# Patient Record
Sex: FEMALE | Race: WHITE | NOT HISPANIC OR LATINO | Employment: OTHER | URBAN - METROPOLITAN AREA
[De-identification: names, ages, dates, MRNs, and addresses within clinical notes are randomized per-mention and may not be internally consistent; named-entity substitution may affect disease eponyms.]

---

## 2017-01-17 DIAGNOSIS — F03.91 DEMENTIA WITH BEHAVIORAL DISTURBANCE, UNSPECIFIED DEMENTIA TYPE: ICD-10-CM

## 2017-01-17 RX ORDER — MEMANTINE HYDROCHLORIDE 28 MG/1
CAPSULE, EXTENDED RELEASE ORAL
OUTPATIENT
Start: 2017-01-17

## 2017-01-26 DIAGNOSIS — F03.91 DEMENTIA WITH BEHAVIORAL DISTURBANCE, UNSPECIFIED DEMENTIA TYPE: ICD-10-CM

## 2017-01-26 RX ORDER — MEMANTINE HYDROCHLORIDE 28 MG/1
CAPSULE, EXTENDED RELEASE ORAL
Qty: 30 EACH | Refills: 3 | Status: SHIPPED | OUTPATIENT
Start: 2017-01-26 | End: 2017-04-19 | Stop reason: SDUPTHER

## 2017-02-20 DIAGNOSIS — F02.818 DEMENTIA ASSOCIATED WITH OTHER UNDERLYING DISEASE WITH BEHAVIORAL DISTURBANCE: ICD-10-CM

## 2017-02-20 RX ORDER — DONEPEZIL HYDROCHLORIDE 5 MG/1
5 TABLET, FILM COATED ORAL EVERY MORNING
Qty: 30 TABLET | Refills: 11 | Status: SHIPPED | OUTPATIENT
Start: 2017-02-20 | End: 2017-04-19 | Stop reason: SDUPTHER

## 2017-03-24 RX ORDER — LOVASTATIN 10 MG/1
TABLET ORAL
Qty: 30 TABLET | Refills: 11 | Status: ON HOLD | OUTPATIENT
Start: 2017-03-24 | End: 2018-07-14 | Stop reason: HOSPADM

## 2017-04-19 DIAGNOSIS — F02.818 DEMENTIA ASSOCIATED WITH OTHER UNDERLYING DISEASE WITH BEHAVIORAL DISTURBANCE: ICD-10-CM

## 2017-04-19 DIAGNOSIS — F03.91 DEMENTIA WITH BEHAVIORAL DISTURBANCE, UNSPECIFIED DEMENTIA TYPE: ICD-10-CM

## 2017-04-19 RX ORDER — MEMANTINE HYDROCHLORIDE 28 MG/1
1 CAPSULE, EXTENDED RELEASE ORAL DAILY
Qty: 90 EACH | Refills: 3 | Status: SHIPPED | OUTPATIENT
Start: 2017-04-19 | End: 2018-04-12 | Stop reason: SDUPTHER

## 2017-04-19 RX ORDER — DONEPEZIL HYDROCHLORIDE 5 MG/1
5 TABLET, FILM COATED ORAL EVERY MORNING
Qty: 90 TABLET | Refills: 3 | Status: SHIPPED | OUTPATIENT
Start: 2017-04-19 | End: 2018-04-10 | Stop reason: SDUPTHER

## 2017-05-01 ENCOUNTER — OFFICE VISIT (OUTPATIENT)
Dept: INTERNAL MEDICINE | Facility: CLINIC | Age: 72
End: 2017-05-01
Payer: MEDICARE

## 2017-05-01 VITALS
OXYGEN SATURATION: 96 % | WEIGHT: 135.81 LBS | HEART RATE: 81 BPM | BODY MASS INDEX: 23.18 KG/M2 | HEIGHT: 64 IN | DIASTOLIC BLOOD PRESSURE: 60 MMHG | SYSTOLIC BLOOD PRESSURE: 130 MMHG

## 2017-05-01 DIAGNOSIS — H90.5 DEAFNESS CONGENITAL: ICD-10-CM

## 2017-05-01 DIAGNOSIS — E13.9 DIABETES MELLITUS DUE TO ABNORMAL INSULIN: Primary | ICD-10-CM

## 2017-05-01 DIAGNOSIS — E78.5 HYPERLIPIDEMIA, UNSPECIFIED HYPERLIPIDEMIA TYPE: ICD-10-CM

## 2017-05-01 DIAGNOSIS — F79 MENTAL RETARDATION: ICD-10-CM

## 2017-05-01 DIAGNOSIS — M54.89 OTHER BACK PAIN, UNSPECIFIED CHRONICITY: ICD-10-CM

## 2017-05-01 DIAGNOSIS — I10 ESSENTIAL HYPERTENSION: ICD-10-CM

## 2017-05-01 PROCEDURE — 99213 OFFICE O/P EST LOW 20 MIN: CPT | Mod: PBBFAC | Performed by: INTERNAL MEDICINE

## 2017-05-01 PROCEDURE — 99215 OFFICE O/P EST HI 40 MIN: CPT | Mod: S$PBB,,, | Performed by: INTERNAL MEDICINE

## 2017-05-01 PROCEDURE — 99999 PR PBB SHADOW E&M-EST. PATIENT-LVL III: CPT | Mod: PBBFAC,,, | Performed by: INTERNAL MEDICINE

## 2017-05-01 NOTE — PROGRESS NOTES
CHIEF COMPLAINT: Followup of diabetes, hypertension, hyperlipidemia, reflux.       HISTORY OF PRESENT ILLNESS: This is a 71-year-old woman who presents with her facilitator, Izabelfor followup of above. All history is obtained from Izabel due her mental handicapped and hearing loss. Memory has stabilized. She is now taking Aricept 5 mg daily and Namenda XR 28 mg daily. Memory is the same. She continues to see Dr Walters in neurology.  She continues to see Dr Cardenas in psychiatry who has given her haldol 5 mg once daily as needed when she gets agitated just prior her next Haldol injection (which is every 2 weeks). Have only had to have the prn Haldol once since our last visit. She continues to take Seroquel 200 mg at bedtime and Cogentin 1 mg twce daily and Cymbalta 30 mg daily.       Blood sugars have been doing well. She is checking blood sugars daily. She continues to take metformin 500 mg twice daily. No hypoglycemia. NO diarrhea. Weight is stable       Blood pressure has been well controlled on lisinopril 20 mg daily. No cough or shortness of breath. She continues to take lovastatin 10 mg at bedtime for hyperlipidemia. No joint pain, muscle pain, chest pain, shortness of breath, palpitaitons      No heartburn on Pepcid 40 mg daily. No nausea, vomiting, constipation, diarrhea.       She continues to complain of back pain intermittently. She does her exercises at home which helps. She continues to take tylenol 500 mg 2 tablets twice daily, Celebrex 200 mg daily, gabapentin 300 mg at bedtime and zanaflex 2 mg 2 tablets every 6 hours as needed. She sleeps well.                  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       PHYSICAL EXAMINATION:     /60 (BP Location: Left arm, Patient Position: Sitting, BP Method: Manual)  Pulse 81  Ht 5' 4" (1.626 m)  Wt 61.6 kg (135 lb 12.9 oz)  SpO2 96%  BMI 23.31 kg/m2    GENERAL: She is alert, oriented, no apparent distress. Affect within normal limits.    Conjunctiva anicteric. Tympanic membranes clear. Oropharynx clear.    NECK: Supple.    Respiratory: Effort normal. Lungs are clear to auscultation.    HEART: Regular rate and rhythm without murmurs, gallops or rubs.    No lower extremity edema.  ABDOMEN: soft, non distended, non tender, bowel sounds present, no hepatosplenomgaly  BREAST: no abn seen, no nodules palpated, no axillary lad  Protective Sensation (w/ 10 gram monofilament):  Right: Intact  Left: Intact    Visual Inspection:  Normal -  Bilateral    Pedal Pulses:   Right: Present  Left: Present    Posterior tibialis:   Right:Present  Left: Present              ASSESSMENT AND PLAN:    1. Memory loss - on Aricept and Namenda XR 28 mg daily  3. Diabetes mellitus - labs    4. Back pain -better  5. Hypertension - controlled.    6. Hyperlipidemia - stable    7. Reflux - controlled.    8. Schizoaffective disorder with memory issues - follow up with neurology and psychiatry    9. Screening - mammogram 11/16. Colonoscopy is due 2018. Normal bone density 09/2011.    I will see her back in 6 months, sooner if problems arise.    TO have labs done at [facility]           "

## 2017-05-11 ENCOUNTER — LAB VISIT (OUTPATIENT)
Dept: LAB | Facility: HOSPITAL | Age: 72
End: 2017-05-11
Attending: INTERNAL MEDICINE
Payer: MEDICARE

## 2017-05-11 DIAGNOSIS — E13.9 MATURITY ONSET DIABETES MELLITUS IN YOUNG: Primary | ICD-10-CM

## 2017-05-11 LAB
ALBUMIN SERPL BCP-MCNC: 4 G/DL
ALP SERPL-CCNC: 88 U/L
ALT SERPL W/O P-5'-P-CCNC: 9 U/L
ANION GAP SERPL CALC-SCNC: 10 MMOL/L
AST SERPL-CCNC: 16 U/L
BASOPHILS # BLD AUTO: 0.04 K/UL
BASOPHILS NFR BLD: 0.6 %
BILIRUB SERPL-MCNC: 0.3 MG/DL
BUN SERPL-MCNC: 15 MG/DL
CALCIUM SERPL-MCNC: 10.1 MG/DL
CHLORIDE SERPL-SCNC: 103 MMOL/L
CHOLEST/HDLC SERPL: 3.9 {RATIO}
CO2 SERPL-SCNC: 28 MMOL/L
CREAT SERPL-MCNC: 0.8 MG/DL
DIFFERENTIAL METHOD: NORMAL
EOSINOPHIL # BLD AUTO: 0.3 K/UL
EOSINOPHIL NFR BLD: 4 %
ERYTHROCYTE [DISTWIDTH] IN BLOOD BY AUTOMATED COUNT: 13.4 %
EST. GFR  (AFRICAN AMERICAN): >60 ML/MIN/1.73 M^2
EST. GFR  (NON AFRICAN AMERICAN): >60 ML/MIN/1.73 M^2
ESTIMATED AVG GLUCOSE: 114 MG/DL
GLUCOSE SERPL-MCNC: 99 MG/DL
HBA1C MFR BLD HPLC: 5.6 %
HCT VFR BLD AUTO: 39.4 %
HDL/CHOLESTEROL RATIO: 25.5 %
HDLC SERPL-MCNC: 149 MG/DL
HDLC SERPL-MCNC: 38 MG/DL
HGB BLD-MCNC: 12.9 G/DL
LDLC SERPL CALC-MCNC: 68.2 MG/DL
LYMPHOCYTES # BLD AUTO: 1.9 K/UL
LYMPHOCYTES NFR BLD: 29.6 %
MCH RBC QN AUTO: 28.5 PG
MCHC RBC AUTO-ENTMCNC: 32.7 %
MCV RBC AUTO: 87 FL
MONOCYTES # BLD AUTO: 0.7 K/UL
MONOCYTES NFR BLD: 11.3 %
NEUTROPHILS # BLD AUTO: 3.4 K/UL
NEUTROPHILS NFR BLD: 54.3 %
NONHDLC SERPL-MCNC: 111 MG/DL
PLATELET # BLD AUTO: 297 K/UL
PMV BLD AUTO: 9.6 FL
POTASSIUM SERPL-SCNC: 3.9 MMOL/L
PROT SERPL-MCNC: 8 G/DL
RBC # BLD AUTO: 4.52 M/UL
SODIUM SERPL-SCNC: 141 MMOL/L
TRIGL SERPL-MCNC: 214 MG/DL
TSH SERPL DL<=0.005 MIU/L-ACNC: 0.71 UIU/ML
WBC # BLD AUTO: 6.29 K/UL

## 2017-05-11 PROCEDURE — 80053 COMPREHEN METABOLIC PANEL: CPT

## 2017-05-11 PROCEDURE — 80061 LIPID PANEL: CPT

## 2017-05-11 PROCEDURE — 36415 COLL VENOUS BLD VENIPUNCTURE: CPT

## 2017-05-11 PROCEDURE — 84443 ASSAY THYROID STIM HORMONE: CPT

## 2017-05-11 PROCEDURE — 83036 HEMOGLOBIN GLYCOSYLATED A1C: CPT

## 2017-05-11 PROCEDURE — 85025 COMPLETE CBC W/AUTO DIFF WBC: CPT

## 2017-05-19 DIAGNOSIS — F25.9 CHRONIC SCHIZOAFFECTIVE DISORDER: ICD-10-CM

## 2017-05-22 RX ORDER — QUETIAPINE FUMARATE 200 MG/1
TABLET, FILM COATED ORAL
Qty: 30 TABLET | Refills: 1 | Status: SHIPPED | OUTPATIENT
Start: 2017-05-22 | End: 2017-07-19 | Stop reason: SDUPTHER

## 2017-05-22 RX ORDER — BENZTROPINE MESYLATE 1 MG/1
TABLET ORAL
Qty: 60 TABLET | Refills: 1 | Status: SHIPPED | OUTPATIENT
Start: 2017-05-22 | End: 2017-07-19 | Stop reason: SDUPTHER

## 2017-05-22 RX ORDER — DULOXETIN HYDROCHLORIDE 30 MG/1
CAPSULE, DELAYED RELEASE ORAL
Qty: 30 CAPSULE | Refills: 1 | Status: SHIPPED | OUTPATIENT
Start: 2017-05-22 | End: 2017-07-19 | Stop reason: SDUPTHER

## 2017-06-22 ENCOUNTER — HOSPITAL ENCOUNTER (EMERGENCY)
Facility: HOSPITAL | Age: 72
Discharge: HOME | End: 2017-06-23
Attending: EMERGENCY MEDICINE
Payer: MEDICARE
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
## 2017-06-22 DIAGNOSIS — S00.81XA ABRASION, FACE W/O INFECTION: ICD-10-CM

## 2017-06-22 PROCEDURE — 99284 EMERGENCY DEPT VISIT MOD MDM: CPT | Mod: ,,, | Performed by: EMERGENCY MEDICINE

## 2017-06-22 PROCEDURE — 99284 EMERGENCY DEPT VISIT MOD MDM: CPT | Mod: 25

## 2017-06-23 VITALS
DIASTOLIC BLOOD PRESSURE: 73 MMHG | WEIGHT: 140 LBS | RESPIRATION RATE: 16 BRPM | TEMPERATURE: 99 F | HEART RATE: 72 BPM | BODY MASS INDEX: 24.03 KG/M2 | OXYGEN SATURATION: 98 % | SYSTOLIC BLOOD PRESSURE: 167 MMHG

## 2017-06-23 PROCEDURE — 96374 THER/PROPH/DIAG INJ IV PUSH: CPT

## 2017-06-23 PROCEDURE — 25000003 PHARM REV CODE 250: Performed by: STUDENT IN AN ORGANIZED HEALTH CARE EDUCATION/TRAINING PROGRAM

## 2017-06-23 RX ORDER — LABETALOL HYDROCHLORIDE 5 MG/ML
10 INJECTION, SOLUTION INTRAVENOUS
Status: COMPLETED | OUTPATIENT
Start: 2017-06-23 | End: 2017-06-23

## 2017-06-23 RX ORDER — IBUPROFEN 600 MG/1
600 TABLET ORAL EVERY 6 HOURS PRN
Qty: 20 TABLET | Refills: 0 | Status: SHIPPED | OUTPATIENT
Start: 2017-06-23 | End: 2017-11-03

## 2017-06-23 RX ADMIN — LABETALOL HYDROCHLORIDE 10 MG: 5 INJECTION, SOLUTION INTRAVENOUS at 12:06

## 2017-06-23 NOTE — ED PROVIDER NOTES
Encounter Date: 6/22/2017    SCRIBE #1 NOTE: I, Ranjeet Maynard, am scribing for, and in the presence of,  Dr. Farfan . I have scribed the following portions of the note - the Resident attestation.       History     Chief Complaint   Patient presents with    Fall     Patient is a 70yo F with history of Dementia, HTN and DM who presents with face pain after a fall from standing today. Patient and the caregiver note she was walking in the park with her group when she tripped over her shoe and fell forward, striking her head on the ground. No LOC reported. No nausea/vomiting reported. Patient is acting appropriately per the caregiver at the bedside. Patient notes she fell on her L arm.           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 No pertinent surgical history.  Family History   Problem Relation Age of Onset    Depression Sister     Depression Brother     Suicide Brother     ADD / ADHD Neg Hx     Alcohol abuse Neg Hx     Anxiety disorder Neg Hx     Bipolar disorder Neg Hx     Dementia Neg Hx     Drug abuse Neg Hx     OCD Neg Hx     Paranoid behavior Neg Hx     Physical abuse Neg Hx     Schizophrenia Neg Hx     Seizures Neg Hx     Self injury Neg Hx     Sexual abuse Neg Hx      Social History   Substance Use Topics    Smoking status: Never Smoker    Smokeless tobacco: Never Used    Alcohol use No     Review of Systems   Constitutional: Negative for chills and fever.   HENT: Negative for congestion, rhinorrhea and sore throat.    Eyes: Negative for  visual disturbance.   Respiratory: Negative for cough and shortness of breath.    Cardiovascular: Negative for chest pain.   Gastrointestinal: Negative for abdominal pain, diarrhea, nausea and vomiting.   Genitourinary: Negative for dysuria.   Musculoskeletal: Negative for neck pain.   Skin: Positive for wound.   Neurological: Negative for dizziness, syncope and weakness.       Physical Exam     Initial Vitals [06/22/17 2056]   BP Pulse Resp Temp SpO2   (!) 170/80 73 18 98 °F (36.7 °C) 100 %      MAP       110         Physical Exam    Nursing note and vitals reviewed.  Constitutional: She appears well-developed and well-nourished. She is not diaphoretic. No distress.   Caregiver at bedside   HENT:   Head: Normocephalic and atraumatic.   Right Ear: External ear normal.   Left Ear: External ear normal.   Nose: Nose normal.   Mouth/Throat: Oropharynx is clear and moist. No oropharyngeal exudate.   No septal hematoma, no hemotympanum. Multiple superficial facial abrasions. No facial crepitus.    Eyes: EOM are normal. Pupils are equal, round, and reactive to light. Right eye exhibits no discharge. Left eye exhibits no discharge. No scleral icterus.   Neck: No tracheal deviation present. No JVD present.   Cardiovascular: Normal rate, regular rhythm, normal heart sounds and intact distal pulses. Exam reveals no gallop and no friction rub.    No murmur heard.  Pulmonary/Chest: Breath sounds normal. No respiratory distress. She has no wheezes. She has no rhonchi. She has no rales. She exhibits no tenderness.   Abdominal: Soft. Bowel sounds are normal. She exhibits no distension. There is no tenderness. There is no guarding.   Musculoskeletal: She exhibits no edema or tenderness.   Lymphadenopathy:     She has no cervical adenopathy.   Neurological: She is alert.   Moves all extremities and carries on conversation.   No C-spine tenderness. Full ROM.    Skin: Skin is warm and dry. Capillary refill takes less than 2 seconds.          ED Course   Procedures  Labs Reviewed - No data to display                APC / Resident Notes:   PGY-2 MDM:   -Patient is a 71 y.o. presenting with a chief complaint of mechanical fall from standing. Vital signs stable, patient afebrile, non-tachycardic and non-hypoxic.   -Will check CT head, Max Face for evidence of fracture. Will check CXR and Xr L shoulder for evidence of fracture.   -Patient will require blood pressure re-check in 1 week with primary care physician.     Selvin Oro DO  [employer] EM/IM PGY-2  6/23/2017 12:26 AM       Patient Care Update:  -CT imaging of the max face, head showed no acute process. CXR and Xr shoulder unremarkable.   -Will discharge.     Selvin Oro DO  [facility] EM/IM PGY-2  6/23/2017 2:14 AM          Scribe Attestation:   Scribe #1: I performed the above scribed service and the documentation accurately describes the services I performed. I attest to the accuracy of the note.    Attending Attestation:   Physician Attestation Statement for Resident:  As the supervising MD . -: 71 y.o. female presents to the ED after she tripped and fell. Pt has an abrasion to her face.      As the supervising MD I agree with the above PE.   -: Will do CT scan to evaluate for fracture or hemorrhage.            Physician Attestation for Scribe:  Physician Attestation Statement for Scribe #1: I, Dr. Farfan, reviewed documentation, as scribed by Ranjeet Maynard  in my presence, and it is both accurate and complete.                   ED Course     Clinical Impression:   Diagnoses of Fall, Fall, Fall, and Fall were pertinent to this visit.                           Selvin Oro MD  Resident  06/23/17 0417

## 2017-06-23 NOTE — ED TRIAGE NOTES
Pt presents to ED with c/o trip and fall. Pt hit her head and has abrasions to left side of face. Caretaker denies LOC. Pt also fel on left arm. No deformity noted. Pt denies pain. Pt has hx of dementia.

## 2017-06-23 NOTE — ED NOTES
Pt assisted to bed. Pt identifiers verified.    APPEARANCE: Pt is awake and alert. Pt is clean and well groomed with clothes appropriately fastened. Abrasions noted to left side of face.   RESPIRATORY: Respirations are even and unlabored. Airway is patent.   SKIN: Skin is warm, dry, intact and appropriately colored for ethnicity.   NEURO: Pt is moving all extremities without difficulty. Sensation is intact. Speech is clear and appropriate. Facial movement is symmetrical.   CARDIAC: No edema noted. Peripheral pulses are intact and palpable. Cap refill is <3 seconds.     Bed is in low, locked position with side rails upx2. Call light is in reach.

## 2017-07-12 DIAGNOSIS — F25.9 CHRONIC SCHIZOAFFECTIVE DISORDER: ICD-10-CM

## 2017-07-12 RX ORDER — QUETIAPINE FUMARATE 200 MG/1
TABLET, FILM COATED ORAL
Qty: 30 TABLET | OUTPATIENT
Start: 2017-07-12

## 2017-07-12 RX ORDER — BENZTROPINE MESYLATE 1 MG/1
TABLET ORAL
Qty: 60 TABLET | OUTPATIENT
Start: 2017-07-12

## 2017-07-12 RX ORDER — DULOXETIN HYDROCHLORIDE 30 MG/1
CAPSULE, DELAYED RELEASE ORAL
Qty: 30 CAPSULE | OUTPATIENT
Start: 2017-07-12

## 2017-07-19 ENCOUNTER — OFFICE VISIT (OUTPATIENT)
Dept: PSYCHIATRY | Facility: CLINIC | Age: 72
End: 2017-07-19
Payer: MEDICARE

## 2017-07-19 VITALS
HEART RATE: 84 BPM | HEIGHT: 64 IN | DIASTOLIC BLOOD PRESSURE: 78 MMHG | WEIGHT: 138 LBS | BODY MASS INDEX: 23.56 KG/M2 | SYSTOLIC BLOOD PRESSURE: 175 MMHG

## 2017-07-19 DIAGNOSIS — F25.9 CHRONIC SCHIZOAFFECTIVE DISORDER: Primary | ICD-10-CM

## 2017-07-19 DIAGNOSIS — E13.9 DIABETES MELLITUS DUE TO ABNORMAL INSULIN: ICD-10-CM

## 2017-07-19 DIAGNOSIS — I10 ESSENTIAL HYPERTENSION: ICD-10-CM

## 2017-07-19 DIAGNOSIS — F25.9 CHRONIC SCHIZOAFFECTIVE DISORDER: ICD-10-CM

## 2017-07-19 DIAGNOSIS — E78.5 HYPERLIPIDEMIA, UNSPECIFIED HYPERLIPIDEMIA TYPE: ICD-10-CM

## 2017-07-19 DIAGNOSIS — H90.5 DEAFNESS CONGENITAL: ICD-10-CM

## 2017-07-19 DIAGNOSIS — F03.91 DEMENTIA WITH BEHAVIORAL DISTURBANCE, UNSPECIFIED DEMENTIA TYPE: ICD-10-CM

## 2017-07-19 PROCEDURE — 99213 OFFICE O/P EST LOW 20 MIN: CPT | Mod: PBBFAC | Performed by: PSYCHIATRY & NEUROLOGY

## 2017-07-19 PROCEDURE — 1159F MED LIST DOCD IN RCRD: CPT | Mod: ,,, | Performed by: PSYCHIATRY & NEUROLOGY

## 2017-07-19 PROCEDURE — 99999 PR PBB SHADOW E&M-EST. PATIENT-LVL III: CPT | Mod: PBBFAC,,, | Performed by: PSYCHIATRY & NEUROLOGY

## 2017-07-19 PROCEDURE — 99214 OFFICE O/P EST MOD 30 MIN: CPT | Mod: S$PBB,,, | Performed by: PSYCHIATRY & NEUROLOGY

## 2017-07-19 PROCEDURE — 3044F HG A1C LEVEL LT 7.0%: CPT | Mod: ,,, | Performed by: PSYCHIATRY & NEUROLOGY

## 2017-07-19 RX ORDER — BENZTROPINE MESYLATE 1 MG/1
TABLET ORAL
Qty: 60 TABLET | Refills: 6 | Status: SHIPPED | OUTPATIENT
Start: 2017-07-19 | End: 2018-04-12 | Stop reason: SDUPTHER

## 2017-07-19 RX ORDER — DULOXETIN HYDROCHLORIDE 30 MG/1
CAPSULE, DELAYED RELEASE ORAL
Qty: 30 CAPSULE | Refills: 6 | Status: SHIPPED | OUTPATIENT
Start: 2017-07-19 | End: 2018-05-09 | Stop reason: SDUPTHER

## 2017-07-19 RX ORDER — BENZTROPINE MESYLATE 1 MG/1
TABLET ORAL
Qty: 60 TABLET | Refills: 1 | Status: SHIPPED | OUTPATIENT
Start: 2017-07-19 | End: 2017-07-19 | Stop reason: SDUPTHER

## 2017-07-19 RX ORDER — DULOXETIN HYDROCHLORIDE 30 MG/1
CAPSULE, DELAYED RELEASE ORAL
Qty: 30 CAPSULE | Refills: 1 | Status: SHIPPED | OUTPATIENT
Start: 2017-07-19 | End: 2017-07-19 | Stop reason: SDUPTHER

## 2017-07-19 RX ORDER — HALOPERIDOL 5 MG/1
5 TABLET ORAL DAILY PRN
Qty: 30 TABLET | Refills: 1 | Status: SHIPPED | OUTPATIENT
Start: 2017-07-19 | End: 2018-05-09 | Stop reason: SDUPTHER

## 2017-07-19 RX ORDER — QUETIAPINE FUMARATE 200 MG/1
TABLET, FILM COATED ORAL
Qty: 30 TABLET | Refills: 6 | Status: SHIPPED | OUTPATIENT
Start: 2017-07-19 | End: 2018-04-12 | Stop reason: SDUPTHER

## 2017-07-19 RX ORDER — QUETIAPINE FUMARATE 200 MG/1
TABLET, FILM COATED ORAL
Qty: 30 TABLET | Refills: 1 | Status: SHIPPED | OUTPATIENT
Start: 2017-07-19 | End: 2017-07-19 | Stop reason: SDUPTHER

## 2017-07-19 NOTE — PROGRESS NOTES
ESTABLISHED OUTPATIENT VISIT   E/M LEVEL 4: [id number]    ENCOUNTER DATE: 7/19/2017  SITE: [facility]      HISTORY    CHIEF COMPLAINT   Lay Peres is a 71 y.o. female who presents for followup of   Chief Complaint   Patient presents with    Mood Disorder    Memory Deficits       HPI   (Elements: Location, Quality, Severity, Duration, Timing, Content, Modifying Factors, Associated Signs & Symptoms)    Patient with cogenital deafness, possible MR, chronic schizoaffective disorder, and now dementia, followed by me for disease maintenance.  She comes with new  today.  Overall she is doing well save continued progression of memory loss - it may actually have stabilized to some degree but overall downward course.  She remains on aricept and namenda - both managed by neurology.  No significant agitation or behavioral problems.  She does have prn haldol for breakthrough agitation.  New  states she does have intermittent suspiciousness and some combativeness about showering.  The suspiciousness at times may be secondary to hallucinatory experience, at times may be secondary to memory loss.  She is not oversedated on medication - no falls, no grogginess.  Sleep intact.  No fatigue or drowsiness during day.  Weight up 9lbs intersession.  No movement side effects noted.  I discussed treatment plan and rationale with new .  No wandering off, leaving water/stove on, inappropriate clothing - she attends to some of her ADLs such as feeding and toileting - does wear a depends at night.  No depression or SI.  She did recently ask to live in a nursing home, which surprised her .   has been labeling things in her room to help assist with memory.      11/16/16 - no major changes other than worsening memory.  No med changes.  5/12/16 - no major changes other than worsening memory.  No med changes.  8/26/15 - progressive short term memory  loss but otherwise stable.  No med changes.  I discussed again with sister psychopharmacologic approach.  Of note, this is longstanding regimen started prior to seeing me.  We discussed both of the followin. Usually multiple neuroleptics avoided.  In this case she is on two neuroleptics - haldol decanoate and seroquel.  2. There is risk for cardiovascular events with neuroleptics in dementia.  However patient with comorbid schizoaffective disorder that long predates dementia and is indication for neuroleptic.  Sister understands risks and benefits of keeping med regimen as it is vs adjusting it.  Sister would like to continue regimen as is - states in past when adjustments made patient has decompensated requiring hospitalization.          ROS   Neurologic ROS: positive for - memory loss.  Negative for tremor, akathisia, or tardive dyskinesia  Musculoskeletal ROS: positive for - back pain   ROS: positive for urinary incontinence    PFSH  Past Medical History reviewed: Yes  Family History reviewed: Yes  Social History reviewed: No      PSYCHOTROPIC MEDICATIONS   Haldol dec 100mg IM q2 weeks - also Haldol 5mg prns  Seroquel 200mg bedtime  Cymbalta 30mg daily  Cogentin 1mg bid    Namenda  Aricept    EXAMINATION    VITALS   Most recent vital signs, dated less than 90 days prior to this appointment, were reviewed.   Vitals:    17 1651   BP: (!) 175/78   Pulse: 84       CONSTITUTIONAL  General Appearance: stated age, casually dressed    MUSCULOSKELETAL  Muscle Strength and Tone: no weakness or spasticity  Abnormal Involuntary Movements: no tremor noted.  No evidence of akathisia or tardive dyskinesia on my exam.  Gait and Station: ambulates without assistance    PSYCHIATRIC   Level of Consciousness: alert  Orientation: to person and place  Grooming: neat, intact  Psychomotor Behavior: no retardation or agitation.    Speech: rambles on at times, repetitive - often repeating self  Language: reads lips, some  dysarthria - difficult to comprehend  Mood: ok  Affect: animated, reactive, smiling, pleasant, euthymic.  No irritability noted.    Thought Process: perseverative, concrete, off topic at times  Associations: no loosening of associations  Thought Content: no SI.  +PI at times but none here with me  Memory: impaired  Attention: mild distractibility noted  Fund of Knowledge: limited  Insight: impaired  Judgment: intact    MEDICAL DECISION MAKING    DIAGNOSES AND MANAGEMENT PLANS    New problem(s) (3 points each):   - none    Established problem(s), worsening (2 points each):   - none    Established problem(s), stable/improved (1 point each):  - schizoaffective disorder.  cont current med regimen - this is longstanding regimen and it seems to be keeping her reasonably stable with no appreciable side effects.  - mental retardation.  Perhaps mild, IQ unknown.  Cont structured living program.  - congenital deafness.  Could be complicating proper diagnosis.  She reads lips.  Neurology notes it may be affecting memory diagnosis.  - dementia.  Neurology input appreciated, will cont to follow.  She is now on aricept and namenda.  As dementia progresses, we will likely need to taper down psychotropics - but she has not yet progressed to this point - this was again reviewed with new  today.    - metabolic syndrome.  Will monitor given she is on atypical neuroleptics.  Labs up to date - reviewed.  She is followed by Dr. Case.  Weight is up 9lbs.      PRESCRIPTION DRUG MANAGEMENT  Compliance: taking meds as prescribed  Side Effects: none reported  Regimen Adjustments: cont med regimen as prescribed.  [place] prescription monitoring site checked - no entries      DIAGNOSTIC TESTING  Follow cmp and fasting lipids on seroquel   5/11/17 - TSH, lipid panel, CMP, CBC, HgA1c reviewed  5/10/13 EKG QTc 428      Serg Cardenas

## 2017-09-07 ENCOUNTER — LAB VISIT (OUTPATIENT)
Dept: LAB | Facility: HOSPITAL | Age: 72
End: 2017-09-07
Attending: INTERNAL MEDICINE
Payer: MEDICARE

## 2017-09-07 DIAGNOSIS — E11.9 DIABETES MELLITUS WITHOUT COMPLICATION: Primary | ICD-10-CM

## 2017-09-07 LAB
ESTIMATED AVG GLUCOSE: 105 MG/DL
HBA1C MFR BLD HPLC: 5.3 %

## 2017-09-07 PROCEDURE — 83036 HEMOGLOBIN GLYCOSYLATED A1C: CPT

## 2017-10-05 ENCOUNTER — IMMUNIZATION (OUTPATIENT)
Dept: INTERNAL MEDICINE | Facility: CLINIC | Age: 72
End: 2017-10-05
Payer: MEDICARE

## 2017-10-05 PROCEDURE — 90662 IIV NO PRSV INCREASED AG IM: CPT | Mod: PBBFAC | Performed by: INTERNAL MEDICINE

## 2017-11-03 ENCOUNTER — OFFICE VISIT (OUTPATIENT)
Dept: INTERNAL MEDICINE | Facility: CLINIC | Age: 72
End: 2017-11-03
Payer: MEDICARE

## 2017-11-03 VITALS
SYSTOLIC BLOOD PRESSURE: 120 MMHG | DIASTOLIC BLOOD PRESSURE: 70 MMHG | BODY MASS INDEX: 23.93 KG/M2 | HEART RATE: 76 BPM | WEIGHT: 140.19 LBS | HEIGHT: 64 IN

## 2017-11-03 DIAGNOSIS — E13.9 DIABETES MELLITUS DUE TO ABNORMAL INSULIN: ICD-10-CM

## 2017-11-03 DIAGNOSIS — F79 MENTAL RETARDATION: ICD-10-CM

## 2017-11-03 DIAGNOSIS — Z12.31 SCREENING MAMMOGRAM, ENCOUNTER FOR: Primary | ICD-10-CM

## 2017-11-03 DIAGNOSIS — F03.91 DEMENTIA WITH BEHAVIORAL DISTURBANCE, UNSPECIFIED DEMENTIA TYPE: ICD-10-CM

## 2017-11-03 DIAGNOSIS — I10 ESSENTIAL HYPERTENSION: ICD-10-CM

## 2017-11-03 DIAGNOSIS — H90.5 DEAFNESS CONGENITAL: ICD-10-CM

## 2017-11-03 DIAGNOSIS — Z12.11 SCREENING FOR MALIGNANT NEOPLASM OF COLON: ICD-10-CM

## 2017-11-03 DIAGNOSIS — E78.5 HYPERLIPIDEMIA, UNSPECIFIED HYPERLIPIDEMIA TYPE: ICD-10-CM

## 2017-11-03 PROCEDURE — 99212 OFFICE O/P EST SF 10 MIN: CPT | Mod: PBBFAC | Performed by: INTERNAL MEDICINE

## 2017-11-03 PROCEDURE — 99999 PR PBB SHADOW E&M-EST. PATIENT-LVL II: CPT | Mod: PBBFAC,,, | Performed by: INTERNAL MEDICINE

## 2017-11-03 PROCEDURE — 99215 OFFICE O/P EST HI 40 MIN: CPT | Mod: S$PBB,,, | Performed by: INTERNAL MEDICINE

## 2017-11-03 NOTE — PROGRESS NOTES
CHIEF COMPLAINT: Followup of diabetes, hypertension, hyperlipidemia, reflux.       HISTORY OF PRESENT ILLNESS: This is a 72-year-old woman who presents with her sister followup of above. All history is obtained from sister due her mental handicapped and hearing loss. Memory has stabilized. She is now taking Aricept 5 mg daily and Namenda XR 28 mg daily. Memory is the same. She continues to see Dr Walters in neurology.  She continues to see Dr Cardenas in psychiatry who has given her haldol 5 mg once daily as needed when she gets agitated just prior her next Haldol injection (which is every 2 weeks). Have only had to have the prn Haldol once since our last visit. She continues to take Seroquel 200 mg at bedtime and Cogentin 1 mg twce daily and Cymbalta 30 mg daily.       Blood sugars have been doing well. She is checking blood sugars daily. She continues to take metformin 500 mg twice daily. No hypoglycemia. NO diarrhea. Weight is stable       Blood pressure has been well controlled on lisinopril 20 mg daily. No cough or shortness of breath. She continues to take lovastatin 10 mg at bedtime for hyperlipidemia. No joint pain, muscle pain, chest pain, shortness of breath, palpitaitons      No heartburn on Pepcid 40 mg daily. No nausea, vomiting, constipation, diarrhea.       She continues to complain of back pain intermittently. She does her exercises at home which helps. She continues to take tylenol 500 mg 2 tablets twice daily, Celebrex 200 mg daily, gabapentin 300 mg at bedtime and zanaflex 2 mg 2 tablets every 6 hours as needed. She sleeps well.                  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       PHYSICAL EXAMINATION:       /70   Pulse 76   Ht 5' 4" (1.626 m)   Wt 63.6 kg (140 lb 3.4 oz)   BMI 24.07 kg/m²     GENERAL: She is alert, oriented, no apparent distress. Affect within normal limits.    Conjunctiva anicteric. Tympanic membranes clear. Oropharynx clear.    NECK: Supple.    Respiratory: Effort normal. Lungs are clear to auscultation.    HEART: Regular rate and rhythm without murmurs, gallops or rubs.    No lower extremity edema.  ABDOMEN: soft, non distended, non tender, bowel sounds present, no hepatosplenomgaly  BREAST: no abn seen, no nodules palpated, no axillary lad                ASSESSMENT AND PLAN:    1. Memory loss - on Aricept and Namenda XR 28 mg daily  3. Diabetes mellitus - stable   4. Back pain -stable  5. Hypertension - controlled.    6. Hyperlipidemia - stable    7. Reflux - controlled.    8. Schizoaffective disorder with memory issues - follow up with neurology and psychiatry    9. Screening - mammogram 11/16 - ordered. Colonoscopy is due 2018. Normal bone density 09/2011.      [employer] was given to patient on 11/3/2017 10:21 AM         I will see her back in 6 months, sooner if problems arise.    TO have labs done at [facility]        "

## 2017-11-07 DIAGNOSIS — F02.818 DEMENTIA ASSOCIATED WITH OTHER UNDERLYING DISEASE WITH BEHAVIORAL DISTURBANCE: ICD-10-CM

## 2017-11-13 DIAGNOSIS — F25.9 CHRONIC SCHIZOAFFECTIVE DISORDER: ICD-10-CM

## 2017-11-13 RX ORDER — HALOPERIDOL DECANOATE 100 MG/ML
100 INJECTION INTRAMUSCULAR
Qty: 1 ML | Refills: 12 | Status: SHIPPED | OUTPATIENT
Start: 2017-11-13 | End: 2018-05-09 | Stop reason: SDUPTHER

## 2018-01-05 ENCOUNTER — HOSPITAL ENCOUNTER (OUTPATIENT)
Dept: RADIOLOGY | Facility: HOSPITAL | Age: 73
Discharge: HOME | End: 2018-01-05
Attending: INTERNAL MEDICINE
Payer: MEDICARE

## 2018-01-05 DIAGNOSIS — Z12.31 SCREENING MAMMOGRAM, ENCOUNTER FOR: ICD-10-CM

## 2018-01-05 PROCEDURE — 77067 SCR MAMMO BI INCL CAD: CPT | Mod: TC

## 2018-01-05 PROCEDURE — 77067 SCR MAMMO BI INCL CAD: CPT | Mod: 26,,, | Performed by: RADIOLOGY

## 2018-01-05 PROCEDURE — 77063 BREAST TOMOSYNTHESIS BI: CPT | Mod: 26,,, | Performed by: RADIOLOGY

## 2018-02-22 ENCOUNTER — HOSPITAL ENCOUNTER (EMERGENCY)
Facility: HOSPITAL | Age: 73
Discharge: HOME | End: 2018-02-22
Attending: EMERGENCY MEDICINE
Payer: MEDICARE

## 2018-02-22 VITALS
HEIGHT: 65 IN | HEART RATE: 77 BPM | BODY MASS INDEX: 24.16 KG/M2 | OXYGEN SATURATION: 99 % | RESPIRATION RATE: 20 BRPM | DIASTOLIC BLOOD PRESSURE: 77 MMHG | TEMPERATURE: 98 F | WEIGHT: 145 LBS | SYSTOLIC BLOOD PRESSURE: 171 MMHG
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
## 2018-02-22 DIAGNOSIS — S09.90XA INJURY OF HEAD, INITIAL ENCOUNTER: Primary | ICD-10-CM

## 2018-02-22 DIAGNOSIS — S00.03XA HEMATOMA OF SCALP, INITIAL ENCOUNTER: ICD-10-CM

## 2018-02-22 PROCEDURE — 25000003 PHARM REV CODE 250: Performed by: EMERGENCY MEDICINE

## 2018-02-22 PROCEDURE — 99283 EMERGENCY DEPT VISIT LOW MDM: CPT

## 2018-02-22 PROCEDURE — 99283 EMERGENCY DEPT VISIT LOW MDM: CPT | Mod: ,,, | Performed by: EMERGENCY MEDICINE

## 2018-02-22 RX ORDER — ACETAMINOPHEN 325 MG/1
650 TABLET ORAL
Status: COMPLETED | OUTPATIENT
Start: 2018-02-22 | End: 2018-02-22

## 2018-02-22 RX ADMIN — ACETAMINOPHEN 650 MG: 325 TABLET, FILM COATED ORAL at 03:02

## 2018-02-22 NOTE — ED NOTES
Pt reports being puched down from standing position today. Pt reports falling and hitting head on floor. Pt denies LOC , AMS , N/V.     LOC: The patient is awake, alert and aware of environment with an appropriate affect. Pt family member being historian, pt hx of deafness   APPEARANCE: Patient resting comfortably and in no acute distress, patient is clean and well groomed  SKIN: The skin is warm and dry, color consistent with ethnicity, patient has normal skin turgor and moist mucus membranes, skin intact, no breakdown or bruising noted.  MUSCULOSKELETAL: Patient moving all extremities well, no obvious swelling or deformities noted.   NEUROLOGIC: PERRL, 3 mm bilaterally, eyes open spontaneously, behavior appropriate to situation, follows commands, facial expression symmetrical.
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
## 2018-02-22 NOTE — DISCHARGE INSTRUCTIONS
Our goal in the emergency department is to always give you outstanding care and exceptional service. You may receive a survey by mail or e-mail in the next week regarding your experience in our ED. We would greatly appreciate your completing and returning the survey. Your feedback provides us with a way to recognize our staff who give very good care and it helps us learn how to improve when your experience was below our aspiration of excellence.     Tylenol for pain as needed.  Continue to use ice for swelling.

## 2018-03-15 DIAGNOSIS — F25.9 CHRONIC SCHIZOAFFECTIVE DISORDER: ICD-10-CM

## 2018-03-15 RX ORDER — BENZTROPINE MESYLATE 1 MG/1
TABLET ORAL
Qty: 60 TABLET | Refills: 0 | Status: SHIPPED | OUTPATIENT
Start: 2018-03-15 | End: 2018-04-10 | Stop reason: SDUPTHER

## 2018-04-10 DIAGNOSIS — F02.818 DEMENTIA ASSOCIATED WITH OTHER UNDERLYING DISEASE WITH BEHAVIORAL DISTURBANCE: ICD-10-CM

## 2018-04-10 DIAGNOSIS — F25.9 CHRONIC SCHIZOAFFECTIVE DISORDER: ICD-10-CM

## 2018-04-10 RX ORDER — DONEPEZIL HYDROCHLORIDE 5 MG/1
5 TABLET, FILM COATED ORAL EVERY MORNING
Qty: 90 TABLET | Refills: 3 | Status: SHIPPED | OUTPATIENT
Start: 2018-04-10 | End: 2019-04-04 | Stop reason: SDUPTHER

## 2018-04-11 RX ORDER — BENZTROPINE MESYLATE 1 MG/1
TABLET ORAL
Qty: 14 TABLET | Refills: 0 | Status: SHIPPED | OUTPATIENT
Start: 2018-04-11 | End: 2018-05-09

## 2018-04-11 RX ORDER — DULOXETIN HYDROCHLORIDE 30 MG/1
CAPSULE, DELAYED RELEASE ORAL
Qty: 7 CAPSULE | Refills: 0 | Status: SHIPPED | OUTPATIENT
Start: 2018-04-11 | End: 2018-05-09

## 2018-04-11 RX ORDER — QUETIAPINE FUMARATE 200 MG/1
TABLET, FILM COATED ORAL
Qty: 7 TABLET | Refills: 0 | Status: SHIPPED | OUTPATIENT
Start: 2018-04-11 | End: 2018-05-09

## 2018-04-12 ENCOUNTER — TELEPHONE (OUTPATIENT)
Dept: NEUROLOGY | Facility: CLINIC | Age: 73
End: 2018-04-12

## 2018-04-12 DIAGNOSIS — F03.91 DEMENTIA WITH BEHAVIORAL DISTURBANCE, UNSPECIFIED DEMENTIA TYPE: ICD-10-CM

## 2018-04-12 DIAGNOSIS — F25.9 CHRONIC SCHIZOAFFECTIVE DISORDER: ICD-10-CM

## 2018-04-12 RX ORDER — BENZTROPINE MESYLATE 1 MG/1
TABLET ORAL
Qty: 60 TABLET | Refills: 0 | Status: SHIPPED | OUTPATIENT
Start: 2018-04-12 | End: 2018-05-09 | Stop reason: SDUPTHER

## 2018-04-12 RX ORDER — QUETIAPINE FUMARATE 200 MG/1
TABLET, FILM COATED ORAL
Qty: 30 TABLET | Refills: 0 | Status: SHIPPED | OUTPATIENT
Start: 2018-04-12 | End: 2018-05-09 | Stop reason: SDUPTHER

## 2018-04-12 RX ORDER — MEMANTINE HYDROCHLORIDE 28 MG/1
1 CAPSULE, EXTENDED RELEASE ORAL DAILY
Qty: 90 EACH | Refills: 3 | Status: SHIPPED | OUTPATIENT
Start: 2018-04-12 | End: 2019-04-04 | Stop reason: SDUPTHER

## 2018-04-12 NOTE — TELEPHONE ENCOUNTER
Advice endy back does not get the prescription request and we will send her the prescription today.

## 2018-04-12 NOTE — TELEPHONE ENCOUNTER
----- Message from Wayne Bull sent at 4/12/2018  1:42 PM CDT -----  Contact: Dorie Gambino            Name of Who is Calling: Ted      What is the request in detail: Pharmacy is requesting a call back in reference to the denial on Rx memantine (NAMENDA XR) 28 mg CSpX       Can the clinic reply by [facility]: no      What Number to Call Back if not in [facility]: Dorie Gambino [facility] [place] [address] [place] [address] [phone]

## 2018-04-17 DIAGNOSIS — F25.9 CHRONIC SCHIZOAFFECTIVE DISORDER: ICD-10-CM

## 2018-04-17 RX ORDER — DULOXETIN HYDROCHLORIDE 30 MG/1
CAPSULE, DELAYED RELEASE ORAL
Qty: 30 CAPSULE | Refills: 0 | Status: SHIPPED | OUTPATIENT
Start: 2018-04-17 | End: 2018-05-09

## 2018-05-03 ENCOUNTER — TELEPHONE (OUTPATIENT)
Dept: ENDOSCOPY | Facility: HOSPITAL | Age: 73
End: 2018-05-03

## 2018-05-03 DIAGNOSIS — Z12.11 SPECIAL SCREENING FOR MALIGNANT NEOPLASMS, COLON: Primary | ICD-10-CM

## 2018-05-03 RX ORDER — POLYETHYLENE GLYCOL 3350, SODIUM SULFATE ANHYDROUS, SODIUM BICARBONATE, SODIUM CHLORIDE, POTASSIUM CHLORIDE 236; 22.74; 6.74; 5.86; 2.97 G/4L; G/4L; G/4L; G/4L; G/4L
4 POWDER, FOR SOLUTION ORAL ONCE
Qty: 4000 ML | Refills: 0 | Status: SHIPPED | OUTPATIENT
Start: 2018-05-03 | End: 2018-05-03

## 2018-05-09 ENCOUNTER — OFFICE VISIT (OUTPATIENT)
Dept: PSYCHIATRY | Facility: CLINIC | Age: 73
End: 2018-05-09
Payer: MEDICARE

## 2018-05-09 VITALS
WEIGHT: 146.5 LBS | HEART RATE: 72 BPM | SYSTOLIC BLOOD PRESSURE: 179 MMHG | BODY MASS INDEX: 24.38 KG/M2 | DIASTOLIC BLOOD PRESSURE: 82 MMHG

## 2018-05-09 DIAGNOSIS — E78.5 HYPERLIPIDEMIA, UNSPECIFIED HYPERLIPIDEMIA TYPE: ICD-10-CM

## 2018-05-09 DIAGNOSIS — H90.5 DEAFNESS CONGENITAL: ICD-10-CM

## 2018-05-09 DIAGNOSIS — E13.9 DIABETES MELLITUS DUE TO ABNORMAL INSULIN: ICD-10-CM

## 2018-05-09 DIAGNOSIS — F79 MENTAL RETARDATION: ICD-10-CM

## 2018-05-09 DIAGNOSIS — Z79.899 LONG-TERM USE OF HIGH-RISK MEDICATION: ICD-10-CM

## 2018-05-09 DIAGNOSIS — F03.91 DEMENTIA WITH BEHAVIORAL DISTURBANCE, UNSPECIFIED DEMENTIA TYPE: ICD-10-CM

## 2018-05-09 DIAGNOSIS — F25.9 CHRONIC SCHIZOAFFECTIVE DISORDER: Primary | ICD-10-CM

## 2018-05-09 DIAGNOSIS — I10 ESSENTIAL HYPERTENSION: ICD-10-CM

## 2018-05-09 PROCEDURE — 99999 PR PBB SHADOW E&M-EST. PATIENT-LVL III: CPT | Mod: PBBFAC,,, | Performed by: PSYCHIATRY & NEUROLOGY

## 2018-05-09 PROCEDURE — 99213 OFFICE O/P EST LOW 20 MIN: CPT | Mod: PBBFAC | Performed by: PSYCHIATRY & NEUROLOGY

## 2018-05-09 PROCEDURE — 99214 OFFICE O/P EST MOD 30 MIN: CPT | Mod: S$PBB,,, | Performed by: PSYCHIATRY & NEUROLOGY

## 2018-05-09 RX ORDER — HALOPERIDOL 5 MG/1
5 TABLET ORAL DAILY PRN
Qty: 30 TABLET | Refills: 1 | Status: SHIPPED | OUTPATIENT
Start: 2018-05-09 | End: 2018-08-15 | Stop reason: SDUPTHER

## 2018-05-09 RX ORDER — DULOXETIN HYDROCHLORIDE 30 MG/1
CAPSULE, DELAYED RELEASE ORAL
Qty: 30 CAPSULE | Refills: 6 | Status: SHIPPED | OUTPATIENT
Start: 2018-05-09 | End: 2018-12-05 | Stop reason: SDUPTHER

## 2018-05-09 RX ORDER — QUETIAPINE FUMARATE 200 MG/1
TABLET, FILM COATED ORAL
Qty: 30 TABLET | Refills: 0 | Status: SHIPPED | OUTPATIENT
Start: 2018-05-09 | End: 2018-06-15 | Stop reason: SDUPTHER

## 2018-05-09 RX ORDER — BENZTROPINE MESYLATE 1 MG/1
TABLET ORAL
Qty: 60 TABLET | Refills: 6 | Status: SHIPPED | OUTPATIENT
Start: 2018-05-09 | End: 2018-08-15 | Stop reason: SDUPTHER

## 2018-05-09 RX ORDER — HALOPERIDOL DECANOATE 100 MG/ML
100 INJECTION INTRAMUSCULAR
Qty: 1 ML | Refills: 12 | Status: SHIPPED | OUTPATIENT
Start: 2018-05-09 | End: 2018-11-14 | Stop reason: SDUPTHER
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
## 2018-06-12 DIAGNOSIS — F25.9 CHRONIC SCHIZOAFFECTIVE DISORDER: ICD-10-CM

## 2018-06-13 RX ORDER — QUETIAPINE FUMARATE 200 MG/1
TABLET, FILM COATED ORAL
Qty: 30 TABLET | OUTPATIENT
Start: 2018-06-13

## 2018-06-15 DIAGNOSIS — F25.9 CHRONIC SCHIZOAFFECTIVE DISORDER: ICD-10-CM

## 2018-06-15 RX ORDER — QUETIAPINE FUMARATE 200 MG/1
TABLET, FILM COATED ORAL
Qty: 30 TABLET | Refills: 0 | Status: SHIPPED | OUTPATIENT
Start: 2018-06-15 | End: 2018-07-03 | Stop reason: SDUPTHER

## 2018-07-03 DIAGNOSIS — F25.9 CHRONIC SCHIZOAFFECTIVE DISORDER: ICD-10-CM

## 2018-07-05 RX ORDER — QUETIAPINE FUMARATE 200 MG/1
TABLET, FILM COATED ORAL
Qty: 30 TABLET | Refills: 0 | Status: SHIPPED | OUTPATIENT
Start: 2018-07-05 | End: 2018-07-31 | Stop reason: SDUPTHER

## 2018-07-13 ENCOUNTER — HOSPITAL ENCOUNTER (INPATIENT)
Facility: HOSPITAL | Age: 73
LOS: 3 days | Discharge: HOME HEALTH | DRG: 064 | End: 2018-07-16
Attending: EMERGENCY MEDICINE | Admitting: PSYCHIATRY & NEUROLOGY
Payer: MEDICARE

## 2018-07-13 DIAGNOSIS — I63.9 CEREBROVASCULAR ACCIDENT (CVA), UNSPECIFIED MECHANISM: Primary | ICD-10-CM

## 2018-07-13 DIAGNOSIS — G93.6 CYTOTOXIC CEREBRAL EDEMA: ICD-10-CM

## 2018-07-13 DIAGNOSIS — I63.9 CVA (CEREBRAL VASCULAR ACCIDENT): ICD-10-CM

## 2018-07-13 DIAGNOSIS — I63.412 STROKE DUE TO EMBOLISM OF LEFT MIDDLE CEREBRAL ARTERY: ICD-10-CM

## 2018-07-13 DIAGNOSIS — I63.9 STROKE: ICD-10-CM

## 2018-07-13 DIAGNOSIS — F03.91 DEMENTIA WITH BEHAVIORAL DISTURBANCE, UNSPECIFIED DEMENTIA TYPE: ICD-10-CM

## 2018-07-13 DIAGNOSIS — R41.82 ALTERED MENTAL STATUS: ICD-10-CM

## 2018-07-13 LAB
ALBUMIN SERPL BCP-MCNC: 3 G/DL
ALP SERPL-CCNC: 106 U/L
ALT SERPL W/O P-5'-P-CCNC: 21 U/L
ANION GAP SERPL CALC-SCNC: 9 MMOL/L
AST SERPL-CCNC: 21 U/L
BACTERIA #/AREA URNS AUTO: ABNORMAL /HPF
BASOPHILS # BLD AUTO: 0.04 K/UL
BASOPHILS NFR BLD: 0.3 %
BILIRUB SERPL-MCNC: 0.8 MG/DL
BILIRUB UR QL STRIP: NEGATIVE
BUN SERPL-MCNC: 27 MG/DL
CALCIUM SERPL-MCNC: 10.2 MG/DL
CHLORIDE SERPL-SCNC: 107 MMOL/L
CHOLEST SERPL-MCNC: 106 MG/DL
CHOLEST/HDLC SERPL: 3.2 {RATIO}
CLARITY UR REFRACT.AUTO: ABNORMAL
CO2 SERPL-SCNC: 24 MMOL/L
COLOR UR AUTO: YELLOW
CREAT SERPL-MCNC: 1.3 MG/DL (ref 0.5–1.4)
CREAT SERPL-MCNC: 1.5 MG/DL
DIASTOLIC DYSFUNCTION: YES
DIFFERENTIAL METHOD: ABNORMAL
EOSINOPHIL # BLD AUTO: 0 K/UL
EOSINOPHIL NFR BLD: 0.3 %
ERYTHROCYTE [DISTWIDTH] IN BLOOD BY AUTOMATED COUNT: 14.6 %
EST. GFR  (AFRICAN AMERICAN): 39.3 ML/MIN/1.73 M^2
EST. GFR  (NON AFRICAN AMERICAN): 34.1 ML/MIN/1.73 M^2
ESTIMATED AVG GLUCOSE: 108 MG/DL
ESTIMATED PA SYSTOLIC PRESSURE: 50.06
GLUCOSE SERPL-MCNC: 171 MG/DL
GLUCOSE UR QL STRIP: NEGATIVE
HBA1C MFR BLD HPLC: 5.4 %
HCT VFR BLD AUTO: 31.3 %
HDLC SERPL-MCNC: 33 MG/DL
HDLC SERPL: 31.1 %
HGB BLD-MCNC: 9.6 G/DL
HGB UR QL STRIP: ABNORMAL
HYALINE CASTS UR QL AUTO: 0 /LPF
IMM GRANULOCYTES # BLD AUTO: 0.12 K/UL
IMM GRANULOCYTES NFR BLD AUTO: 0.8 %
INR PPP: 1
KETONES UR QL STRIP: NEGATIVE
LDLC SERPL CALC-MCNC: 54.4 MG/DL
LEUKOCYTE ESTERASE UR QL STRIP: ABNORMAL
LYMPHOCYTES # BLD AUTO: 0.5 K/UL
LYMPHOCYTES NFR BLD: 3 %
MCH RBC QN AUTO: 27.6 PG
MCHC RBC AUTO-ENTMCNC: 30.7 G/DL
MCV RBC AUTO: 90 FL
MICROSCOPIC COMMENT: ABNORMAL
MONOCYTES # BLD AUTO: 1.8 K/UL
MONOCYTES NFR BLD: 11.7 %
NEUTROPHILS # BLD AUTO: 13 K/UL
NEUTROPHILS NFR BLD: 83.9 %
NITRITE UR QL STRIP: POSITIVE
NONHDLC SERPL-MCNC: 73 MG/DL
NRBC BLD-RTO: 0 /100 WBC
PH UR STRIP: 5 [PH] (ref 5–8)
PLATELET # BLD AUTO: 236 K/UL
PMV BLD AUTO: 9.6 FL
POC PTINR: 1.4 (ref 0.9–1.2)
POC PTWBT: 16 SEC (ref 9.7–14.3)
POCT GLUCOSE: 145 MG/DL (ref 70–110)
POCT GLUCOSE: 198 MG/DL (ref 70–110)
POTASSIUM SERPL-SCNC: 4.1 MMOL/L
PROT SERPL-MCNC: 7.1 G/DL
PROT UR QL STRIP: ABNORMAL
PROTHROMBIN TIME: 10.6 SEC
RBC # BLD AUTO: 3.48 M/UL
RBC #/AREA URNS AUTO: 10 /HPF (ref 0–4)
RETIRED EF AND QEF - SEE NOTES: 60 (ref 55–65)
SAMPLE: ABNORMAL
SAMPLE: NORMAL
SODIUM SERPL-SCNC: 140 MMOL/L
SP GR UR STRIP: 1.01 (ref 1–1.03)
TRICUSPID VALVE REGURGITATION: ABNORMAL
TRIGL SERPL-MCNC: 93 MG/DL
TSH SERPL DL<=0.005 MIU/L-ACNC: 0.63 UIU/ML
URN SPEC COLLECT METH UR: ABNORMAL
UROBILINOGEN UR STRIP-ACNC: NEGATIVE EU/DL
WBC # BLD AUTO: 15.48 K/UL
WBC #/AREA URNS AUTO: 25 /HPF (ref 0–5)

## 2018-07-13 PROCEDURE — 99285 EMERGENCY DEPT VISIT HI MDM: CPT | Mod: 25

## 2018-07-13 PROCEDURE — 63600175 PHARM REV CODE 636 W HCPCS: Performed by: STUDENT IN AN ORGANIZED HEALTH CARE EDUCATION/TRAINING PROGRAM

## 2018-07-13 PROCEDURE — 25000003 PHARM REV CODE 250: Performed by: PHYSICIAN ASSISTANT

## 2018-07-13 PROCEDURE — 83036 HEMOGLOBIN GLYCOSYLATED A1C: CPT

## 2018-07-13 PROCEDURE — 63600175 PHARM REV CODE 636 W HCPCS: Performed by: PHYSICIAN ASSISTANT

## 2018-07-13 PROCEDURE — 20600001 HC STEP DOWN PRIVATE ROOM

## 2018-07-13 PROCEDURE — 82565 ASSAY OF CREATININE: CPT

## 2018-07-13 PROCEDURE — 87088 URINE BACTERIA CULTURE: CPT

## 2018-07-13 PROCEDURE — 93005 ELECTROCARDIOGRAM TRACING: CPT

## 2018-07-13 PROCEDURE — 96374 THER/PROPH/DIAG INJ IV PUSH: CPT

## 2018-07-13 PROCEDURE — 97535 SELF CARE MNGMENT TRAINING: CPT

## 2018-07-13 PROCEDURE — 96375 TX/PRO/DX INJ NEW DRUG ADDON: CPT

## 2018-07-13 PROCEDURE — G8996 SWALLOW CURRENT STATUS: HCPCS | Mod: CL

## 2018-07-13 PROCEDURE — 87086 URINE CULTURE/COLONY COUNT: CPT

## 2018-07-13 PROCEDURE — 99285 EMERGENCY DEPT VISIT HI MDM: CPT | Mod: ,,, | Performed by: PHYSICIAN ASSISTANT

## 2018-07-13 PROCEDURE — 85025 COMPLETE CBC W/AUTO DIFF WBC: CPT

## 2018-07-13 PROCEDURE — 82962 GLUCOSE BLOOD TEST: CPT

## 2018-07-13 PROCEDURE — 96361 HYDRATE IV INFUSION ADD-ON: CPT

## 2018-07-13 PROCEDURE — 25000003 PHARM REV CODE 250: Performed by: NURSE PRACTITIONER

## 2018-07-13 PROCEDURE — C8929 TTE W OR WO FOL WCON,DOPPLER: HCPCS

## 2018-07-13 PROCEDURE — 93306 TTE W/DOPPLER COMPLETE: CPT | Mod: 26,,, | Performed by: INTERNAL MEDICINE

## 2018-07-13 PROCEDURE — 25000003 PHARM REV CODE 250: Performed by: STUDENT IN AN ORGANIZED HEALTH CARE EDUCATION/TRAINING PROGRAM

## 2018-07-13 PROCEDURE — 87186 SC STD MICRODIL/AGAR DIL: CPT

## 2018-07-13 PROCEDURE — 87077 CULTURE AEROBIC IDENTIFY: CPT

## 2018-07-13 PROCEDURE — A4216 STERILE WATER/SALINE, 10 ML: HCPCS | Performed by: STUDENT IN AN ORGANIZED HEALTH CARE EDUCATION/TRAINING PROGRAM

## 2018-07-13 PROCEDURE — 80061 LIPID PANEL: CPT

## 2018-07-13 PROCEDURE — 96372 THER/PROPH/DIAG INJ SC/IM: CPT

## 2018-07-13 PROCEDURE — G8997 SWALLOW GOAL STATUS: HCPCS | Mod: CJ

## 2018-07-13 PROCEDURE — 92610 EVALUATE SWALLOWING FUNCTION: CPT

## 2018-07-13 PROCEDURE — 99223 1ST HOSP IP/OBS HIGH 75: CPT | Mod: AI,GC,, | Performed by: PSYCHIATRY & NEUROLOGY

## 2018-07-13 PROCEDURE — 81001 URINALYSIS AUTO W/SCOPE: CPT

## 2018-07-13 PROCEDURE — 25000003 PHARM REV CODE 250: Performed by: PSYCHIATRY & NEUROLOGY

## 2018-07-13 PROCEDURE — 93010 ELECTROCARDIOGRAM REPORT: CPT | Mod: ,,, | Performed by: INTERNAL MEDICINE

## 2018-07-13 PROCEDURE — 80053 COMPREHEN METABOLIC PANEL: CPT

## 2018-07-13 PROCEDURE — 84443 ASSAY THYROID STIM HORMONE: CPT

## 2018-07-13 PROCEDURE — 85610 PROTHROMBIN TIME: CPT

## 2018-07-13 RX ORDER — ROSUVASTATIN CALCIUM 20 MG/1
20 TABLET, COATED ORAL DAILY
Status: DISCONTINUED | OUTPATIENT
Start: 2018-07-13 | End: 2018-07-16 | Stop reason: HOSPADM

## 2018-07-13 RX ORDER — CEFTRIAXONE 1 G/1
1 INJECTION, POWDER, FOR SOLUTION INTRAMUSCULAR; INTRAVENOUS
Status: DISCONTINUED | OUTPATIENT
Start: 2018-07-14 | End: 2018-07-16 | Stop reason: HOSPADM

## 2018-07-13 RX ORDER — HEPARIN SODIUM 5000 [USP'U]/ML
5000 INJECTION, SOLUTION INTRAVENOUS; SUBCUTANEOUS EVERY 8 HOURS
Status: DISCONTINUED | OUTPATIENT
Start: 2018-07-13 | End: 2018-07-16 | Stop reason: HOSPADM

## 2018-07-13 RX ORDER — LISINOPRIL 20 MG/1
20 TABLET ORAL DAILY
Status: DISCONTINUED | OUTPATIENT
Start: 2018-07-13 | End: 2018-07-16

## 2018-07-13 RX ORDER — BENZTROPINE MESYLATE 0.5 MG/1
1 TABLET ORAL 2 TIMES DAILY
Status: DISCONTINUED | OUTPATIENT
Start: 2018-07-13 | End: 2018-07-16 | Stop reason: HOSPADM

## 2018-07-13 RX ORDER — MEMANTINE HYDROCHLORIDE 5 MG/1
5 TABLET ORAL DAILY
Status: DISCONTINUED | OUTPATIENT
Start: 2018-07-13 | End: 2018-07-16 | Stop reason: HOSPADM

## 2018-07-13 RX ORDER — HALOPERIDOL 5 MG/1
5 TABLET ORAL DAILY PRN
Status: DISCONTINUED | OUTPATIENT
Start: 2018-07-13 | End: 2018-07-13

## 2018-07-13 RX ORDER — ONDANSETRON 2 MG/ML
4 INJECTION INTRAMUSCULAR; INTRAVENOUS EVERY 12 HOURS PRN
Status: DISCONTINUED | OUTPATIENT
Start: 2018-07-13 | End: 2018-07-16 | Stop reason: HOSPADM

## 2018-07-13 RX ORDER — HALOPERIDOL 5 MG/ML
5 INJECTION INTRAMUSCULAR EVERY 6 HOURS PRN
Status: DISCONTINUED | OUTPATIENT
Start: 2018-07-13 | End: 2018-07-16 | Stop reason: HOSPADM

## 2018-07-13 RX ORDER — CEFTRIAXONE 1 G/1
1 INJECTION, POWDER, FOR SOLUTION INTRAMUSCULAR; INTRAVENOUS
Status: COMPLETED | OUTPATIENT
Start: 2018-07-13 | End: 2018-07-13

## 2018-07-13 RX ORDER — SODIUM CHLORIDE 0.9 % (FLUSH) 0.9 %
3 SYRINGE (ML) INJECTION EVERY 8 HOURS
Status: DISCONTINUED | OUTPATIENT
Start: 2018-07-13 | End: 2018-07-16 | Stop reason: HOSPADM

## 2018-07-13 RX ORDER — FAMOTIDINE 20 MG/1
40 TABLET, FILM COATED ORAL DAILY
Status: DISCONTINUED | OUTPATIENT
Start: 2018-07-13 | End: 2018-07-16 | Stop reason: HOSPADM

## 2018-07-13 RX ORDER — INSULIN ASPART 100 [IU]/ML
0-5 INJECTION, SOLUTION INTRAVENOUS; SUBCUTANEOUS EVERY 6 HOURS PRN
Status: DISCONTINUED | OUTPATIENT
Start: 2018-07-13 | End: 2018-07-16 | Stop reason: HOSPADM

## 2018-07-13 RX ORDER — QUETIAPINE FUMARATE 100 MG/1
200 TABLET, FILM COATED ORAL DAILY
Status: DISCONTINUED | OUTPATIENT
Start: 2018-07-13 | End: 2018-07-13

## 2018-07-13 RX ORDER — ACETAMINOPHEN 325 MG/1
650 TABLET ORAL EVERY 6 HOURS PRN
Status: DISCONTINUED | OUTPATIENT
Start: 2018-07-13 | End: 2018-07-16 | Stop reason: HOSPADM

## 2018-07-13 RX ORDER — DULOXETIN HYDROCHLORIDE 30 MG/1
30 CAPSULE, DELAYED RELEASE ORAL DAILY
Status: DISCONTINUED | OUTPATIENT
Start: 2018-07-13 | End: 2018-07-16 | Stop reason: HOSPADM

## 2018-07-13 RX ORDER — HALOPERIDOL 5 MG/ML
INJECTION INTRAMUSCULAR
Status: DISPENSED
Start: 2018-07-13 | End: 2018-07-14

## 2018-07-13 RX ORDER — ACETAMINOPHEN 650 MG/1
650 SUPPOSITORY RECTAL
Status: COMPLETED | OUTPATIENT
Start: 2018-07-13 | End: 2018-07-13

## 2018-07-13 RX ORDER — ASPIRIN 325 MG
325 TABLET, DELAYED RELEASE (ENTERIC COATED) ORAL DAILY
Status: DISCONTINUED | OUTPATIENT
Start: 2018-07-13 | End: 2018-07-16 | Stop reason: HOSPADM

## 2018-07-13 RX ORDER — QUETIAPINE FUMARATE 100 MG/1
200 TABLET, FILM COATED ORAL NIGHTLY
Status: DISCONTINUED | OUTPATIENT
Start: 2018-07-13 | End: 2018-07-16 | Stop reason: HOSPADM

## 2018-07-13 RX ORDER — DONEPEZIL HYDROCHLORIDE 5 MG/1
5 TABLET, FILM COATED ORAL EVERY MORNING
Status: DISCONTINUED | OUTPATIENT
Start: 2018-07-13 | End: 2018-07-16 | Stop reason: HOSPADM

## 2018-07-13 RX ORDER — GLUCAGON 1 MG
1 KIT INJECTION
Status: DISCONTINUED | OUTPATIENT
Start: 2018-07-13 | End: 2018-07-16 | Stop reason: HOSPADM

## 2018-07-13 RX ORDER — QUETIAPINE FUMARATE 100 MG/1
200 TABLET, FILM COATED ORAL NIGHTLY
Status: DISCONTINUED | OUTPATIENT
Start: 2018-07-14 | End: 2018-07-13

## 2018-07-13 RX ORDER — LABETALOL HYDROCHLORIDE 5 MG/ML
10 INJECTION, SOLUTION INTRAVENOUS EVERY 4 HOURS PRN
Status: DISCONTINUED | OUTPATIENT
Start: 2018-07-13 | End: 2018-07-16 | Stop reason: HOSPADM

## 2018-07-13 RX ADMIN — BENZTROPINE MESYLATE 1 MG: 0.5 TABLET ORAL at 10:07

## 2018-07-13 RX ADMIN — ACETAMINOPHEN 650 MG: 650 SUPPOSITORY RECTAL at 12:07

## 2018-07-13 RX ADMIN — HALOPERIDOL LACTATE 5 MG: 5 INJECTION, SOLUTION INTRAMUSCULAR at 01:07

## 2018-07-13 RX ADMIN — HEPARIN SODIUM 5000 UNITS: 5000 INJECTION, SOLUTION INTRAVENOUS; SUBCUTANEOUS at 03:07

## 2018-07-13 RX ADMIN — ACETAMINOPHEN 650 MG: 325 TABLET, FILM COATED ORAL at 11:07

## 2018-07-13 RX ADMIN — Medication 3 ML: at 03:07

## 2018-07-13 RX ADMIN — HEPARIN SODIUM 5000 UNITS: 5000 INJECTION, SOLUTION INTRAVENOUS; SUBCUTANEOUS at 10:07

## 2018-07-13 RX ADMIN — SODIUM CHLORIDE 1000 ML: 0.9 INJECTION, SOLUTION INTRAVENOUS at 11:07

## 2018-07-13 RX ADMIN — CEFTRIAXONE SODIUM 1 G: 1 INJECTION, POWDER, FOR SOLUTION INTRAMUSCULAR; INTRAVENOUS at 11:07

## 2018-07-13 RX ADMIN — QUETIAPINE FUMARATE 200 MG: 100 TABLET ORAL at 11:07

## 2018-07-13 RX ADMIN — Medication 3 ML: at 10:07

## 2018-07-13 NOTE — ASSESSMENT & PLAN NOTE
72-year-old female with a medical history significant for deafness, HTN, DM, HLD, schizoaffective disorder, dementia with behavior disturbance presents to the ED for evaluation of language impairment, body shakiness, imbalance resulting in a fall today.  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  Of note, patient was not treated with iv alteplase as she was out of the window for any intervention.  Admit to the stroke service to complete stroke work up as detailed below.    Antithrombotics for secondary stroke prevention: Antiplatelets: Aspirin: 81 mg daily    Statins for secondary stroke prevention and hyperlipidemia, if present:   Statins: Atorvastatin- 40 mg daily    Aggressive risk factor modification: HTN, DM, HLD     Rehab efforts: PT/OT/SLP to evaluate and treat    Diagnostics ordered/pending: HgbA1C to assess blood glucose levels, Lipid Profile to assess cholesterol levels, MRA head to assess vasculature, MRA neck/arch to assess vasculature, TTE to assess cardiac function/status , TSH to assess thyroid function    VTE prophylaxis: Heparin 5000 units SQ every 8 hours    BP parameters: Infarct: No intervention, SBP <220

## 2018-07-13 NOTE — ED NOTES
Pt now agitated, trying to get out of bed, redirected, remains agitated and restless. [facility] MD notified.

## 2018-07-13 NOTE — ED NOTES
Tele box [id number] applied to pt. VENTURA in war room states able to see pt on monitor, rhythm SINUS TACHY with .

## 2018-07-13 NOTE — CONSULTS
[facility]  Vascular Neurology  Comprehensive Stroke Center  Consult Note    Consults  Assessment/Plan:     Patient is a 72 y.o. year old female with:    * Stroke due to embolism of left middle cerebral artery    72-year-old female with a medical history significant for deafness, HTN, DM, HLD, schizoaffective disorder, dementia with behavior disturbance presents to the ED for evaluation of language impairment, body shakiness, imbalance resulting in a fall today.  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  Of note, patient was not treated with iv alteplase as she was out of the window for any intervention.  Admit to the stroke service to complete stroke work up as detailed below.    Antithrombotics for secondary stroke prevention: Antiplatelets: Aspirin: 81 mg daily    Statins for secondary stroke prevention and hyperlipidemia, if present:   Statins: Atorvastatin- 40 mg daily    Aggressive risk factor modification: HTN, DM, HLD     Rehab efforts: PT/OT/SLP to evaluate and treat    Diagnostics ordered/pending: HgbA1C to assess blood glucose levels, Lipid Profile to assess cholesterol levels, MRA head to assess vasculature, MRA neck/arch to assess vasculature, TTE to assess cardiac function/status , TSH to assess thyroid function    VTE prophylaxis: Heparin 5000 units SQ every 8 hours    BP parameters: Infarct: No intervention, SBP <220                STROKE DOCUMENTATION     Acute Stroke Times   Last Known Normal Date: 07/12/18  Last Known Normal Time:  (unable to determine)  Symptom Onset Date: 07/12/18  Symptom Onset Time:  (unable to determine)  Stroke Team Called Date: 07/13/18  Stroke Team Called Time: 0820  Stroke Team Arrival Date: 07/13/18  Stroke Team Arrival Time: 0825  CT Interpretation Time: 0832  Decision to Treat Time for Alteplase:  (No iv alteplse candidate)  Decision to Treat Time for IR:  (No IR candidate)    NIH Scale:  1a.  Level Of Consciousness: 0-->Alert: keenly responsive  1b. LOC Questions: 1-->Answers one question correctly (Please, note that patient is deaf)  1c. LOC Commands: 0-->Performs both tasks correctly  2. Best Gaze: 0-->Normal  3. Visual: 0-->No visual loss  4. Facial Palsy: 0-->Normal symmetrical movements  5a. Motor Arm, Left: 0-->No drift: limb holds 90 (or 45) degrees for full 10 secs  5b. Motor Arm, Right: 0-->No drift: limb holds 90 (or 45) degrees for full 10 secs  6a. Motor Leg, Left: 0-->No drift: leg holds 30 degree position for full 5 secs  6b. Motor Leg, Right: 0-->No drift: leg holds 30 degree position for full 5 secs  7. Limb Ataxia: 0-->Absent  8. Sensory: 0-->Normal: no sensory loss  9. Best Language: 2-->Severe aphasia: all communication is through fragmentary expression: great need for inference, questioning, and guessing by the listener. Range of information that can be exchanged is limited: listener carries burden of. . . (see row details)  10. Dysarthria: 2-->Severe dysarthria: patients speech is so slurred as to be unintelligible in the absence of or out of proportion to any dysphasia, or is mute/anarthric  11. Extinction and Inattention (formerly Neglect): 0-->No abnormality  Total (NIH Stroke Scale): 5    Modified Russell Score: 1  [place] Coma Scale:15   ABCD2 Score:    [id number] Score:   HAS -BLED Score:   ICH Score:   Hunt & Lui Classification:       Thrombolysis Candidate? No, Out of window       Interventional Revascularization Candidate?   Is the patient eligible for mechanical endovascular reperfusion (DON)?  No; No significant neurological deficit      Hemorrhagic change of an Ischemic Stroke: Does this patient have an ischemic stroke with hemorrhagic changes? No     Subjective:     History of Present Illness:  Ms Peres is a 72-year-old female with a medical history significant for deafness, HTN, DM, HLD, schizoaffective disorder, dementia with behavior disturbance presents to the ED  "for evaluation of language impairment, body shakiness, imbalance resulting in a fall today.   is at the bedside and provides all the history. She indicated that at baseline patient is able to walk and speak fluently, and has no significant functional limitations, but yesterday patient was noted to have shaking movement of the upper body and was very off balance. Then, she reports that Ms Peres had an unwitnessed fall this morning and was found around 6:20-6:30 and has been having " mumbled speech this morning" as well as grabbing for objects that are not there. Pt lives at home with a roommate and has 24-hr care. No recent reports of fever, cough, diarrhea, vomiting.   Upon our initial assessment patient displays expressive dysphasia with apparent preserved sensorium and no other lateralizing signs on exam.  STAT CTH was obtained and it showed no acute abnormality.  Strong suspicion for acute infarction distal small branch left MCA, thus MRI brain was requested which showed small acute posterior lateral frontal lobe.  Of note, patient was not offer treatment with iv alteplase as she was out of the window for any intervention.        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 No pertinent surgical history.  Family History   Problem Relation Age of Onset    Depression Sister     Depression Brother     Suicide Brother     ADD / ADHD Neg Hx     Alcohol abuse Neg Hx     Anxiety disorder Neg Hx     Bipolar disorder Neg Hx     Dementia Neg Hx     Drug abuse Neg " Hx     OCD Neg Hx     Paranoid behavior Neg Hx     Physical abuse Neg Hx     Schizophrenia Neg Hx     Seizures Neg Hx     Self injury Neg Hx     Sexual abuse Neg Hx      Social History   Substance Use Topics    Smoking status: Never Smoker    Smokeless tobacco: Never Used    Alcohol use No     Review of patient's allergies indicates:  No Known Allergies    Medications: I have reviewed the current medication administration record.      (Not in a hospital admission)    Review of Systems   Constitutional: Negative for chills, diaphoresis and fever.   HENT: Positive for hearing loss. Negative for facial swelling, tinnitus and trouble swallowing.    Eyes: Negative for photophobia and visual disturbance.   Respiratory: Negative for chest tightness and shortness of breath.    Cardiovascular: Negative for chest pain and palpitations.   Gastrointestinal: Negative for abdominal pain and vomiting.   Endocrine: Negative for cold intolerance and polyuria.   Genitourinary: Negative for hematuria.   Musculoskeletal: Positive for gait problem. Negative for neck pain and neck stiffness.   Skin: Negative for rash.   Neurological: Positive for speech difficulty. Negative for dizziness, weakness and headaches.   Hematological: Does not bruise/bleed easily.   Psychiatric/Behavioral: Negative for agitation, behavioral problems and confusion.     Objective:     Vital Signs (Most Recent):  Temp: (!) 102.2 °F (39 °C) (07/13/18 1250)  Pulse: 104 (07/13/18 1200)  Resp: 20 (07/13/18 1200)  BP: 129/88 (07/13/18 1200)  SpO2: 99 % (07/13/18 1200)    Vital Signs Range (Last 24H):  Temp:  [97.7 °F (36.5 °C)-102.2 °F (39 °C)]   Pulse:  []   Resp:  [18-20]   BP: (128-140)/(56-88)   SpO2:  [95 %-99 %]     Physical Exam   Constitutional: She appears well-developed and well-nourished.   HENT:   Head: Normocephalic and atraumatic.   Eyes: EOM are normal. Pupils are equal, round, and reactive to light.   Neck: Normal range of motion. Neck  supple.   Cardiovascular: Normal rate and regular rhythm.    Pulmonary/Chest: Effort normal. No respiratory distress.   Abdominal: Soft. She exhibits no mass.   Genitourinary:   Genitourinary Comments: No tested     Musculoskeletal: Normal range of motion. She exhibits no edema or deformity.   Neurological: She is alert. She displays normal reflexes. No cranial nerve deficit or sensory deficit. She exhibits normal muscle tone. Coordination normal.   Patient is deaf   Skin: No rash noted. No erythema.   Psychiatric: She has a normal mood and affect. Her behavior is normal.   Nursing note and vitals reviewed.      Neurological Exam:   LOC: alert  Attention Span: Good   Language: Expressive aphasia  Articulation: Dysarthria  Orientation: Untestable due to severe aphasia   Visual Fields: Full  EOM (CN III, IV, VI): Full/intact  Pupils (CN II, III): PERRL  Facial Sensation (CN V): Normal  Facial Movement (CN VII): Symmetric facial expression    Gag Reflex: present  Reflexes: 2+ throughout  Motor: Arm left  Normal 5/5  Leg left  Normal 5/5  Arm right  Normal 5/5  Leg right Normal 5/5  Cebellar: No evidence of appendicular or axial ataxia  Sensation: Intact to light touch, temperature and vibration  Tone: Normal tone throughout      Laboratory:  CMP:   Recent Labs  Lab 07/13/18  0819   CALCIUM 10.2   ALBUMIN 3.0*   PROT 7.1      K 4.1   CO2 24      BUN 27*   CREATININE 1.5*   ALKPHOS 106   ALT 21   AST 21   BILITOT 0.8     CBC:   Recent Labs  Lab 07/13/18  0819   WBC 15.48*   RBC 3.48*   HGB 9.6*   HCT 31.3*      MCV 90   MCH 27.6   MCHC 30.7*     Lipid Panel:   Recent Labs  Lab 07/13/18  0819   CHOL 106*   LDLCALC 54.4*   HDL 33*   TRIG 93     Coagulation:   Recent Labs  Lab 07/13/18  0819   INR 1.0     Hgb A1C: No results for input(s): HGBA1C in the last 168 hours.  TSH:   Recent Labs  Lab 07/13/18  0819   TSH 0.626       Diagnostic Results:      Brain imaging:  [facility] 7/13/18: no acute intracranial  abnormality  MRI brain 7/13/18: small focus of acute infarct left lateral frontal lobe    Vessel Imaging:  None    Cardiac Evaluation:   NSR on bedside monitor      Nima Cortez MD  Comprehensive Stroke Center  Department of Vascular Neurology   [facility]

## 2018-07-13 NOTE — PT/OT/SLP EVAL
Speech Language Pathology Evaluation  Bedside Swallow    Patient Name:  Lay Peres   MRN:  [id number]  Admitting Diagnosis: Stroke due to embolism of left middle cerebral artery    Recommendations:                 General Recommendations:  Speech language evaluation  Diet recommendations:  Puree, Nectar Thick ONLY WHEN FULLY AWAKE & ALERT & ATTENDING TO PO  Aspiration Precautions: 1 bite/sip at a time, Assistance with meals and Assistance with thickening liquids, Avoid talking while eating, Eliminate distractions, HOB to 90 degrees, Meds crushed in puree  Monitor closely for s/s of aspiration (coughing, throat clearing, wet vocal quality) & discontinue PO if s/s present  Small bites/sips and Strict aspiration precautions   General Precautions: Standard, aspiration, fall, nectar thick, pureed diet  Communication strategies:  pt is deaf, reads lips     History:     Past Medical History:   Diagnosis Date    Back pain 7/13/2012    Chronic constipation 7/13/2012    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 50-70) 7/13/2012    Neck pain 7/13/2012    Paranoid schizophrenia     Schizoaffective disorder, chronic condition 7/13/2012       History reviewed. No pertinent surgical history.    Social History: Patient lives at [facility]    Chest X-Rays: 7/13 No significant intrathoracic abnormality.  Allowing for a poorer inspiratory depth level on the current examination, there has been no significant detrimental interval change in the appearance of the chest since 06/23/2017.    Prior diet: reg/thin per sister    Subjective   Awake, pt very agitated & restless when SLP arrived. Several nurses at bedside assisting pt. Pt with jerking arm/body movements.     Pain/Comfort:  · Pain Rating 1: 0/10 (none  indicated)  · Pain Rating Post-Intervention 2: 0/10    Objective:     Oral Musculature Evaluation  · Oral Musculature: unable to assess due to poor participation/comprehension  · Mucosal Quality: adequate  · Volitional Cough: not elicited  · Volitional Swallow: not elicited  · Voice Prior to PO Intake: Clear via spontaneous verbalizations    Bedside Swallow Eval:   Consistencies Assessed:  · Thin liquids 1 tsp via spoon   · Nectar thick liquids via spoon x1, 3 oz via cup sips  · Puree 1 tsp bites x5   · Solid not attempted at this time 2/2 state of agitation/alertness    Oral Phase:   · WFL    Pharyngeal Phase:   · coughing/choking following sip of thin. Swallow appeared delayed & uncoordinated with jerking movement & agitation  · No overt s/s aspiration with nectar & puree, pt appears to elicit more controlled & coordinated swallows with these thicker consistencies    SLP educated sister on swallow function, recs, precautions, risks of aspiration, s/s aspiration, role of SLP, POC, instructions for thickening, etc. Sister    Assessment:     Lay Peres is a 72 y.o. female with an SLP diagnosis of dysphagia    Goals:    SLP Goals        Problem: SLP Goal    Goal Priority Disciplines Outcome   SLP Goal     SLP    Description:  Speech Language Pathology Goals  Goals expected to be met by 7/20  1. Pt will tolerate puree & nectar thick liquids without signs of aspiration  2. Ongoing swallow assessment to ensure safety of PO intake & determine least restrictive PO consistencies  3. SLP Speech/Language/Cognitive Evaluation                    Plan:     · Patient to be seen:  5 x/week   · Plan of Care expires:  08/11/18  · Plan of Care reviewed with:  patient, sibling   · SLP Follow-Up:  Yes       Discharge recommendations:   (tbd)     Time Tracking:     SLP Treatment Date:   07/13/18  Speech Start Time:  1335  Speech Stop Time:  1351     Speech Total Time (min):  16 min    Billable Minutes: Eval Swallow and  Oral Function 8 and Seld Care/Home Management Training 8    Mckenzie Boston, CCC-SLP  07/13/2018   [phone]

## 2018-07-13 NOTE — ED NOTES
Hourly rounding complete. Patient resting in stretcher and is in NAD at this time. Pt calm and cooperative at this time. Pt is awake and alert, respirations even and unlabored. Pt updated on POC. Family at bedside and updated on POC. Bed low and locked with side rails up x2, call bell in pt reach.

## 2018-07-13 NOTE — HPI
"Ms Peres is a 72-year-old female with a medical history significant for deafness, HTN, DM, HLD, schizoaffective disorder, dementia with behavior disturbance presents to the ED for evaluation of language impairment, body shakiness, imbalance resulting in a fall today.   is at the bedside and provides all the history. She indicated that at baseline patient is able to walk and speak fluently, and has no significant functional limitations, but yesterday patient was noted to have shaking movement of the upper body and was very off balance. Then, she reports that Ms Peres had an unwitnessed fall this morning and was found around 6:20-6:30 and has been having " mumbled speech this morning" as well as grabbing for objects that are not there. Pt lives at home with a roommate and has 24-hr care. No recent reports of fever, cough, diarrhea, vomiting.   Upon our initial assessment patient displays expressive dysphasia with apparent preserved sensorium and no other lateralizing signs on exam.  STAT CTH was obtained and it showed no acute abnormality.  Strong suspicion for acute infarction distal small branch left MCA, thus MRI brain was requested which showed small acute posterior lateral frontal lobe.  Of note, patient was not offer treatment with iv alteplase as she was out of the window for any intervention.  "

## 2018-07-13 NOTE — ED TRIAGE NOTES
PT ARRIVED TO ED WITH CAREGIVER WITH CC OF SLURRED SPEECH UPON WAKING UP APPROX 6AM TODAY - LSN UNKNOWN. NO OTHER COMPLAINTS AT THIS TIME.    Patient identifiers verified and correct for Lay Reeves Ja.    LOC: The patient is awake and alert, slurred speech noted. Pt deaf, able to read lips, follows commands appropriately, speech incomprehensible, answers yes/ no questions appropriately.  APPEARANCE: Patient resting and in no acute distress. Pt is clean and well groomed. No JVD visible. Pt reports pain level of 0.  SKIN: Skin is warm dry and intact, and color is pallor. No tenting observed and capillary refill <3 seconds. No clubbing noted to nail beds. No breakdown or brusing visible and mucus membranes moist and acyanotic.  MUSCULOSKELETAL: Full range of motion present in all extremities. Hand  equal and leg strength strong +5 bilaterally.  RESPIRATORY: Airway is open and patent. Respirations-unlabored, regular rate, equal bilaterally on inspiration and expiration. No accessory muscle use noted. Lungs clear to auscultation in all fields bilaterally anterior and posterior.   CARDIAC: Patient tachycardic. No peripheral edema noted.  ABDOMEN: Soft, tenderness noted to RUQ, with no distention noted.   NEUROLOGIC: SEE NEURO ASSESSMENT FLOW SHEET  : Pt reports pain/ burning with urination.

## 2018-07-13 NOTE — ED PROVIDER NOTES
Encounter Date: 7/13/2018    SCRIBE #1 NOTE: I, Hetal Cartwright, am scribing for, and in the presence of,  Dr. Watson. I have scribed the following portions of the note - the EKG reading.       History     Chief Complaint   Patient presents with    Altered Mental Status      , fell this morning, not able to walk, urinated on self     A 72-year-old female with medical history significant for deafness, HTN, DM, HLD, schizoaffective disorder, dementia with behavior disturbance presents to the ED with various symptoms.  is present and provides most of history.  She reports that the had an unwitnessed fall this morning and was found around 6:20-6:30.  Patient was unable to stand or walk at her baseline.  No significant pain was noted. Patient's speech was also mumbled this morning.  Patient is deaf, but her speech is usually understandable.  Case management also notes that the patient had small twitching movements of the hands that began yesterday. Pt has been grabbing for objects that are not there. Pt lives at home with a roommate and has 24-hr care. No recent reports of fever, cough, diarrhea, vomiting.           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 No pertinent surgical history.  Family History   Problem Relation Age of Onset    Depression Sister     Depression Brother     Suicide Brother     ADD / ADHD Neg Hx     Alcohol abuse Neg Hx     Anxiety disorder Neg Hx     Bipolar  disorder Neg Hx     Dementia Neg Hx     Drug abuse Neg Hx     OCD Neg Hx     Paranoid behavior Neg Hx     Physical abuse Neg Hx     Schizophrenia Neg Hx     Seizures Neg Hx     Self injury Neg Hx     Sexual abuse Neg Hx      Social History   Substance Use Topics    Smoking status: Never Smoker    Smokeless tobacco: Never Used    Alcohol use No     Review of Systems   Constitutional: Negative for fever.   HENT: Negative for sore throat.    Respiratory: Negative for shortness of breath.    Cardiovascular: Negative for chest pain.   Gastrointestinal: Negative for nausea.   Genitourinary: Negative for dysuria.   Musculoskeletal: Negative for back pain.   Skin: Negative for rash.   Neurological: Positive for speech difficulty. Negative for weakness.        Extremity twitching   Hematological: Does not bruise/bleed easily.       Physical Exam     Initial Vitals [07/13/18 0749]   BP Pulse Resp Temp SpO2   (!) 140/63 109 18 97.7 °F (36.5 °C) 97 %      MAP       --         Physical Exam    Nursing note and vitals reviewed.  Constitutional: She appears well-developed and well-nourished. She is not diaphoretic.  Non-toxic appearance. She does not appear ill. No distress.   HENT:   Head: Normocephalic and atraumatic.   Eyes: EOM are normal. Pupils are equal, round, and reactive to light.   Neck: Neck supple.   Cardiovascular: Normal rate and regular rhythm. Exam reveals no gallop and no friction rub.    No murmur heard.  Pulmonary/Chest: Effort normal and breath sounds normal. No accessory muscle usage. No tachypnea. No respiratory distress. She has no decreased breath sounds. She has no wheezes. She has no rhonchi. She has no rales.   Abdominal: Soft. She exhibits no distension. There is tenderness in the right upper quadrant.   Mild RUQ tenderness.   Musculoskeletal: Normal range of motion.   Neurological: She is alert.   Moderate dysarthria noted. No extremity drift.  Symmetric facial movements.  Unable to assess  sensation or coordination with patient's baseline mental status.   Skin: Skin is warm and dry. No rash noted. No pallor.   Psychiatric: She has a normal mood and affect. Her behavior is normal.         ED Course   Procedures  Labs Reviewed   CBC W/ AUTO DIFFERENTIAL - Abnormal; Notable for the following:        Result Value    WBC 15.48 (*)     RBC 3.48 (*)     Hemoglobin 9.6 (*)     Hematocrit 31.3 (*)     MCHC 30.7 (*)     RDW 14.6 (*)     Immature Granulocytes 0.8 (*)     Gran # (ANC) 13.0 (*)     Immature Grans (Abs) 0.12 (*)     Lymph # 0.5 (*)     Mono # 1.8 (*)     Gran% 83.9 (*)     Lymph% 3.0 (*)     All other components within normal limits   COMPREHENSIVE METABOLIC PANEL - Abnormal; Notable for the following:     Glucose 171 (*)     BUN, Bld 27 (*)     Creatinine 1.5 (*)     Albumin 3.0 (*)     eGFR if  39.3 (*)     eGFR if non  34.1 (*)     All other components within normal limits   LIPID PANEL - Abnormal; Notable for the following:     Cholesterol 106 (*)     HDL 33 (*)     LDL Cholesterol 54.4 (*)     All other components within normal limits   URINALYSIS, REFLEX TO URINE CULTURE - Abnormal; Notable for the following:     Appearance, UA Hazy (*)     Protein, UA 1+ (*)     Occult Blood UA 2+ (*)     Nitrite, UA Positive (*)     Leukocytes, UA 2+ (*)     All other components within normal limits    Narrative:     Preferred Collection Type->Urine, Clean Catch   URINALYSIS MICROSCOPIC - Abnormal; Notable for the following:     RBC, UA 10 (*)     WBC, UA 25 (*)     Bacteria, UA Many (*)     All other components within normal limits    Narrative:     Preferred Collection Type->Urine, Clean Catch   POCT GLUCOSE - Abnormal; Notable for the following:     POCT Glucose 198 (*)     All other components within normal limits   ISTAT PROCEDURE - Abnormal; Notable for the following:     POC PTWBT 16.0 (*)     POC PTINR 1.4 (*)     All other components within normal limits   POCT  GLUCOSE - Abnormal; Notable for the following:     POCT Glucose 145 (*)     All other components within normal limits   CULTURE, URINE   PROTIME-INR   TSH   HEMOGLOBIN A1C   HEMOGLOBIN A1C    Narrative:     ADD ON TEST HEMOGLOBIN A1C PER DR AUDI ALVAREZ ORDER #[id number]   07/13/2018  13:33    POCT GLUCOSE   ISTAT CREATININE   POCT GLUCOSE MONITORING CONTINUOUS     EKG Readings: (Independently Interpreted)   Sinus tachycardia, rate of 103, no STEMI, normal intervals.       Imaging Results          MRI Brain (Stroke Protocol) Without Contrast (Final result)  Result time 07/13/18 10:38:47    Final result by Arya Flores MD (07/13/18 10:38:47)                 Impression:      Punctate acute infarct in the left lateral frontal lobe.  No acute hemorrhage.    Moderate degree of chronic microvascular ischemic change which is slightly advanced for age.    Additional findings as above.    COMMUNICATION  This critical result was discovered/received at 1000.  The critical information above was relayed directly by me by telephone to Dr. Audi Alvarez on 07/13/2018 at 1010.    Electronically signed by resident: Demond Angelo  Date:    07/13/2018  Time:    10:12    Electronically signed by: Arya Flores MD  Date:    07/13/2018  Time:    10:38             Narrative:    EXAMINATION:  MRI BRAIN (STROKE PROTOCOL) WITHOUT CONTRAST    CLINICAL HISTORY:  Stroke;    TECHNIQUE:  Multiplanar multisequence MR imaging of the brain was performed without intravenous contrast.    COMPARISON:  CT head without contrast 07/13/2018    FINDINGS:  Intracranial Compartment:    Ventricles are stable in size and configuration when compared to the prior examination without evidence of hydrocephalus.    The brain parenchyma demonstrates a punctate focus of restricted diffusion in the left lateral frontal lobe measuring on the order of 5 mm and consistent with acute infarction.  Demonstrates scattered patchy and confluent T2/FLAIR signal  hyperintensity throughout the supratentorial white matter likely representing a moderate degree of chronic microvascular ischemic change.  Findings are slightly advanced for age.  There is mild generalized cerebral volume loss.  No mass, hemorrhage, or recent or remote major vascular distribution infarct.    No extra-axial blood or fluid collections.    Normal vascular flow voids are preserved.    Skull/Extracranial Contents (limited evaluation):    Bone marrow signal intensity is normal.                               X-Ray Abdomen AP 1 View (KUB) (Final result)  Result time 07/13/18 09:13:33    Final result by Katherine Borrero MD (07/13/18 09:13:33)                 Impression:      No intra-abdominal disease identified on this examination.      Electronically signed by: Katherine Borrero MD  Date:    07/13/2018  Time:    09:13             Narrative:    EXAMINATION:  XR ABDOMEN AP 1 VIEW    CLINICAL HISTORY:  Cerebral infarction, unspecified    TECHNIQUE:  Single AP View of the abdomen was performed.    COMPARISON:  None    FINDINGS:  Diaphragm in pubic symphysis are excluded from the examination.    Gas and abundant stool are present in the colon without bowel distension.  I detect no small bowel gas.  There is no intramural gas, intra portal gas or free peritoneal gas and no mass effect.  I do not identify an usual calcification.    Degenerative changes are present at multiple levels in the lumbar spine of this 72-year-old woman.                               X-Ray Chest AP Portable (Final result)  Result time 07/13/18 08:59:20    Final result by Dg Sanders MD (07/13/18 08:59:20)                 Impression:      No significant intrathoracic abnormality.  Allowing for a poorer inspiratory depth level on the current examination, there has been no significant detrimental interval change in the appearance of the chest since 06/23/2017.      Electronically signed by: Dg Sanders MD  Date:    07/13/2018  Time:    08:59              Narrative:    EXAMINATION:  XR CHEST AP PORTABLE    CLINICAL HISTORY:  Stroke;    COMPARISON:  Comparison is made to 06/23/2017.    FINDINGS:  Allowing for magnification of the cardiomediastinal silhouette related both to projection and to a poor inspiratory depth level, heart size is within normal limits.  Pulmonary vascularity is normal.  Lung zones appear adequately aerated, free of significant airspace consolidation or volume loss.  No pleural fluid of any substantial volume is seen on either side.  No abnormal mediastinal widening.  No pneumothorax.                               CT Head Without Contrast (Final result)  Result time 07/13/18 09:22:24    Final result by Bruce Barajas MD (07/13/18 09:22:24)                 Impression:      No evidence of acute hemorrhage or major vascular distribution infarct.    Mild chronic ischemic change, stable when compared to CT examination 06/23/2017.    Electronically signed by resident: Demond Angelo  Date:    07/13/2018  Time:    08:39    Electronically signed by: Bruce Barajas MD  Date:    07/13/2018  Time:    09:22             Narrative:    EXAMINATION:  CT HEAD WITHOUT CONTRAST    CLINICAL HISTORY:  dysarthria, ataxia;    TECHNIQUE:  Low dose axial CT images obtained throughout the head without intravenous contrast. Sagittal and coronal reconstructions were performed.    COMPARISON:  CT head without contrast 06/23/2017    FINDINGS:  The brain parenchyma demonstrates a mild degree of stable chronic microvascular ischemic change of the supratentorial periventricular white matter, noting that findings are most prominent in the right subinsular region.  No parenchymal mass, hemorrhage, edema, or major vascular distribution infarct.    Ventricles are stable in size and configuration when compared to the prior examination without evidence of hydrocephalus. No extra-axial blood or fluid collections.    No extracalvarial fracture or significant  extracalvarial soft tissue injury.  Incidental note is made of hyperostosis frontalis interna.  Mastoid air cells and paranasal sinuses are essentially clear.                                 Medical Decision Making:   History:   Old Medical Records: I decided to obtain old medical records.  Differential Diagnosis:   My differential diagnosis includes but is not limited to:  Ischemic stroke, ICH, encephalopathy, malignancy, UTI  Independently Interpreted Test(s):   I have ordered and independently interpreted EKG Reading(s) - see prior notes  Clinical Tests:   Lab Tests: Ordered and Reviewed  Radiological Study: Ordered and Reviewed  Medical Tests: Ordered and Reviewed  Other:   I have discussed this case with another health care provider.       APC / Resident Notes:   72-year-old female presents with ataxia and dysarthria since this morning upon waking around 6:00 a.m. Vitals within normal limits. Dysarthria noted on exam.  No extremity drift or facial droop.  A stroke code was called.  Vascular Neurology consulted emergently.      MRI revealed punctate acute infarct in the left lateral frontal lobe.  UA with infection.  Patient was treated with ceftriaxone in the ED. Patient was admitted to vascular Neurology service for further treatment and management.  I have reviewed the patient's records and discussed this case with my supervising physician.         Scribe Attestation:   Scribe #1: I performed the above scribed service and the documentation accurately describes the services I performed. I attest to the accuracy of the note.               Clinical Impression:   The primary encounter diagnosis was Cerebrovascular accident (CVA), unspecified mechanism. Diagnoses of Stroke and Altered mental status were also pertinent to this visit.      Disposition:   Disposition: Admitted  Condition: Serious                        Chelle Hernandez PA-C  07/13/18 2572

## 2018-07-13 NOTE — SUBJECTIVE & OBJECTIVE
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 No pertinent surgical history.  Family History   Problem Relation Age of Onset    Depression Sister     Depression Brother     Suicide Brother     ADD / ADHD Neg Hx     Alcohol abuse Neg Hx     Anxiety disorder Neg Hx     Bipolar disorder Neg Hx     Dementia Neg Hx     Drug abuse Neg Hx     OCD Neg Hx     Paranoid behavior Neg Hx     Physical abuse Neg Hx     Schizophrenia Neg Hx     Seizures Neg Hx     Self injury Neg Hx     Sexual abuse Neg Hx      Social History   Substance Use Topics    Smoking status: Never Smoker    Smokeless tobacco: Never Used    Alcohol use No     Review of patient's allergies indicates:  No Known Allergies    Medications: I have reviewed the current medication administration record.      (Not in a hospital admission)    Review of Systems   Constitutional: Negative for chills, diaphoresis and fever.   HENT: Positive for hearing loss. Negative for facial swelling, tinnitus and trouble swallowing.    Eyes: Negative for photophobia and visual disturbance.   Respiratory: Negative for chest tightness and shortness of breath.    Cardiovascular: Negative for chest pain and palpitations.   Gastrointestinal: Negative for abdominal pain and vomiting.   Endocrine: Negative for cold intolerance and polyuria.   Genitourinary: Negative for hematuria.   Musculoskeletal: Positive for gait problem. Negative for neck pain and neck stiffness.   Skin: Negative for rash.   Neurological: Positive for  speech difficulty. Negative for dizziness, weakness and headaches.   Hematological: Does not bruise/bleed easily.   Psychiatric/Behavioral: Negative for agitation, behavioral problems and confusion.     Objective:     Vital Signs (Most Recent):  Temp: (!) 102.2 °F (39 °C) (07/13/18 1250)  Pulse: 104 (07/13/18 1200)  Resp: 20 (07/13/18 1200)  BP: 129/88 (07/13/18 1200)  SpO2: 99 % (07/13/18 1200)    Vital Signs Range (Last 24H):  Temp:  [97.7 °F (36.5 °C)-102.2 °F (39 °C)]   Pulse:  []   Resp:  [18-20]   BP: (128-140)/(56-88)   SpO2:  [95 %-99 %]     Physical Exam   Constitutional: She appears well-developed and well-nourished.   HENT:   Head: Normocephalic and atraumatic.   Eyes: EOM are normal. Pupils are equal, round, and reactive to light.   Neck: Normal range of motion. Neck supple.   Cardiovascular: Normal rate and regular rhythm.    Pulmonary/Chest: Effort normal. No respiratory distress.   Abdominal: Soft. She exhibits no mass.   Genitourinary:   Genitourinary Comments: No tested     Musculoskeletal: Normal range of motion. She exhibits no edema or deformity.   Neurological: She is alert. She displays normal reflexes. No cranial nerve deficit or sensory deficit. She exhibits normal muscle tone. Coordination normal.   Patient is deaf   Skin: No rash noted. No erythema.   Psychiatric: She has a normal mood and affect. Her behavior is normal.   Nursing note and vitals reviewed.      Neurological Exam:   LOC: alert  Attention Span: Good   Language: Expressive aphasia  Articulation: Dysarthria  Orientation: Untestable due to severe aphasia   Visual Fields: Full  EOM (CN III, IV, VI): Full/intact  Pupils (CN II, III): PERRL  Facial Sensation (CN V): Normal  Facial Movement (CN VII): Symmetric facial expression    Gag Reflex: present  Reflexes: 2+ throughout  Motor: Arm left  Normal 5/5  Leg left  Normal 5/5  Arm right  Normal 5/5  Leg right Normal 5/5  Cebellar: No evidence of appendicular or axial  ataxia  Sensation: Intact to light touch, temperature and vibration  Tone: Normal tone throughout      Laboratory:  CMP:   Recent Labs  Lab 07/13/18  0819   CALCIUM 10.2   ALBUMIN 3.0*   PROT 7.1      K 4.1   CO2 24      BUN 27*   CREATININE 1.5*   ALKPHOS 106   ALT 21   AST 21   BILITOT 0.8     CBC:   Recent Labs  Lab 07/13/18 0819   WBC 15.48*   RBC 3.48*   HGB 9.6*   HCT 31.3*      MCV 90   MCH 27.6   MCHC 30.7*     Lipid Panel:   Recent Labs  Lab 07/13/18 0819   CHOL 106*   LDLCALC 54.4*   HDL 33*   TRIG 93     Coagulation:   Recent Labs  Lab 07/13/18 0819   INR 1.0     Hgb A1C: No results for input(s): HGBA1C in the last 168 hours.  TSH:   Recent Labs  Lab 07/13/18 0819   TSH 0.626       Diagnostic Results:      Brain imaging:  CTH 7/13/18: no acute intracranial abnormality  MRI brain 7/13/18: small focus of acute infarct left lateral frontal lobe    Vessel Imaging:  None    Cardiac Evaluation:   NSR on bedside monitor

## 2018-07-13 NOTE — CONSULTS
Inpatient consult to Physical Medicine Rehab  Consult performed by: MANJIT JARRELL  Consult ordered by: PACO BRYSON  Reason for consult: assess rehab needs      Reviewed patient history and current admission.  Rehab team following.  Full consult to follow.    NBA Kaminski, FNP-C  Physical Medicine & Rehabilitation   07/13/2018  Spectralink: [id number]

## 2018-07-13 NOTE — ED NOTES
PT DIFFICULT TO AROUSE, AROUSED WITH REPEATED TACTILE STIMULUS, DOES NOT MAINTAIN ALERT, VASC NEURO MD GARCIA ROMAN NOTIFIED - MD ROMAN ALSO NOTIFIED OF PT TEMP 102.2 - TELEPHONE ORDER RECEIVED AND PLACED.

## 2018-07-13 NOTE — ED NOTES
HOURLY ROUNDING COMPLETE. Patient resting in stretcher and is in NAD at this time. Pt is awake and alert, VSS, respirations even and unlabored. Pt denies pain at this time. Pt updated on POC. Care giver remains at bedside, pt's sister arrived at bedside and updated on POC. Pt changed into hospital gown for MRI. Bed low and locked with side rails up x2, call bell in pt reach.

## 2018-07-14 PROBLEM — G93.6 CYTOTOXIC CEREBRAL EDEMA: Status: ACTIVE | Noted: 2018-07-14

## 2018-07-14 PROBLEM — N39.0 URINARY TRACT INFECTION: Status: ACTIVE | Noted: 2018-07-14

## 2018-07-14 LAB
ALBUMIN SERPL BCP-MCNC: 2.7 G/DL
ALP SERPL-CCNC: 109 U/L
ALT SERPL W/O P-5'-P-CCNC: 24 U/L
ANION GAP SERPL CALC-SCNC: 11 MMOL/L
APTT BLDCRRT: 34.2 SEC
APTT BLDCRRT: 36 SEC
AST SERPL-CCNC: 26 U/L
BASOPHILS # BLD AUTO: 0.05 K/UL
BASOPHILS NFR BLD: 0.4 %
BILIRUB SERPL-MCNC: 0.5 MG/DL
BUN SERPL-MCNC: 23 MG/DL
CALCIUM SERPL-MCNC: 9.8 MG/DL
CHLORIDE SERPL-SCNC: 110 MMOL/L
CK MB SERPL-MCNC: 1.3 NG/ML
CK MB SERPL-RTO: 1.1 %
CK SERPL-CCNC: 119 U/L
CO2 SERPL-SCNC: 21 MMOL/L
CREAT SERPL-MCNC: 1.3 MG/DL
DIFFERENTIAL METHOD: ABNORMAL
EOSINOPHIL # BLD AUTO: 0 K/UL
EOSINOPHIL NFR BLD: 0.3 %
ERYTHROCYTE [DISTWIDTH] IN BLOOD BY AUTOMATED COUNT: 14.6 %
EST. GFR  (AFRICAN AMERICAN): 46.7 ML/MIN/1.73 M^2
EST. GFR  (NON AFRICAN AMERICAN): 40.5 ML/MIN/1.73 M^2
GLUCOSE SERPL-MCNC: 133 MG/DL
HCT VFR BLD AUTO: 30.7 %
HGB BLD-MCNC: 9.5 G/DL
IMM GRANULOCYTES # BLD AUTO: 0.07 K/UL
IMM GRANULOCYTES NFR BLD AUTO: 0.6 %
INR PPP: 1
LYMPHOCYTES # BLD AUTO: 1.1 K/UL
LYMPHOCYTES NFR BLD: 9.4 %
MAGNESIUM SERPL-MCNC: 1.6 MG/DL
MCH RBC QN AUTO: 27.9 PG
MCHC RBC AUTO-ENTMCNC: 30.9 G/DL
MCV RBC AUTO: 90 FL
MONOCYTES # BLD AUTO: 1.5 K/UL
MONOCYTES NFR BLD: 12.4 %
NEUTROPHILS # BLD AUTO: 9.1 K/UL
NEUTROPHILS NFR BLD: 76.9 %
NRBC BLD-RTO: 0 /100 WBC
PHOSPHATE SERPL-MCNC: 2.4 MG/DL
PLATELET # BLD AUTO: 218 K/UL
PMV BLD AUTO: 10 FL
POCT GLUCOSE: 129 MG/DL (ref 70–110)
POCT GLUCOSE: 159 MG/DL (ref 70–110)
POCT GLUCOSE: 168 MG/DL (ref 70–110)
POTASSIUM SERPL-SCNC: 3.8 MMOL/L
PROT SERPL-MCNC: 6.8 G/DL
PROTHROMBIN TIME: 10.8 SEC
RBC # BLD AUTO: 3.41 M/UL
SODIUM SERPL-SCNC: 142 MMOL/L
TROPONIN I SERPL DL<=0.01 NG/ML-MCNC: 0.01 NG/ML
WBC # BLD AUTO: 11.86 K/UL

## 2018-07-14 PROCEDURE — 85730 THROMBOPLASTIN TIME PARTIAL: CPT

## 2018-07-14 PROCEDURE — 84100 ASSAY OF PHOSPHORUS: CPT

## 2018-07-14 PROCEDURE — 97535 SELF CARE MNGMENT TRAINING: CPT

## 2018-07-14 PROCEDURE — 25000003 PHARM REV CODE 250: Performed by: PSYCHIATRY & NEUROLOGY

## 2018-07-14 PROCEDURE — G8979 MOBILITY GOAL STATUS: HCPCS | Mod: CJ

## 2018-07-14 PROCEDURE — 85025 COMPLETE CBC W/AUTO DIFF WBC: CPT

## 2018-07-14 PROCEDURE — 63600175 PHARM REV CODE 636 W HCPCS: Performed by: STUDENT IN AN ORGANIZED HEALTH CARE EDUCATION/TRAINING PROGRAM

## 2018-07-14 PROCEDURE — 85730 THROMBOPLASTIN TIME PARTIAL: CPT | Mod: 91

## 2018-07-14 PROCEDURE — G8978 MOBILITY CURRENT STATUS: HCPCS | Mod: CK

## 2018-07-14 PROCEDURE — 25000003 PHARM REV CODE 250: Performed by: NURSE PRACTITIONER

## 2018-07-14 PROCEDURE — 97165 OT EVAL LOW COMPLEX 30 MIN: CPT

## 2018-07-14 PROCEDURE — 82550 ASSAY OF CK (CPK): CPT

## 2018-07-14 PROCEDURE — 83735 ASSAY OF MAGNESIUM: CPT

## 2018-07-14 PROCEDURE — 36415 COLL VENOUS BLD VENIPUNCTURE: CPT

## 2018-07-14 PROCEDURE — 84484 ASSAY OF TROPONIN QUANT: CPT

## 2018-07-14 PROCEDURE — 80053 COMPREHEN METABOLIC PANEL: CPT

## 2018-07-14 PROCEDURE — 25000003 PHARM REV CODE 250: Performed by: STUDENT IN AN ORGANIZED HEALTH CARE EDUCATION/TRAINING PROGRAM

## 2018-07-14 PROCEDURE — 97530 THERAPEUTIC ACTIVITIES: CPT

## 2018-07-14 PROCEDURE — 20600001 HC STEP DOWN PRIVATE ROOM

## 2018-07-14 PROCEDURE — G8988 SELF CARE GOAL STATUS: HCPCS | Mod: CJ

## 2018-07-14 PROCEDURE — 85610 PROTHROMBIN TIME: CPT

## 2018-07-14 PROCEDURE — 97162 PT EVAL MOD COMPLEX 30 MIN: CPT

## 2018-07-14 PROCEDURE — G8989 SELF CARE D/C STATUS: HCPCS | Mod: CK

## 2018-07-14 PROCEDURE — G8987 SELF CARE CURRENT STATUS: HCPCS | Mod: CK

## 2018-07-14 PROCEDURE — A4216 STERILE WATER/SALINE, 10 ML: HCPCS | Performed by: STUDENT IN AN ORGANIZED HEALTH CARE EDUCATION/TRAINING PROGRAM

## 2018-07-14 PROCEDURE — 82553 CREATINE MB FRACTION: CPT

## 2018-07-14 PROCEDURE — 63600175 PHARM REV CODE 636 W HCPCS: Performed by: NURSE PRACTITIONER

## 2018-07-14 PROCEDURE — 99233 SBSQ HOSP IP/OBS HIGH 50: CPT | Mod: ,,, | Performed by: PSYCHIATRY & NEUROLOGY

## 2018-07-14 RX ORDER — ROSUVASTATIN CALCIUM 20 MG/1
20 TABLET, COATED ORAL DAILY
Qty: 30 TABLET | Refills: 0 | Status: SHIPPED | OUTPATIENT
Start: 2018-07-15 | End: 2018-07-23 | Stop reason: SDUPTHER

## 2018-07-14 RX ORDER — PHENAZOPYRIDINE HYDROCHLORIDE 100 MG/1
100 TABLET, FILM COATED ORAL
Status: DISCONTINUED | OUTPATIENT
Start: 2018-07-14 | End: 2018-07-14

## 2018-07-14 RX ORDER — PHENAZOPYRIDINE HYDROCHLORIDE 100 MG/1
100 TABLET, FILM COATED ORAL
Status: DISPENSED | OUTPATIENT
Start: 2018-07-14 | End: 2018-07-16

## 2018-07-14 RX ORDER — ASPIRIN 325 MG
325 TABLET, DELAYED RELEASE (ENTERIC COATED) ORAL DAILY
Refills: 0 | COMMUNITY
Start: 2018-07-15 | End: 2020-09-28

## 2018-07-14 RX ADMIN — ASPIRIN 325 MG: 325 TABLET, DELAYED RELEASE ORAL at 10:07

## 2018-07-14 RX ADMIN — HEPARIN SODIUM 5000 UNITS: 5000 INJECTION, SOLUTION INTRAVENOUS; SUBCUTANEOUS at 02:07

## 2018-07-14 RX ADMIN — HALOPERIDOL LACTATE 5 MG: 5 INJECTION, SOLUTION INTRAMUSCULAR at 12:07

## 2018-07-14 RX ADMIN — PHENAZOPYRIDINE HYDROCHLORIDE 100 MG: 100 TABLET ORAL at 05:07

## 2018-07-14 RX ADMIN — HEPARIN SODIUM 5000 UNITS: 5000 INJECTION, SOLUTION INTRAVENOUS; SUBCUTANEOUS at 10:07

## 2018-07-14 RX ADMIN — BENZTROPINE MESYLATE 1 MG: 0.5 TABLET ORAL at 10:07

## 2018-07-14 RX ADMIN — PHENAZOPYRIDINE HYDROCHLORIDE 100 MG: 100 TABLET ORAL at 12:07

## 2018-07-14 RX ADMIN — ACETAMINOPHEN 650 MG: 325 TABLET, FILM COATED ORAL at 05:07

## 2018-07-14 RX ADMIN — ROSUVASTATIN CALCIUM 20 MG: 20 TABLET, FILM COATED ORAL at 10:07

## 2018-07-14 RX ADMIN — PHENAZOPYRIDINE HYDROCHLORIDE 100 MG: 100 TABLET ORAL at 02:07

## 2018-07-14 RX ADMIN — QUETIAPINE FUMARATE 200 MG: 100 TABLET ORAL at 09:07

## 2018-07-14 RX ADMIN — DONEPEZIL HYDROCHLORIDE 5 MG: 5 TABLET, FILM COATED ORAL at 06:07

## 2018-07-14 RX ADMIN — MEMANTINE HYDROCHLORIDE 5 MG: 5 TABLET ORAL at 10:07

## 2018-07-14 RX ADMIN — CEFTRIAXONE SODIUM 1 G: 1 INJECTION, POWDER, FOR SOLUTION INTRAMUSCULAR; INTRAVENOUS at 10:07

## 2018-07-14 RX ADMIN — FAMOTIDINE 40 MG: 20 TABLET ORAL at 10:07

## 2018-07-14 RX ADMIN — ACETAMINOPHEN 650 MG: 325 TABLET, FILM COATED ORAL at 12:07

## 2018-07-14 RX ADMIN — DULOXETINE HYDROCHLORIDE 30 MG: 30 CAPSULE, DELAYED RELEASE ORAL at 10:07

## 2018-07-14 RX ADMIN — LISINOPRIL 20 MG: 20 TABLET ORAL at 10:07

## 2018-07-14 RX ADMIN — HEPARIN SODIUM 5000 UNITS: 5000 INJECTION, SOLUTION INTRAVENOUS; SUBCUTANEOUS at 05:07

## 2018-07-14 RX ADMIN — BENZTROPINE MESYLATE 1 MG: 0.5 TABLET ORAL at 09:07

## 2018-07-14 RX ADMIN — ACETAMINOPHEN 650 MG: 325 TABLET, FILM COATED ORAL at 09:07

## 2018-07-14 RX ADMIN — Medication 3 ML: at 06:07

## 2018-07-14 RX ADMIN — Medication 3 ML: at 10:07

## 2018-07-14 RX ADMIN — Medication 3 ML: at 02:07

## 2018-07-14 NOTE — ASSESSMENT & PLAN NOTE
72-year-old female with a medical history significant for deafness, HTN, DM, HLD, schizoaffective disorder, dementia with behavior disturbance presents to the ED for evaluation of language impairment, body shakiness, imbalance resulting in a fall today. Family reports speech difficulties are patient's baseline (she is deaf and has dementia). Instability possibly also related to acute UTI.   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 Appears to be a small embolic phenomenon. Pending vascular imaging with carotid US.    Of note, patient was not treated with iv alteplase as she was out of the window for any intervention.   Admit to the stroke service to complete stroke work up as detailed below.      Antithrombotics for secondary stroke prevention: Antiplatelets: Aspirin: 325 mg daily    Statins for secondary stroke prevention and hyperlipidemia, if present: Statins: Crestor 20mg daily (home med)     Aggressive risk factor modification: HTN, DM, HLD      Rehab efforts: PT/OT/SLP to evaluate and treat    Diagnostics ordered/pending: Carotid US     VTE prophylaxis: Heparin 5000 units SQ every 8 hours    BP parameters: Infarct: No intervention, SBP <220

## 2018-07-14 NOTE — PLAN OF CARE
Problem: Physical Therapy Goal  Goal: Physical Therapy Goal  Outcome: Ongoing (interventions implemented as appropriate)  Initial eval completed.  Results, POC, and therapy recommendations discussed with patient and sisters.   Complete evaluation documentation to follow.    Mobility recommendation: Pt is safe to walk to and from the bathroom with 1 person assistance (hold her hand).  Discharge Recommendation: Recommend d/c home with HH and 24 hr assistance when medically appropriate.     Ruth March, PT  7/14/2018  [phone] (pager)

## 2018-07-14 NOTE — HOSPITAL COURSE
7/14/18: Patient agitated yesterday. Treatment for UTI started with ceftriaxone; febrile overnight. Culture in process. WBC count normal. On puree/nectar thick diet. PT/OT recommending back home with HH services/constant care.   7/15/18: Fever 102 overnight, improved with tylenol. Discussed case with hospital medicine. Ordering renal US and procalcitonin. Will continue to monitor closely. Carotid US last night without significant carotid stenosis - no change in medical plan for secondary stroke prevention.   7/16/18: Afebrile overnight. Diet advanced to dental soft and nectar thick liquids. Renal US negative for infection concerns. Will try to set up home health with [facility] today. Continuing antibiotics PO x 7 days. Completed 4 days of rocephin.     Ms. Peres is a 71yo F with dementia/MR at baseline, hypertension, and is deaf who presented after a fall with worsened speech (has dysarthria and aphasia at baseline). Patient was found to have a small distal L temporal lobe infarct. Suspect that infarct was more an incidental infarct and not causing new deficits due to size and location of infarct. PT/OT/ST felt patient should return home with HH (has assistance due to aforementioned comorbidities) and also [facility] due to modified diet (soft, nectar thick). Also found to have a UTI + E Coli, pan sensitive. She was started on rocephin and still had fevers after 2 days of treatment so on discharge patient was prescribed 7 day course of augmentin (pt had been afebrile for 24 hours). Renal US was negative for abscess, no leukocytosis. Patient will start ASA 325mg daily for secondary stroke prevention as well as crestor 20mg daily. She will have close follow up with her PCP within 1 week and 4-6 weeks will see neurology.

## 2018-07-14 NOTE — ASSESSMENT & PLAN NOTE
Ceftriaxone 1g IV daily started 7/13/18   UA 7/13 + leukocytes and nitrite +   Pending cultures and sensitivities   Febrile overnight (102.2)   No leukocytosis

## 2018-07-14 NOTE — CONSULTS
Consult received re: stroke pathway. Pt started on pureed diet. Noted chol is low and A1C is 5.4%, shows good compliance of diet. No education needed. Please re-consult if needed.

## 2018-07-14 NOTE — ASSESSMENT & PLAN NOTE
Delirium precautions   Sees Dr. Walters as OP   Continue home medications (donepezil, memantine, seroquel)   Plan for home with HH as soon as possible

## 2018-07-14 NOTE — PT/OT/SLP EVAL
"Physical Therapy Evaluation    Patient Name:  Lay Peres   MRN:  [id number]    Recommendations:     Discharge Recommendations:  home health PT   Discharge Equipment Recommendations: none   Barriers to discharge: Decreased caregiver support: recommend 1:1 caregiver      Plan:     During this hospitalization, patient to be seen 4 x/week to address the above listed problems via gait training, therapeutic activities, therapeutic exercises, neuromuscular re-education  · Plan of Care Expires:  08/14/18   Plan of Care Reviewed with: patient, sibling    History:     Past Medical History:   Diagnosis Date    Back pain 7/13/2012    Chronic constipation 7/13/2012    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 50-70) 7/13/2012    Neck pain 7/13/2012    Paranoid schizophrenia     Schizoaffective disorder, chronic condition 7/13/2012       History reviewed. No pertinent surgical history.    Subjective     Communicated with RN prior to session.  Patient found supine in bed with sister at bedside upon PT entry to room, agreeable to evaluation.      Chief Complaint: "not feeling well"  Pain/Comfort:  · Pain Rating 1: 0/10  · Pain Rating Post-Intervention 1: 0/10    Living Environment:  Pt lives in assisted living apartment with threshold step to enter.  She shares an apartment with another resident who receives 24 care.  24 hr supervision present within the home.  She was ambulatory for household distances with no assistance and no adaptive equipment.  She was independent with mobility and ADls with the following exceptions, supervision/min assistance for showering.  If she requires more assistance, her sister is available to care for the patient temporarily within her own home.  Patient has the following " equipment:shower chair.    Objective:     Patient found with: telemetry, bed alarm     General Precautions: Standard, aspiration, fall, hearing impaired, nectar thick, pureed diet   Patient was found supine in bed with her sister at bedside. She agreed to therapy.  Her sister assisted with communication. Pt is deaf. Minimal lip reading. She can verbalize.     PHYSICAL EXAMINATION  Cognitive Function:  - Oriented to: person  - Level of Alertness: awake and alert  - Follows Commands/attention: Follows one-step commands with gestures  - Communication: dysarthria (may be baseline)  - Safety awareness/insight to disability: impaired  Musculoskeletal System  Upper Extremities:   ROM: WFL  Strength: WFL  Lower Extremities:  ROM: WFL  Strength: WFL   Cardiopulmonary System:   Recent vitals:   Vitals:    07/14/18 0800   BP: 163/71   Pulse: 93   Resp: 16   Temp: 98 °F (36.7 °C)   Neuromuscular System:  - Sensation: NT  - Coordination: NT  Posture and gross symmetry: rounded shoulders, forward flexed posture    BALANCE:  Sitting: SBA  Standing: min assistance (hand held) for balance    FUNCTIONAL MOBILITY ASSESSMENT:  Bed Mobility: performed with HOB flat  - Rolling/Turning R: SBA  - Rolling/Turning L: SBA  - Supine > sit: min assistance  - Sit > supine: NT  - Scooting EOB: min assistance     Transfers:  - Sit <> stand transfer: min assistance   - Bed <> chair transfer: min assistance  - toilet transfer: min assistance    Gait:   Gait x 15 feet to and from the bathroom, min assistance (hand held)  Gait 50 feet x 2, min assistance (hand held) with minor gait and balance deviations.  - Patient demonstrated the following gait deviations:  · Decreased balance  · Inconsistent stepping pattern (festinating at times and decreased LLE step length at times) but improves with HHA  · Decreased LLE step length  - Comments: gait deviations worsen when assistance is removed, but improve with HHA during gait    Therapeutic Activities,  Education, or Exercises:  Therapist educated patient and her sisters on the role of PT, POC, and therapy recommendations of HH therapy.  Therapist discussed the patients current mobility status, deficits, and level of assistance with her sisters and nursing.  They were educated on proper positioning in supine and in sitting in order to increase awareness of extremity and to decrease the effects of immobility, edema, neglect and pain. PT educated and instructed the patient's sisters in level of assistance with gait and expected assistance upon d/c home. Discussed recommendation for 1:1 care until gait and mobility improve. Time was provided for active listening, discussion of health disposition, and discussion of safe discharge recommendaitons. Therapist answered questions to patient/familys satisfaction within scope of practice.  Patient and family are aware of patient's deficits and therapy progression. White board updated to reflect current level of assistance.    FUNCTIONAL OUTCOME MEASURES:   AM-PAC 6 CLICK MOBILITY  Total Score:18     Patient left up in chair with all lines intact, call button in reach, rn notified and sisters present.    GOALS:    Physical Therapy Goals        Problem: Physical Therapy Goal    Goal Priority Disciplines Outcome Goal Variances Interventions   Physical Therapy Goal     PT/OT, PT Ongoing (interventions implemented as appropriate)     Description:    Goals to be met by 7/27/2018    1. Pt will perform rolling to the R and L with SBA.   2. Pt will perform supine to sit from both sides of the bed with SBA.  3. Pt will perform sit to supine with SBA.  4. Pt will perform sit to stand transfers with SBA.    5. Pt will perform bed <> chair transfers with SBA.  6. Pt will perform gait x 50 feet with SBA and no assistance device.                    Assessment:     Lay Peres is a 72 y.o. female admitted with a medical diagnosis of Stroke due to embolism of left middle  cerebral artery.  She lives in an assisted living apartment with 24 hr assistance, but was at a supervision level with all mobility and ADLs.  She now presents with the following impairments/functional limitations:  gait instability, impaired endurance, impaired balance, decreased safety awareness, impaired self care skills, impaired functional mobilty, impaired cognition, weakness.  She required min assistance with bed mobility, transfers, and gait which is not her baseline.  However, the patient is expected to progress with additional therapy.  Recommend HH therapy and 1:1 assistance for mobility.  Assistive device not introduced at this time d/t cognitive deficits (congenital and acquired) and expectation that patient will return to PLOF and not require long term use of RW.       Rehab Prognosis:  good; patient would benefit from acute skilled PT services to address these deficits and reach maximum level of function.      Recent Surgery: * No surgery found *    Clinical Decision Making:   COMPLEXITY OF PT EXAMINATION:  HISTORY  - Comorbidities that affect the PT plan of care or the patient's ability to participate in/progress with therapy:  1. Mental retardation   2. Congenital deafness  3. Dementia   4. Chronic schizoaffective disorder  - Personal Factors:   1. Patient's cognitive status and safety concerns.  2. Status of current condition   3. PLOF.  EXAMINATION  - Body Systems:  1. Communication ability, affect, cognition, language, and learning style: the assessment of the ability to make needs known, consciousness, orientation, expected emotional /behavioral responses, and learning preferences  2. Neuromuscular system: a general assessment of gross coordinated movement (eg, balance, gait, locomotion, transfers, and transitions) and motor function (motor control and motor learning)  3. Musculoskeletal system: the assessment of gross symmetry, gross range of motion, gross strength, height, and  weight  4. Cardiovascular/pulmonary system: the assessment of heart rate, respiratory rate, blood pressure, SpO2, and edema   - Activity or participation limitations:   Patient's cognitive status and safety concerns  CLINICAL PRESENTATION: Evolving/changing characteristics  varying levels of awareness or cognitive performance   LEVEL OF COMPLEXITY: Moderate Complexity - at least 1-2 personal factors or comorbidities that impact the plan of care; examination addressing at least 3 body structures and functions, activity limitations, and/or participation restrictions; and clinical presentation with evolving or changing characteristics.      Time Tracking:     PT Received On: 07/14/18  PT Start Time: 0955     PT Stop Time: 1025  PT Total Time (min): 30 min     Billable Minutes: Evaluation 20 and Therapeutic Activity 10      Ruth March, PT  07/14/2018

## 2018-07-14 NOTE — PROGRESS NOTES
[facility]  Vascular Neurology  Comprehensive Stroke Center  Progress Note    Assessment/Plan:     * Stroke due to embolism of left middle cerebral artery    72-year-old female with a medical history significant for deafness, HTN, DM, HLD, schizoaffective disorder, dementia with behavior disturbance presents to the ED for evaluation of language impairment, body shakiness, imbalance resulting in a fall today. Family reports speech difficulties are patient's baseline (she is deaf and has dementia). Instability possibly also related to acute UTI.   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 Appears to be a small embolic phenomenon. Pending vascular imaging with carotid US.    Of note, patient was not treated with iv alteplase as she was out of the window for any intervention.   Admit to the stroke service to complete stroke work up as detailed below.      Antithrombotics for secondary stroke prevention: Antiplatelets: Aspirin: 325 mg daily    Statins for secondary stroke prevention and hyperlipidemia, if present: Statins: Crestor 20mg daily (home med)     Aggressive risk factor modification: HTN, DM, HLD      Rehab efforts: PT/OT/SLP to evaluate and treat    Diagnostics ordered/pending: Carotid US     VTE prophylaxis: Heparin 5000 units SQ every 8 hours    BP parameters: Infarct: No intervention, SBP <220        Cytotoxic cerebral edema    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 There is not mass effect associated with it. We will continue to monitor the patients clinical exam for any worsening of symptoms which may indicate expansion of the stroke or the area of the edema resulting in the clinical change. 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         Urinary tract infection    Ceftriaxone 1g IV daily started 7/13/18   UA 7/13 + leukocytes and nitrite +    Pending cultures and sensitivities   Febrile overnight (102.2)   No leukocytosis         Dementia    Delirium precautions   Sees Dr. Walters as OP   Continue home medications (donepezil, memantine, seroquel)   Plan for home with HH as soon as possible           Hyperlipidemia    Stroke risk factor   LDL 54.4   Continue home crestor 20mg daily         Hypertension    Stroke risk factor   Goal BP <200 acute infarct, no intervention              7/14/18: Patient agitated yesterday. Treatment for UTI started with ceftriaxone; febrile overnight. Culture in process. WBC count normal. On puree/nectar thick diet. PT/OT recommending back home with HH services/constant care.     STROKE DOCUMENTATION   Acute Stroke Times   Last Known Normal Date: 07/12/18  Last Known Normal Time:  (unable to determine)  Symptom Onset Date: 07/12/18  Symptom Onset Time:  (unable to determine)  Stroke Team Called Date: 07/13/18  Stroke Team Called Time: 0820  Stroke Team Arrival Date: 07/13/18  Stroke Team Arrival Time: 0825  CT Interpretation Time: 0832  Decision to Treat Time for Alteplase:  (No iv alteplse candidate)  Decision to Treat Time for IR:  (No IR candidate)    NIH Scale:  1a. Level Of Consciousness: 0-->Alert: keenly responsive  1b. LOC Questions: 1-->Answers one question correctly  1c. LOC Commands: 0-->Performs both tasks correctly  2. Best Gaze: 0-->Normal  3. Visual: 0-->No visual loss  4. Facial Palsy: 0-->Normal symmetrical movements  5a. Motor Arm, Left: 0-->No drift: limb holds 90 (or 45) degrees for full 10 secs  5b. Motor Arm, Right: 0-->No drift: limb holds 90 (or 45) degrees for full 10 secs  6a. Motor Leg, Left: 0-->No drift: leg holds 30 degree position for full 5 secs  6b. Motor Leg, Right: 0-->No drift: leg holds 30 degree position for full 5 secs  7. Limb Ataxia: 0-->Absent  8. Sensory: 0-->Normal: no sensory loss  9. Best Language: 2-->Severe aphasia: all communication is through fragmentary expression: great  need for inference, questioning, and guessing by the listener. Range of information that can be exchanged is limited: listener carries burden of. . . (see row details)  10. Dysarthria: 2-->Severe dysarthria: patients speech is so slurred as to be unintelligible in the absence of or out of proportion to any dysphasia, or is mute/anarthric  11. Extinction and Inattention (formerly Neglect): 0-->No abnormality  Total (NIH Stroke Scale): 5       Modified [place] Score: 1  [place] Coma Scale:    ABCD2 Score:    [id number] Score:   HAS -BLED Score:   ICH Score:   Hunt & Lui Classification:      Hemorrhagic change of an Ischemic Stroke: Does this patient have an ischemic stroke with hemorrhagic changes? No     Neurologic Chief Complaint: difficulty walking, fall     Subjective:     Interval History: Patient is seen for follow-up neurological assessment and treatment recommendations:     Patient agitated yesterday. Treatment for UTI started with ceftriaxone; febrile overnight. Culture in process. WBC count normal. On puree/nectar thick diet. PT/OT recommending back home with  services/[facility] care.     HPI, Past Medical, Family, and Social History remains the same as documented in the initial encounter.     Review of Systems   Constitutional: Positive for fever (T max 102 ). Negative for diaphoresis.   HENT: Positive for hearing loss (baseline).    Eyes: Negative for visual disturbance.   Respiratory: Negative for shortness of breath.    Gastrointestinal: Negative for vomiting.   Skin: Positive for rash.   Neurological: Positive for speech difficulty (family reports baseline speech). Negative for facial asymmetry and weakness.   Psychiatric/Behavioral: Negative for agitation and behavioral problems.     Scheduled Meds:   aspirin  325 mg Oral Daily    benztropine  1 mg Oral BID    cefTRIAXone (ROCEPHIN) IVPB  1 g Intravenous Q24H    donepezil  5 mg Oral QAM    DULoxetine  30 mg Oral Daily    famotidine  40 mg Oral  Daily    heparin (porcine)  5,000 Units Subcutaneous Q8H    lisinopril  20 mg Oral Daily    memantine  5 mg Oral Daily    phenazopyridine  100 mg Oral TID WM    QUEtiapine  200 mg Oral QHS    rosuvastatin  20 mg Oral Daily    sodium chloride 0.9%  3 mL Intravenous Q8H     Continuous Infusions:   sodium chloride 0.9%       PRN Meds:acetaminophen, dextrose 50%, glucagon (human recombinant), haloperidol lactate, insulin aspart U-100, labetalol, ondansetron, sodium chloride 0.9%    Objective:     Vital Signs (Most Recent):  Temp: 98.7 °F (37.1 °C) (07/14/18 1228)  Pulse: 90 (07/14/18 1228)  Resp: 18 (07/14/18 1228)  BP: (!) 165/71 (07/14/18 1228)  SpO2: 96 % (07/14/18 1228)  BP Location: Left arm    Vital Signs Range (Last 24H):  Temp:  [98 °F (36.7 °C)-101.5 °F (38.6 °C)]   Pulse:  []   Resp:  [16-20]   BP: (106-165)/(56-71)   SpO2:  [93 %-99 %]   BP Location: Left arm    Physical Exam   Constitutional: She appears well-developed and well-nourished. No distress.   HENT:   Head: Normocephalic and atraumatic.   Eyes: EOM are normal.   Neck: Normal range of motion. Neck supple.   Pulmonary/Chest: Effort normal.   Musculoskeletal: Normal range of motion.   Neurological: She is alert.   Unable to assess orientation given aphasia/dysarthria    Psychiatric: She has a normal mood and affect.       Neurological Exam:   LOC: alert  Attention Span: Good   Language: Global aphasia  Articulation: Dysarthria  Orientation: Untestable due to severe aphasia   Visual Fields: Full  EOM (CN III, IV, VI): Full/intact  Facial Movement (CN VII): Symmetric facial expression    Motor: Arm left  Normal 5/5  Leg left  Paresis: 4/5  Arm right  Normal 5/5  Leg right Paresis: 4/5  Sensation: Intact to light touch, temperature and vibration  Tone: Normal tone throughout    Laboratory:  CMP:   Recent Labs  Lab 07/14/18  0427   CALCIUM 9.8   ALBUMIN 2.7*   PROT 6.8      K 3.8   CO2 21*      BUN 23   CREATININE 1.3   ALKPHOS  109   ALT 24   AST 26   BILITOT 0.5     BMP:   Recent Labs  Lab 07/14/18 0427      K 3.8      CO2 21*   BUN 23   CREATININE 1.3   CALCIUM 9.8     CBC:   Recent Labs  Lab 07/14/18 0427   WBC 11.86   RBC 3.41*   HGB 9.5*   HCT 30.7*      MCV 90   MCH 27.9   MCHC 30.9*     Lipid Panel:   Recent Labs  Lab 07/13/18 0819   CHOL 106*   LDLCALC 54.4*   HDL 33*   TRIG 93     Coagulation:   Recent Labs  Lab 07/14/18 0427   INR 1.0   APTT 34.2*     Platelet Aggregation Study: No results for input(s): PLTAGG, PLTAGINTERP, PLTAGREGLACO, ADPPLTAGGREG in the last 168 hours.  Hgb A1C:   Recent Labs  Lab 07/13/18 0819   HGBA1C 5.4     TSH:   Recent Labs  Lab 07/13/18 0819   TSH 0.626       Diagnostic Results     Brain Imaging   MRI brain 7/13/18:   Punctate acute infarct in the left lateral frontal lobe.  No acute hemorrhage.  Moderate degree of chronic microvascular ischemic change which is slightly advanced for age.  Additional findings as above.      Vessel Imaging   Pending     Cardiac Imaging   ECHO 7/13/18:   CONCLUSIONS     1 - Normal left ventricular systolic function (EF 60-65%).     2 - Normal right ventricular systolic function .     3 - Impaired LV relaxation, elevated LAP (grade 2 diastolic dysfunction).     4 - Moderate left atrial enlargement.     5 - Mild tricuspid regurgitation.     6 - Pulmonary hypertension. The estimated PA systolic pressure is greater than 47 mmHg.     7 - Low central venous pressure      Airam Adams PA-C  Comprehensive Stroke Center  Department of Vascular Neurology   [facility]

## 2018-07-14 NOTE — PLAN OF CARE
Problem: Occupational Therapy Goal  Goal: Occupational Therapy Goal  OT evaluation completed.  KATHERIN Rodriges  7/14/2018

## 2018-07-14 NOTE — ASSESSMENT & PLAN NOTE
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 There is not mass effect associated with it. We will continue to monitor the patients clinical exam for any worsening of symptoms which may indicate expansion of the stroke or the area of the edema resulting in the clinical change. 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

## 2018-07-14 NOTE — PLAN OF CARE
[facility]    HOME HEALTH ORDERS  FACE TO FACE ENCOUNTER    Patient Name: Lay Peres  YOB: 1945    PCP: Krista Case MD   PCP Address: [address] [place] [address]  PCP Phone Number: [phone]  PCP Fax: [phone]    Encounter Date: 07/14/2018    Admit to Home Health    Diagnoses:  Active Hospital Problems    Diagnosis  POA    *Stroke due to embolism of left middle cerebral artery [I63.412]  Yes     Priority: 1 - High    Cytotoxic cerebral edema [G93.6]  Yes     Priority: 2     Urinary tract infection [N39.0]  Yes    Cerebrovascular accident (CVA) [I63.9]  Yes    Dementia [F03.90]  Yes    Hyperlipidemia [E78.5]  Yes    Hypertension [I10]  Yes      Resolved Hospital Problems    Diagnosis Date Resolved POA   No resolved problems to display.       No future appointments.  Follow-up Information     Krista Case MD In 10 days.    Specialty:  Internal Medicine  Why:  For hospital discharge - stroke and UTI   Contact information:  [address] [facility] [id number] [phone]             Cornelius Walters MD. Schedule an appointment as soon as possible for a visit in 4 weeks.    Specialty:  Neurology  Why:  For hospital follow up (fall, stroke)   Contact information:  [address]  [address]  [place] [id number] [phone]                     I have seen and examined this patient face to face today. My clinical findings that support the need for the home health skilled services and home bound status are the following:  Weakness/numbness causing balance and gait disturbance due to Stroke making it taxing to leave home.  Patient with medication mismanagement issues requiring home bound status as evidenced by  Poor understanding of medication regimen/dosage.    Allergies:Review of patient's allergies indicates:  No Known Allergies    Diet: pureed diet, nectar thick liquids     Activities: activity as tolerated    Nursing:    SN to complete comprehensive assessment including routine vital signs. Instruct on disease process and s/s of complications to report to MD. Review/verify medication list sent home with the patient at time of discharge  and instruct patient/caregiver as needed. Frequency may be adjusted depending on start of care date.    Notify MD if SBP > 160 or < 90; DBP > 90 or < 50; HR > 120 or < 50; Temp > 101; Other:         CONSULTS:    Physical Therapy to evaluate and treat. Evaluate for home safety and equipment needs; Establish/upgrade home exercise program. Perform / instruct on therapeutic exercises, gait training, transfer training, and Range of Motion.  Occupational Therapy to evaluate and treat. Evaluate home environment for safety and equipment needs. Perform/Instruct on transfers, ADL training, ROM, and therapeutic exercises.  Speech Therapy  to evaluate and treat for  Language, Swallowing and Cognition.  Aide to provide assistance with personal care, ADLs, and vital signs.    MISCELLANEOUS CARE:  N/A    WOUND CARE ORDERS  n/a      Medications: Review discharge medications with patient and family and provide education.      Current Discharge Medication List      START taking these medications    Details   aspirin (ECOTRIN) 325 MG EC tablet Take 1 tablet (325 mg total) by mouth once daily.  Refills: 0      rosuvastatin (CRESTOR) 20 MG tablet Take 1 tablet (20 mg total) by mouth once daily.  Qty: 30 tablet, Refills: 0         CONTINUE these medications which have NOT CHANGED    Details   acetaminophen (TYLENOL) 500 mg Cap Take 2 capsules (1,000 mg total) by mouth 2 (two) times daily.  Qty: 120 capsule, Refills: 11      benztropine (COGENTIN) 1 MG tablet TAKE 1 TABLET BY MOUTH twice a day (8am/8pm)  Qty: 60 tablet, Refills: 6    Associated Diagnoses: Chronic schizoaffective disorder      blood sugar diagnostic Strp 1 strip by Misc.(Non-Drug; Combo Route) route once daily.  Qty: 100 strip, Refills: 4      CALCIUM  CARBONATE (SAWYER-GEST ANTACID ORAL) Take 1 tablet by mouth 4 (four) times daily before meals and nightly.       cholecalciferol, vitamin D3, (VITAMIN D3) 1,000 unit capsule Take 1 capsule by mouth Daily.      donepezil (ARICEPT) 5 MG tablet Take 1 tablet (5 mg total) by mouth every morning. To be taken with food  Qty: 90 tablet, Refills: 3    Associated Diagnoses: Dementia associated with other underlying disease with behavioral disturbance      DULoxetine (CYMBALTA) 30 MG capsule TAKE 1 CAPSULE BY MOUTH once daily at 8am  Qty: 30 capsule, Refills: 6    Associated Diagnoses: Chronic schizoaffective disorder      famotidine (PEPCID) 40 MG tablet Take 1 tablet by mouth Every PM.      gabapentin (NEURONTIN) 300 MG capsule TAKE 1 TABLET (300MG) BY MOUTH AT BEDTIME at 8pm  Qty: 30 capsule, Refills: 11      haloperidol (HALDOL) 5 MG tablet Take 1 tablet (5 mg total) by mouth daily as needed (agitation/paranoia).  Qty: 30 tablet, Refills: 1    Associated Diagnoses: Chronic schizoaffective disorder      haloperidol decanoate (HALDOL DECANOATE) 100 mg/mL injection Inject 1 mL (100 mg total) into the muscle every 14 (fourteen) days.  Qty: 1 mL, Refills: 12    Comments: This prescription was filled on 11/13/2017. Any refills authorized will be placed on file.  Associated Diagnoses: Chronic schizoaffective disorder      lisinopril (PRINIVIL,ZESTRIL) 20 MG tablet Take 1 tablet (20 mg total) by mouth once daily.  Qty: 30 tablet, Refills: 4      memantine (NAMENDA XR) 28 mg CSpX Take 1 capsule (28 mg total) by mouth once daily.  Qty: 90 each, Refills: 3    Associated Diagnoses: Dementia with behavioral disturbance, unspecified dementia type      metformin (GLUCOPHAGE) 500 MG tablet Take 1 tablet (500 mg total) by mouth 2 (two) times daily with meals.  Qty: 180 tablet, Refills: 4      multivitamin capsule Take 1 capsule by mouth once daily.      nystatin (MYCOSTATIN) ointment Apply topically 2 (two) times daily.      QUEtiapine  (SEROQUEL) 200 MG Tab TAKE 1 TABLET BY MOUTH EVERY EVENING at 8pm  Qty: 30 tablet, Refills: 0    Associated Diagnoses: Chronic schizoaffective disorder      senna (SENNA) 8.6 mg tablet Take 1 tablet by mouth Twice daily.      tizanidine 2 mg Cap Take 4 mg by mouth every 6 (six) hours as needed.         STOP taking these medications       celecoxib (CELEBREX) 200 MG capsule Comments:   Reason for Stopping:         lovastatin (MEVACOR) 10 MG tablet Comments:   Reason for Stopping:         lovastatin (MEVACOR) 10 MG tablet Comments:   Reason for Stopping:               I certify that this patient is confined to her home and needs intermittent skilled nursing care, physical therapy, speech therapy and occupational therapy.

## 2018-07-14 NOTE — PT/OT/SLP EVAL
"Occupational Therapy   Evaluation    Name: Lay Peres  MRN: [id number]  Admitting Diagnosis:  Stroke due to embolism of left middle cerebral artery      Recommendations:     Discharge Recommendations: home health OT  Discharge Equipment Recommendations:  bath bench  Barriers to discharge:  None    History:     Occupational Profile:  Per sister:  Patient resides in [place] with roommate in one story home with one step to enter.  Patient and roommate have 24 hour care provided.  During the day, patient attends [employer] from 8am-4pm.  PTA patient independent with ADLs, not driving.  Patient is right handed.  Currently owns no DME.  Hobbies:  Eating out at [employer].  Roles/Responsibilities: Sister.      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 50-70) 7/13/2012    Neck pain 7/13/2012    Paranoid schizophrenia     Schizoaffective disorder, chronic condition 7/13/2012       History reviewed. No pertinent surgical history.    Subjective     Patient:  "You are beautiful.  Thank you.  I love you."  Sister:  "My sister and I are the only two who can understand her and it's getting progressively worse due to the dementia."   Communicated with: Nurse prior to session.  Pain/Comfort:  · Pain Rating 1: 0/10  · Pain Rating Post-Intervention 1: 0/10    Patients cultural, spiritual, Spiritism conflicts given the current situation: Nondenominational    Objective:     Patient found with: peripheral IV, bed alarm  Sister present.  General Precautions: Standard, aspiration, fall, pureed diet, nectar thick ; deaf; dementia with behavior disturbance  Orthopedic Precautions:N/A   Braces: N/A     Occupational " Performance:    Bed Mobility:    · Patient completed Rolling/Turning to Left with  supervision  · Patient completed Rolling/Turning to Right with supervision  · Patient completed Scooting/Bridging with supervision  · Patient completed Supine to Sit with stand by assistance  · Patient completed Sit to Supine with stand by assistance    Functional Mobility/Transfers:  · Patient completed Sit <> Stand Transfer with stand by assistance  with  no assistive device   · Patient completed Bed <> Chair Transfer using Stand Pivot technique with stand by assistance with no assistive device    Activities of Daily Living:  · Grooming: stand by assistance while standing  · Upper Body Dressing: stand by assistance while seated EOB  · Lower Body Dressing: stand by assistance    · Toileting: stand by assistance with std commode and use of grab bar    Cognitive/Visual Perceptual:  Cognitive/Psychosocial Skills:     -       Oriented to: Daily orientation provided  -       Follows Commands/attention:attentive; able to engage in all functional tasks.  -       Communication: few simple sentences stated  -       Safety awareness/insight to disability: impaired  -       Mood/Affect/Coping skills/emotional control: cooperative, affectionate    Physical Exam:  Postural examination/scapula alignment:    -       Rounded shoulders  Skin integrity: Visible skin intact  Edema:  None noted  Upper Extremity Range of Motion:     -       Right Upper Extremity: WFL  -       Left Upper Extremity: WFL  Upper Extremity Strength:    -       Right Upper Extremity: WFL  -       Left Upper Extremity: WFL    Patient left supine with all lines intact, call button in reach and bed alarm on    AMPAC 6 Click:  AMPAC Total Score: 18    Treatment & Education:  Patient education provided on role of OT and need for HH OT upon discharge. Patient and family instructed on need to call for assistance for toileting needs and when getting up.  Continued education, patient/  "family training recommended.  Patient's functional status and disposition recommendation discussed with patient and family.  White board updated in patient's room.  OT asked if there were any other questions; patient/ family had no further questions.       Education:    Assessment:     Lay Peres is a 72 y.o. female with a medical diagnosis of Stroke due to embolism of left middle cerebral artery.  She presents with performance deficits affecting function are weakness, impaired endurance, impaired self care skills, impaired functional mobilty.      Rehab Prognosis:  Good; patient would benefit from acute skilled OT services to address these deficits and reach maximum level of function.         Clinical Decision Makin.  OT Low:  "Pt evaluation falls under low complexity for evaluation coding due to performance deficits noted in 1-3 areas as stated above and 0 co-morbities affecting current functional status. Data obtained from problem focused assessments. No modifications or assistance was required for completion of evaluation. Only brief occupational profile and history review completed."     Plan:     Patient to be seen 3 x/week to address the above listed problems via self-care/home management, therapeutic activities, therapeutic exercises, neuromuscular re-education, cognitive retraining  · Plan of Care Expires: 18  · Plan of Care Reviewed with: patient, sibling    This Plan of care has been discussed with the patient who was involved in its development and understands and is in agreement with the identified goals and treatment plan    GOALS:    Occupational Therapy Goals        Problem: Occupational Therapy Goal    Goal Priority Disciplines Outcome Interventions   Occupational Therapy Goal     OT, PT/OT     Description:  Goals set  to be addressed for 7 days with expiration date,   Patient will increase functional independence with ADLs by performing:    Patient will " demonstrate rolling to the right with modified independence.  Not met   Patient will demonstrate rolling to the left with modified independence.   Not met  Patient will demonstrate supine -sit with modified independence.   Not met  Patient will demonstrate stand pivot transfers with supervision.   Not met  Patient will demonstrate grooming while standing with supervision.   Not met  Patient will demonstrate upper body dressing with supervision.   Not met  Patient will demonstrate lower body dressing with supervision.   Not met  Patient will demonstrate toileting with supervision.   Not met  Patient's family / caregiver will demonstrate independence and safety with assisting patient with self-care skills and functional mobility.     Not met                            Time Tracking:     OT Date of Treatment: 07/14/18  OT Start Time: 0704  OT Stop Time: 0730  OT Total Time (min): 26 min    Billable Minutes:Evaluation 16  Self Care/Home Management 10    KATHERIN Rodriges  7/14/2018

## 2018-07-14 NOTE — SUBJECTIVE & OBJECTIVE
Neurologic Chief Complaint: difficulty walking, fall     Subjective:     Interval History: Patient is seen for follow-up neurological assessment and treatment recommendations:     Patient agitated yesterday. Treatment for UTI started with ceftriaxone; febrile overnight. Culture in process. WBC count normal. On puree/nectar thick diet. PT/OT recommending back home with  services/constant care.     HPI, Past Medical, Family, and Social History remains the same as documented in the initial encounter.     Review of Systems   Constitutional: Positive for fever (T max 102 ). Negative for diaphoresis.   HENT: Positive for hearing loss (baseline).    Eyes: Negative for visual disturbance.   Respiratory: Negative for shortness of breath.    Gastrointestinal: Negative for vomiting.   Skin: Positive for rash.   Neurological: Positive for speech difficulty (family reports baseline speech). Negative for facial asymmetry and weakness.   Psychiatric/Behavioral: Negative for agitation and behavioral problems.     Scheduled Meds:   aspirin  325 mg Oral Daily    benztropine  1 mg Oral BID    cefTRIAXone (ROCEPHIN) IVPB  1 g Intravenous Q24H    donepezil  5 mg Oral QAM    DULoxetine  30 mg Oral Daily    famotidine  40 mg Oral Daily    heparin (porcine)  5,000 Units Subcutaneous Q8H    lisinopril  20 mg Oral Daily    memantine  5 mg Oral Daily    phenazopyridine  100 mg Oral TID WM    QUEtiapine  200 mg Oral QHS    rosuvastatin  20 mg Oral Daily    sodium chloride 0.9%  3 mL Intravenous Q8H     Continuous Infusions:   sodium chloride 0.9%       PRN Meds:acetaminophen, dextrose 50%, glucagon (human recombinant), haloperidol lactate, insulin aspart U-100, labetalol, ondansetron, sodium chloride 0.9%    Objective:     Vital Signs (Most Recent):  Temp: 98.7 °F (37.1 °C) (07/14/18 1228)  Pulse: 90 (07/14/18 1228)  Resp: 18 (07/14/18 1228)  BP: (!) 165/71 (07/14/18 1228)  SpO2: 96 % (07/14/18 1228)  BP Location: Left  arm    Vital Signs Range (Last 24H):  Temp:  [98 °F (36.7 °C)-101.5 °F (38.6 °C)]   Pulse:  []   Resp:  [16-20]   BP: (106-165)/(56-71)   SpO2:  [93 %-99 %]   BP Location: Left arm    Physical Exam   Constitutional: She appears well-developed and well-nourished. No distress.   HENT:   Head: Normocephalic and atraumatic.   Eyes: EOM are normal.   Neck: Normal range of motion. Neck supple.   Pulmonary/Chest: Effort normal.   Musculoskeletal: Normal range of motion.   Neurological: She is alert.   Unable to assess orientation given aphasia/dysarthria    Psychiatric: She has a normal mood and affect.       Neurological Exam:   LOC: alert  Attention Span: Good   Language: Global aphasia  Articulation: Dysarthria  Orientation: Untestable due to severe aphasia   Visual Fields: Full  EOM (CN III, IV, VI): Full/intact  Facial Movement (CN VII): Symmetric facial expression    Motor: Arm left  Normal 5/5  Leg left  Paresis: 4/5  Arm right  Normal 5/5  Leg right Paresis: 4/5  Sensation: Intact to light touch, temperature and vibration  Tone: Normal tone throughout    Laboratory:  CMP:   Recent Labs  Lab 07/14/18 0427   CALCIUM 9.8   ALBUMIN 2.7*   PROT 6.8      K 3.8   CO2 21*      BUN 23   CREATININE 1.3   ALKPHOS 109   ALT 24   AST 26   BILITOT 0.5     BMP:   Recent Labs  Lab 07/14/18 0427      K 3.8      CO2 21*   BUN 23   CREATININE 1.3   CALCIUM 9.8     CBC:   Recent Labs  Lab 07/14/18 0427   WBC 11.86   RBC 3.41*   HGB 9.5*   HCT 30.7*      MCV 90   MCH 27.9   MCHC 30.9*     Lipid Panel:   Recent Labs  Lab 07/13/18 0819   CHOL 106*   LDLCALC 54.4*   HDL 33*   TRIG 93     Coagulation:   Recent Labs  Lab 07/14/18 0427   INR 1.0   APTT 34.2*     Platelet Aggregation Study: No results for input(s): PLTAGG, PLTAGINTERP, PLTAGREGLACO, ADPPLTAGGREG in the last 168 hours.  Hgb A1C:   Recent Labs  Lab 07/13/18 0819   HGBA1C 5.4     TSH:   Recent Labs  Lab 07/13/18 0819   TSH 0.626        Diagnostic Results     Brain Imaging   MRI brain 7/13/18:   Punctate acute infarct in the left lateral frontal lobe.  No acute hemorrhage.  Moderate degree of chronic microvascular ischemic change which is slightly advanced for age.  Additional findings as above.      Vessel Imaging   Pending     Cardiac Imaging   ECHO 7/13/18:   CONCLUSIONS     1 - Normal left ventricular systolic function (EF 60-65%).     2 - Normal right ventricular systolic function .     3 - Impaired LV relaxation, elevated LAP (grade 2 diastolic dysfunction).     4 - Moderate left atrial enlargement.     5 - Mild tricuspid regurgitation.     6 - Pulmonary hypertension. The estimated PA systolic pressure is greater than 47 mmHg.     7 - Low central venous pressure

## 2018-07-15 LAB
ALBUMIN SERPL BCP-MCNC: 2.6 G/DL
ALP SERPL-CCNC: 119 U/L
ALT SERPL W/O P-5'-P-CCNC: 34 U/L
ANION GAP SERPL CALC-SCNC: 10 MMOL/L
AST SERPL-CCNC: 29 U/L
BASOPHILS # BLD AUTO: 0.04 K/UL
BASOPHILS NFR BLD: 0.4 %
BILIRUB SERPL-MCNC: 0.4 MG/DL
BUN SERPL-MCNC: 19 MG/DL
CALCIUM SERPL-MCNC: 10.3 MG/DL
CHLORIDE SERPL-SCNC: 111 MMOL/L
CO2 SERPL-SCNC: 21 MMOL/L
CREAT SERPL-MCNC: 1 MG/DL
DIFFERENTIAL METHOD: ABNORMAL
EOSINOPHIL # BLD AUTO: 0.1 K/UL
EOSINOPHIL NFR BLD: 1.4 %
ERYTHROCYTE [DISTWIDTH] IN BLOOD BY AUTOMATED COUNT: 14.5 %
EST. GFR  (AFRICAN AMERICAN): >60 ML/MIN/1.73 M^2
EST. GFR  (NON AFRICAN AMERICAN): 55.6 ML/MIN/1.73 M^2
GLUCOSE SERPL-MCNC: 130 MG/DL
HCT VFR BLD AUTO: 32.7 %
HGB BLD-MCNC: 10.1 G/DL
IMM GRANULOCYTES # BLD AUTO: 0.04 K/UL
IMM GRANULOCYTES NFR BLD AUTO: 0.4 %
LYMPHOCYTES # BLD AUTO: 1.3 K/UL
LYMPHOCYTES NFR BLD: 13.1 %
MCH RBC QN AUTO: 27.6 PG
MCHC RBC AUTO-ENTMCNC: 30.9 G/DL
MCV RBC AUTO: 89 FL
MONOCYTES # BLD AUTO: 1.4 K/UL
MONOCYTES NFR BLD: 14.4 %
NEUTROPHILS # BLD AUTO: 6.7 K/UL
NEUTROPHILS NFR BLD: 70.3 %
NRBC BLD-RTO: 0 /100 WBC
PLATELET # BLD AUTO: 223 K/UL
PMV BLD AUTO: 10.2 FL
POCT GLUCOSE: 115 MG/DL (ref 70–110)
POCT GLUCOSE: 127 MG/DL (ref 70–110)
POCT GLUCOSE: 146 MG/DL (ref 70–110)
POCT GLUCOSE: 185 MG/DL (ref 70–110)
POCT GLUCOSE: 190 MG/DL (ref 70–110)
POTASSIUM SERPL-SCNC: 3.7 MMOL/L
PROCALCITONIN SERPL IA-MCNC: 1.22 NG/ML
PROT SERPL-MCNC: 6.8 G/DL
RBC # BLD AUTO: 3.66 M/UL
SODIUM SERPL-SCNC: 142 MMOL/L
WBC # BLD AUTO: 9.54 K/UL

## 2018-07-15 PROCEDURE — 25000003 PHARM REV CODE 250: Performed by: NURSE PRACTITIONER

## 2018-07-15 PROCEDURE — 36415 COLL VENOUS BLD VENIPUNCTURE: CPT

## 2018-07-15 PROCEDURE — 63600175 PHARM REV CODE 636 W HCPCS: Performed by: STUDENT IN AN ORGANIZED HEALTH CARE EDUCATION/TRAINING PROGRAM

## 2018-07-15 PROCEDURE — 25000003 PHARM REV CODE 250: Performed by: STUDENT IN AN ORGANIZED HEALTH CARE EDUCATION/TRAINING PROGRAM

## 2018-07-15 PROCEDURE — 85025 COMPLETE CBC W/AUTO DIFF WBC: CPT

## 2018-07-15 PROCEDURE — 80053 COMPREHEN METABOLIC PANEL: CPT

## 2018-07-15 PROCEDURE — 63600175 PHARM REV CODE 636 W HCPCS: Performed by: NURSE PRACTITIONER

## 2018-07-15 PROCEDURE — 25000003 PHARM REV CODE 250: Performed by: PSYCHIATRY & NEUROLOGY

## 2018-07-15 PROCEDURE — 99233 SBSQ HOSP IP/OBS HIGH 50: CPT | Mod: ,,, | Performed by: PSYCHIATRY & NEUROLOGY

## 2018-07-15 PROCEDURE — 20600001 HC STEP DOWN PRIVATE ROOM

## 2018-07-15 PROCEDURE — 84145 PROCALCITONIN (PCT): CPT

## 2018-07-15 PROCEDURE — A4216 STERILE WATER/SALINE, 10 ML: HCPCS | Performed by: STUDENT IN AN ORGANIZED HEALTH CARE EDUCATION/TRAINING PROGRAM

## 2018-07-15 RX ADMIN — QUETIAPINE FUMARATE 200 MG: 100 TABLET ORAL at 09:07

## 2018-07-15 RX ADMIN — LISINOPRIL 20 MG: 20 TABLET ORAL at 09:07

## 2018-07-15 RX ADMIN — FAMOTIDINE 40 MG: 20 TABLET ORAL at 10:07

## 2018-07-15 RX ADMIN — PHENAZOPYRIDINE HYDROCHLORIDE 100 MG: 100 TABLET ORAL at 05:07

## 2018-07-15 RX ADMIN — ASPIRIN 325 MG: 325 TABLET, DELAYED RELEASE ORAL at 10:07

## 2018-07-15 RX ADMIN — HEPARIN SODIUM 5000 UNITS: 5000 INJECTION, SOLUTION INTRAVENOUS; SUBCUTANEOUS at 02:07

## 2018-07-15 RX ADMIN — PHENAZOPYRIDINE HYDROCHLORIDE 100 MG: 100 TABLET ORAL at 08:07

## 2018-07-15 RX ADMIN — HEPARIN SODIUM 5000 UNITS: 5000 INJECTION, SOLUTION INTRAVENOUS; SUBCUTANEOUS at 09:07

## 2018-07-15 RX ADMIN — MEMANTINE HYDROCHLORIDE 5 MG: 5 TABLET ORAL at 10:07

## 2018-07-15 RX ADMIN — BENZTROPINE MESYLATE 1 MG: 0.5 TABLET ORAL at 10:07

## 2018-07-15 RX ADMIN — Medication 3 ML: at 10:07

## 2018-07-15 RX ADMIN — DONEPEZIL HYDROCHLORIDE 5 MG: 5 TABLET, FILM COATED ORAL at 06:07

## 2018-07-15 RX ADMIN — ROSUVASTATIN CALCIUM 20 MG: 20 TABLET, FILM COATED ORAL at 10:07

## 2018-07-15 RX ADMIN — BENZTROPINE MESYLATE 1 MG: 0.5 TABLET ORAL at 09:07

## 2018-07-15 RX ADMIN — Medication 3 ML: at 02:07

## 2018-07-15 RX ADMIN — Medication 3 ML: at 06:07

## 2018-07-15 RX ADMIN — CEFTRIAXONE SODIUM 1 G: 1 INJECTION, POWDER, FOR SOLUTION INTRAMUSCULAR; INTRAVENOUS at 10:07

## 2018-07-15 RX ADMIN — HEPARIN SODIUM 5000 UNITS: 5000 INJECTION, SOLUTION INTRAVENOUS; SUBCUTANEOUS at 06:07

## 2018-07-15 RX ADMIN — PHENAZOPYRIDINE HYDROCHLORIDE 100 MG: 100 TABLET ORAL at 11:07

## 2018-07-15 RX ADMIN — DULOXETINE HYDROCHLORIDE 30 MG: 30 CAPSULE, DELAYED RELEASE ORAL at 10:07

## 2018-07-15 NOTE — ASSESSMENT & PLAN NOTE
Ceftriaxone 1g IV daily started 7/13/18   UA 7/13 + leukocytes and nitrite +   + E Coli  Pending sensitivities   Febrile overnight (102)   No leukocytosis   Renal US and procal ordered 7/15.   If febrile again will get ID approval for meropenem and get blood cultures

## 2018-07-15 NOTE — ASSESSMENT & PLAN NOTE
72-year-old female with a medical history significant for deafness, HTN, DM, HLD, schizoaffective disorder, dementia with behavior disturbance presents to the ED for evaluation of language impairment, body shakiness, imbalance resulting in a fall today. Family reports speech difficulties are patient's baseline (she is deaf and has dementia). Instability possibly also related to acute UTI.   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 Appears to be a small embolic phenomenon. Vascular US of carotid arteries unremarkable, no significant vascular disease.   Of note, patient was not treated with iv alteplase as she was out of the window for any intervention.   Admit to the stroke service to complete stroke work up as detailed below.      Antithrombotics for secondary stroke prevention: Antiplatelets: Aspirin: 325 mg daily    Statins for secondary stroke prevention and hyperlipidemia, if present: Statins: Crestor 20mg daily (home med)     Aggressive risk factor modification: HTN, DM, HLD      Rehab efforts: PT/OT/SLP to evaluate and treat    Diagnostics ordered/pending: Carotid US     VTE prophylaxis: Heparin 5000 units SQ every 8 hours    BP parameters: Infarct: No intervention, SBP <180

## 2018-07-15 NOTE — SUBJECTIVE & OBJECTIVE
Neurologic Chief Complaint: difficulty walking, fall     Subjective:     Interval History: Patient is seen for follow-up neurological assessment and treatment recommendations:     Fever 102 overnight, improved with tylenol. Discussed case with hospital medicine. Ordering renal US and procalcitonin. Will continue to monitor closely. Carotid US last night without significant carotid stenosis - no change in medical plan for secondary stroke prevention.     HPI, Past Medical, Family, and Social History remains the same as documented in the initial encounter.     Review of Systems   Constitutional: Positive for fever (T max 102 ). Negative for diaphoresis.   HENT: Positive for hearing loss (baseline).    Eyes: Negative for visual disturbance.   Respiratory: Negative for shortness of breath.    Gastrointestinal: Negative for vomiting.   Skin: Positive for rash.   Neurological: Positive for speech difficulty (family reports baseline speech). Negative for facial asymmetry and weakness.   Psychiatric/Behavioral: Negative for agitation and behavioral problems.     Scheduled Meds:   aspirin  325 mg Oral Daily    benztropine  1 mg Oral BID    cefTRIAXone (ROCEPHIN) IVPB  1 g Intravenous Q24H    donepezil  5 mg Oral QAM    DULoxetine  30 mg Oral Daily    famotidine  40 mg Oral Daily    heparin (porcine)  5,000 Units Subcutaneous Q8H    lisinopril  20 mg Oral Daily    memantine  5 mg Oral Daily    phenazopyridine  100 mg Oral TID WM    QUEtiapine  200 mg Oral QHS    rosuvastatin  20 mg Oral Daily    sodium chloride 0.9%  3 mL Intravenous Q8H     Continuous Infusions:   sodium chloride 0.9%       PRN Meds:acetaminophen, dextrose 50%, glucagon (human recombinant), haloperidol lactate, insulin aspart U-100, labetalol, ondansetron, sodium chloride 0.9%    Objective:     Vital Signs (Most Recent):  Temp: 98.2 °F (36.8 °C) (07/15/18 0815)  Pulse: 83 (07/15/18 1117)  Resp: 16 (07/14/18 2154)  BP: (!) 145/78 (07/15/18  0815)  SpO2: (!) 94 % (07/15/18 0815)  BP Location: Left arm    Vital Signs Range (Last 24H):  Temp:  [96.8 °F (36 °C)-102.1 °F (38.9 °C)]   Pulse:  []   Resp:  [16-18]   BP: (141-189)/(63-78)   SpO2:  [85 %-94 %]   BP Location: Left arm    Physical Exam   Constitutional: She appears well-developed and well-nourished. No distress.   HENT:   Head: Normocephalic and atraumatic.   Eyes: EOM are normal.   Neck: Normal range of motion. Neck supple.   Pulmonary/Chest: Effort normal.   Musculoskeletal: Normal range of motion.   Neurological: She is alert.   Unable to assess orientation given aphasia/dysarthria    Psychiatric: She has a normal mood and affect.       Neurological Exam:   LOC: alert  Attention Span: Good   Language: Global aphasia  Articulation: Dysarthria  Orientation: Untestable due to severe aphasia   Visual Fields: Full  EOM (CN III, IV, VI): Full/intact  Facial Movement (CN VII): Symmetric facial expression    Motor: Arm left  Normal 5/5  Leg left  Paresis: 4/5  Arm right  Normal 5/5  Leg right Paresis: 4/5  Sensation: Intact to light touch, temperature and vibration  Tone: Normal tone throughout    Laboratory:  CMP:     Recent Labs  Lab 07/15/18  0508   CALCIUM 10.3   ALBUMIN 2.6*   PROT 6.8      K 3.7   CO2 21*   *   BUN 19   CREATININE 1.0   ALKPHOS 119   ALT 34   AST 29   BILITOT 0.4     BMP:     Recent Labs  Lab 07/15/18  0508      K 3.7   *   CO2 21*   BUN 19   CREATININE 1.0   CALCIUM 10.3     CBC:     Recent Labs  Lab 07/15/18  0508   WBC 9.54   RBC 3.66*   HGB 10.1*   HCT 32.7*      MCV 89   MCH 27.6   MCHC 30.9*     Lipid Panel:     Recent Labs  Lab 07/13/18  0819   CHOL 106*   LDLCALC 54.4*   HDL 33*   TRIG 93     Coagulation:     Recent Labs  Lab 07/14/18  0427 07/14/18  1911   INR 1.0  --    APTT 34.2* 36.0*     Platelet Aggregation Study: No results for input(s): PLTAGG, PLTAGINTERP, PLTAGREGLACO, ADPPLTAGGREG in the last 168 hours.  Hgb A1C:     Recent  Labs  Lab 07/13/18  0819   HGBA1C 5.4     TSH:     Recent Labs  Lab 07/13/18  0819   TSH 0.626       Diagnostic Results     Brain Imaging   MRI brain 7/13/18:   Punctate acute infarct in the left lateral frontal lobe.  No acute hemorrhage.  Moderate degree of chronic microvascular ischemic change which is slightly advanced for age.  Additional findings as above.      Vessel Imaging   Carotid US 7/14/18:   No evidence of a hemodynamically significant carotid bifurcation stenosis.    Cardiac Imaging   ECHO 7/13/18:   CONCLUSIONS     1 - Normal left ventricular systolic function (EF 60-65%).     2 - Normal right ventricular systolic function .     3 - Impaired LV relaxation, elevated LAP (grade 2 diastolic dysfunction).     4 - Moderate left atrial enlargement.     5 - Mild tricuspid regurgitation.     6 - Pulmonary hypertension. The estimated PA systolic pressure is greater than 47 mmHg.     7 - Low central venous pressure

## 2018-07-15 NOTE — PROGRESS NOTES
[facility]  Vascular Neurology  Comprehensive Stroke Center  Progress Note    Assessment/Plan:     * Stroke due to embolism of left middle cerebral artery    72-year-old female with a medical history significant for deafness, HTN, DM, HLD, schizoaffective disorder, dementia with behavior disturbance presents to the ED for evaluation of language impairment, body shakiness, imbalance resulting in a fall today. Family reports speech difficulties are patient's baseline (she is deaf and has dementia). Instability possibly also related to acute UTI.   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 Appears to be a small embolic phenomenon. Vascular US of carotid arteries unremarkable, no significant vascular disease.   Of note, patient was not treated with iv alteplase as she was out of the window for any intervention.   Admit to the stroke service to complete stroke work up as detailed below.      Antithrombotics for secondary stroke prevention: Antiplatelets: Aspirin: 325 mg daily    Statins for secondary stroke prevention and hyperlipidemia, if present: Statins: Crestor 20mg daily (home med)     Aggressive risk factor modification: HTN, DM, HLD      Rehab efforts: PT/OT/SLP to evaluate and treat    Diagnostics ordered/pending: Carotid US     VTE prophylaxis: Heparin 5000 units SQ every 8 hours    BP parameters: Infarct: No intervention, SBP <180        Cytotoxic cerebral edema    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 There is not mass effect associated with it. We will continue to monitor the patients clinical exam for any worsening of symptoms which may indicate expansion of the stroke or the area of the edema resulting in the clinical change. 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         Urinary tract infection    Ceftriaxone 1g IV daily started 7/13/18   UA  7/13 + leukocytes and nitrite +   + E Coli  Pending sensitivities   Febrile overnight (102)   No leukocytosis   Renal US and procal ordered 7/15.   If febrile again will get ID approval for meropenem and get blood cultures         Dementia    Delirium precautions   Sees Dr. Walters as OP   Continue home medications (donepezil, memantine, seroquel)   Plan for home with HH as soon as possible           Hyperlipidemia    Stroke risk factor   LDL 54.4   Continue home crestor 20mg daily         Hypertension    Stroke risk factor   Goal BP <180 acute infarct, no intervention              7/14/18: Patient agitated yesterday. Treatment for UTI started with ceftriaxone; febrile overnight. Culture in process. WBC count normal. On puree/nectar thick diet. PT/OT recommending back home with HH services/constant care.   7/15/18: Fever 102 overnight, improved with tylenol. Discussed case with hospital medicine. Ordering renal US and procalcitonin. Will continue to monitor closely. Carotid US last night without significant carotid stenosis - no change in medical plan for secondary stroke prevention.     STROKE DOCUMENTATION   Acute Stroke Times   Last Known Normal Date: 07/12/18  Last Known Normal Time:  (unable to determine)  Symptom Onset Date: 07/12/18  Symptom Onset Time:  (unable to determine)  Stroke Team Called Date: 07/13/18  Stroke Team Called Time: 0820  Stroke Team Arrival Date: 07/13/18  Stroke Team Arrival Time: 0825  CT Interpretation Time: 0832  Decision to Treat Time for Alteplase:  (No iv alteplse candidate)  Decision to Treat Time for IR:  (No IR candidate)    NIH Scale:  1a. Level Of Consciousness: 0-->Alert: keenly responsive  1b. LOC Questions: 2-->Answers neither question correctly  1c. LOC Commands: 0-->Performs both tasks correctly  2. Best Gaze: 0-->Normal  3. Visual: 0-->No visual loss  4. Facial Palsy: 0-->Normal symmetrical movements  5a. Motor Arm, Left: 0-->No drift: limb holds 90 (or 45) degrees for  full 10 secs  5b. Motor Arm, Right: 0-->No drift: limb holds 90 (or 45) degrees for full 10 secs  6a. Motor Leg, Left: 0-->No drift: leg holds 30 degree position for full 5 secs  6b. Motor Leg, Right: 0-->No drift: leg holds 30 degree position for full 5 secs  7. Limb Ataxia: 0-->Absent  8. Sensory: 0-->Normal: no sensory loss  9. Best Language: 2-->Severe aphasia: all communication is through fragmentary expression: great need for inference, questioning, and guessing by the listener. Range of information that can be exchanged is limited: listener carries burden of. . . (see row details)  10. Dysarthria: 2-->Severe dysarthria: patients speech is so slurred as to be unintelligible in the absence of or out of proportion to any dysphasia, or is mute/anarthric  11. Extinction and Inattention (formerly Neglect): 0-->No abnormality  Total (NIH Stroke Scale): 6       Modified D[place] Score: 1  [place] Coma Scale:    ABCD2 Score:    [id number] Score:   HAS -BLED Score:   ICH Score:   Hunt & Lui Classification:      Hemorrhagic change of an Ischemic Stroke: Does this patient have an ischemic stroke with hemorrhagic changes? No     Neurologic Chief Complaint: difficulty walking, fall     Subjective:     Interval History: Patient is seen for follow-up neurological assessment and treatment recommendations:     Fever 102 overnight, improved with tylenol. Discussed case with hospital medicine. Ordering renal US and procalcitonin. Will continue to monitor closely. Carotid US last night without significant carotid stenosis - no change in medical plan for secondary stroke prevention.     HPI, Past Medical, Family, and Social History remains the same as documented in the initial encounter.     Review of Systems   Constitutional: Positive for fever (T max 102 ). Negative for diaphoresis.   HENT: Positive for hearing loss (baseline).    Eyes: Negative for visual disturbance.   Respiratory: Negative for shortness of breath.     Gastrointestinal: Negative for vomiting.   Skin: Positive for rash.   Neurological: Positive for speech difficulty (family reports baseline speech). Negative for facial asymmetry and weakness.   Psychiatric/Behavioral: Negative for agitation and behavioral problems.     Scheduled Meds:   aspirin  325 mg Oral Daily    benztropine  1 mg Oral BID    cefTRIAXone (ROCEPHIN) IVPB  1 g Intravenous Q24H    donepezil  5 mg Oral QAM    DULoxetine  30 mg Oral Daily    famotidine  40 mg Oral Daily    heparin (porcine)  5,000 Units Subcutaneous Q8H    lisinopril  20 mg Oral Daily    memantine  5 mg Oral Daily    phenazopyridine  100 mg Oral TID WM    QUEtiapine  200 mg Oral QHS    rosuvastatin  20 mg Oral Daily    sodium chloride 0.9%  3 mL Intravenous Q8H     Continuous Infusions:   sodium chloride 0.9%       PRN Meds:acetaminophen, dextrose 50%, glucagon (human recombinant), haloperidol lactate, insulin aspart U-100, labetalol, ondansetron, sodium chloride 0.9%    Objective:     Vital Signs (Most Recent):  Temp: 98.2 °F (36.8 °C) (07/15/18 0815)  Pulse: 83 (07/15/18 1117)  Resp: 16 (07/14/18 2154)  BP: (!) 145/78 (07/15/18 0815)  SpO2: (!) 94 % (07/15/18 0815)  BP Location: Left arm    Vital Signs Range (Last 24H):  Temp:  [96.8 °F (36 °C)-102.1 °F (38.9 °C)]   Pulse:  []   Resp:  [16-18]   BP: (141-189)/(63-78)   SpO2:  [85 %-94 %]   BP Location: Left arm    Physical Exam   Constitutional: She appears well-developed and well-nourished. No distress.   HENT:   Head: Normocephalic and atraumatic.   Eyes: EOM are normal.   Neck: Normal range of motion. Neck supple.   Pulmonary/Chest: Effort normal.   Musculoskeletal: Normal range of motion.   Neurological: She is alert.   Unable to assess orientation given aphasia/dysarthria    Psychiatric: She has a normal mood and affect.       Neurological Exam:   LOC: alert  Attention Span: Good   Language: Global aphasia  Articulation: Dysarthria  Orientation:  Untestable due to severe aphasia   Visual Fields: Full  EOM (CN III, IV, VI): Full/intact  Facial Movement (CN VII): Symmetric facial expression    Motor: Arm left  Normal 5/5  Leg left  Paresis: 4/5  Arm right  Normal 5/5  Leg right Paresis: 4/5  Sensation: Intact to light touch, temperature and vibration  Tone: Normal tone throughout    Laboratory:  CMP:     Recent Labs  Lab 07/15/18  0508   CALCIUM 10.3   ALBUMIN 2.6*   PROT 6.8      K 3.7   CO2 21*   *   BUN 19   CREATININE 1.0   ALKPHOS 119   ALT 34   AST 29   BILITOT 0.4     BMP:     Recent Labs  Lab 07/15/18  0508      K 3.7   *   CO2 21*   BUN 19   CREATININE 1.0   CALCIUM 10.3     CBC:     Recent Labs  Lab 07/15/18  0508   WBC 9.54   RBC 3.66*   HGB 10.1*   HCT 32.7*      MCV 89   MCH 27.6   MCHC 30.9*     Lipid Panel:     Recent Labs  Lab 07/13/18  0819   CHOL 106*   LDLCALC 54.4*   HDL 33*   TRIG 93     Coagulation:     Recent Labs  Lab 07/14/18  0427 07/14/18  1911   INR 1.0  --    APTT 34.2* 36.0*     Platelet Aggregation Study: No results for input(s): PLTAGG, PLTAGINTERP, PLTAGREGLACO, ADPPLTAGGREG in the last 168 hours.  Hgb A1C:     Recent Labs  Lab 07/13/18  0819   HGBA1C 5.4     TSH:     Recent Labs  Lab 07/13/18  0819   TSH 0.626       Diagnostic Results     Brain Imaging   MRI brain 7/13/18:   Punctate acute infarct in the left lateral frontal lobe.  No acute hemorrhage.  Moderate degree of chronic microvascular ischemic change which is slightly advanced for age.  Additional findings as above.      Vessel Imaging   Carotid US 7/14/18:   No evidence of a hemodynamically significant carotid bifurcation stenosis.    Cardiac Imaging   ECHO 7/13/18:   CONCLUSIONS     1 - Normal left ventricular systolic function (EF 60-65%).     2 - Normal right ventricular systolic function .     3 - Impaired LV relaxation, elevated LAP (grade 2 diastolic dysfunction).     4 - Moderate left atrial enlargement.     5 - Mild tricuspid  regurgitation.     6 - Pulmonary hypertension. The estimated PA systolic pressure is greater than 47 mmHg.     7 - Low central venous pressure      Airam Adams PA-C  Comprehensive Stroke Center  Department of Vascular Neurology   [facility]

## 2018-07-16 ENCOUNTER — TELEPHONE (OUTPATIENT)
Dept: NEUROLOGY | Facility: CLINIC | Age: 73
End: 2018-07-16

## 2018-07-16 ENCOUNTER — TELEPHONE (OUTPATIENT)
Dept: INTERNAL MEDICINE | Facility: CLINIC | Age: 73
End: 2018-07-16

## 2018-07-16 VITALS
OXYGEN SATURATION: 94 % | BODY MASS INDEX: 25.27 KG/M2 | WEIGHT: 148 LBS | HEART RATE: 85 BPM | HEIGHT: 64 IN | TEMPERATURE: 99 F | DIASTOLIC BLOOD PRESSURE: 92 MMHG | RESPIRATION RATE: 18 BRPM | SYSTOLIC BLOOD PRESSURE: 171 MMHG

## 2018-07-16 LAB
BACTERIA UR CULT: NORMAL
POCT GLUCOSE: 107 MG/DL (ref 70–110)
POCT GLUCOSE: 129 MG/DL (ref 70–110)

## 2018-07-16 PROCEDURE — A4216 STERILE WATER/SALINE, 10 ML: HCPCS | Performed by: STUDENT IN AN ORGANIZED HEALTH CARE EDUCATION/TRAINING PROGRAM

## 2018-07-16 PROCEDURE — 63600175 PHARM REV CODE 636 W HCPCS: Performed by: STUDENT IN AN ORGANIZED HEALTH CARE EDUCATION/TRAINING PROGRAM

## 2018-07-16 PROCEDURE — 92523 SPEECH SOUND LANG COMPREHEN: CPT

## 2018-07-16 PROCEDURE — 25000003 PHARM REV CODE 250: Performed by: PHYSICIAN ASSISTANT

## 2018-07-16 PROCEDURE — 99222 1ST HOSP IP/OBS MODERATE 55: CPT | Mod: ,,, | Performed by: NURSE PRACTITIONER

## 2018-07-16 PROCEDURE — 25000003 PHARM REV CODE 250: Performed by: STUDENT IN AN ORGANIZED HEALTH CARE EDUCATION/TRAINING PROGRAM

## 2018-07-16 PROCEDURE — 99233 SBSQ HOSP IP/OBS HIGH 50: CPT | Mod: ,,, | Performed by: PHYSICIAN ASSISTANT

## 2018-07-16 PROCEDURE — 63600175 PHARM REV CODE 636 W HCPCS: Performed by: NURSE PRACTITIONER

## 2018-07-16 PROCEDURE — 97116 GAIT TRAINING THERAPY: CPT

## 2018-07-16 PROCEDURE — G8980 MOBILITY D/C STATUS: HCPCS | Mod: CK

## 2018-07-16 PROCEDURE — 92526 ORAL FUNCTION THERAPY: CPT

## 2018-07-16 PROCEDURE — 25000003 PHARM REV CODE 250: Performed by: PSYCHIATRY & NEUROLOGY

## 2018-07-16 RX ORDER — LISINOPRIL 20 MG/1
20 TABLET ORAL ONCE
Status: COMPLETED | OUTPATIENT
Start: 2018-07-16 | End: 2018-07-16

## 2018-07-16 RX ORDER — LISINOPRIL 40 MG/1
40 TABLET ORAL DAILY
Qty: 30 TABLET | Refills: 0 | Status: SHIPPED | OUTPATIENT
Start: 2018-07-17 | End: 2018-08-10 | Stop reason: SDUPTHER

## 2018-07-16 RX ORDER — AMOXICILLIN AND CLAVULANATE POTASSIUM 875; 125 MG/1; MG/1
1 TABLET, FILM COATED ORAL EVERY 12 HOURS
Qty: 14 TABLET | Refills: 0 | Status: SHIPPED | OUTPATIENT
Start: 2018-07-16 | End: 2018-07-27

## 2018-07-16 RX ORDER — CIPROFLOXACIN 500 MG/1
500 TABLET ORAL 2 TIMES DAILY
Qty: 14 TABLET | Refills: 0 | Status: SHIPPED | OUTPATIENT
Start: 2018-07-16 | End: 2018-07-16 | Stop reason: HOSPADM

## 2018-07-16 RX ORDER — LISINOPRIL 20 MG/1
40 TABLET ORAL DAILY
Status: DISCONTINUED | OUTPATIENT
Start: 2018-07-17 | End: 2018-07-16 | Stop reason: HOSPADM

## 2018-07-16 RX ADMIN — Medication 3 ML: at 05:07

## 2018-07-16 RX ADMIN — ASPIRIN 325 MG: 325 TABLET, DELAYED RELEASE ORAL at 09:07

## 2018-07-16 RX ADMIN — LISINOPRIL 20 MG: 20 TABLET ORAL at 02:07

## 2018-07-16 RX ADMIN — DONEPEZIL HYDROCHLORIDE 5 MG: 5 TABLET, FILM COATED ORAL at 06:07

## 2018-07-16 RX ADMIN — ROSUVASTATIN CALCIUM 20 MG: 20 TABLET, FILM COATED ORAL at 09:07

## 2018-07-16 RX ADMIN — LISINOPRIL 20 MG: 20 TABLET ORAL at 09:07

## 2018-07-16 RX ADMIN — HEPARIN SODIUM 5000 UNITS: 5000 INJECTION, SOLUTION INTRAVENOUS; SUBCUTANEOUS at 05:07

## 2018-07-16 RX ADMIN — HEPARIN SODIUM 5000 UNITS: 5000 INJECTION, SOLUTION INTRAVENOUS; SUBCUTANEOUS at 02:07

## 2018-07-16 RX ADMIN — Medication 3 ML: at 02:07

## 2018-07-16 RX ADMIN — DULOXETINE HYDROCHLORIDE 30 MG: 30 CAPSULE, DELAYED RELEASE ORAL at 09:07

## 2018-07-16 RX ADMIN — FAMOTIDINE 40 MG: 20 TABLET ORAL at 09:07

## 2018-07-16 RX ADMIN — PHENAZOPYRIDINE HYDROCHLORIDE 100 MG: 100 TABLET ORAL at 12:07

## 2018-07-16 RX ADMIN — CEFTRIAXONE SODIUM 1 G: 1 INJECTION, POWDER, FOR SOLUTION INTRAMUSCULAR; INTRAVENOUS at 12:07

## 2018-07-16 RX ADMIN — BENZTROPINE MESYLATE 1 MG: 0.5 TABLET ORAL at 09:07

## 2018-07-16 NOTE — PLAN OF CARE
Problem: Physical Therapy Goal  Goal: Physical Therapy Goal    Goals to be met by 7/27/2018    1. Pt will perform rolling to the R and L with SBA.   2. Pt will perform supine to sit from both sides of the bed with SBA.  3. Pt will perform sit to supine with SBA.  4. Pt will perform sit to stand transfers with SBA- met.    5. Pt will perform bed <> chair transfers with SBA.  6. Pt will perform gait x 50 feet with SBA and no assistance device.     Outcome: Ongoing (interventions implemented as appropriate)  Patient's strength, balance and endurance showed significant progress.

## 2018-07-16 NOTE — HPI
Lay Peres is a 72-year-old female with PMHx of mild mental retardation, congenital deafness, HTN, HLD, DMII, schizoaffective disorder, dementia with behavior disturbance, depression, and back pain.  Patient presented to [facility] on 7/13/18 with speech difficulty and impaired balance with subsequent unwitnessed fall.  On arrival, evaluated by Vascular Neurology.  CTH without acute pathology; however, not tPA candidate 2/2 outside of treatment window.  MRI brain revealed acute L frontal infarct.  Etiology likely embolic.  Hospital course complicated by UTI (started on Ceftriaxone)    Functional History: Patient lives in [place] with a roommate in a single story home with one step to enter.  Prior to admission, she is (I) with ADLs and mobility.  She and her roommate have 24 hour care.  Attends [employer] 8 am-4 pm during the week.  DME: none.

## 2018-07-16 NOTE — PLAN OF CARE
[facility]    HOME HEALTH ORDERS  FACE TO FACE ENCOUNTER    Patient Name: Lay Peres  YOB: 1945    PCP: Krista Case MD   PCP Address: [address] / [place] [address]  PCP Phone Number: [phone]  PCP Fax: [phone]    Encounter Date: 07/16/2018    Admit to Home Health    Diagnoses:  Active Hospital Problems    Diagnosis  POA    *Stroke due to embolism of left middle cerebral artery [I63.412]  Yes     Priority: 1 - High    Cytotoxic cerebral edema [G93.6]  Yes     Priority: 2     Urinary tract infection [N39.0]  Yes    Cerebrovascular accident (CVA) [I63.9]  Yes    Dementia [F03.90]  Yes    Hyperlipidemia [E78.5]  Yes    Hypertension [I10]  Yes      Resolved Hospital Problems    Diagnosis Date Resolved POA   No resolved problems to display.       No future appointments.  Follow-up Information     Krista Case MD Call to schedule an appointment within 10 days.    Specialty:  Internal Medicine  Why:  For hospital discharge - stroke and UTI   Contact information:  [facility] [facility] [id number] [phone]             Cornelius Walters MD. Schedule an appointment as soon as possible for a visit in 4 weeks.    Specialty:  Neurology  Why:  For hospital follow up (fall, stroke)   Contact information:  [address]  [address]  [facility] [id number] [phone]                     I have seen and examined this patient face to face today. My clinical findings that support the need for the home health skilled services and home bound status are the following:  Weakness/numbness causing balance and gait disturbance due to Stroke making it taxing to leave home.  Patient with medication mismanagement issues requiring home bound status as evidenced by  Poor understanding of medication regimen/dosage.    Allergies:Review of patient's allergies indicates:  No Known Allergies    Diet: Dental soft diet, nectar thick liquids   Speech  therapy recommends modified barium swallow study as outpatient to maybe resume regular diet     Activities: activity as tolerated    Nursing:   SN to complete comprehensive assessment including routine vital signs. Instruct on disease process and s/s of complications to report to MD. Review/verify medication list sent home with the patient at time of discharge  and instruct patient/caregiver as needed. Frequency may be adjusted depending on start of care date.    Notify MD if SBP > 160 or < 90; DBP > 90 or < 50; HR > 120 or < 50; Temp > 101;   Other:         CONSULTS:    Physical Therapy to evaluate and treat. Evaluate for home safety and equipment needs; Establish/upgrade home exercise program. Perform / instruct on therapeutic exercises, gait training, transfer training, and Range of Motion.  Occupational Therapy to evaluate and treat. Evaluate home environment for safety and equipment needs. Perform/Instruct on transfers, ADL training, ROM, and therapeutic exercises.  Speech Therapy  to evaluate and treat for  Language, Swallowing and Cognition.  Aide to provide assistance with personal care, ADLs, and vital signs.    MISCELLANEOUS CARE:  N/A    WOUND CARE ORDERS  n/a      Medications: Review discharge medications with patient and family and provide education.      Current Discharge Medication List      START taking these medications    Details   aspirin (ECOTRIN) 325 MG EC tablet Take 1 tablet (325 mg total) by mouth once daily.  Refills: 0      ciprofloxacin HCl (CIPRO) 500 MG tablet Take 1 tablet (500 mg total) by mouth 2 (two) times daily. for 7 days  Qty: 14 tablet, Refills: 0      rosuvastatin (CRESTOR) 20 MG tablet Take 1 tablet (20 mg total) by mouth once daily.  Qty: 30 tablet, Refills: 0         CONTINUE these medications which have CHANGED    Details   lisinopril (PRINIVIL,ZESTRIL) 40 MG tablet Take 1 tablet (40 mg total) by mouth once daily.  Qty: 30 tablet, Refills: 0         CONTINUE these  medications which have NOT CHANGED    Details   acetaminophen (TYLENOL) 500 mg Cap Take 2 capsules (1,000 mg total) by mouth 2 (two) times daily.  Qty: 120 capsule, Refills: 11      benztropine (COGENTIN) 1 MG tablet TAKE 1 TABLET BY MOUTH twice a day (8am/8pm)  Qty: 60 tablet, Refills: 6    Associated Diagnoses: Chronic schizoaffective disorder      blood sugar diagnostic Strp 1 strip by Misc.(Non-Drug; Combo Route) route once daily.  Qty: 100 strip, Refills: 4      CALCIUM CARBONATE (SAWYER-GEST ANTACID ORAL) Take 1 tablet by mouth 4 (four) times daily before meals and nightly.       cholecalciferol, vitamin D3, (VITAMIN D3) 1,000 unit capsule Take 1 capsule by mouth Daily.      donepezil (ARICEPT) 5 MG tablet Take 1 tablet (5 mg total) by mouth every morning. To be taken with food  Qty: 90 tablet, Refills: 3    Associated Diagnoses: Dementia associated with other underlying disease with behavioral disturbance      DULoxetine (CYMBALTA) 30 MG capsule TAKE 1 CAPSULE BY MOUTH once daily at 8am  Qty: 30 capsule, Refills: 6    Associated Diagnoses: Chronic schizoaffective disorder      famotidine (PEPCID) 40 MG tablet Take 1 tablet by mouth Every PM.      gabapentin (NEURONTIN) 300 MG capsule TAKE 1 TABLET (300MG) BY MOUTH AT BEDTIME at 8pm  Qty: 30 capsule, Refills: 11      haloperidol (HALDOL) 5 MG tablet Take 1 tablet (5 mg total) by mouth daily as needed (agitation/paranoia).  Qty: 30 tablet, Refills: 1    Associated Diagnoses: Chronic schizoaffective disorder      haloperidol decanoate (HALDOL DECANOATE) 100 mg/mL injection Inject 1 mL (100 mg total) into the muscle every 14 (fourteen) days.  Qty: 1 mL, Refills: 12    Comments: This prescription was filled on 11/13/2017. Any refills authorized will be placed on file.  Associated Diagnoses: Chronic schizoaffective disorder      memantine (NAMENDA XR) 28 mg CSpX Take 1 capsule (28 mg total) by mouth once daily.  Qty: 90 each, Refills: 3    Associated Diagnoses:  Dementia with behavioral disturbance, unspecified dementia type      metformin (GLUCOPHAGE) 500 MG tablet Take 1 tablet (500 mg total) by mouth 2 (two) times daily with meals.  Qty: 180 tablet, Refills: 4      multivitamin capsule Take 1 capsule by mouth once daily.      nystatin (MYCOSTATIN) ointment Apply topically 2 (two) times daily.      QUEtiapine (SEROQUEL) 200 MG Tab TAKE 1 TABLET BY MOUTH EVERY EVENING at 8pm  Qty: 30 tablet, Refills: 0    Associated Diagnoses: Chronic schizoaffective disorder      senna (SENNA) 8.6 mg tablet Take 1 tablet by mouth Twice daily.         STOP taking these medications       celecoxib (CELEBREX) 200 MG capsule Comments:   Reason for Stopping: interaction with aspirin, if resumed, do not take at same time as aspirin         lovastatin (MEVACOR) 10 MG tablet Comments:   Reason for Stopping: alternative therapy        lovastatin (MEVACOR) 10 MG tablet Comments:   Reason for Stopping: alternative therapy         tizanidine 2 mg Cap Comments:   Reason for Stopping: Interaction with cipro and donepezil               I certify that this patient is confined to her home and needs intermittent skilled nursing care, physical therapy, speech therapy and occupational therapy.

## 2018-07-16 NOTE — PLAN OF CARE
Problem: Patient Care Overview  Goal: Plan of Care Review  Outcome: Ongoing (interventions implemented as appropriate)  PT is AAOX1 to self and family, no acute changes noted during shift, pt is up with asst and repositions self well in bed q2, PROM done, neuro deficits done q4; no new deficits noted, no skin breakdown noted, VSS. No falls noted. Fall precautions remain Pain assessed. No pain noted. Pt resting comfortably in bed. Call light in reach. Will continue to monitor.

## 2018-07-16 NOTE — TELEPHONE ENCOUNTER
----- Message from Airam Koenig sent at 7/16/2018 11:39 AM CDT -----  Contact: [facility] [phone]  Pt sister would like a call back from the nurse to get the patient scheduled for a hospital follow up. Pt is being discharged today and they would like her to be seen within 10 days

## 2018-07-16 NOTE — PLAN OF CARE
[facility]    HOME HEALTH ORDERS  FACE TO FACE ENCOUNTER    Patient Name: Lay Peres  YOB: 1945    PCP: Krista Case MD   PCP Address: [address] / [place] [address]  PCP Phone Number: [phone]  PCP Fax: [phone]    Encounter Date: 07/16/2018    Admit to Home Health    Diagnoses:  Active Hospital Problems    Diagnosis  POA    *Stroke due to embolism of left middle cerebral artery [I63.412]  Yes     Priority: 1 - High    Cytotoxic cerebral edema [G93.6]  Yes     Priority: 2     Urinary tract infection [N39.0]  Yes    Cerebrovascular accident (CVA) [I63.9]  Yes    Dementia [F03.90]  Yes    Hyperlipidemia [E78.5]  Yes    Hypertension [I10]  Yes      Resolved Hospital Problems    Diagnosis Date Resolved POA   No resolved problems to display.       Future Appointments  Date Time Provider Department Center   7/27/2018 3:00 PM Krista Case MD [facility]     Follow-up Information     Krista Case MD Call to schedule an appointment within 10 days.    Specialty:  Internal Medicine  Why:  For hospital discharge - stroke and UTI   Contact information:  [address] [facility] [address]  [phone]             Cornelius Walters MD. Schedule an appointment as soon as possible for a visit in 4 weeks.    Specialty:  Neurology  Why:  For hospital follow up (fall, stroke)   Contact information:  [address]  [address]  [facility] [address]  [phone]                     I have seen and examined this patient face to face today. My clinical findings that support the need for the home health skilled services and home bound status are the following:  Weakness/numbness causing balance and gait disturbance due to Stroke making it taxing to leave home.  Patient with medication mismanagement issues requiring home bound status as evidenced by  Poor understanding of medication regimen/dosage.    Allergies:Review of  patient's allergies indicates:  No Known Allergies    Diet: Dental soft diet, nectar thick liquids   Speech therapy recommends modified barium swallow study as outpatient to maybe resume regular diet     Activities: activity as tolerated    Nursing:   SN to complete comprehensive assessment including routine vital signs. Instruct on disease process and s/s of complications to report to MD. Review/verify medication list sent home with the patient at time of discharge  and instruct patient/caregiver as needed. Frequency may be adjusted depending on start of care date.    Notify MD if SBP > 160 or < 90; DBP > 90 or < 50; HR > 120 or < 50; Temp > 101;   Other:         CONSULTS:    Physical Therapy to evaluate and treat. Evaluate for home safety and equipment needs; Establish/upgrade home exercise program. Perform / instruct on therapeutic exercises, gait training, transfer training, and Range of Motion.  Occupational Therapy to evaluate and treat. Evaluate home environment for safety and equipment needs. Perform/Instruct on transfers, ADL training, ROM, and therapeutic exercises.  Speech Therapy  to evaluate and treat for  Language, Swallowing and Cognition.  Aide to provide assistance with personal care, ADLs, and vital signs.    MISCELLANEOUS CARE:  N/A    WOUND CARE ORDERS  n/a      Medications: Review discharge medications with patient and family and provide education.        Current Discharge Medication List      START taking these medications    Details   amoxicillin-clavulanate 875-125mg (AUGMENTIN) 875-125 mg per tablet Take 1 tablet by mouth every 12 (twelve) hours.  Qty: 14 tablet, Refills: 0      aspirin (ECOTRIN) 325 MG EC tablet Take 1 tablet (325 mg total) by mouth once daily.  Refills: 0      rosuvastatin (CRESTOR) 20 MG tablet Take 1 tablet (20 mg total) by mouth once daily.  Qty: 30 tablet, Refills: 0         CONTINUE these medications which have CHANGED    Details   lisinopril (PRINIVIL,ZESTRIL) 40 MG  tablet Take 1 tablet (40 mg total) by mouth once daily.  Qty: 30 tablet, Refills: 0         CONTINUE these medications which have NOT CHANGED    Details   acetaminophen (TYLENOL) 500 mg Cap Take 2 capsules (1,000 mg total) by mouth 2 (two) times daily.  Qty: 120 capsule, Refills: 11      benztropine (COGENTIN) 1 MG tablet TAKE 1 TABLET BY MOUTH twice a day (8am/8pm)  Qty: 60 tablet, Refills: 6    Associated Diagnoses: Chronic schizoaffective disorder      blood sugar diagnostic Strp 1 strip by Misc.(Non-Drug; Combo Route) route once daily.  Qty: 100 strip, Refills: 4      CALCIUM CARBONATE (SAWYER-GEST ANTACID ORAL) Take 1 tablet by mouth 4 (four) times daily before meals and nightly.       cholecalciferol, vitamin D3, (VITAMIN D3) 1,000 unit capsule Take 1 capsule by mouth Daily.      donepezil (ARICEPT) 5 MG tablet Take 1 tablet (5 mg total) by mouth every morning. To be taken with food  Qty: 90 tablet, Refills: 3    Associated Diagnoses: Dementia associated with other underlying disease with behavioral disturbance      DULoxetine (CYMBALTA) 30 MG capsule TAKE 1 CAPSULE BY MOUTH once daily at 8am  Qty: 30 capsule, Refills: 6    Associated Diagnoses: Chronic schizoaffective disorder      famotidine (PEPCID) 40 MG tablet Take 1 tablet by mouth Every PM.      gabapentin (NEURONTIN) 300 MG capsule TAKE 1 TABLET (300MG) BY MOUTH AT BEDTIME at 8pm  Qty: 30 capsule, Refills: 11      haloperidol (HALDOL) 5 MG tablet Take 1 tablet (5 mg total) by mouth daily as needed (agitation/paranoia).  Qty: 30 tablet, Refills: 1    Associated Diagnoses: Chronic schizoaffective disorder      haloperidol decanoate (HALDOL DECANOATE) 100 mg/mL injection Inject 1 mL (100 mg total) into the muscle every 14 (fourteen) days.  Qty: 1 mL, Refills: 12    Comments: This prescription was filled on 11/13/2017. Any refills authorized will be placed on file.  Associated Diagnoses: Chronic schizoaffective disorder      memantine (NAMENDA XR) 28 mg CSpX  Take 1 capsule (28 mg total) by mouth once daily.  Qty: 90 each, Refills: 3    Associated Diagnoses: Dementia with behavioral disturbance, unspecified dementia type      metformin (GLUCOPHAGE) 500 MG tablet Take 1 tablet (500 mg total) by mouth 2 (two) times daily with meals.  Qty: 180 tablet, Refills: 4      multivitamin capsule Take 1 capsule by mouth once daily.      nystatin (MYCOSTATIN) ointment Apply topically 2 (two) times daily.      QUEtiapine (SEROQUEL) 200 MG Tab TAKE 1 TABLET BY MOUTH EVERY EVENING at 8pm  Qty: 30 tablet, Refills: 0    Associated Diagnoses: Chronic schizoaffective disorder      senna (SENNA) 8.6 mg tablet Take 1 tablet by mouth Twice daily.         STOP taking these medications       celecoxib (CELEBREX) 200 MG capsule Comments:   Reason for Stopping:         lovastatin (MEVACOR) 10 MG tablet Comments:   Reason for Stopping:         lovastatin (MEVACOR) 10 MG tablet Comments:   Reason for Stopping:         tizanidine 2 mg Cap Comments:   Reason for Stopping:               I certify that this patient is confined to her home and needs intermittent skilled nursing care, physical therapy, speech therapy and occupational therapy.

## 2018-07-16 NOTE — PLAN OF CARE
Problem: SLP Goal  Goal: SLP Goal  Speech Therapy Short Term Goals  Goal expected to be met by 7/23  1. Pt will tolerate a dental soft diet and nectar thick liquids with no overt s/s of aspiration.   2. Patient will participate in a MBSS as appropriate per MD to objectively r/o aspiration and determine the least restrictive and safest po diet.   3. Patient will tolerate thin liquid trials x10 with no overt s/s of aspiration.   4. Patient will complete trial dysphagia therapy exercises x10 to improve swallowing function as appropriate.     Speech Language Pathology Goals  Goals expected to be met by 7/20  1. Pt will tolerate puree & nectar thick liquids without signs of aspiration. -MET  2. Ongoing swallow assessment to ensure safety of PO intake & determine least restrictive PO consistencies. -ongoing  3. SLP Speech/Language/Cognitive Evaluation. -MET  Outcome: Ongoing (interventions implemented as appropriate)  Patient with cognitive impairments at baseline. Patient is deaf and communicates by reading lips (no longer knows ASL). Use simple words, phrases, and gestures to communicate with patient. Recommend: Upgrade to dental soft diet, nectar thick liquids, aspiration precautions. Patient would benefit from a Modified Barium Swallow Study as appropriate per MD to objectively r/o aspiration and determine the least restrictive and safest po diet. Should patient discharge today, okay to complete MBSS in the outpatient setting.   ELADIO Young., CCC-SLP  Pager: [phone]  07/16/2018

## 2018-07-16 NOTE — CONSULTS
[facility]  Physical Medicine & Rehab  Consult Note    Patient Name: Lay Peres  MRN: [id number]  Admission Date: 7/13/2018  Hospital Length of Stay: 3 days  Attending Physician: Connor Romero MD     Inpatient consult to Physical Medicine & Rehabilitation  Consult performed by: Lea Obrien NP  Consult requested by:  Connor Romero MD    Collaborating Physician: Marion Jenkins MD  Reason for Consult:  assess rehabilitation needs    Consults  Subjective:     Principal Problem: Stroke due to embolism of left middle cerebral artery    HPI: Lay Peres is a 72-year-old female with PMHx of mild mental retardation, congenital deafness, HTN, HLD, DMII, schizoaffective disorder, dementia with behavior disturbance, depression, and back pain.  Patient presented to [facility] on 7/13/18 with speech difficulty and impaired balance with subsequent unwitnessed fall.  On arrival, evaluated by Vascular Neurology.  CTH without acute pathology; however, not tPA candidate 2/2 outside of treatment window.  MRI brain revealed acute L frontal infarct.  Etiology likely embolic.  Hospital course complicated by UTI (started on Ceftriaxone)    Functional History: Patient lives in [place] with a roommate in a single story home with one step to enter.  Prior to admission, she is (I) with ADLs and mobility.  She and her roommate have 24 hour care.  Attends [employer] 8 am-4 pm during the week.  DME: none.    Hospital Course:   7/13/18:  Evaluated by SLP.  Found to have dysphagia.  SLP recommendation: puree diet and nectar thick liquids.   7/14/18:  Evaluated by PT and OT.  Bed mobility SV-Rosy. Sit to stand SBA-Rosy and transfers SBA-Rosy.  Ambulated 15 ft and 50 ft x 2 Rosy.  UBD SBA and LBD SBA.  Grooming and toileting SBA.    Past Medical History:   Diagnosis Date    Back pain 7/13/2012    Chronic constipation 7/13/2012    Congenital deafness 7/13/2012    Deaf     Diabetes mellitus due to  abnormal insulin 7/13/2012    Hyperlipidemia 7/13/2012    Hypertension 7/13/2012    Macular degeneration     Major depression 7/13/2012    Mild mental retardation (I.Q. 50-70) 7/13/2012    Neck pain 7/13/2012    Paranoid schizophrenia     Schizoaffective disorder, chronic condition 7/13/2012     History reviewed. No pertinent surgical history.  Review of patient's allergies indicates:  No Known Allergies    Scheduled Medications:    aspirin  325 mg Oral Daily    benztropine  1 mg Oral BID    cefTRIAXone (ROCEPHIN) IVPB  1 g Intravenous Q24H    donepezil  5 mg Oral QAM    DULoxetine  30 mg Oral Daily    famotidine  40 mg Oral Daily    heparin (porcine)  5,000 Units Subcutaneous Q8H    lisinopril  20 mg Oral Daily    memantine  5 mg Oral Daily    phenazopyridine  100 mg Oral TID WM    QUEtiapine  200 mg Oral QHS    rosuvastatin  20 mg Oral Daily    sodium chloride 0.9%  3 mL Intravenous Q8H       PRN Medications: acetaminophen, dextrose 50%, glucagon (human recombinant), haloperidol lactate, insulin aspart U-100, labetalol, ondansetron, sodium chloride 0.9%    Family History     Problem Relation (Age of Onset)    Depression Sister, Brother    Suicide Brother        Social History Main Topics    Smoking status: Never Smoker    Smokeless tobacco: Never Used    Alcohol use No    Drug use: No    Sexual activity: Not Currently     Review of Systems   Unable to perform ROS: Dementia     Objective:     Vital Signs (Most Recent):  Temp: 99.3 °F (37.4 °C) (07/16/18 0914)  Pulse: 95 (07/16/18 0914)  Resp: 18 (07/16/18 0914)  BP: (!) 149/63 (07/16/18 0914)  SpO2: 98 % (07/16/18 0914)    Vital Signs (24h Range):  Temp:  [97 °F (36.1 °C)-99.9 °F (37.7 °C)] 99.3 °F (37.4 °C)  Pulse:  [83-95] 95  Resp:  [16-18] 18  SpO2:  [90 %-99 %] 98 %  BP: (136-203)/(60-86) 149/63     Body mass index is 25.4 kg/m².    Physical Exam   Constitutional: She appears well-developed and well-nourished. No distress.   HENT:    Head: Normocephalic and atraumatic.   Right Ear: External ear normal.   Left Ear: External ear normal.   Nose: Nose normal.   Eyes: Right eye exhibits no discharge. Left eye exhibits no discharge. No scleral icterus.   Neck: Normal range of motion.   Cardiovascular: Normal rate, regular rhythm and intact distal pulses.    Pulmonary/Chest: Effort normal. No respiratory distress. She has no wheezes.   Abdominal: Soft. She exhibits no distension. There is no tenderness.   Musculoskeletal: Normal range of motion. She exhibits no edema or tenderness.   Neurological: She is alert. She exhibits normal muscle tone.   -  Mental Status:  Awake and alert.  Follows simple commands.   -  Facial movement (CN VII): symmetrical.   -  Motor:  RUE: 4/5.  LUE: 5/5.  RLE: 4/5.  LLE: 5/5.  -  Tone:  Normal.   Skin: Skin is warm and dry. No rash noted.   Psychiatric: She has a normal mood and affect. Cognition and memory are impaired.   Vitals reviewed.    Diagnostic Results:   Labs: Reviewed  X-Ray: Reviewed  CT: Reviewed  MRI: Reviewed    Assessment/Plan:     * Stroke due to embolism of left middle cerebral artery    -  Presented with speech difficulty and impaired balance with subsequent unwitnessed fall  -  CTH without acute patholog--> not tPA candidate 2/2 outside of treatment window  -  MRI brain revealed acute L frontal infarct  -  Etiology likely embolic  See hospital course for functional, cognitive/speech/language, and nutrition/swallow status.      Recommendations  -  Encourage mobility, OOB in chair, and early ambulation as appropriate   -  PT/OT evaluate and treat  -  SLP speech and cognitive evaluate and treat  -  Monitor mood and sleep disturbances  -  Establish consistent sleep-wake cycle  -  Monitor for bowel and bladder dysfunction  -  Monitor for shoulder pain and subluxation  -  Monitor for spasticity  -  Monitor for and prevent skin breakdown and pressure ulcers  · Early mobility, repositioning/weight shifting  every 20-30 minutes when sitting, turn patient every 2 hours, proper mattress/overlay and chair cushioning, pressure relief/heel protector boots  -  DVT prophylaxis:  On SQH        Urinary tract infection    -  On Ceftriaxone         Cytotoxic cerebral edema    -  2/2 stroke        Dementia    -  At baseline with behavior disturbance   -  On home meds        Evaluated by therapy.  Near premorbid level of function and with limited rehab goals.  Agree with therapist for home health therapy.  Will sign off.  Please call with questions/concerns or re-consult if situation changes.    Thank you for your consult.     GLO Kaminski  Department of Physical Medicine & Rehab  [facility]

## 2018-07-16 NOTE — PT/OT/SLP PROGRESS
"Physical Therapy Treatment    Patient Name:  Lay Peres   MRN:  [id number]    Recommendations:     Discharge Recommendations:  home health PT   Discharge Equipment Recommendations: none   Barriers to discharge: None    Assessment:     Lay Peres is a 72 y.o. female admitted with a medical diagnosis of Stroke due to embolism of left middle cerebral artery.  She presents with the following impairments/functional limitations:  weakness, gait instability, decreased safety awareness, impaired cognition, impaired functional mobilty, impaired self care skills. Patient showed good compliance and initiative. No loss of balance or shortness of breath noted.    Rehab Prognosis:  good; patient would benefit from acute skilled PT services to address these deficits and reach maximum level of function.      Recent Surgery: * No surgery found *      Plan:     During this hospitalization, patient to be seen 4 x/week to address the above listed problems via gait training, therapeutic activities, therapeutic exercises, neuromuscular re-education  · Plan of Care Expires:  08/14/18   Plan of Care Reviewed with: patient, sibling    Subjective     Communicated with NSG prior to session.  Patient found on bedside chair upon PT entry to room, agreeable to treatment.   Patient's sister states " She's been moving around today, we walked her to the bathroom".     Chief Complaint: none  Patient comments/goals: to go home  Pain/Comfort:  · Pain Rating 1: 0/10  · Pain Rating Post-Intervention 1: 0/10    Patients cultural, spiritual, Jehovah's witness conflicts given the current situation: Episcopal    Objective:     Patient found with: telemetry     General Precautions: Standard, aspiration, fall, hearing impaired, nectar thick   Orthopedic Precautions:N/A   Braces: N/A     Functional Mobility:  · Transfers:     · Sit to Stand:  stand by assistance with no AD  · Bed to Chair: contact guard assistance with  no AD  using  Stand " Pivot  · Gait: 100 ft and 140 ft with HHA with CGA with a rest break in sitting between trials.      AM-PAC 6 CLICK MOBILITY  Turning over in bed (including adjusting bedclothes, sheets and blankets)?: 3  Sitting down on and standing up from a chair with arms (e.g., wheelchair, bedside commode, etc.): 3  Moving from lying on back to sitting on the side of the bed?: 3  Moving to and from a bed to a chair (including a wheelchair)?: 3  Need to walk in hospital room?: 3  Climbing 3-5 steps with a railing?: 3  Basic Mobility Total Score: 18       Therapeutic Activities and Exercises:   educated on proper transfer technique. Donned a second gown.    Patient left up in chair with all lines intact, call button in reach and siblings present..    GOALS:    Physical Therapy Goals        Problem: Physical Therapy Goal    Goal Priority Disciplines Outcome Goal Variances Interventions   Physical Therapy Goal     PT/OT, PT Ongoing (interventions implemented as appropriate)     Description:    Goals to be met by 7/27/2018    1. Pt will perform rolling to the R and L with SBA.   2. Pt will perform supine to sit from both sides of the bed with SBA.  3. Pt will perform sit to supine with SBA.  4. Pt will perform sit to stand transfers with SBA- met.    5. Pt will perform bed <> chair transfers with SBA.  6. Pt will perform gait x 50 feet with SBA and no assistance device.                       Time Tracking:     PT Received On: 07/16/18  PT Start Time: 1121     PT Stop Time: 1130  PT Total Time (min): 9 min     Billable [id number] Training 9    Treatment Type: Treatment  PT/PTA: PTA     PTA Visit Number: Gavi Felix PTA  07/16/2018

## 2018-07-16 NOTE — PT/OT/SLP DISCHARGE
Occupational Therapy Discharge Summary    Lay Peres  MRN: [id number]   Principal Problem: Stroke due to embolism of left middle cerebral artery      Patient Discharged from acute Occupational Therapy on 7/16.  Please refer to prior OT note dated 7/14 for functional status.    Assessment:      Patient appropriate for care in another setting.    Objective:     GOALS:    Occupational Therapy Goals        Problem: Occupational Therapy Goal    Goal Priority Disciplines Outcome Interventions   Occupational Therapy Goal     OT, PT/OT     Description:  Goals set 7/14 to be addressed for 7 days with expiration date, 7/21  Patient will increase functional independence with ADLs by performing:    Patient will demonstrate rolling to the right with modified independence.  Not met   Patient will demonstrate rolling to the left with modified independence.   Not met  Patient will demonstrate supine -sit with modified independence.   Not met  Patient will demonstrate stand pivot transfers with supervision.   Not met  Patient will demonstrate grooming while standing with supervision.   Not met  Patient will demonstrate upper body dressing with supervision.   Not met  Patient will demonstrate lower body dressing with supervision.   Not met  Patient will demonstrate toileting with supervision.   Not met  Patient's family / caregiver will demonstrate independence and safety with assisting patient with self-care skills and functional mobility.     Not met                            Reasons for Discontinuation of Therapy Services  Transfer to alternate level of care.      Plan:     Patient Discharged to: Home with Home Health Service    KATHERIN Rodriges  7/16/2018

## 2018-07-16 NOTE — PLAN OF CARE
Patient will have [facility] for home health needs. Patient will not attend the adult day care center until she has progressed with home health. TYLER spoke with pt's sister Jose Francisco to inform of this information. Jose Francisco voices understanding. Melony Tracyer, will arrange for pt to have additional support in the home while receiving home health.     TYLER faxed discharge orders to Caroline Ty for review ( ph: [phone]; fax : [phone]).    TYLER informed Caroline Ty that patient will be ready for pickup at 4:00 PM. Melony will provide transportation for pt to return home.     RN can call report to Caroline at [phone].    CARLOTTA castañeda.     Veronica Robertson LMSW  [facility]  Ext. [phone]

## 2018-07-16 NOTE — PROGRESS NOTES
[facility]  Vascular Neurology  Comprehensive Stroke Center  Progress Note    Assessment/Plan:     * Stroke due to embolism of left middle cerebral artery    72-year-old female with a medical history significant for deafness, HTN, DM, HLD, schizoaffective disorder, dementia with behavior disturbance presents to the ED for evaluation of language impairment, body shakiness, imbalance resulting in a fall today. Family reports speech difficulties are patient's baseline (she is deaf and has dementia). Instability possibly also related to acute UTI.   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 Appears to be a small embolic phenomenon. Vascular US of carotid arteries unremarkable, no significant vascular disease.   Of note, patient was not treated with iv alteplase as she was out of the window for any intervention.   Admit to the stroke service to complete stroke work up as detailed below.      Antithrombotics for secondary stroke prevention: Antiplatelets: Aspirin: 325 mg daily    Statins for secondary stroke prevention and hyperlipidemia, if present: Statins: Crestor 20mg daily (home med)     Aggressive risk factor modification: HTN, DM, HLD      Rehab efforts: PT/OT/SLP to evaluate and treat    Diagnostics ordered/pending: none    VTE prophylaxis: Heparin 5000 units SQ every 8 hours    BP parameters: Infarct: No intervention, SBP <180        Cytotoxic cerebral edema    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 There is not mass effect associated with it. We will continue to monitor the patients clinical exam for any worsening of symptoms which may indicate expansion of the stroke or the area of the edema resulting in the clinical change. 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         Urinary tract infection    Ceftriaxone 1g IV daily started 7/13/18   UA 7/13 +  leukocytes and nitrite +   + E Coli  Pan sensitive culture   Afebrile overnight  No leukocytosis   Renal US negative for abscess and procal 1.22  Will change antibiotics to PO and continue home treatment of UTI          Dementia    Delirium precautions   Sees Dr. Walters as OP   Continue home medications (donepezil, memantine, seroquel)   Plan for home with HH as soon as possible           Hyperlipidemia    Stroke risk factor   LDL 54.4   Continue home crestor 20mg daily         Hypertension    Stroke risk factor   Goal BP <180 acute infarct, no intervention              7/14/18: Patient agitated yesterday. Treatment for UTI started with ceftriaxone; febrile overnight. Culture in process. WBC count normal. On puree/nectar thick diet. PT/OT recommending back home with HH services/constant care.   7/15/18: Fever 102 overnight, improved with tylenol. Discussed case with hospital medicine. Ordering renal US and procalcitonin. Will continue to monitor closely. Carotid US last night without significant carotid stenosis - no change in medical plan for secondary stroke prevention.   7/16/18: Afebrile overnight. Diet advanced to dental soft and nectar thick liquids. Renal US negative for infection concerns. Will try to set up home health with [facility] today. Continuing antibiotics PO x 7 days. Completed 4 days of rocephin.     STROKE DOCUMENTATION   Acute Stroke Times   Last Known Normal Date: 07/12/18  Last Known Normal Time:  (unable to determine)  Symptom Onset Date: 07/12/18  Symptom Onset Time:  (unable to determine)  Stroke Team Called Date: 07/13/18  Stroke Team Called Time: 0820  Stroke Team Arrival Date: 07/13/18  Stroke Team Arrival Time: 0825  CT Interpretation Time: 0832  Decision to Treat Time for Alteplase:  (No iv alteplse candidate)  Decision to Treat Time for IR:  (No IR candidate)    NIH Scale:  1a. Level Of Consciousness: 0-->Alert: keenly responsive  1b. LOC Questions: 2-->Answers neither question  correctly  1c. LOC Commands: 0-->Performs both tasks correctly  2. Best Gaze: 0-->Normal  3. Visual: 0-->No visual loss  4. Facial Palsy: 0-->Normal symmetrical movements  5a. Motor Arm, Left: 0-->No drift: limb holds 90 (or 45) degrees for full 10 secs  5b. Motor Arm, Right: 0-->No drift: limb holds 90 (or 45) degrees for full 10 secs  6a. Motor Leg, Left: 0-->No drift: leg holds 30 degree position for full 5 secs  6b. Motor Leg, Right: 0-->No drift: leg holds 30 degree position for full 5 secs  7. Limb Ataxia: 0-->Absent  8. Sensory: 0-->Normal: no sensory loss  9. Best Language: 2-->Severe aphasia: all communication is through fragmentary expression: great need for inference, questioning, and guessing by the listener. Range of information that can be exchanged is limited: listener carries burden of. . . (see row details)  10. Dysarthria: 2-->Severe dysarthria: patients speech is so slurred as to be unintelligible in the absence of or out of proportion to any dysphasia, or is mute/anarthric  11. Extinction and Inattention (formerly Neglect): 0-->No abnormality  Total (NIH Stroke Scale): 6       Modified Russell Score: 1  Lazbuddie Coma Scale:    ABCD2 Score:    [id number] Score:   HAS -BLED Score:   ICH Score:   Hunt & Lui Classification:      Hemorrhagic change of an Ischemic Stroke: Does this patient have an ischemic stroke with hemorrhagic changes? No     Neurologic Chief Complaint: difficulty walking, fall     Subjective:     Interval History: Patient is seen for follow-up neurological assessment and treatment recommendations:     Afebrile overnight. Diet advanced to dental soft and nectar thick liquids. Renal US negative for infection concerns. Will try to set up home health with [facility] today. Continuing antibiotics PO x 7 days. Completed 4 days of rocephin.     HPI, Past Medical, Family, and Social History remains the same as documented in the initial encounter.     Review of Systems   Constitutional:  Negative for diaphoresis and fever (T max 99.9).   HENT: Positive for hearing loss (baseline).    Eyes: Negative for visual disturbance.   Respiratory: Negative for shortness of breath.    Gastrointestinal: Negative for vomiting.   Musculoskeletal:        Neg CVA tenderness   Skin: Positive for rash (anterior neck).   Neurological: Positive for speech difficulty (family reports baseline speech). Negative for facial asymmetry and weakness.   Psychiatric/Behavioral: Negative for agitation and behavioral problems.     Scheduled Meds:   aspirin  325 mg Oral Daily    benztropine  1 mg Oral BID    cefTRIAXone (ROCEPHIN) IVPB  1 g Intravenous Q24H    donepezil  5 mg Oral QAM    DULoxetine  30 mg Oral Daily    famotidine  40 mg Oral Daily    heparin (porcine)  5,000 Units Subcutaneous Q8H    lisinopril  20 mg Oral Daily    memantine  5 mg Oral Daily    phenazopyridine  100 mg Oral TID WM    QUEtiapine  200 mg Oral QHS    rosuvastatin  20 mg Oral Daily    sodium chloride 0.9%  3 mL Intravenous Q8H     Continuous Infusions:   sodium chloride 0.9%       PRN Meds:acetaminophen, dextrose 50%, glucagon (human recombinant), haloperidol lactate, insulin aspart U-100, labetalol, ondansetron, sodium chloride 0.9%    Objective:     Vital Signs (Most Recent):  Temp: 99.3 °F (37.4 °C) (07/16/18 0914)  Pulse: 95 (07/16/18 0914)  Resp: 18 (07/16/18 0914)  BP: (!) 149/63 (07/16/18 0914)  SpO2: 98 % (07/16/18 0914)  BP Location: Right arm    Vital Signs Range (Last 24H):  Temp:  [97 °F (36.1 °C)-99.9 °F (37.7 °C)]   Pulse:  [85-95]   Resp:  [16-18]   BP: (136-203)/(60-86)   SpO2:  [90 %-99 %]   BP Location: Right arm    Physical Exam   Constitutional: She appears well-developed and well-nourished. No distress.   HENT:   Head: Normocephalic and atraumatic.   Eyes: EOM are normal.   Neck: Normal range of motion. Neck supple.   Pulmonary/Chest: Effort normal.   Musculoskeletal: Normal range of motion.   Neurological: She is  alert.   Unable to assess orientation given aphasia/dysarthria    Psychiatric: She has a normal mood and affect.       Neurological Exam:   LOC: alert  Attention Span: Good   Language: Global aphasia  Articulation: Dysarthria  Orientation: Untestable due to severe aphasia   Visual Fields: Full  EOM (CN III, IV, VI): Full/intact  Facial Movement (CN VII): Symmetric facial expression    Motor: Arm left  Normal 5/5  Leg left  Paresis: 4/5  Arm right  Normal 5/5  Leg right Paresis: 4/5  Sensation: Intact to light touch, temperature and vibration  Tone: Normal tone throughout    Laboratory:  CMP:   No results for input(s): GLUCOSE, CALCIUM, ALBUMIN, PROT, NA, K, CO2, CL, BUN, CREATININE, ALKPHOS, ALT, AST, BILITOT in the last 24 hours.  BMP:     Recent Labs  Lab 07/15/18  0508      K 3.7   *   CO2 21*   BUN 19   CREATININE 1.0   CALCIUM 10.3     CBC:     Recent Labs  Lab 07/15/18  0508   WBC 9.54   RBC 3.66*   HGB 10.1*   HCT 32.7*      MCV 89   MCH 27.6   MCHC 30.9*     Lipid Panel:     Recent Labs  Lab 07/13/18  0819   CHOL 106*   LDLCALC 54.4*   HDL 33*   TRIG 93     Coagulation:     Recent Labs  Lab 07/14/18  0427 07/14/18  1911   INR 1.0  --    APTT 34.2* 36.0*     Platelet Aggregation Study: No results for input(s): PLTAGG, PLTAGINTERP, PLTAGREGLACO, ADPPLTAGGREG in the last 168 hours.  Hgb A1C:     Recent Labs  Lab 07/13/18  0819   HGBA1C 5.4     TSH:     Recent Labs  Lab 07/13/18  0819   TSH 0.626       Diagnostic Results     Brain Imaging   MRI brain 7/13/18:   Punctate acute infarct in the left lateral frontal lobe.  No acute hemorrhage.  Moderate degree of chronic microvascular ischemic change which is slightly advanced for age.  Additional findings as above.      Vessel Imaging   Carotid US 7/14/18:   No evidence of a hemodynamically significant carotid bifurcation stenosis.    Cardiac Imaging   ECHO 7/13/18:   CONCLUSIONS     1 - Normal left ventricular systolic function (EF 60-65%).     2  - Normal right ventricular systolic function .     3 - Impaired LV relaxation, elevated LAP (grade 2 diastolic dysfunction).     4 - Moderate left atrial enlargement.     5 - Mild tricuspid regurgitation.     6 - Pulmonary hypertension. The estimated PA systolic pressure is greater than 47 mmHg.     7 - Low central venous pressure      Airam Adams PA-C  [facility]  Department of Vascular Neurology   [facility]

## 2018-07-16 NOTE — PT/OT/SLP EVAL
Speech Language Pathology Evaluation  Cognitive Communication    Patient Name:  Lay Peres   MRN:  [id number]  Admitting Diagnosis: Stroke due to embolism of left middle cerebral artery    Recommendations:     Recommendations:                General Recommendations:  Modified barium swallow study; Should patient discharge prior to completion, okay to complete MBSS in the outpatient setting  Diet recommendations:  Dental Soft, [place] Thick   Aspiration Precautions: 1 bite/sip at a time, Assistance with thickening liquids, Check for pocketing/oral residue, Eliminate distractions, Frequent oral care, HOB to 90 degrees, Meds whole 1 at a time, Monitor for s/s of aspiration, Remain upright 30 minutes post meal, Small bites/sips and Standard aspiration precautions   General Precautions: Standard, aspiration, fall, hearing impaired, nectar thick  Communication strategies:  Patient is deaf- reads lips to communicate    History:     Past Medical History:   Diagnosis Date    Back pain 7/13/2012    Chronic constipation 7/13/2012    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 50-70) 7/13/2012    Neck pain 7/13/2012    Paranoid schizophrenia     Schizoaffective disorder, chronic condition 7/13/2012       History reviewed. No pertinent surgical history.    MRI: Punctate acute infarct in the left lateral frontal lobe.  No acute hemorrhage.  Moderate degree of chronic microvascular ischemic change which is slightly advanced for age.  Additional findings as above.    Social History: Patient lives in an apartment with a roommate with Down Syndrome. Per sister, patient's roommate requires 90% of nursing attention. Patient moving to on-campus housing at [employer].    Chest X-Rays:Allowing for magnification of the  "cardiomediastinal silhouette related both to projection and to a poor inspiratory depth level, heart size is within normal limits.  Pulmonary vascularity is normal.  Lung zones appear adequately aerated, free of significant airspace consolidation or volume loss.  No pleural fluid of any substantial volume is seen on either side.  No abnormal mediastinal widening.  No pneumothorax.    Prior diet: Patient's sister describes soft diet preferences; thin liquids      Subjective     Patient awake, seated in chair, and cooperative.   Patient goals: to go home     Pain/Comfort:  · Pain Rating 1: 0/10    Objective:     COGNITIVE STATUS:  Behavioral Observations: alert, appropriate and cooperative-  Memory:  · Immediate: Impaired   · Short Term: impaired  · Long Term: impaired  Orientation: impaired; oriented to name, month of birth; not oriented to place, situation, day or year of birthday  Attention: wfl  Problem Solving: at baseline per sister  Pragmatics: at baseline per sister  Simple Money/Time Management: at baseline per sister    LANGUAGE:   Receptive Language:  Simple y/n Questions: wfl  Complex y/n Questions: wfl  Identification: wfl  1 Step Directions: wfl  2 Step Directions: minimal assessment 2/2 hearing impairment; patient's sister reports patient at baseline    Expressive Language:   Naming:   · Confrontational: wfl  Automatic Speech: wfl  Responsive Speech: wfl    Treatment: Per patient's sister, she is at baseline for communication with some increased confusion with orientation and memory. She reports word finding difficulties at baseline. Patient's sister states, "My other sister and I are the only ones she really understands most of the time."    Patient seen for an ongoing swallowing assessment. Patient with no overt s/s of aspiration or difficulties with keny cracker trials. Sister describes a dental soft diet at baseline. Patient assessed with sips of water via cup and straw x8 ounces with noted facial " grimacing and throat clearing/vocalization following thin liquids. No facial grimacing or throat clear noted with nectar thick liquids. Unable to determine if vocalization/possible throat clear following thin liquids is behavioral or possible new difficulty swallowing. SLP provided family (sisters) education on SLP role, s/s and risks of aspiraiton, safe swallow precautions, and POC. Handouts provided listing thickener instructions and wallowing precautions. They v/u of all discussed.     Discussed possible Modified Barium Swallow Study with family (sisters) to objectively r/o aspiration. Family in agreement, however, do not want to delay discharge from hospital. Paged MD team and discussed recommendation for MBSS 7/17. Should patient discharge prior to completion, okay to complete MBSS in the outpatient setting.     Assessment:   Lay Peres is a 72 y.o. female with an SLP diagnosis of Dysphagia and Cognitive-Linguistic Impairment.  She presents with cognitive impairnents at baseline likely complicated by UTI. Patient would benefit from a MBSS to objectively r/o aspiration and determine the least restrictive and safest po diet.     Goals:    SLP Goals        Problem: SLP Goal    Goal Priority Disciplines Outcome   SLP Goal     SLP Ongoing (interventions implemented as appropriate)   Description:  Speech Therapy Short Term Goals  Goal expected to be met by 7/23  1. Pt will tolerate a dental soft diet and nectar thick liquids with no overt s/s of aspiration.   2. Patient will participate in a MBSS as appropriate per MD to objectively r/o aspiration and determine the least restrictive and safest po diet.   3. Patient will tolerate thin liquid trials x10 with no overt s/s of aspiration.   4. Patient will complete trial dysphagia therapy exercises x10 to improve swallowing function as appropriate.     Speech Language Pathology Goals  Goals expected to be met by 7/20  1. Pt will tolerate puree & nectar  thick liquids without signs of aspiration. -MET  2. Ongoing swallow assessment to ensure safety of PO intake & determine least restrictive PO consistencies. -ongoing  3. SLP Speech/Language/Cognitive Evaluation. -MET                    Plan:   · Patient to be seen:  4 x/week   · Plan of Care expires:  08/11/18  · Plan of Care reviewed with:  patient   · SLP Follow-Up:  Yes       Discharge recommendations:  Discharge Facility/Level Of Care Needs: home health speech therapy   Barriers to Discharge:  None per ST discipline    Time Tracking:   SLP Treatment Date:   07/16/18  Speech Start Time:  0828  Speech Stop Time:  0906     Speech Total Time (min):  38 min    Billable Minutes: Eval 15 , Treatment Swallowing Dysfunction 8 and Seld Care/Home Management Training 15    ELIAS Dumont, CCC-SLP   Pager: [phone]  07/16/2018

## 2018-07-16 NOTE — SUBJECTIVE & OBJECTIVE
Neurologic Chief Complaint: difficulty walking, fall     Subjective:     Interval History: Patient is seen for follow-up neurological assessment and treatment recommendations:     Afebrile overnight. Diet advanced to dental soft and nectar thick liquids. Renal US negative for infection concerns. Will try to set up home health with [facility] today. Continuing antibiotics PO x 7 days. Completed 4 days of rocephin.     HPI, Past Medical, Family, and Social History remains the same as documented in the initial encounter.     Review of Systems   Constitutional: Negative for diaphoresis and fever (T max 99.9).   HENT: Positive for hearing loss (baseline).    Eyes: Negative for visual disturbance.   Respiratory: Negative for shortness of breath.    Gastrointestinal: Negative for vomiting.   Musculoskeletal:        Neg CVA tenderness   Skin: Positive for rash (anterior neck).   Neurological: Positive for speech difficulty (family reports baseline speech). Negative for facial asymmetry and weakness.   Psychiatric/Behavioral: Negative for agitation and behavioral problems.     Scheduled Meds:   aspirin  325 mg Oral Daily    benztropine  1 mg Oral BID    cefTRIAXone (ROCEPHIN) IVPB  1 g Intravenous Q24H    donepezil  5 mg Oral QAM    DULoxetine  30 mg Oral Daily    famotidine  40 mg Oral Daily    heparin (porcine)  5,000 Units Subcutaneous Q8H    lisinopril  20 mg Oral Daily    memantine  5 mg Oral Daily    phenazopyridine  100 mg Oral TID WM    QUEtiapine  200 mg Oral QHS    rosuvastatin  20 mg Oral Daily    sodium chloride 0.9%  3 mL Intravenous Q8H     Continuous Infusions:   sodium chloride 0.9%       PRN Meds:acetaminophen, dextrose 50%, glucagon (human recombinant), haloperidol lactate, insulin aspart U-100, labetalol, ondansetron, sodium chloride 0.9%    Objective:     Vital Signs (Most Recent):  Temp: 99.3 °F (37.4 °C) (07/16/18 0914)  Pulse: 95 (07/16/18 0914)  Resp: 18 (07/16/18 0914)  BP: (!)  149/63 (07/16/18 0914)  SpO2: 98 % (07/16/18 0914)  BP Location: Right arm    Vital Signs Range (Last 24H):  Temp:  [97 °F (36.1 °C)-99.9 °F (37.7 °C)]   Pulse:  [85-95]   Resp:  [16-18]   BP: (136-203)/(60-86)   SpO2:  [90 %-99 %]   BP Location: Right arm    Physical Exam   Constitutional: She appears well-developed and well-nourished. No distress.   HENT:   Head: Normocephalic and atraumatic.   Eyes: EOM are normal.   Neck: Normal range of motion. Neck supple.   Pulmonary/Chest: Effort normal.   Musculoskeletal: Normal range of motion.   Neurological: She is alert.   Unable to assess orientation given aphasia/dysarthria    Psychiatric: She has a normal mood and affect.       Neurological Exam:   LOC: alert  Attention Span: Good   Language: Global aphasia  Articulation: Dysarthria  Orientation: Untestable due to severe aphasia   Visual Fields: Full  EOM (CN III, IV, VI): Full/intact  Facial Movement (CN VII): Symmetric facial expression    Motor: Arm left  Normal 5/5  Leg left  Paresis: 4/5  Arm right  Normal 5/5  Leg right Paresis: 4/5  Sensation: Intact to light touch, temperature and vibration  Tone: Normal tone throughout    Laboratory:  CMP:   No results for input(s): GLUCOSE, CALCIUM, ALBUMIN, PROT, NA, K, CO2, CL, BUN, CREATININE, ALKPHOS, ALT, AST, BILITOT in the last 24 hours.  BMP:     Recent Labs  Lab 07/15/18  0508      K 3.7   *   CO2 21*   BUN 19   CREATININE 1.0   CALCIUM 10.3     CBC:     Recent Labs  Lab 07/15/18  0508   WBC 9.54   RBC 3.66*   HGB 10.1*   HCT 32.7*      MCV 89   MCH 27.6   MCHC 30.9*     Lipid Panel:     Recent Labs  Lab 07/13/18  0819   CHOL 106*   LDLCALC 54.4*   HDL 33*   TRIG 93     Coagulation:     Recent Labs  Lab 07/14/18  0427 07/14/18  1911   INR 1.0  --    APTT 34.2* 36.0*     Platelet Aggregation Study: No results for input(s): PLTAGG, PLTAGINTERP, PLTAGREGLACO, ADPPLTAGGREG in the last 168 hours.  Hgb A1C:     Recent Labs  Lab 07/13/18  0819   HGBA1C  5.4     TSH:     Recent Labs  Lab 07/13/18  0819   TSH 0.626       Diagnostic Results     Brain Imaging   MRI brain 7/13/18:   Punctate acute infarct in the left lateral frontal lobe.  No acute hemorrhage.  Moderate degree of chronic microvascular ischemic change which is slightly advanced for age.  Additional findings as above.      Vessel Imaging   Carotid US 7/14/18:   No evidence of a hemodynamically significant carotid bifurcation stenosis.    Cardiac Imaging   ECHO 7/13/18:   CONCLUSIONS     1 - Normal left ventricular systolic function (EF 60-65%).     2 - Normal right ventricular systolic function .     3 - Impaired LV relaxation, elevated LAP (grade 2 diastolic dysfunction).     4 - Moderate left atrial enlargement.     5 - Mild tricuspid regurgitation.     6 - Pulmonary hypertension. The estimated PA systolic pressure is greater than 47 mmHg.     7 - Low central venous pressure

## 2018-07-16 NOTE — ASSESSMENT & PLAN NOTE
Ceftriaxone 1g IV daily started 7/13/18   UA 7/13 + leukocytes and nitrite +   + E Coli  Pan sensitive culture   Afebrile overnight  No leukocytosis   Renal US negative for abscess and procal 1.22  Will change antibiotics to PO and continue home treatment of UTI

## 2018-07-16 NOTE — HOSPITAL COURSE
7/13/18:  Evaluated by SLP.  Found to have dysphagia.  SLP recommendation: puree diet and nectar thick liquids.   7/14/18:  Evaluated by PT and OT.  Bed mobility SV-Rosy. Sit to stand SBA-Rosy and transfers SBA-Rosy.  Ambulated 15 ft and 50 ft x 2 Rosy.  UBD SBA and LBD SBA.  Grooming and toileting SBA.

## 2018-07-16 NOTE — ASSESSMENT & PLAN NOTE
-  Presented with speech difficulty and impaired balance with subsequent unwitnessed fall  -  CTH without acute patholog--> not tPA candidate 2/2 outside of treatment window  -  MRI brain revealed acute L frontal infarct  -  Etiology likely embolic  See hospital course for functional, cognitive/speech/language, and nutrition/swallow status.      Recommendations  -  Encourage mobility, OOB in chair, and early ambulation as appropriate   -  PT/OT evaluate and treat  -  SLP speech and cognitive evaluate and treat  -  Monitor mood and sleep disturbances  -  Establish consistent sleep-wake cycle  -  Monitor for bowel and bladder dysfunction  -  Monitor for shoulder pain and subluxation  -  Monitor for spasticity  -  Monitor for and prevent skin breakdown and pressure ulcers  · Early mobility, repositioning/weight shifting every 20-30 minutes when sitting, turn patient every 2 hours, proper mattress/overlay and chair cushioning, pressure relief/heel protector boots  -  DVT prophylaxis:  On [facility]

## 2018-07-16 NOTE — PLAN OF CARE
Assumed pt care from  Sue Townsend, no complaints at this time, Will continue with current plan of care.

## 2018-07-16 NOTE — PLAN OF CARE
07/16/18 1551   Final Note   Assessment Type Final Discharge Note   Discharge Disposition Home-Health     Patient is discharged to [facility] living today. Patient and her sisters are in agreement with discharge today. Cm sent an in basket message to [facility] for hospital follow up in 4-6 weeks. Cm called the patient's PCP Dr. Case to schedule an appt. Cm was unable to schedule an appt within two weeks. Cm left a message for the Rn to call the patient's sister with date and time of an appt within two weeks. Sw to arrange transportation home and .

## 2018-07-16 NOTE — PLAN OF CARE
PCP:Krista Case MD    Extended Emergency Contact Information  Primary Emergency Contact: Jose Francisco Segura   [place]  Mobile Phone: [phone]  Relation: Sister  Secondary Emergency Contact: Piper Herrera  Address: [address] [address]           [place] [address] [facility]  Home Phone: [phone]  Mobile Phone: [phone]  Relation: Relative      [facility] - [place] - [address]  [address]  [place] [address]  Phone: [phone] Fax: [phone]    Payor: MEDICARE / Plan: MEDICARE PART A & B / Product Type: Government /     Patient was independent with ADLS living at [facility] and going to [facility]  on Tuesdays and Thursdays. Pt/OT/SLP are recommending home with HH. Cm will continue to follow.     07/16/18 1025   Discharge Assessment   Assessment Type Discharge Planning Assessment   Confirmed/corrected address and phone number on facesheet? Yes   Assessment information obtained from? Caregiver   Expected Length of Stay (days) 1   Communicated expected length of stay with patient/caregiver yes   Prior to hospitilization cognitive status: Alert/Oriented   Prior to hospitalization functional status: Independent   Current cognitive status: Alert/Oriented   Current Functional Status: Needs Assistance   Facility Arrived From: Home   Lives With other (see comments)  ([facility])   Able to Return to Prior Arrangements yes   Is patient able to care for self after discharge? Yes   Who are your caregiver(s) and their phone number(s)? Jose Francisco Saldaña (sister) [phone]   Patient's perception of discharge disposition home or selfcare;home health   Readmission Within The Last 30 Days no previous admission in last 30 days   Patient currently being followed by outpatient case management? No   Patient currently receives any other outside agency services? No   Equipment Currently Used at Home none   Do you have any  problems affording any of your prescribed medications? No   Is the patient taking medications as prescribed? yes   Does the patient have transportation home? Yes   Transportation Available car;family or friend will provide   Does the patient receive services at the [facility]? No   Discharge Plan A Home;Home Health   Discharge Plan B Home   Patient/Family In Agreement With Plan yes

## 2018-07-16 NOTE — SUBJECTIVE & OBJECTIVE
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 No pertinent surgical history.  Review of patient's allergies indicates:  No Known Allergies    Scheduled Medications:    aspirin  325 mg Oral Daily    benztropine  1 mg Oral BID    cefTRIAXone (ROCEPHIN) IVPB  1 g Intravenous Q24H    donepezil  5 mg Oral QAM    DULoxetine  30 mg Oral Daily    famotidine  40 mg Oral Daily    heparin (porcine)  5,000 Units Subcutaneous Q8H    lisinopril  20 mg Oral Daily    memantine  5 mg Oral Daily    phenazopyridine  100 mg Oral TID WM    QUEtiapine  200 mg Oral QHS    rosuvastatin  20 mg Oral Daily    sodium chloride 0.9%  3 mL Intravenous Q8H       PRN Medications: acetaminophen, dextrose 50%, glucagon (human recombinant), haloperidol lactate, insulin aspart U-100, labetalol, ondansetron, sodium chloride 0.9%    Family History     Problem Relation (Age of Onset)    Depression Sister, Brother    Suicide Brother        Social History Main Topics    Smoking status: Never Smoker    Smokeless tobacco: Never Used    Alcohol use No    Drug use: No    Sexual activity: Not Currently     Review of Systems   Unable to perform ROS: Dementia     Objective:     Vital Signs (Most Recent):  Temp: 99.3 °F (37.4 °C) (07/16/18 0914)  Pulse: 95 (07/16/18 0914)  Resp: 18 (07/16/18 0914)  BP: (!) 149/63 (07/16/18 0914)  SpO2: 98 % (07/16/18 0914)    Vital Signs (24h Range):  Temp:  [97 °F (36.1 °C)-99.9 °F (37.7 °C)] 99.3 °F (37.4 °C)  Pulse:  [83-95] 95  Resp:  [16-18] 18  SpO2:  [90 %-99 %] 98  %  BP: (136-203)/(60-86) 149/63     Body mass index is 25.4 kg/m².    Physical Exam   Constitutional: She appears well-developed and well-nourished. No distress.   HENT:   Head: Normocephalic and atraumatic.   Right Ear: External ear normal.   Left Ear: External ear normal.   Nose: Nose normal.   Eyes: Right eye exhibits no discharge. Left eye exhibits no discharge. No scleral icterus.   Neck: Normal range of motion.   Cardiovascular: Normal rate, regular rhythm and intact distal pulses.    Pulmonary/Chest: Effort normal. No respiratory distress. She has no wheezes.   Abdominal: Soft. She exhibits no distension. There is no tenderness.   Musculoskeletal: Normal range of motion. She exhibits no edema or tenderness.   Neurological: She is alert. She exhibits normal muscle tone.   -  Mental Status:  Awake and alert.  Follows simple commands.   -  Facial movement (CN VII): symmetrical.   -  Motor:  RUE: 4/5.  LUE: 5/5.  RLE: 4/5.  LLE: 5/5.  -  Tone:  Normal.   Skin: Skin is warm and dry. No rash noted.   Psychiatric: She has a normal mood and affect. Cognition and memory are impaired.   Vitals reviewed.         Diagnostic Results:   Labs: Reviewed  X-Ray: Reviewed  CT: Reviewed  MRI: Reviewed

## 2018-07-16 NOTE — ASSESSMENT & PLAN NOTE
72-year-old female with a medical history significant for deafness, HTN, DM, HLD, schizoaffective disorder, dementia with behavior disturbance presents to the ED for evaluation of language impairment, body shakiness, imbalance resulting in a fall today. Family reports speech difficulties are patient's baseline (she is deaf and has dementia). Instability possibly also related to acute UTI.   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 Appears to be a small embolic phenomenon. Vascular US of carotid arteries unremarkable, no significant vascular disease.   Of note, patient was not treated with iv alteplase as she was out of the window for any intervention.   Admit to the stroke service to complete stroke work up as detailed below.      Antithrombotics for secondary stroke prevention: Antiplatelets: Aspirin: 325 mg daily    Statins for secondary stroke prevention and hyperlipidemia, if present: Statins: Crestor 20mg daily (home med)     Aggressive risk factor modification: HTN, DM, HLD      Rehab efforts: PT/OT/SLP to evaluate and treat    Diagnostics ordered/pending: none    VTE prophylaxis: Heparin 5000 units SQ every 8 hours    BP parameters: Infarct: No intervention, SBP <180

## 2018-07-16 NOTE — TELEPHONE ENCOUNTER
----- Message from Mariel Barbosa RN sent at 7/16/2018 11:37 AM CDT -----  Contact: Mariel Samuels  Please schedule a hospital follow up appt in 4-6 weeks. The patient was seen by Dr. Romero while in patient. But her already established neurologist is Dr. Walters. Please call the patient with date and time of appt.

## 2018-07-18 ENCOUNTER — PATIENT OUTREACH (OUTPATIENT)
Dept: ADMINISTRATIVE | Facility: CLINIC | Age: 73
End: 2018-07-18

## 2018-07-18 NOTE — PT/OT/SLP DISCHARGE
Physical Therapy Discharge Summary    Name: Lay Peres  MRN: [id number]   Principal Problem: Stroke due to embolism of left middle cerebral artery     Patient Discharged from acute Physical Therapy on 7/16/2018.  Please refer to prior PT noted date on 7/16/2018 for functional status.     Assessment:     Patient appropriate for care in another setting.    Objective:     GOALS:    Physical Therapy Goals        Problem: Physical Therapy Goal    Goal Priority Disciplines Outcome Goal Variances Interventions   Physical Therapy Goal     PT/OT, PT Ongoing (interventions implemented as appropriate)     Description:    Goals to be met by 7/27/2018    1. Pt will perform rolling to the R and L with SBA.   2. Pt will perform supine to sit from both sides of the bed with SBA.  3. Pt will perform sit to supine with SBA.  4. Pt will perform sit to stand transfers with SBA- met.    5. Pt will perform bed <> chair transfers with SBA.  6. Pt will perform gait x 50 feet with SBA and no assistance device.                       Reasons for Discontinuation of Therapy Services  Transfer to alternate level of care.      Plan:     Patient Discharged to: Home with Home Health Service.    Ruth March, PT  7/18/2018

## 2018-07-18 NOTE — PATIENT INSTRUCTIONS
Discharge Instructions for Stroke  You have been diagnosed with a stroke, or with a TIA (transient ischemic attack). Or you have been identified as having a high risk for stroke. During a stroke, blood stops flowing to part of your brain. This can damage areas in the brain that control other parts of the body. Symptoms after a stroke depend on which part of the brain has been affected.  Stroke risk factors  Once youve had a stroke, youre at greater risk for another one. Listed below are some other factors that can increase your risk for a stroke:  · High blood pressure  · High cholesterol  · Cigarette or cigar smoking  · Diabetes  · Carotid or other artery disease  · Atrial fibrillation, atrial flutter, or other heart disease  · Not being physically active  · Obesity  · Certain blood disorders (such as sickle cell anemia)  · Excessive alcohol use  · Abuse of street drugs  · Race  · Gender  · Family history of stroke  · Diet high in salty, fried, or greasy foods  Changes in daily living  Doing your regular tasks may be difficult after youve had a stroke, but you can learn new ways to manage your daily activities. In fact, doing daily activities may help you to regain muscle strength and bring back function to affected limbs. Be patient, give yourself time to adjust, and appreciate the progress you make.  Daily activities  You may be at risk of falling. Make changes to your home to help you walk more easily. A therapist will decide if you need an assistive device to walk safely.  You may need to see an occupational therapist or physical therapist to learn new ways of doing things. For example, you may need to make adjustments when bathing or dressing:  Tips for showering or bathing  · Test the water temperature with a hand or foot that was not affected by the stroke.  · Use grab bars, a shower seat, a hand-held showerhead, and a long-handled brush.  Tips for getting dressed  · Dress while sitting, starting with  the affected side or limb.  · Wear shirts that pull easily over your head. Wear pants or skirts with elastic waistbands.  · Use zippers with loops attached to the pull tabs.  Lifestyle changes  · Take your medicines exactly as directed. Dont skip doses.  · Begin an exercise program. Ask your provider how to get started. Also ask how much activity you should try to get on a daily or weekly basis. You can benefit from simple activities such as walking or gardening.  · Limit alcohol intake. Men should have no more than 2 alcoholic drinks a day. Women should limit themselves to 1 alcoholic drink per day.  · Know your cholesterol level. Follow your providers recommendations about how to keep cholesterol under control.  · If you are a smoker, quit now. Join a stop-smoking program to improve your chances of success. Ask your provider about medicines or other methods to help you quit.  · Learn stress management techniques to help you deal with stress in your home and work life.  Diet  Your healthcare provider will give you information on dietary changes that you may need to make, based on your situation. Your provider may recommend that you see a registered dietitian for help with diet changes. Changes may include:  · Reducing fat and cholesterol intake  · Reducing salt (sodium) intake, especially if you have high blood pressure  · Eating more fresh vegetables and fruits  · Eating more lean proteins, such as fish, poultry, and beans and peas (legumes)  · Eating less red meat and processed meats  · Using low-fat dairy products  · Limiting vegetable oils and nut oils  · Limiting sweets and processed foods such as chips, cookies, and baked goods  Follow-up care  · Keep your medical appointments. Close follow-up is important to stroke rehabilitation and recovery.  · Some medicines require blood tests to check for progress or problems. Keep follow-up appointments for any blood tests ordered by your providers.  When to call  911  Call 911 right away if you have any of the following symptoms of stroke:  · Weakness, tingling, or loss of feeling on one side of your face or body  · Sudden double vision or trouble seeing in one or both eyes  · Sudden trouble talking or slurred speech  · Trouble understanding others  · Sudden, severe headache  · Dizziness, loss of balance, or a sense of falling  · Blackouts or seizures      F.A.S.T. is an easy way to remember the signs of stroke. When you see these signs, you know that you need to call 911 fast.  F.A.S.T. stands for:  · F is for face drooping. One side of the face is drooping or numb. When the person smiles, the smile is uneven.  · A is for arm weakness. One arm is weak or numb. When the person lifts both arms at the same time, one arm may drift downward.  · S is for speech difficulty. You may notice slurred speech or trouble speaking. The person can't repeat a simple sentence correctly when asked.  · T is for time to call 911. If someone shows any of these symptoms, even if they go away, call 911 immediately. Make note of the time the symptoms first appeared.  Date Last Reviewed: 8/26/2015 © 2000-2018 [employer]. [address] [place] [address], [place] [address]. All rights reserved. This information is not intended as a substitute for professional medical care. Always follow your healthcare professional's instructions.

## 2018-07-19 NOTE — DISCHARGE SUMMARY
"[facility]  Vascular Neurology  Comprehensive Stroke Center  Discharge Summary     Summary:     Admit Date: 7/13/2018  7:50 AM    Discharge Date and Time: 7/16/2018  6:16 PM    Attending Physician: No att. providers found     Discharge Provider: Airam Adams PA-C    History of Present Illness: Ms Peres is a 72-year-old female with a medical history significant for deafness, HTN, DM, HLD, schizoaffective disorder, dementia with behavior disturbance presents to the ED for evaluation of language impairment, body shakiness, imbalance resulting in a fall today.   is at the bedside and provides all the history. She indicated that at baseline patient is able to walk and speak fluently, and has no significant functional limitations, but yesterday patient was noted to have shaking movement of the upper body and was very off balance. Then, she reports that Ms Peres had an unwitnessed fall this morning and was found around 6:20-6:30 and has been having " mumbled speech this morning" as well as grabbing for objects that are not there. Pt lives at home with a roommate and has 24-hr care. No recent reports of fever, cough, diarrhea, vomiting.   Upon our initial assessment patient displays expressive dysphasia with apparent preserved sensorium and no other lateralizing signs on exam.  STAT CTH was obtained and it showed no acute abnormality.  Strong suspicion for acute infarction distal small branch left MCA, thus MRI brain was requested which showed small acute posterior lateral frontal lobe.  Of note, patient was not offer treatment with iv alteplase as she was out of the window for any intervention.    Hospital Course (synopsis of major diagnoses, care, treatment, and services provided during the course of the hospital stay): 7/14/18: Patient agitated yesterday. Treatment for UTI started with ceftriaxone; febrile overnight. Culture in process. WBC count normal. On puree/nectar thick diet. PT/OT " recommending back home with  services/constant care.   7/15/18: Fever 102 overnight, improved with tylenol. Discussed case with hospital medicine. Ordering renal US and procalcitonin. Will continue to monitor closely. Carotid US last night without significant carotid stenosis - no change in medical plan for secondary stroke prevention.   7/16/18: Afebrile overnight. Diet advanced to dental soft and nectar thick liquids. Renal US negative for infection concerns. Will try to set up home health with [facility] today. Continuing antibiotics PO x 7 days. Completed 4 days of rocephin.     Ms. Peres is a 73yo F with dementia/MR at baseline, hypertension, and is deaf who presented after a fall with worsened speech (has dysarthria and aphasia at baseline). Patient was found to have a small distal L temporal lobe infarct. Suspect that infarct was more an incidental infarct and not causing new deficits due to size and location of infarct. PT/OT/ST felt patient should return home with HH (has assistance due to aforementioned comorbidities) and also follow up with [facility] due to modified diet (soft, nectar thick). Also found to have a UTI + E Coli, pan sensitive. She was started on rocephin and still had fevers after 2 days of treatment so on discharge patient was prescribed 7 day course of augmentin (pt had been afebrile for 24 hours). Renal US was negative for abscess, no leukocytosis. Patient will start ASA 325mg daily for secondary stroke prevention as well as crestor 20mg daily. She will have close follow up with her PCP within 1 week and 4-6 weeks will see her neurologist, Dr. Walters.     STROKE DOCUMENTATION   Acute Stroke Times   Last Known Normal Date: 07/12/18  Last Known Normal Time:  (unable to determine)  Symptom Onset Date: 07/12/18  Symptom Onset Time:  (unable to determine)  Stroke Team Called Date: 07/13/18  Stroke Team Called Time: 0820  Stroke Team Arrival Date: 07/13/18  Stroke Team Arrival Time: 0825  CT  Interpretation Time: 0832  Decision to Treat Time for Alteplase:  (No iv alteplse candidate)  Decision to Treat Time for IR:  (No IR candidate)     NIH Scale:  1a. Level Of Consciousness: 0-->Alert: keenly responsive  1b. LOC Questions: 2-->Answers neither question correctly  1c. LOC Commands: 0-->Performs both tasks correctly  2. Best Gaze: 0-->Normal  3. Visual: 0-->No visual loss  4. Facial Palsy: 0-->Normal symmetrical movements  5a. Motor Arm, Left: 0-->No drift: limb holds 90 (or 45) degrees for full 10 secs  5b. Motor Arm, Right: 0-->No drift: limb holds 90 (or 45) degrees for full 10 secs  6a. Motor Leg, Left: 0-->No drift: leg holds 30 degree position for full 5 secs  6b. Motor Leg, Right: 0-->No drift: leg holds 30 degree position for full 5 secs  7. Limb Ataxia: 0-->Absent  8. Sensory: 0-->Normal: no sensory loss  9. Best Language: 2-->Severe aphasia: all communication is through fragmentary expression: great need for inference, questioning, and guessing by the listener. Range of information that can be exchanged is limited: listener carries burden of. . . (see row details)  10. Dysarthria: 2-->Severe dysarthria: patients speech is so slurred as to be unintelligible in the absence of or out of proportion to any dysphasia, or is mute/anarthric  11. Extinction and Inattention (formerly Neglect): 0-->No abnormality  Total (NIH Stroke Scale): 6        Modified [place] Score: 3  Marcia Coma Scale:12   ABCD2 Score:    [id number] Score:   HAS -BLED Score:   ICH Score:   Hunt & Lui Classification:       Assessment/Plan:     Diagnostic Results:      Brain Imaging:   MRI brain 7/13/18:   Punctate acute infarct in the left lateral frontal lobe.  No acute hemorrhage.  Moderate degree of chronic microvascular ischemic change which is slightly advanced for age.  Additional findings as above.       Vessel Imaging   Carotid US 7/14/18:   No evidence of a hemodynamically significant carotid bifurcation  stenosis.     Cardiac Imaging   ECHO 7/13/18:   CONCLUSIONS     1 - Normal left ventricular systolic function (EF 60-65%).     2 - Normal right ventricular systolic function .     3 - Impaired LV relaxation, elevated LAP (grade 2 diastolic dysfunction).     4 - Moderate left atrial enlargement.     5 - Mild tricuspid regurgitation.     6 - Pulmonary hypertension. The estimated PA systolic pressure is greater than 47 mmHg.     7 - Low central venous pressure    Interventions: none     Complications: n/a     Disposition: Home or Self Care    Final Active Diagnoses:    Diagnosis Date Noted POA    PRINCIPAL PROBLEM:  Stroke due to embolism of left middle cerebral artery [I63.412] 07/13/2018 Yes    Cytotoxic cerebral edema [G93.6] 07/14/2018 Yes    Urinary tract infection [N39.0] 07/14/2018 Yes    Cerebrovascular accident (CVA) [I63.9] 07/13/2018 Yes    Dementia [F03.90] 01/20/2015 Yes    Hyperlipidemia [E78.5] 07/13/2012 Yes    Hypertension [I10] 07/13/2012 Yes      Problems Resolved During this Admission:    Diagnosis Date Noted Date Resolved POA     * Stroke due to embolism of left middle cerebral artery    72-year-old female with a medical history significant for deafness, HTN, DM, HLD, schizoaffective disorder, dementia with behavior disturbance presents to the ED for evaluation of language impairment, body shakiness, imbalance resulting in a fall today. Family reports speech difficulties are patient's baseline (she is deaf and has dementia). Instability possibly also related to acute UTI.   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 Appears to be a small embolic phenomenon. Vascular US of carotid arteries unremarkable, no significant vascular disease. ESUS.  Of note, patient was not treated with iv alteplase as she was out of the window for any intervention.       Antithrombotics for secondary stroke prevention: Antiplatelets: Aspirin: 325 mg  daily    Statins for secondary stroke prevention and hyperlipidemia, if present: Statins: Crestor 20mg daily (home med)     Aggressive risk factor modification: HTN, DM, HLD      Rehab efforts: PT/OT/SLP to evaluate and treat    Diagnostics ordered/pending: none    VTE prophylaxis: Heparin 5000 units SQ every 8 hours    BP parameters: Infarct: No intervention, SBP <180        Cytotoxic cerebral edema    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 There is not mass effect associated with it. We will continue to monitor the patients clinical exam for any worsening of symptoms which may indicate expansion of the stroke or the area of the edema resulting in the clinical change. 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         Urinary tract infection    Ceftriaxone 1g IV daily started 7/13/18   UA 7/13 + leukocytes and nitrite +   + E Coli  Pan sensitive culture   Afebrile overnight  No leukocytosis   Renal US negative for abscess and procal 1.22  Will change antibiotics to PO and continue home treatment of UTI          Dementia    Delirium precautions   Sees Dr. Walters as OP   Continue home medications (donepezil, memantine, seroquel)   Plan for home with HH as soon as possible           Hyperlipidemia    Stroke risk factor   LDL 54.4   Continue home crestor 20mg daily         Hypertension    Stroke risk factor   Goal BP <180 acute infarct, no intervention   Follow up with PCP as OP for better BP control             Recommendations:     Post-discharge complication risks: Falls, Pneumonia, Seizure, Skin breakdown, Urinary tract infections    Stroke Education given to: family and caregiver    Follow-up in Stroke Clinic in 4-6 weeks.     Discharge Plan:  Antithrombotics: Aspirin 325mg  Statin: Rosuvastatin 20mg  Aggresive risk factor modification:  Hypertension  High Cholesterol  Diet  Exercise    Follow Up:  Follow-up Information      Krista Case MD In 10 days.    Specialty:  Internal Medicine  Why:  For hospital discharge - stroke and UTI. The office will call you to schedule an appt with in 2 weeks. If the office does not call you by 7/18/18 please call to schedule an appt.  Contact information:  [address]  [facility] [address]  [phone]             Cornelius Walters MD. Schedule an appointment as soon as possible for a visit in 4 weeks.    Specialty:  Neurology  Why:  For hospital follow up (fall, stroke) The   Contact information:  [address]  [address]  [facility] [address]  [phone]                   Patient Instructions:   No discharge procedures on file.    Medications:  Reconciled Home Medications:      Medication List      START taking these medications    amoxicillin-clavulanate 875-125mg 875-125 mg per tablet  Commonly known as:  AUGMENTIN  Take 1 tablet by mouth every 12 (twelve) hours.     aspirin 325 MG EC tablet  Commonly known as:  ECOTRIN  Take 1 tablet (325 mg total) by mouth once daily.     rosuvastatin 20 MG tablet  Commonly known as:  CRESTOR  Take 1 tablet (20 mg total) by mouth once daily.        CHANGE how you take these medications    lisinopril 40 MG tablet  Commonly known as:  PRINIVIL,ZESTRIL  Take 1 tablet (40 mg total) by mouth once daily.  What changed:  · medication strength  · how much to take        CONTINUE taking these medications    acetaminophen 500 mg Cap  Commonly known as:  TYLENOL  Take 2 capsules (1,000 mg total) by mouth 2 (two) times daily.     benztropine 1 MG tablet  Commonly known as:  COGENTIN  TAKE 1 TABLET BY MOUTH twice a day (8am/8pm)     blood sugar diagnostic Strp  1 strip by Misc.(Non-Drug; Combo Route) route once daily.     SAWYER-GEST ANTACID ORAL  Take 1 tablet by mouth 4 (four) times daily before meals and nightly.     donepezil 5 MG tablet  Commonly known as:  ARICEPT  Take 1 tablet (5 mg total) by mouth every morning. To be taken with food     DULoxetine 30  MG capsule  Commonly known as:  CYMBALTA  TAKE 1 CAPSULE BY MOUTH once daily at 8am     gabapentin 300 MG capsule  Commonly known as:  NEURONTIN  TAKE 1 TABLET (300MG) BY MOUTH AT BEDTIME at 8pm     haloperidol 5 MG tablet  Commonly known as:  HALDOL  Take 1 tablet (5 mg total) by mouth daily as needed (agitation/paranoia).     haloperidol decanoate 100 mg/mL injection  Commonly known as:  HALDOL DECANOATE  Inject 1 mL (100 mg total) into the muscle every 14 (fourteen) days.     memantine 28 mg Cspx  Commonly known as:  NAMENDA XR  Take 1 capsule (28 mg total) by mouth once daily.     metFORMIN 500 MG tablet  Commonly known as:  GLUCOPHAGE  Take 1 tablet (500 mg total) by mouth 2 (two) times daily with meals.     multivitamin capsule  Take 1 capsule by mouth once daily.     nystatin ointment  Commonly known as:  MYCOSTATIN  Apply topically 2 (two) times daily.     PEPCID 40 MG tablet  Generic drug:  famotidine  Take 1 tablet by mouth Every PM.     QUEtiapine 200 MG Tab  Commonly known as:  SEROQUEL  TAKE 1 TABLET BY MOUTH EVERY EVENING at 8pm     SENNA 8.6 mg tablet  Generic drug:  senna  Take 1 tablet by mouth Twice daily.     VITAMIN D3 1,000 unit capsule  Generic drug:  cholecalciferol (vitamin D3)  Take 1 capsule by mouth Daily.        STOP taking these medications    celecoxib 200 MG capsule  Commonly known as:  CeleBREX     lovastatin 10 MG tablet  Commonly known as:  MEVACOR     tiZANidine 2 mg Cap            Airam Adams PA-C  [facility]  Department of Vascular Neurology   [facility]

## 2018-07-19 NOTE — ASSESSMENT & PLAN NOTE
Stroke risk factor   Goal BP <180 acute infarct, no intervention   Follow up with PCP as OP for better BP control

## 2018-07-19 NOTE — ASSESSMENT & PLAN NOTE
72-year-old female with a medical history significant for deafness, HTN, DM, HLD, schizoaffective disorder, dementia with behavior disturbance presents to the ED for evaluation of language impairment, body shakiness, imbalance resulting in a fall today. Family reports speech difficulties are patient's baseline (she is deaf and has dementia). Instability possibly also related to acute UTI.   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 Appears to be a small embolic phenomenon. Vascular US of carotid arteries unremarkable, no significant vascular disease. ESUS.  Of note, patient was not treated with iv alteplase as she was out of the window for any intervention.       Antithrombotics for secondary stroke prevention: Antiplatelets: Aspirin: 325 mg daily    Statins for secondary stroke prevention and hyperlipidemia, if present: Statins: Crestor 20mg daily (home med)     Aggressive risk factor modification: HTN, DM, HLD      Rehab efforts: PT/OT/SLP to evaluate and treat    Diagnostics ordered/pending: none    VTE prophylaxis: Heparin 5000 units SQ every 8 hours    BP parameters: Infarct: No intervention, SBP <180

## 2018-07-23 RX ORDER — ROSUVASTATIN CALCIUM 20 MG/1
TABLET, COATED ORAL
Qty: 30 TABLET | Refills: 6 | Status: SHIPPED | OUTPATIENT
Start: 2018-07-23 | End: 2021-11-02 | Stop reason: SDUPTHER

## 2018-07-27 ENCOUNTER — OFFICE VISIT (OUTPATIENT)
Dept: INTERNAL MEDICINE | Facility: CLINIC | Age: 73
End: 2018-07-27
Payer: MEDICARE

## 2018-07-27 DIAGNOSIS — F79 MENTAL RETARDATION: ICD-10-CM

## 2018-07-27 DIAGNOSIS — I10 ESSENTIAL HYPERTENSION: ICD-10-CM

## 2018-07-27 DIAGNOSIS — Z12.11 SCREENING FOR MALIGNANT NEOPLASM OF COLON: ICD-10-CM

## 2018-07-27 DIAGNOSIS — E13.9 DIABETES MELLITUS DUE TO ABNORMAL INSULIN: ICD-10-CM

## 2018-07-27 DIAGNOSIS — E78.5 HYPERLIPIDEMIA, UNSPECIFIED HYPERLIPIDEMIA TYPE: ICD-10-CM

## 2018-07-27 DIAGNOSIS — H90.5 DEAFNESS CONGENITAL: ICD-10-CM

## 2018-07-27 DIAGNOSIS — I63.9 ACUTE CARDIOEMBOLIC STROKE: Primary | ICD-10-CM

## 2018-07-27 DIAGNOSIS — N39.0 URINARY TRACT INFECTION WITHOUT HEMATURIA, SITE UNSPECIFIED: ICD-10-CM

## 2018-07-27 PROCEDURE — 99496 TRANSJ CARE MGMT HIGH F2F 7D: CPT | Mod: S$PBB,,, | Performed by: INTERNAL MEDICINE

## 2018-07-27 PROCEDURE — 99496 TRANSJ CARE MGMT HIGH F2F 7D: CPT | Mod: PBBFAC | Performed by: INTERNAL MEDICINE

## 2018-07-27 PROCEDURE — 99212 OFFICE O/P EST SF 10 MIN: CPT | Mod: PBBFAC | Performed by: INTERNAL MEDICINE

## 2018-07-27 PROCEDURE — 99999 PR PBB SHADOW E&M-EST. PATIENT-LVL II: CPT | Mod: PBBFAC,,, | Performed by: INTERNAL MEDICINE

## 2018-07-27 NOTE — PROGRESS NOTES
CHIEF COMPLAINT: Hospital follow up       HISTORY OF PRESENT ILLNESS: This is a 72-year-old woman who presents with her sister for hospital follow up. She presented to the ED on 7/13/18 due to dizziness and inability to walk.  She  Was hospitalized from 7/13/18 to 7/16/18 due to a UTI and a Punctate acute infarct in the left lateral frontal lobe on MRI brain.  US carotids unremarkable. ECH revealed diastolic dysfunction and pulmonary HTN with PA pressure greater than 47 mm hg. Since discharge, she has been feeling well. No dysuria, hematuria, fever, chills. Home health physical therapy has been coming out.  NO swallowing difficulty.  Mood has been good.  She will be moving from supported independent living to a group home at [facility]. She is now taking aspirin 325 mg daily and crestor 20 mg daily for stroke prevention.     Mood has been good. She continues to take Aricept 5 mg daily and Namenda XR 28 mg daily. Memory is the same. She continues to see Dr Walters in neurology.  She continues to see Dr Cardenas in psychiatry who has given her haldol 5 mg once daily as needed when she gets agitated just prior her next Haldol injection (which is every 2 weeks). She continues to take Seroquel 200 mg at bedtime and Cogentin 1 mg twce daily and Cymbalta 30 mg daily.       Blood sugars have been doing well. She is checking blood sugars daily. She continues to take metformin 500 mg twice daily. No hypoglycemia. NO diarrhea. Weight is stable       Blood pressure has been well controlled on lisinopril 20 mg daily.  No cough, joint pain, muscle pain, chest pain, shortness of breath, palpitaitons      No heartburn on Pepcid 40 mg daily. No nausea, vomiting, constipation, diarrhea.       She continues to complain of back pain intermittently. She does her exercises at home which helps. She continues to take tylenol 500 mg 2 tablets twice daily, gabapentin 300 mg at bedtime and zanaflex 2 mg 2 tablets every 6  "hours as needed. She sleeps well.                  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       PHYSICAL EXAMINATION:     /82 (BP Location: Left arm, Patient Position: Sitting, BP Method: Medium (Manual))   Pulse 87   Ht 5' 3" (1.6 m)   Wt 65.7 kg (144 lb 14.4 oz)   BMI 25.67 kg/m²     GENERAL: She is alert, oriented, no apparent distress. Affect within normal limits.    Conjunctiva anicteric. Tympanic membranes clear. Oropharynx clear.    NECK: Supple.    Respiratory: Effort normal. Lungs are clear to auscultation.    HEART: Regular rate and rhythm without murmurs, gallops or rubs.    No lower extremity edema.  ABDOMEN: soft, non distended, non tender, bowel sounds present, no hepatosplenomgaly             ASSESSMENT AND PLAN:    1. UTI -treated -discussed with her to let us know if she has any urinary difficulty  2. Acute stroke - modifying risk factors  3. Memory loss - on Aricept and Namenda XR 28 mg daily  4. Diabetes mellitus - stable   5. Back pain -stable  6. Hypertension - controlled.    7. Hyperlipidemia - stable    8. Reflux - controlled.    9. Schizoaffective disorder with memory issues - follow up with neurology and psychiatry    10. Screening - mammogram 1/18 Colonoscopy is due 2018. Normal bone density 09/2011.  [employer]Kit was given to patient on 7/29/2018 2:24 PM          I will see her back in 2 weeks at [place], sooner if problems arise.      Sister does not want any extra ordinary measeures to " prolong life. Sister does not want her to have CPR. [employer] packet given for sister to fill out and return after notarized to put on file.     Spent greater than 40 minutes with the patient, greater than 50% in face to face counseling.

## 2018-07-29 VITALS
HEART RATE: 87 BPM | HEIGHT: 63 IN | WEIGHT: 144.88 LBS | SYSTOLIC BLOOD PRESSURE: 126 MMHG | BODY MASS INDEX: 25.67 KG/M2 | DIASTOLIC BLOOD PRESSURE: 82 MMHG

## 2018-07-29 PROBLEM — I63.9 ACUTE CARDIOEMBOLIC STROKE: Status: ACTIVE | Noted: 2018-07-13

## 2018-07-29 NOTE — PROGRESS NOTES
Transitional Care Note  Subjective:       Patient ID: Lay Peres is a 72 y.o. female.  Chief Complaint: No chief complaint on file.    Family and/or Caretaker present at visit?  Yes.  Diagnostic tests reviewed/disposition: No diagnosic tests pending after this hospitalization.  Disease/illness education:   Home health/community services discussion/referrals: Patient has home health established at [facility].   Establishment or re-establishment of referral orders for community resources: No other necessary community resources.   Discussion with other health care providers: No discussion with other health care providers necessary.   HPI  Review of Systems    Objective:      Physical Exam    Assessment:           Plan:

## 2018-07-31 DIAGNOSIS — F25.9 CHRONIC SCHIZOAFFECTIVE DISORDER: ICD-10-CM

## 2018-07-31 RX ORDER — GABAPENTIN 300 MG/1
CAPSULE ORAL
Qty: 30 CAPSULE | Refills: 6 | Status: ON HOLD | OUTPATIENT
Start: 2018-07-31 | End: 2024-01-19

## 2018-07-31 RX ORDER — FAMOTIDINE 40 MG/1
TABLET, FILM COATED ORAL
Qty: 30 TABLET | Refills: 6 | Status: ON HOLD | OUTPATIENT
Start: 2018-07-31 | End: 2024-01-18 | Stop reason: HOSPADM

## 2018-07-31 RX ORDER — LISINOPRIL 40 MG/1
TABLET ORAL
Qty: 30 TABLET | OUTPATIENT
Start: 2018-07-31

## 2018-08-01 RX ORDER — QUETIAPINE FUMARATE 200 MG/1
TABLET, FILM COATED ORAL
Qty: 30 TABLET | Refills: 0 | Status: SHIPPED | OUTPATIENT
Start: 2018-08-01 | End: 2018-08-15 | Stop reason: SDUPTHER

## 2018-08-07 ENCOUNTER — HOSPITAL ENCOUNTER (OUTPATIENT)
Facility: HOSPITAL | Age: 73
Discharge: HOME | End: 2018-08-08
Attending: EMERGENCY MEDICINE | Admitting: HOSPITALIST
Payer: MEDICARE

## 2018-08-07 DIAGNOSIS — I50.32 CHRONIC DIASTOLIC HEART FAILURE: ICD-10-CM

## 2018-08-07 DIAGNOSIS — R07.9 CHEST PAIN: Primary | ICD-10-CM

## 2018-08-07 LAB
ALBUMIN SERPL BCP-MCNC: 3.5 G/DL
ALP SERPL-CCNC: 76 U/L
ALT SERPL W/O P-5'-P-CCNC: 10 U/L
ANION GAP SERPL CALC-SCNC: 9 MMOL/L
AST SERPL-CCNC: 24 U/L
BACTERIA #/AREA URNS AUTO: ABNORMAL /HPF
BASOPHILS # BLD AUTO: 0.05 K/UL
BASOPHILS NFR BLD: 0.5 %
BILIRUB SERPL-MCNC: 0.3 MG/DL
BILIRUB UR QL STRIP: NEGATIVE
BNP SERPL-MCNC: 23 PG/ML
BUN SERPL-MCNC: 20 MG/DL
CALCIUM SERPL-MCNC: 10.2 MG/DL
CHLORIDE SERPL-SCNC: 109 MMOL/L
CLARITY UR REFRACT.AUTO: ABNORMAL
CO2 SERPL-SCNC: 24 MMOL/L
COLOR UR AUTO: YELLOW
CREAT SERPL-MCNC: 0.8 MG/DL
DIFFERENTIAL METHOD: ABNORMAL
EOSINOPHIL # BLD AUTO: 0.3 K/UL
EOSINOPHIL NFR BLD: 2.9 %
ERYTHROCYTE [DISTWIDTH] IN BLOOD BY AUTOMATED COUNT: 15.2 %
EST. GFR  (AFRICAN AMERICAN): >60 ML/MIN/1.73 M^2
EST. GFR  (NON AFRICAN AMERICAN): >60 ML/MIN/1.73 M^2
GLUCOSE SERPL-MCNC: 111 MG/DL
GLUCOSE UR QL STRIP: NEGATIVE
HCT VFR BLD AUTO: 33.2 %
HGB BLD-MCNC: 9.9 G/DL
HGB UR QL STRIP: NEGATIVE
IMM GRANULOCYTES # BLD AUTO: 0.03 K/UL
IMM GRANULOCYTES NFR BLD AUTO: 0.3 %
KETONES UR QL STRIP: NEGATIVE
LEUKOCYTE ESTERASE UR QL STRIP: ABNORMAL
LYMPHOCYTES # BLD AUTO: 2.1 K/UL
LYMPHOCYTES NFR BLD: 20.4 %
MAGNESIUM SERPL-MCNC: 1.8 MG/DL
MCH RBC QN AUTO: 27.3 PG
MCHC RBC AUTO-ENTMCNC: 29.8 G/DL
MCV RBC AUTO: 92 FL
MICROSCOPIC COMMENT: ABNORMAL
MONOCYTES # BLD AUTO: 1 K/UL
MONOCYTES NFR BLD: 10 %
NEUTROPHILS # BLD AUTO: 6.7 K/UL
NEUTROPHILS NFR BLD: 65.9 %
NITRITE UR QL STRIP: POSITIVE
NRBC BLD-RTO: 0 /100 WBC
PH UR STRIP: 5 [PH] (ref 5–8)
PHOSPHATE SERPL-MCNC: 3.4 MG/DL
PLATELET # BLD AUTO: 286 K/UL
PMV BLD AUTO: 9.9 FL
POCT GLUCOSE: 104 MG/DL (ref 70–110)
POCT GLUCOSE: 84 MG/DL (ref 70–110)
POCT GLUCOSE: 84 MG/DL (ref 70–110)
POTASSIUM SERPL-SCNC: 4.8 MMOL/L
PROT SERPL-MCNC: 7.9 G/DL
PROT UR QL STRIP: NEGATIVE
RBC # BLD AUTO: 3.62 M/UL
RBC #/AREA URNS AUTO: 0 /HPF (ref 0–4)
SODIUM SERPL-SCNC: 142 MMOL/L
SP GR UR STRIP: 1.01 (ref 1–1.03)
SQUAMOUS #/AREA URNS AUTO: 0 /HPF
TROPONIN I SERPL DL<=0.01 NG/ML-MCNC: 0.01 NG/ML
TROPONIN I SERPL DL<=0.01 NG/ML-MCNC: 0.01 NG/ML
TROPONIN I SERPL DL<=0.01 NG/ML-MCNC: <0.006 NG/ML
URN SPEC COLLECT METH UR: ABNORMAL
UROBILINOGEN UR STRIP-ACNC: NEGATIVE EU/DL
WBC # BLD AUTO: 10.17 K/UL
WBC #/AREA URNS AUTO: >100 /HPF (ref 0–5)
WBC CLUMPS UR QL AUTO: ABNORMAL

## 2018-08-07 PROCEDURE — 84100 ASSAY OF PHOSPHORUS: CPT

## 2018-08-07 PROCEDURE — 87086 URINE CULTURE/COLONY COUNT: CPT

## 2018-08-07 PROCEDURE — 93005 ELECTROCARDIOGRAM TRACING: CPT

## 2018-08-07 PROCEDURE — 84484 ASSAY OF TROPONIN QUANT: CPT | Mod: 91

## 2018-08-07 PROCEDURE — 82962 GLUCOSE BLOOD TEST: CPT

## 2018-08-07 PROCEDURE — 87077 CULTURE AEROBIC IDENTIFY: CPT

## 2018-08-07 PROCEDURE — 99283 EMERGENCY DEPT VISIT LOW MDM: CPT | Mod: ,,, | Performed by: EMERGENCY MEDICINE

## 2018-08-07 PROCEDURE — 25000003 PHARM REV CODE 250: Performed by: EMERGENCY MEDICINE

## 2018-08-07 PROCEDURE — 83735 ASSAY OF MAGNESIUM: CPT

## 2018-08-07 PROCEDURE — 87088 URINE BACTERIA CULTURE: CPT

## 2018-08-07 PROCEDURE — 87186 SC STD MICRODIL/AGAR DIL: CPT

## 2018-08-07 PROCEDURE — 83880 ASSAY OF NATRIURETIC PEPTIDE: CPT

## 2018-08-07 PROCEDURE — 84484 ASSAY OF TROPONIN QUANT: CPT

## 2018-08-07 PROCEDURE — 36415 COLL VENOUS BLD VENIPUNCTURE: CPT

## 2018-08-07 PROCEDURE — 81001 URINALYSIS AUTO W/SCOPE: CPT

## 2018-08-07 PROCEDURE — 25000003 PHARM REV CODE 250: Performed by: PHYSICIAN ASSISTANT

## 2018-08-07 PROCEDURE — 63600175 PHARM REV CODE 636 W HCPCS: Performed by: PHYSICIAN ASSISTANT

## 2018-08-07 PROCEDURE — 99284 EMERGENCY DEPT VISIT MOD MDM: CPT | Mod: 25

## 2018-08-07 PROCEDURE — G0378 HOSPITAL OBSERVATION PER HR: HCPCS

## 2018-08-07 PROCEDURE — 85025 COMPLETE CBC W/AUTO DIFF WBC: CPT

## 2018-08-07 PROCEDURE — 99220 PR INITIAL OBSERVATION CARE,LEVL III: CPT | Mod: ,,, | Performed by: PHYSICIAN ASSISTANT

## 2018-08-07 PROCEDURE — 93010 ELECTROCARDIOGRAM REPORT: CPT | Mod: ,,, | Performed by: INTERNAL MEDICINE

## 2018-08-07 PROCEDURE — 80053 COMPREHEN METABOLIC PANEL: CPT

## 2018-08-07 RX ORDER — DULOXETIN HYDROCHLORIDE 30 MG/1
30 CAPSULE, DELAYED RELEASE ORAL DAILY
Status: DISCONTINUED | OUTPATIENT
Start: 2018-08-08 | End: 2018-08-08 | Stop reason: HOSPADM

## 2018-08-07 RX ORDER — ONDANSETRON 8 MG/1
8 TABLET, ORALLY DISINTEGRATING ORAL EVERY 8 HOURS PRN
Status: DISCONTINUED | OUTPATIENT
Start: 2018-08-07 | End: 2018-08-08 | Stop reason: HOSPADM

## 2018-08-07 RX ORDER — INSULIN ASPART 100 [IU]/ML
0-5 INJECTION, SOLUTION INTRAVENOUS; SUBCUTANEOUS
Status: DISCONTINUED | OUTPATIENT
Start: 2018-08-07 | End: 2018-08-08 | Stop reason: HOSPADM

## 2018-08-07 RX ORDER — SODIUM CHLORIDE 0.9 % (FLUSH) 0.9 %
3 SYRINGE (ML) INJECTION
Status: DISCONTINUED | OUTPATIENT
Start: 2018-08-07 | End: 2018-08-08 | Stop reason: HOSPADM

## 2018-08-07 RX ORDER — AMOXICILLIN 250 MG
1 CAPSULE ORAL 2 TIMES DAILY
Status: DISCONTINUED | OUTPATIENT
Start: 2018-08-07 | End: 2018-08-08 | Stop reason: HOSPADM

## 2018-08-07 RX ORDER — DONEPEZIL HYDROCHLORIDE 5 MG/1
5 TABLET, FILM COATED ORAL EVERY MORNING
Status: DISCONTINUED | OUTPATIENT
Start: 2018-08-08 | End: 2018-08-08 | Stop reason: HOSPADM

## 2018-08-07 RX ORDER — ONDANSETRON 2 MG/ML
4 INJECTION INTRAMUSCULAR; INTRAVENOUS EVERY 8 HOURS PRN
Status: DISCONTINUED | OUTPATIENT
Start: 2018-08-07 | End: 2018-08-08 | Stop reason: HOSPADM

## 2018-08-07 RX ORDER — IBUPROFEN 200 MG
16 TABLET ORAL
Status: DISCONTINUED | OUTPATIENT
Start: 2018-08-07 | End: 2018-08-08 | Stop reason: HOSPADM

## 2018-08-07 RX ORDER — FAMOTIDINE 20 MG/1
40 TABLET, FILM COATED ORAL NIGHTLY
Status: DISCONTINUED | OUTPATIENT
Start: 2018-08-07 | End: 2018-08-08 | Stop reason: HOSPADM

## 2018-08-07 RX ORDER — POLYETHYLENE GLYCOL 3350 17 G/17G
17 POWDER, FOR SOLUTION ORAL DAILY
Status: DISCONTINUED | OUTPATIENT
Start: 2018-08-07 | End: 2018-08-08 | Stop reason: HOSPADM

## 2018-08-07 RX ORDER — ENOXAPARIN SODIUM 100 MG/ML
40 INJECTION SUBCUTANEOUS EVERY 24 HOURS
Status: DISCONTINUED | OUTPATIENT
Start: 2018-08-07 | End: 2018-08-08 | Stop reason: HOSPADM

## 2018-08-07 RX ORDER — ROSUVASTATIN CALCIUM 20 MG/1
20 TABLET, COATED ORAL DAILY
Status: DISCONTINUED | OUTPATIENT
Start: 2018-08-08 | End: 2018-08-08 | Stop reason: HOSPADM

## 2018-08-07 RX ORDER — IBUPROFEN 200 MG
24 TABLET ORAL
Status: DISCONTINUED | OUTPATIENT
Start: 2018-08-07 | End: 2018-08-08 | Stop reason: HOSPADM

## 2018-08-07 RX ORDER — CEFTRIAXONE 1 G/1
1 INJECTION, POWDER, FOR SOLUTION INTRAMUSCULAR; INTRAVENOUS
Status: DISCONTINUED | OUTPATIENT
Start: 2018-08-07 | End: 2018-08-08

## 2018-08-07 RX ORDER — LISINOPRIL 20 MG/1
40 TABLET ORAL DAILY
Status: DISCONTINUED | OUTPATIENT
Start: 2018-08-08 | End: 2018-08-08 | Stop reason: HOSPADM

## 2018-08-07 RX ORDER — ACETAMINOPHEN 325 MG/1
650 TABLET ORAL EVERY 4 HOURS PRN
Status: DISCONTINUED | OUTPATIENT
Start: 2018-08-07 | End: 2018-08-08 | Stop reason: HOSPADM

## 2018-08-07 RX ORDER — NAPROXEN SODIUM 220 MG/1
81 TABLET, FILM COATED ORAL
Status: COMPLETED | OUTPATIENT
Start: 2018-08-07 | End: 2018-08-07

## 2018-08-07 RX ORDER — BENZTROPINE MESYLATE 1 MG/1
1 TABLET ORAL 2 TIMES DAILY
Status: DISCONTINUED | OUTPATIENT
Start: 2018-08-07 | End: 2018-08-08 | Stop reason: HOSPADM

## 2018-08-07 RX ORDER — HALOPERIDOL 5 MG/1
5 TABLET ORAL DAILY PRN
Status: DISCONTINUED | OUTPATIENT
Start: 2018-08-07 | End: 2018-08-08 | Stop reason: HOSPADM

## 2018-08-07 RX ORDER — POLYETHYLENE GLYCOL 3350 17 G/17G
17 POWDER, FOR SOLUTION ORAL 2 TIMES DAILY PRN
Status: DISCONTINUED | OUTPATIENT
Start: 2018-08-07 | End: 2018-08-08 | Stop reason: HOSPADM

## 2018-08-07 RX ORDER — HYDROCODONE BITARTRATE AND ACETAMINOPHEN 5; 325 MG/1; MG/1
1 TABLET ORAL EVERY 4 HOURS PRN
Status: DISCONTINUED | OUTPATIENT
Start: 2018-08-07 | End: 2018-08-08 | Stop reason: HOSPADM

## 2018-08-07 RX ORDER — GLUCAGON 1 MG
1 KIT INJECTION
Status: DISCONTINUED | OUTPATIENT
Start: 2018-08-07 | End: 2018-08-08 | Stop reason: HOSPADM

## 2018-08-07 RX ORDER — NITROGLYCERIN 0.4 MG/1
0.4 TABLET SUBLINGUAL EVERY 5 MIN PRN
Status: DISCONTINUED | OUTPATIENT
Start: 2018-08-07 | End: 2018-08-08 | Stop reason: HOSPADM

## 2018-08-07 RX ORDER — QUETIAPINE FUMARATE 100 MG/1
200 TABLET, FILM COATED ORAL NIGHTLY
Status: DISCONTINUED | OUTPATIENT
Start: 2018-08-07 | End: 2018-08-08 | Stop reason: HOSPADM

## 2018-08-07 RX ORDER — GABAPENTIN 300 MG/1
300 CAPSULE ORAL NIGHTLY
Status: DISCONTINUED | OUTPATIENT
Start: 2018-08-07 | End: 2018-08-08 | Stop reason: HOSPADM

## 2018-08-07 RX ORDER — ASPIRIN 325 MG
325 TABLET, DELAYED RELEASE (ENTERIC COATED) ORAL DAILY
Status: DISCONTINUED | OUTPATIENT
Start: 2018-08-08 | End: 2018-08-08 | Stop reason: HOSPADM

## 2018-08-07 RX ADMIN — STANDARDIZED SENNA CONCENTRATE AND DOCUSATE SODIUM 1 TABLET: 8.6; 5 TABLET, FILM COATED ORAL at 02:08

## 2018-08-07 RX ADMIN — ASPIRIN 81 MG CHEWABLE TABLET 81 MG: 81 TABLET CHEWABLE at 09:08

## 2018-08-07 RX ADMIN — POLYETHYLENE GLYCOL 3350 17 G: 17 POWDER, FOR SOLUTION ORAL at 02:08

## 2018-08-07 RX ADMIN — CEFTRIAXONE SODIUM 1 G: 1 INJECTION, POWDER, FOR SOLUTION INTRAMUSCULAR; INTRAVENOUS at 02:08

## 2018-08-07 RX ADMIN — FAMOTIDINE 40 MG: 20 TABLET ORAL at 09:08

## 2018-08-07 RX ADMIN — STANDARDIZED SENNA CONCENTRATE AND DOCUSATE SODIUM 1 TABLET: 8.6; 5 TABLET, FILM COATED ORAL at 09:08

## 2018-08-07 RX ADMIN — ENOXAPARIN SODIUM 40 MG: 100 INJECTION SUBCUTANEOUS at 05:08

## 2018-08-07 RX ADMIN — GABAPENTIN 300 MG: 300 CAPSULE ORAL at 09:08

## 2018-08-07 RX ADMIN — BENZTROPINE MESYLATE 1 MG: 1 TABLET ORAL at 09:08

## 2018-08-07 RX ADMIN — QUETIAPINE FUMARATE 200 MG: 100 TABLET ORAL at 09:08

## 2018-08-07 NOTE — H&P
[facility] Hospital Medicine  History & Physical    Patient Name: Lay Peres  MRN: [id number]  Admission Date: 8/7/2018  Attending Physician: Sakina Beckham MD   Primary Care Provider: Krista Case MD    [facility] Medicine Team: [facility]OSP MED E Conor Watkins PA-C     Patient information was obtained from patient, past medical records and ER records.     Subjective:     Principal Problem:Chest pain    Chief Complaint:   Chief Complaint   Patient presents with    Chest Pain     began this morning; middle of chest         HPI: Lay Peres a 73yo WF with a PMH of HTN, HLD, congenital deafness, schizoaffective disorder, dementia and CVA on 7/13/18 who presents to the ED with substernal CP. Patient's sister at bedside provides majority of HPI with sign language assistance to patient. Patient reported mild CP 5/10 to the center of her chest that began this morning. She reported the pain to her aid at [facility] and they consulted her PCP who told her to come to the ED. Patient denies any associated diaphoresis, N/V, or F/C. She reports associated L sided HA and dizziness that began this morning. She also complains of burning with urination however unsure of duration. She was recently treated for a UTI 7/13/18 with ceftriaxone 1g as an inpatient and discharged on augmentin with resolution. She complains of an episode of diarrhea and abdominal pain yesterday.     ED: Obtained CXR which showed no significant changes from prior 7/13/18. EKG showed NSR with new PACs, troponin 0.010 BNP 23. UA with > 100WBCs.    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 50-70) 7/13/2012    Neck pain  7/13/2012    Paranoid schizophrenia     Schizoaffective disorder, chronic condition 7/13/2012       History reviewed. No pertinent surgical history.    Review of patient's allergies indicates:  No Known Allergies    No current facility-administered medications on file prior to encounter.      Current Outpatient Prescriptions on File Prior to Encounter   Medication Sig    acetaminophen (TYLENOL) 500 mg Cap Take 2 capsules (1,000 mg total) by mouth 2 (two) times daily.    aspirin (ECOTRIN) 325 MG EC tablet Take 1 tablet (325 mg total) by mouth once daily.    benztropine (COGENTIN) 1 MG tablet TAKE 1 TABLET BY MOUTH twice a day (8am/8pm)    blood sugar diagnostic Strp 1 strip by Misc.(Non-Drug; Combo Route) route once daily.    CALCIUM CARBONATE (SAWYER-GEST ANTACID ORAL) Take 1 tablet by mouth 4 (four) times daily before meals and nightly.     cholecalciferol, vitamin D3, (VITAMIN D3) 1,000 unit capsule Take 1 capsule by mouth Daily.    donepezil (ARICEPT) 5 MG tablet Take 1 tablet (5 mg total) by mouth every morning. To be taken with food    DULoxetine (CYMBALTA) 30 MG capsule TAKE 1 CAPSULE BY MOUTH once daily at 8am    famotidine (PEPCID) 40 MG tablet TAKE 1 TABLET BY MOUTH EVERY EVENING AT 8PM    gabapentin (NEURONTIN) 300 MG capsule TAKE 1 TABLET (300MG) BY MOUTH AT BEDTIME at 8pm    haloperidol (HALDOL) 5 MG tablet Take 1 tablet (5 mg total) by mouth daily as needed (agitation/paranoia).    haloperidol decanoate (HALDOL DECANOATE) 100 mg/mL injection Inject 1 mL (100 mg total) into the muscle every 14 (fourteen) days.    lisinopril (PRINIVIL,ZESTRIL) 40 MG tablet Take 1 tablet (40 mg total) by mouth once daily.    memantine (NAMENDA XR) 28 mg CSpX Take 1 capsule (28 mg total) by mouth once daily.    metformin (GLUCOPHAGE) 500 MG tablet Take 1 tablet (500 mg total) by mouth 2 (two) times daily with meals.    multivitamin capsule Take 1 capsule by mouth once daily.    nystatin (MYCOSTATIN) ointment  Apply topically 2 (two) times daily.    QUEtiapine (SEROQUEL) 200 MG Tab TAKE 1 TABLET BY MOUTH EVERY EVENING at 8pm    rosuvastatin (CRESTOR) 20 MG tablet TAKE 1 TABLET BY MOUTH once daily    senna (SENNA) 8.6 mg tablet Take 1 tablet by mouth Twice daily.     Family History     Problem Relation (Age of Onset)    Depression Sister, Brother    Suicide Brother        Social History Main Topics    Smoking status: Never Smoker    Smokeless tobacco: Never Used    Alcohol use No    Drug use: No    Sexual activity: Not Currently     Review of Systems   Constitutional: Negative for chills, diaphoresis and fever.   HENT: Negative for trouble swallowing.    Eyes: Negative for photophobia and visual disturbance.   Cardiovascular: Positive for chest pain. Negative for palpitations and leg swelling.   Gastrointestinal: Positive for abdominal pain, constipation and diarrhea. Negative for abdominal distention, nausea and vomiting.   Genitourinary: Positive for dysuria. Negative for flank pain and urgency.   Skin: Negative for rash and wound.   Neurological: Positive for dizziness, light-headedness and headaches. Negative for speech difficulty and weakness.   Psychiatric/Behavioral: Negative for agitation and confusion. The patient is not nervous/anxious.      Objective:     Vital Signs (Most Recent):  Temp: 96.5 °F (35.8 °C) (08/07/18 1254)  Pulse: 72 (08/07/18 1254)  Resp: 18 (08/07/18 1254)  BP: 138/70 (08/07/18 1254)  SpO2: 97 % (08/07/18 1254) Vital Signs (24h Range):  Temp:  [96.5 °F (35.8 °C)-98.7 °F (37.1 °C)] 96.5 °F (35.8 °C)  Pulse:  [66-76] 72  Resp:  [16-18] 18  SpO2:  [96 %-99 %] 97 %  BP: (115-142)/(55-70) 138/70     Weight: 65.6 kg (144 lb 10 oz)  Body mass index is 25.7 kg/m².    Physical Exam   Constitutional: She appears well-developed and well-nourished. No distress.   HENT:   Head: Normocephalic and atraumatic.   Eyes: EOM are normal.   Neck: Normal range of motion. Neck supple.   Cardiovascular: Normal  rate, regular rhythm, normal heart sounds and intact distal pulses.    Pulmonary/Chest: Effort normal and breath sounds normal. No respiratory distress. She has no wheezes. She has no rales. She exhibits tenderness.   Abdominal: Soft. Bowel sounds are normal. She exhibits no distension and no mass. There is tenderness. There is no guarding.   Neurological: She is alert.   Skin: Skin is warm and dry. She is not diaphoretic.   Psychiatric: She has a normal mood and affect. Her behavior is normal.   Nursing note and vitals reviewed.        CRANIAL NERVES     CN III, IV, VI   Extraocular motions are normal.        Significant Labs:   CBC:   Recent Labs  Lab 08/07/18  0937   WBC 10.17   HGB 9.9*   HCT 33.2*        CMP:   Recent Labs  Lab 08/07/18  0937      K 4.8      CO2 24   *   BUN 20   CREATININE 0.8   CALCIUM 10.2   PROT 7.9   ALBUMIN 3.5   BILITOT 0.3   ALKPHOS 76   AST 24   ALT 10   ANIONGAP 9   EGFRNONAA >60.0     Troponin:   Recent Labs  Lab 08/07/18  0937   TROPONINI 0.010     Urine Studies:   Recent Labs  Lab 08/07/18  1101   COLORU Yellow   APPEARANCEUA Hazy*   PHUR 5.0   SPECGRAV 1.010   PROTEINUA Negative   GLUCUA Negative   KETONESU Negative   BILIRUBINUA Negative   OCCULTUA Negative   NITRITE Positive*   UROBILINOGEN Negative   LEUKOCYTESUR 3+*   RBCUA 0   WBCUA >100*   BACTERIA Many*   SQUAMEPITHEL 0       Significant Imaging: I have reviewed all pertinent imaging results/findings within the past 24 hours.    Assessment/Plan:     * Chest pain    Patient admitted with chest/epigastric pain that is reproducible on examination and has since resolved. The pain does not radiate and has no anginal equivalents.   - EKG without ischemic changes  - troponin 0.010, will trend x 3  - BNP 23  - will get pharm stress echo  - NPO at [facility]  - NTG prn        Urinary tract infection    Patient recently treated in July for UTI with Ecoli, discharged on Cipro and transitioned to Augmentin by PCP for  concern for QT prolongation. UA today with > 100 WBCs  - follow cultures  - continue Ceftriaxone daily  - no acute encephalopathy        Chronic diastolic heart failure    Patient appears compensated, last ECHO 7/18 with HFpEF and pulmonary hypertension  - continue ACE  - maintain on tele        Cerebrovascular accident (CVA)    Small left frontal punctuate focus seen on MRI 7/18  - no neurologic deficits  - continue secondary prevention with ASA and statin        Deafness congenital    Patient pleasant, sister at bedside can sign with her        Chronic schizoaffective disorder    Dementia    - continue cogentin, seroquel, duloxetine, and donepezil  - stable  - haldol PRN        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    Hold home metformin  - low dose SSI, ACHS  - cardiac diabetic diet        Hypertension    Blood pressure controlled on admission  - continue ACE          VTE Risk Mitigation         Ordered     enoxaparin injection 40 mg  Daily      08/07/18 1152     IP VTE HIGH RISK PATIENT  Once      08/07/18 1152             JOAQUIN CovingtonC  Department of Hospital Medicine   [facility]

## 2018-08-07 NOTE — ED NOTES
Pt resting on stretcher in NAD, respirations even and unlabored. Stretcher locked and in lowest position, side rails x 2, call bell within reach. Cardiac monitor, pulse ox and BP cuff applied cycling Q 30 minute vitals. Pt offered restroom assistance, pt states no needs at this time, caregiver at the bedside. Pt informed urine specimen needed. Will continue to monitor and update pt on plan of care.

## 2018-08-07 NOTE — ASSESSMENT & PLAN NOTE
Patient admitted with chest/epigastric pain that is reproducible on examination and has since resolved. The pain does not radiate and has no anginal equivalents.   - EKG without ischemic changes  - troponin 0.010, will trend x 3  - BNP 23  - will get pharm stress echo  - NPO at [facility]  - NTG prn

## 2018-08-07 NOTE — SUBJECTIVE & OBJECTIVE
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 50-70) 7/13/2012    Neck pain 7/13/2012    Paranoid schizophrenia     Schizoaffective disorder, chronic condition 7/13/2012       History reviewed. No pertinent surgical history.    Review of patient's allergies indicates:  No Known Allergies    No current facility-administered medications on file prior to encounter.      Current Outpatient Prescriptions on File Prior to Encounter   Medication Sig    acetaminophen (TYLENOL) 500 mg Cap Take 2 capsules (1,000 mg total) by mouth 2 (two) times daily.    aspirin (ECOTRIN) 325 MG EC tablet Take 1 tablet (325 mg total) by mouth once daily.    benztropine (COGENTIN) 1 MG tablet TAKE 1 TABLET BY MOUTH twice a day (8am/8pm)    blood sugar diagnostic Strp 1 strip by Misc.(Non-Drug; Combo Route) route once daily.    CALCIUM CARBONATE (SAWYER-GEST ANTACID ORAL) Take 1 tablet by mouth 4 (four) times daily before meals and nightly.     cholecalciferol, vitamin D3, (VITAMIN D3) 1,000 unit capsule Take 1 capsule by mouth Daily.    donepezil (ARICEPT) 5 MG tablet Take 1 tablet (5 mg total) by mouth every morning. To be taken with food    DULoxetine (CYMBALTA) 30 MG capsule TAKE 1 CAPSULE BY MOUTH once daily at 8am    famotidine (PEPCID) 40 MG tablet TAKE 1 TABLET BY MOUTH EVERY EVENING AT 8PM    gabapentin (NEURONTIN) 300 MG capsule TAKE 1 TABLET (300MG) BY MOUTH AT BEDTIME at 8pm    haloperidol (HALDOL) 5 MG tablet Take 1 tablet (5 mg total) by mouth daily as needed (agitation/paranoia).    haloperidol decanoate (HALDOL DECANOATE) 100 mg/mL injection Inject 1 mL (100 mg total) into the muscle every 14 (fourteen) days.    lisinopril  (PRINIVIL,ZESTRIL) 40 MG tablet Take 1 tablet (40 mg total) by mouth once daily.    memantine (NAMENDA XR) 28 mg CSpX Take 1 capsule (28 mg total) by mouth once daily.    metformin (GLUCOPHAGE) 500 MG tablet Take 1 tablet (500 mg total) by mouth 2 (two) times daily with meals.    multivitamin capsule Take 1 capsule by mouth once daily.    nystatin (MYCOSTATIN) ointment Apply topically 2 (two) times daily.    QUEtiapine (SEROQUEL) 200 MG Tab TAKE 1 TABLET BY MOUTH EVERY EVENING at 8pm    rosuvastatin (CRESTOR) 20 MG tablet TAKE 1 TABLET BY MOUTH once daily    senna (SENNA) 8.6 mg tablet Take 1 tablet by mouth Twice daily.     Family History     Problem Relation (Age of Onset)    Depression Sister, Brother    Suicide Brother        Social History Main Topics    Smoking status: Never Smoker    Smokeless tobacco: Never Used    Alcohol use No    Drug use: No    Sexual activity: Not Currently     Review of Systems   Constitutional: Negative for chills, diaphoresis and fever.   HENT: Negative for trouble swallowing.    Eyes: Negative for photophobia and visual disturbance.   Cardiovascular: Positive for chest pain. Negative for palpitations and leg swelling.   Gastrointestinal: Positive for abdominal pain, constipation and diarrhea. Negative for abdominal distention, nausea and vomiting.   Genitourinary: Positive for dysuria. Negative for flank pain and urgency.   Skin: Negative for rash and wound.   Neurological: Positive for dizziness, light-headedness and headaches. Negative for speech difficulty and weakness.   Psychiatric/Behavioral: Negative for agitation and confusion. The patient is not nervous/anxious.      Objective:     Vital Signs (Most Recent):  Temp: 96.5 °F (35.8 °C) (08/07/18 1254)  Pulse: 72 (08/07/18 1254)  Resp: 18 (08/07/18 1254)  BP: 138/70 (08/07/18 1254)  SpO2: 97 % (08/07/18 1254) Vital Signs (24h Range):  Temp:  [96.5 °F (35.8 °C)-98.7 °F (37.1 °C)] 96.5 °F (35.8 °C)  Pulse:  [66-76]  72  Resp:  [16-18] 18  SpO2:  [96 %-99 %] 97 %  BP: (115-142)/(55-70) 138/70     Weight: 65.6 kg (144 lb 10 oz)  Body mass index is 25.7 kg/m².    Physical Exam   Constitutional: She appears well-developed and well-nourished. No distress.   HENT:   Head: Normocephalic and atraumatic.   Eyes: EOM are normal.   Neck: Normal range of motion. Neck supple.   Cardiovascular: Normal rate, regular rhythm, normal heart sounds and intact distal pulses.    Pulmonary/Chest: Effort normal and breath sounds normal. No respiratory distress. She has no wheezes. She has no rales. She exhibits tenderness.   Abdominal: Soft. Bowel sounds are normal. She exhibits no distension and no mass. There is tenderness. There is no guarding.   Neurological: She is alert.   Skin: Skin is warm and dry. She is not diaphoretic.   Psychiatric: She has a normal mood and affect. Her behavior is normal.   Nursing note and vitals reviewed.        CRANIAL NERVES     CN III, IV, VI   Extraocular motions are normal.        Significant Labs:   CBC:   Recent Labs  Lab 08/07/18  0937   WBC 10.17   HGB 9.9*   HCT 33.2*        CMP:   Recent Labs  Lab 08/07/18  0937      K 4.8      CO2 24   *   BUN 20   CREATININE 0.8   CALCIUM 10.2   PROT 7.9   ALBUMIN 3.5   BILITOT 0.3   ALKPHOS 76   AST 24   ALT 10   ANIONGAP 9   EGFRNONAA >60.0     Troponin:   Recent Labs  Lab 08/07/18  0937   TROPONINI 0.010     Urine Studies:   Recent Labs  Lab 08/07/18  1101   COLORU Yellow   APPEARANCEUA Hazy*   PHUR 5.0   SPECGRAV 1.010   PROTEINUA Negative   GLUCUA Negative   KETONESU Negative   BILIRUBINUA Negative   OCCULTUA Negative   NITRITE Positive*   UROBILINOGEN Negative   LEUKOCYTESUR 3+*   RBCUA 0   WBCUA >100*   BACTERIA Many*   SQUAMEPITHEL 0       Significant Imaging: I have reviewed all pertinent imaging results/findings within the past 24 hours.

## 2018-08-07 NOTE — NURSING
Pt arrived to unit via stretcher from ED.  Pt aaox3, no distress noted.  Pt is deaf.  Pt c/o slight pain to left side of abdomen and a headache.  Bed locked and in lowest position.  Non skid socks and fall risk band applied.  Call light within reach.  Sister at bedside.

## 2018-08-07 NOTE — ED NOTES
Pt returned to bed from ambulating to restroom, placed on cardiac monitor, pulse ox and BP cuff cycling Q 30 minutes. Stretcher locked and in lowest position, side rails x 2, call bell within reach. Pt updated on wait for lab results. Sister at the bedside, pt states no needs at this time.

## 2018-08-07 NOTE — ASSESSMENT & PLAN NOTE
Small left frontal punctuate focus seen on MRI 7/18  - no neurologic deficits  - continue secondary prevention with ASA and statin

## 2018-08-07 NOTE — ED PROVIDER NOTES
Encounter Date: 8/7/2018    SCRIBE #1 NOTE: I, Hetal Cartwright, am scribing for, and in the presence of,  Dr. Miller. I have scribed the following portions of the note - Other sections scribed: HPI, ROS, Physical Exam.       History     Chief Complaint   Patient presents with    Chest Pain     began this morning; middle of chest      Time patient was seen by the provider: 9:27 AM      The patient is a 72 y.o. female with co-morbidities including: HTN, DM, HLD, congenital deafness, schizoaffective disorder, chronic constipation, MR, and macular degeneration who presents to the ED with a complaint of CP localized to the center of the chest.  Pt with hx of developmental delay and dementia.  Caretaker reports pt started complaining of pain just PTA.  Denies diaphoresis at the time of onset.  Caretaker also notes pt was complaining of HA. Denies any surgeries.  NKDA per caretaker.      The history is provided by the patient, a caregiver and medical records. The history is limited by a developmental delay.     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 No pertinent surgical history.  Family History   Problem Relation Age of Onset    Depression Sister     Depression Brother     Suicide Brother     ADD / ADHD Neg Hx     Alcohol abuse Neg Hx     Anxiety disorder Neg Hx     Bipolar disorder Neg Hx     Dementia Neg Hx     Drug abuse Neg Hx     OCD Neg Hx     Paranoid behavior Neg Hx     Physical abuse Neg Hx      Schizophrenia Neg Hx     Seizures Neg Hx     Self injury Neg Hx     Sexual abuse Neg Hx      Social History   Substance Use Topics    Smoking status: Never Smoker    Smokeless tobacco: Never Used    Alcohol use No     Review of Systems   Unable to perform ROS: Patient nonverbal   Constitutional: Negative for diaphoresis.   Cardiovascular: Positive for chest pain.   Neurological: Positive for headaches.       Physical Exam     Initial Vitals [08/07/18 0913]   BP Pulse Resp Temp SpO2   (!) 142/65 76 16 97.8 °F (36.6 °C) 97 %      MAP       --         Physical Exam    Nursing note and vitals reviewed.  Constitutional:   Pt is not diaphoretic.   HENT:   Head: Normocephalic and atraumatic.   Cardiovascular: Normal rate, regular rhythm and normal heart sounds.   Pulmonary/Chest: Breath sounds normal. No respiratory distress.   Musculoskeletal:   No peripheral edema.  Moving all four extremities.   Neurological: She is alert.   Skin: Skin is warm and dry.         ED Course   Procedures  Labs Reviewed   CBC W/ AUTO DIFFERENTIAL - Abnormal; Notable for the following:        Result Value    RBC 3.62 (*)     Hemoglobin 9.9 (*)     Hematocrit 33.2 (*)     MCHC 29.8 (*)     RDW 15.2 (*)     All other components within normal limits   COMPREHENSIVE METABOLIC PANEL - Abnormal; Notable for the following:     Glucose 111 (*)     All other components within normal limits   URINALYSIS - Abnormal; Notable for the following:     Appearance, UA Hazy (*)     Nitrite, UA Positive (*)     Leukocytes, UA 3+ (*)     All other components within normal limits   URINALYSIS MICROSCOPIC - Abnormal; Notable for the following:     WBC, UA >100 (*)     Bacteria, UA Many (*)     All other components within normal limits   TROPONIN I   B-TYPE NATRIURETIC PEPTIDE     EKG Readings: (Independently Interpreted)   Initial Reading: No STEMI. Rhythm: Normal Sinus Rhythm. Heart Rate: 74. Ectopy: No Ectopy. Conduction: Normal. ST Segments: Normal ST  Segments. T Waves: Normal. Axis: Normal. Clinical Impression: Normal Sinus Rhythm       Imaging Results          X-Ray Chest AP Portable (Final result)  Result time 08/07/18 09:57:21    Final result by Dg Sanders MD (08/07/18 09:57:21)                 Impression:      No significant intrathoracic abnormality, allowing for projection and a poor inspiratory depth level.  No significant detrimental interval change in the appearance of the chest since 07/13/2018 is appreciated.      Electronically signed by: Dg Sanders MD  Date:    08/07/2018  Time:    09:57             Narrative:    EXAMINATION:  XR CHEST AP PORTABLE    COMPARISON:  Comparison is made to 07/13/2018.    FINDINGS:  Heart size is within normal limits, as is the appearance of the pulmonary vascularity.  Lung zones are clear, and free of significant airspace consolidation or volume loss.  No pleural fluid of any substantial volume is seen on either side.  No abnormal mediastinal widening.  No pneumothorax.                                 Medical Decision Making:   History:   Old Medical Records: I decided to obtain old medical records.  Independently Interpreted Test(s):   I have ordered and independently interpreted EKG Reading(s) - see prior notes  Clinical Tests:   Lab Tests: Ordered and Reviewed  Radiological Study: Ordered and Reviewed  Medical Tests: Ordered and Reviewed  ED Management:  Multiple risk factors and limitations on history warrant obs.             Scribe Attestation:   Scribe #1: I performed the above scribed service and the documentation accurately describes the services I performed. I attest to the accuracy of the note.               Clinical Impression:   Diagnoses of Chest pain and Chest pain were pertinent to this visit.                             Vladimir Miller MD  08/07/18 1135

## 2018-08-07 NOTE — PLAN OF CARE
08/07/18 1548   Discharge Assessment   Assessment Type Discharge Planning Assessment   Confirmed/corrected address and phone number on facesheet? Yes   Assessment information obtained from? Caregiver  (sisters, Jose Francisco Segura ([phone]) & Renee Garcia ([phone]))   Expected Length of Stay (days) 1   Communicated expected length of stay with patient/caregiver yes   Prior to hospitilization cognitive status: Unable to Assess  (Dx: MR, dem, deaf, schizo; sisters stated that patient's dementia is getting worse)   Prior to hospitalization functional status: Independent   Current cognitive status: Unable to Assess   Current Functional Status: Independent   Lives With other (see comments)  (pt lives with another [facility] patient in a double house that is staffed by [facility])   Able to Return to Prior Arrangements yes   Is patient able to care for self after discharge? No   Patient's perception of discharge disposition home health  (Patient is currently receiving home health care (NSG,PT,OT) from [facility])   Readmission Within The Last 30 Days current reason for admission unrelated to previous admission   If yes, most recent facility name: [facility]   Patient currently being followed by outpatient case management? No   Patient currently receives any other outside agency services? ([facility] staff)   Equipment Currently Used at Home glucometer  (pt takes metformin but has accuchecks done by the [facility] staff)   Do you have any problems affording any of your prescribed medications? No   Is the patient taking medications as prescribed? yes   Does the patient have transportation home? Yes   Transportation Available family or friend will provide;agency transportation  (pt's sisters or [facility] staff)   Does the patient receive services at the [facility]? No   Discharge Plan A Home Health  ([facility])   Discharge Plan B Assisted Living   Patient/Family In Agreement With Plan yes   Readmission Questionnaire   At the time of  your discharge, did someone talk to you about what your health problems were? Yes   At the time of discharge, did someone talk to you about what to watch out for regarding worsening of your health problem? Yes   At the time of discharge, did someone talk to you about what to do if you experienced worsening of your health problem? Yes   At the time of discharge, did someone talk to you about which medication to take when you left the hospital and which ones to stop taking? Yes   At the time of discharge, did someone talk to you about when and where to follow up with a doctor after you left the hospital? Yes   What do you believe caused you to be sick enough to be re-admitted? chest pain   How often do you need to have someone help you when you read instructions, pamphlets, or other written material from your doctor or pharmacy? Always   Do you have problems taking your medications as prescribed? No   Do you have any problems affording any of  your prescribed medications? No   Do you have problems obtaining/receiving your medications? No   Does the patient have transportation to healthcare appointments? Yes   Living Arrangements other (see comments)  (pt lives with another [facility] patient in a double house that is staffed by [email or web address])   Does the patient have family/friends to help with healtcare needs after discharge? yes   Does your caregiver provide all the help you need? Yes     Dx: chest pain  Pharm: [facility]  Hosp f/u appt: Dr. Krista Case (PCP) on 8/15/18 at 1200 & Dr. Darío Marti ([facility] neuro) on 8/16/18 at 1340    CM was informed by the patient's sister, Jose Francisco Segura, that the patient lives with another [facility] patient, Karen (23 year old with down's syndrome), on one side of a double house. House is not owned by [email or web address]. Jose Francisco stated that one [email or web address] staff member is there when Lay & Karen are at home but is not there 24/7. Lay participates in [email or web address]'s day program Monday, Tuesday, Thursday, &  Friday from 8AM-4pm. [place] provides transportation to & from the day program.     Patient was hospitalized 7/13/18-7/16/18 with CVA & UTI. Patient was discharged with po abx & [facility].  informed Anu ([id number]) w/[facility] of patient's hospitalization & OBS status. Per Kala they will not need new orders if the patient remains in OBS status.    Jose Francisco stated that the patient will return home w/[facility] following discharge but that the patient is in the process (hopefully by the end of August) of moving into a group home on [facility] due to worsening dementia. Patient is followed by Dr. Walters for her dementia.    Discharge planning will need to be coordinated with [place] JULI Cordero (w [phone], c [phone]) or [place]  Rossana Larson (w [phone], c [phone]) at time of discharge.

## 2018-08-07 NOTE — HPI
Lay Peres a 71yo WF with a PMH of HTN, HLD, congenital deafness, schizoaffective disorder, dementia and CVA on 7/13/18 who presents to the ED with substernal CP. Patient's sister at bedside provides majority of HPI with sign language assistance to patient. Patient reported mild CP 5/10 to the center of her chest that began this morning. She reported the pain to her aid at [employer] and they consulted her PCP who told her to come to the ED. Patient denies any associated diaphoresis, N/V, or F/C. She reports associated L sided HA and dizziness that began this morning. She also complains of burning with urination however unsure of duration. She was recently treated for a UTI 7/13/18 with ceftriaxone 1g as an inpatient and discharged on augmentin with resolution. She complains of an episode of diarrhea and abdominal pain yesterday.     ED: Obtained CXR which showed no significant changes from prior 7/13/18. EKG showed NSR with new PACs, troponin 0.010 BNP 23. UA with > 100WBCs.

## 2018-08-07 NOTE — ASSESSMENT & PLAN NOTE
Patient appears compensated, last ECHO 7/18 with HFpEF and pulmonary hypertension  - continue ACE  - maintain on tele

## 2018-08-07 NOTE — ED TRIAGE NOTES
Patient presents with chest pain since this morning. Patient recently had a stroke a few weeks ago/

## 2018-08-07 NOTE — ED NOTES
LOC: The patient is awake, alert and aware of environment with an appropriate affect, the patient is oriented x 3 and speaking appropriately.  APPEARANCE: Patient resting comfortably and in no acute distress, patient is clean and well groomed  SKIN: The skin is warm and dry, color consistent with ethnicity, no breakdown or bruising noted.  MUSCULOSKELETAL: Patient moving all extremities well, no obvious swelling or deformities noted. Pt ambulates unassisted with steady gait.   RESPIRATORY: Airway is open and patent, breath sounds clear throughout all lung fields; respirations are spontaneous, patient has a normal effort and rate, no accessory muscle use noted. Pt denies trouble breathing and SOB  CARDIAC: Patient has no peripheral edema noted. Pt reports 5/10 mid-sternal chest pain beginning this AM.   ABDOMEN: Soft and non tender to palpation, no distention noted. Bowel sounds present x 4  NEUROLOGIC: PERRL, 3 mm bilaterally, eyes open spontaneously, behavior appropriate to situation, follows commands. Pt death, able to read lips and speak for communication.

## 2018-08-07 NOTE — PLAN OF CARE
Problem: Patient Care Overview  Goal: Plan of Care Review  Outcome: Ongoing (interventions implemented as appropriate)  Pt on fall precautions. Yellow fall risk band and socks on pt. Instructed pt to call for assistance as needed and pt voiced understanding. Sister at bedside. Denies pain or discomfort. SR on tele.  Accu checks ac/hs with insulin sliding scale.

## 2018-08-07 NOTE — ASSESSMENT & PLAN NOTE
Patient recently treated in July for UTI with Ecoli, discharged on Cipro and transitioned to Augmentin by PCP for concern for QT prolongation. UA today with > 100 WBCs  - follow cultures  - continue Ceftriaxone daily  - no acute encephalopathy

## 2018-08-08 ENCOUNTER — TELEPHONE (OUTPATIENT)
Dept: ADMINISTRATIVE | Facility: CLINIC | Age: 73
End: 2018-08-08

## 2018-08-08 VITALS
BODY MASS INDEX: 25.62 KG/M2 | TEMPERATURE: 99 F | HEART RATE: 76 BPM | SYSTOLIC BLOOD PRESSURE: 140 MMHG | HEIGHT: 63 IN | DIASTOLIC BLOOD PRESSURE: 63 MMHG | WEIGHT: 144.63 LBS | OXYGEN SATURATION: 97 % | RESPIRATION RATE: 17 BRPM

## 2018-08-08 PROBLEM — I50.32 CHRONIC DIASTOLIC HEART FAILURE: Status: ACTIVE | Noted: 2018-08-08

## 2018-08-08 LAB
ANION GAP SERPL CALC-SCNC: 9 MMOL/L
BASOPHILS # BLD AUTO: 0.07 K/UL
BASOPHILS NFR BLD: 0.8 %
BUN SERPL-MCNC: 19 MG/DL
CALCIUM SERPL-MCNC: 9.6 MG/DL
CHLORIDE SERPL-SCNC: 107 MMOL/L
CO2 SERPL-SCNC: 26 MMOL/L
CREAT SERPL-MCNC: 0.8 MG/DL
DIASTOLIC DYSFUNCTION: YES
DIFFERENTIAL METHOD: ABNORMAL
EOSINOPHIL # BLD AUTO: 0.5 K/UL
EOSINOPHIL NFR BLD: 5.4 %
ERYTHROCYTE [DISTWIDTH] IN BLOOD BY AUTOMATED COUNT: 15.3 %
EST. GFR  (AFRICAN AMERICAN): >60 ML/MIN/1.73 M^2
EST. GFR  (NON AFRICAN AMERICAN): >60 ML/MIN/1.73 M^2
GLUCOSE SERPL-MCNC: 99 MG/DL
HCT VFR BLD AUTO: 30.4 %
HGB BLD-MCNC: 9.5 G/DL
IMM GRANULOCYTES # BLD AUTO: 0.03 K/UL
IMM GRANULOCYTES NFR BLD AUTO: 0.4 %
LYMPHOCYTES # BLD AUTO: 2.6 K/UL
LYMPHOCYTES NFR BLD: 31 %
MCH RBC QN AUTO: 28.2 PG
MCHC RBC AUTO-ENTMCNC: 31.3 G/DL
MCV RBC AUTO: 90 FL
MONOCYTES # BLD AUTO: 1 K/UL
MONOCYTES NFR BLD: 11.6 %
NEUTROPHILS # BLD AUTO: 4.3 K/UL
NEUTROPHILS NFR BLD: 50.8 %
NRBC BLD-RTO: 0 /100 WBC
PLATELET # BLD AUTO: 260 K/UL
PMV BLD AUTO: 10 FL
POCT GLUCOSE: 153 MG/DL (ref 70–110)
POCT GLUCOSE: 99 MG/DL (ref 70–110)
POTASSIUM SERPL-SCNC: 4.3 MMOL/L
RBC # BLD AUTO: 3.37 M/UL
RETIRED EF AND QEF - SEE NOTES: 55 (ref 55–65)
SODIUM SERPL-SCNC: 142 MMOL/L
WBC # BLD AUTO: 8.52 K/UL

## 2018-08-08 PROCEDURE — 93351 STRESS TTE COMPLETE: CPT | Mod: 26,,, | Performed by: INTERNAL MEDICINE

## 2018-08-08 PROCEDURE — 36415 COLL VENOUS BLD VENIPUNCTURE: CPT

## 2018-08-08 PROCEDURE — 80048 BASIC METABOLIC PNL TOTAL CA: CPT

## 2018-08-08 PROCEDURE — 63600175 PHARM REV CODE 636 W HCPCS: Performed by: PHYSICIAN ASSISTANT

## 2018-08-08 PROCEDURE — C8930 TTE W OR W/O CONTR, CONT ECG: HCPCS

## 2018-08-08 PROCEDURE — G0378 HOSPITAL OBSERVATION PER HR: HCPCS

## 2018-08-08 PROCEDURE — 25000003 PHARM REV CODE 250: Performed by: PHYSICIAN ASSISTANT

## 2018-08-08 PROCEDURE — 93321 DOPPLER ECHO F-UP/LMTD STD: CPT | Mod: 26,,, | Performed by: INTERNAL MEDICINE

## 2018-08-08 PROCEDURE — 85025 COMPLETE CBC W/AUTO DIFF WBC: CPT

## 2018-08-08 PROCEDURE — 99217 PR OBSERVATION CARE DISCHARGE: CPT | Mod: ,,, | Performed by: PHYSICIAN ASSISTANT

## 2018-08-08 PROCEDURE — 82962 GLUCOSE BLOOD TEST: CPT

## 2018-08-08 RX ADMIN — POLYETHYLENE GLYCOL 3350 17 G: 17 POWDER, FOR SOLUTION ORAL at 08:08

## 2018-08-08 RX ADMIN — BENZTROPINE MESYLATE 1 MG: 1 TABLET ORAL at 08:08

## 2018-08-08 RX ADMIN — LISINOPRIL 40 MG: 20 TABLET ORAL at 08:08

## 2018-08-08 RX ADMIN — ASPIRIN 325 MG: 325 TABLET, DELAYED RELEASE ORAL at 08:08

## 2018-08-08 RX ADMIN — STANDARDIZED SENNA CONCENTRATE AND DOCUSATE SODIUM 1 TABLET: 8.6; 5 TABLET, FILM COATED ORAL at 08:08

## 2018-08-08 RX ADMIN — ROSUVASTATIN CALCIUM 20 MG: 20 TABLET, FILM COATED ORAL at 08:08

## 2018-08-08 RX ADMIN — GENTAMICIN SULFATE 288 MG: 40 INJECTION, SOLUTION INTRAMUSCULAR; INTRAVENOUS at 11:08

## 2018-08-08 RX ADMIN — DULOXETINE HYDROCHLORIDE 30 MG: 30 CAPSULE, DELAYED RELEASE ORAL at 08:08

## 2018-08-08 RX ADMIN — DONEPEZIL HYDROCHLORIDE 5 MG: 5 TABLET, FILM COATED ORAL at 06:08

## 2018-08-08 NOTE — PROGRESS NOTES
Pt discharged from unit.  Discharge instructions given to pt's sisters and both verbalized understanding.  Home meds and f/u appts reviewed.  IV removed from rac w/ catheter intact, no redness or swelling noted to area.  Pt left unit via w/c and was accompanied sisters.

## 2018-08-08 NOTE — PLAN OF CARE
Problem: Patient Care Overview  Goal: Plan of Care Review  Outcome: Ongoing (interventions implemented as appropriate)  Pt awake and alert, oriented to self and place only.  Accuchecks ac/hs.  Safety precautions maintained, pt remains free of falls.  No events noted on telemetry.  Pt denies pain.  IV abx ordered.  Call light within reach.  Sister at bedside.  Stress Echo scheduled for 1500 today.

## 2018-08-08 NOTE — PROGRESS NOTES
Home Health SOC 07/17/2018 - 09/14/2018 with [facility] ([facility] - Dr. Connor Romero. Patient received SN, PT, OT and ST services.

## 2018-08-08 NOTE — PLAN OF CARE
JULI informed Anu ([phone]) w/[facility] of patient's hospitalization & that she will need to resume  care at the [facility] following discharge. Voicemail message left for Melony Cordero ([phone]) informing of above conversation. Patient scheduled to have a stress test done today & may discharge this afternoon. Patient resting quietly in bed with sister, Renee Herrera ([phone]), at the bedside when JULI rounded. JULI informed Renee of above. Renee verbalized understanding. Will continue to follow.

## 2018-08-08 NOTE — PLAN OF CARE
Problem: Patient Care Overview  Goal: Plan of Care Review  Outcome: Ongoing (interventions implemented as appropriate)  Pt progressing towards goals.denies chest pain.remains on telemetry,NSR.all troponin levels negative.NPO after MN for possible stress test in am.continue to monitor glucose levels.safety precautions maintained.pt free of falls or injuries.pt is mentally challenged and [place].sister at bedside.continue plan of care.

## 2018-08-09 LAB — BACTERIA UR CULT: NORMAL

## 2018-08-09 RX ORDER — NITROFURANTOIN 25; 75 MG/1; MG/1
100 CAPSULE ORAL 2 TIMES DAILY
Qty: 20 CAPSULE | Refills: 0 | Status: SHIPPED | OUTPATIENT
Start: 2018-08-09 | End: 2018-08-19

## 2018-08-09 NOTE — PLAN OF CARE
08/09/18 0759   Final Note   Assessment Type Final Discharge Note     Patient discharged home with [facility] on 8/8/18. Discharge summary faxed to Melony Vazquez f) [phone].

## 2018-08-10 RX ORDER — LISINOPRIL 40 MG/1
TABLET ORAL
Qty: 30 TABLET | Refills: 11 | Status: SHIPPED | OUTPATIENT
Start: 2018-08-10 | End: 2021-11-02 | Stop reason: SDUPTHER

## 2018-08-10 NOTE — ASSESSMENT & PLAN NOTE
Patient admitted with chest/epigastric pain that is reproducible on examination and has since resolved. The pain does not radiate and has no anginal equivalents.   - EKG without ischemic changes  - troponin 0.010->0.014->0.06  - BNP 23  - pharm stress negative for ischemia  - NTG prn

## 2018-08-10 NOTE — ASSESSMENT & PLAN NOTE
Patient recently treated in July for UTI with Ecoli, discharged on Cipro and transitioned to Augmentin by PCP for concern for QT prolongation. UA today with > 100 WBCs  - culture grew pan sensitive Ecoli  - given 1 dose Ceftriaxone  - 1 dose Gentamycin 5mg/kg IV given to complete treatment  - no acute encephalopathy

## 2018-08-10 NOTE — DISCHARGE SUMMARY
[facility] Hospital Medicine  Discharge Summary      Patient Name: Lay Peres  MRN: [id number]  Admission Date: 8/7/2018  Hospital Length of Stay: 0 days  Discharge Date and Time: 8/8/2018  5:10 PM  Attending Physician: No att. providers found   Discharging Provider: Conor Watkins PA-C  Primary Care Provider: Krista Case MD  [facility] Medicine Team: [facility] HOSP MED E Conor Watkins PA-C    HPI:   Lay Peres a 71yo WF with a PMH of HTN, HLD, congenital deafness, schizoaffective disorder, dementia and CVA on 7/13/18 who presents to the ED with substernal CP. Patient's sister at bedside provides majority of HPI with sign language assistance to patient. Patient reported mild CP 5/10 to the center of her chest that began this morning. She reported the pain to her aid at [employer] [employer] and they consulted her PCP who told her to come to the ED. Patient denies any associated diaphoresis, N/V, or F/C. She reports associated L sided HA and dizziness that began this morning. She also complains of burning with urination however unsure of duration. She was recently treated for a UTI 7/13/18 with ceftriaxone 1g as an inpatient and discharged on augmentin with resolution. She complains of an episode of diarrhea and abdominal pain yesterday.     ED: Obtained CXR which showed no significant changes from prior 7/13/18. EKG showed NSR with new PACs, troponin 0.010 BNP 23. UA with > 100WBCs.    * No surgery found *      Hospital Course:   Patient admitted to observation for chest pain. Urinalysis positive for infection, culture grew Ecoli that was pansensitive. Treated with Gentamycin 5mg/kg for 1 dose. Stress echo negative for ischemia. Patient stable for discharge.     Consults:     * Chest pain    Patient admitted with chest/epigastric pain that is reproducible on examination and has since resolved. The pain does not radiate and has no anginal equivalents.   - EKG without ischemic  changes  - troponin 0.010->0.014->0.06  - BNP 23  - pharm stress negative for ischemia  - NTG prn        Urinary tract infection    Patient recently treated in July for UTI with Ecoli, discharged on Cipro and transitioned to Augmentin by PCP for concern for QT prolongation. UA today with > 100 WBCs  - culture grew pan sensitive Ecoli  - given 1 dose Ceftriaxone  - 1 dose Gentamycin 5mg/kg IV given to complete treatment  - no acute encephalopathy        Chronic diastolic heart failure    Patient appears compensated, last ECHO 7/18 with HFpEF and pulmonary hypertension  - continue ACE  - maintain on tele        Cerebrovascular accident (CVA)    Small left frontal punctuate focus seen on MRI 7/18  - no neurologic deficits  - continue secondary prevention with ASA and statin        Deafness congenital    Patient pleasant, sister at bedside can sign with her        Chronic schizoaffective disorder    Dementia    - continue cogentin, seroquel, duloxetine, and donepezil  - stable  - haldol PRN        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    Hold home metformin  - low dose SSI, ACHS  - cardiac diabetic diet        Hypertension    Blood pressure controlled on admission  - continue ACE          Final Active Diagnoses:    Diagnosis Date Noted POA    PRINCIPAL PROBLEM:  Chest pain [R07.9] 08/07/2018 Yes    Urinary tract infection [N39.0] 07/14/2018 Yes    Chronic diastolic heart failure [I50.32] 08/08/2018 Yes    Cerebrovascular accident (CVA) [I63.9] 07/13/2018 Yes    Dementia [F03.90] 01/20/2015 Yes    Chronic schizoaffective disorder [F25.8] 09/14/2012 Yes    Deafness congenital [H90.5] 09/14/2012 Yes    Hypertension [I10] 07/13/2012 Yes    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 [E13.9] 07/13/2012 Yes      Problems Resolved During this Admission:    Diagnosis Date Noted Date Resolved POA       Discharged Condition: good    Disposition: Home or Self Care    Follow Up:  Follow-up Information     Darío Marti  MD On 8/16/2018.    Specialty:  Neurology  Why:  at 1:40pm  Contact information:  [address]  [facility] [address]  [phone]             Krista Case MD On 8/15/2018.    Specialty:  Internal Medicine  Why:  at 12:00 AM  Contact information:  [address]  [place] [address]  [phone]                 Patient Instructions:     Diet Cardiac     Activity as tolerated         Significant Diagnostic Studies: Labs:   BMP: No results for input(s): GLU, NA, K, CL, CO2, BUN, CREATININE, CALCIUM, MG in the last 48 hours., CMP No results for input(s): NA, K, CL, CO2, GLU, BUN, CREATININE, CALCIUM, PROT, ALBUMIN, BILITOT, ALKPHOS, AST, ALT, ANIONGAP, ESTGFRAFRICA, EGFRNONAA in the last 48 hours., CBC No results for input(s): WBC, HGB, HCT, PLT in the last 48 hours., Troponin   Recent Labs  Lab 08/07/18  1938   TROPONINI <0.006    and All labs within the past 24 hours have been reviewed  Microbiology:   Urine Culture    Lab Results   Component Value Date    LABURIN ESCHERICHIA COLI  >100,000 cfu/ml   08/07/2018     Pharmacological stress echo   Order: [id number]   Status:  Final result   Visible to patient:  Yes (Patient Portal) Next appt:  08/15/2018 at 12:00 PM in Internal Medicine (Krista Case MD) Dx:  Chest pain   Details     Narrative     Date of Procedure: 08/08/2018    PRE-TEST DATA   EKG: Resting electrocardiogram reveals normal sinus rhythm at a rate of 78 bpm.     TEST DESCRIPTION   The patient received a graduated infusion of Dobutamine beginning at 10.00 mcg/Kg/min to a peak dose of 20 mcg/Kg/min, achieving a peak heart rate of 127 bpm, which is 90% of the age predicted maximum heart rate. . Infusion was terminated secondary to   completed protocol.     EKG Conclusions:    1. The EKG portion of this study is negative for ischemia at a peak heart rate of 127 bpm (90% of predicted).   2. Blood pressure remained stable throughout the protocol  (Presenting BP: 127/64 Peak BP: 173/48).    3. The following arrhythmias were present: PVCs.   4. There were no symptoms of chest discomfort or significant dyspnea throughout the protocol.     Echocardiographic Description:  Technical Quality: This is a technically adequate study. This study was performed in conjunction with a 3ml intravenous injection of Optison contrast agent.     Aorta: The aortic root is normal in size, measuring 2.4 cm at sinotubular junction and 3.0 cm at Sinuses of Valsalva. The proximal ascending aorta is normal in size, measuring 2.9 cm across.     Left Atrium: The left atrial volume index is normal, measuring 28.55 cc/m2.     Left Ventricle: The left ventricle is normal in size, with an end-diastolic diameter of 4.7 cm, and an end-systolic diameter of 3.4 cm. LV wall thickness is normal, with the septum and the posterior wall each measuring 0.8 cm across. Relative wall   thickness was normal at 0.34, and the LV mass index was 83.4 g/m2 consistent with normal left ventricular mass. There are no regional wall motion abnormalities. Left ventricular systolic function appears normal. Visually estimated ejection fraction is   55-60%. The LV Doppler derived stroke volume equals 66.0 ccs.     Diastolic indices: E wave velocity 0.6 m/s, E/A ratio 0.6,  msec., E/e' ratio(avg) 8. There is diastolic dysfunction secondary to relaxation abnormality.     Right Atrium: The right atrium is normal in size, measuring 3.9 cm in length and 3.2 cm in width in the apical view.     Right Ventricle: The right ventricle is normal in size measuring 2.9 cm at the base in the apical right ventricle-focused view. Global right ventricular systolic function appears normal. Tricuspid annular plane systolic excursion (TAPSE) is 1.5 cm.     Aortic Valve:  The aortic valve is normal in structure.     Mitral Valve:  The mitral valve is normal in structure.     Tricuspid Valve:  The tricuspid valve is normal in structure.     Pulmonary Valve:  The pulmonic valve  is normal in structure.     IVC: The IVC is not visualized.     Intracavitary: There is no evidence of pericardial effusion, intracavity mass, thrombi, or vegetation.     Other: If clinically indicated, a full Doppler study can be performed.     Peak Imaging:    Images taken at peak infusion showed left ventricular function augmenting. Left ventricular end systolic volume decreases.     No dobutamine induced wall motion abnormalities were identified.       CONCLUSIONS     1 - Normal left ventricular systolic function (EF 55-60%).     2 - Impaired LV relaxation, normal LAP (grade 1 diastolic dysfunction).     3 - Normal right ventricular systolic function .     4 - If clinically indicated, a full Doppler study can be performed.     No evidence of stress induced myocardial ischemia.         This document has been electronically    SIGNED BY: Radha Magaña MD On: 08/08/2018 15:29        Ref Range & Units 2d ago 4wk ago    EF 55 - 65 55  60     Diastolic Dysfunction  Yes   Yes     Resulting Agency  [facility]      Specimen Collected: 08/08/18 09:21 Last Resulted: 08/08/18 15:34 Lab Flowsheet Order Details View Encounter                  Pending Diagnostic Studies:     None         Medications:  Reconciled Home Medications:      Medication List      CONTINUE taking these medications    acetaminophen 500 mg Cap  Commonly known as:  TYLENOL  Take 2 capsules (1,000 mg total) by mouth 2 (two) times daily.     aspirin 325 MG EC tablet  Commonly known as:  ECOTRIN  Take 1 tablet (325 mg total) by mouth once daily.     benztropine 1 MG tablet  Commonly known as:  COGENTIN  TAKE 1 TABLET BY MOUTH twice a day (8am/8pm)     blood sugar diagnostic Strp  1 strip by Misc.(Non-Drug; Combo Route) route once daily.     SAWYER-GEST ANTACID ORAL  Take 1 tablet by mouth 4 (four) times daily before meals and nightly.     donepezil 5 MG tablet  Commonly known as:  ARICEPT  Take 1 tablet (5 mg total) by mouth every morning. To be taken with  food     DULoxetine 30 MG capsule  Commonly known as:  CYMBALTA  TAKE 1 CAPSULE BY MOUTH once daily at 8am     famotidine 40 MG tablet  Commonly known as:  PEPCID  TAKE 1 TABLET BY MOUTH EVERY EVENING AT 8PM     gabapentin 300 MG capsule  Commonly known as:  NEURONTIN  TAKE 1 TABLET (300MG) BY MOUTH AT BEDTIME at 8pm     haloperidol 5 MG tablet  Commonly known as:  HALDOL  Take 1 tablet (5 mg total) by mouth daily as needed (agitation/paranoia).     haloperidol decanoate 100 mg/mL injection  Commonly known as:  HALDOL DECANOATE  Inject 1 mL (100 mg total) into the muscle every 14 (fourteen) days.     lisinopril 40 MG tablet  Commonly known as:  PRINIVIL,ZESTRIL  Take 1 tablet (40 mg total) by mouth once daily.     memantine 28 mg Cspx  Commonly known as:  NAMENDA XR  Take 1 capsule (28 mg total) by mouth once daily.     metFORMIN 500 MG tablet  Commonly known as:  GLUCOPHAGE  Take 1 tablet (500 mg total) by mouth 2 (two) times daily with meals.     multivitamin capsule  Take 1 capsule by mouth once daily.     nystatin ointment  Commonly known as:  MYCOSTATIN  Apply topically 2 (two) times daily.     QUEtiapine 200 MG Tab  Commonly known as:  SEROQUEL  TAKE 1 TABLET BY MOUTH EVERY EVENING at 8pm     rosuvastatin 20 MG tablet  Commonly known as:  CRESTOR  TAKE 1 TABLET BY MOUTH once daily     SENNA 8.6 mg tablet  Generic drug:  senna  Take 1 tablet by mouth Twice daily.     VITAMIN D3 1,000 unit capsule  Generic drug:  cholecalciferol (vitamin D3)  Take 1 capsule by mouth Daily.            Indwelling Lines/Drains at time of discharge:   Lines/Drains/Airways          No matching active lines, drains, or airways          Time spent on the discharge of patient: 37 minutes  Patient was seen and examined on the date of discharge and determined to be suitable for discharge.         Conor Watkins PA-C  Department of Hospital Medicine  [facility]

## 2018-08-10 NOTE — HOSPITAL COURSE
Patient admitted to observation for chest pain. Urinalysis positive for infection, culture grew Ecoli that was pansensitive. Treated with Gentamycin 5mg/kg for 1 dose. Stress echo negative for ischemia. Patient stable for discharge.

## 2018-08-14 ENCOUNTER — TELEPHONE (OUTPATIENT)
Dept: INTERNAL MEDICINE | Facility: CLINIC | Age: 73
End: 2018-08-14

## 2018-08-14 NOTE — TELEPHONE ENCOUNTER
----- Message from Valorie Maciel sent at 8/14/2018 10:06 AM CDT -----  Contact: aDnte from [facility] -[phone]  Would like orders to continue home physical therapy. Please advise.

## 2018-08-15 ENCOUNTER — OFFICE VISIT (OUTPATIENT)
Dept: PSYCHIATRY | Facility: CLINIC | Age: 73
End: 2018-08-15
Payer: MEDICARE

## 2018-08-15 VITALS
HEIGHT: 62 IN | SYSTOLIC BLOOD PRESSURE: 148 MMHG | DIASTOLIC BLOOD PRESSURE: 68 MMHG | WEIGHT: 141.19 LBS | BODY MASS INDEX: 25.98 KG/M2 | HEART RATE: 83 BPM

## 2018-08-15 DIAGNOSIS — I63.9 CEREBROVASCULAR ACCIDENT (CVA), UNSPECIFIED MECHANISM: ICD-10-CM

## 2018-08-15 DIAGNOSIS — E78.5 HYPERLIPIDEMIA, UNSPECIFIED HYPERLIPIDEMIA TYPE: ICD-10-CM

## 2018-08-15 DIAGNOSIS — I10 ESSENTIAL HYPERTENSION: ICD-10-CM

## 2018-08-15 DIAGNOSIS — E13.9 DIABETES MELLITUS DUE TO ABNORMAL INSULIN: ICD-10-CM

## 2018-08-15 DIAGNOSIS — H90.5 DEAFNESS CONGENITAL: ICD-10-CM

## 2018-08-15 DIAGNOSIS — F03.91 DEMENTIA WITH BEHAVIORAL DISTURBANCE, UNSPECIFIED DEMENTIA TYPE: ICD-10-CM

## 2018-08-15 DIAGNOSIS — F79 MENTAL RETARDATION: ICD-10-CM

## 2018-08-15 DIAGNOSIS — F25.9 CHRONIC SCHIZOAFFECTIVE DISORDER: Primary | ICD-10-CM

## 2018-08-15 PROCEDURE — 99213 OFFICE O/P EST LOW 20 MIN: CPT | Mod: PBBFAC | Performed by: PSYCHIATRY & NEUROLOGY

## 2018-08-15 PROCEDURE — 99999 PR PBB SHADOW E&M-EST. PATIENT-LVL III: CPT | Mod: PBBFAC,,, | Performed by: PSYCHIATRY & NEUROLOGY

## 2018-08-15 PROCEDURE — 99214 OFFICE O/P EST MOD 30 MIN: CPT | Mod: S$PBB,,, | Performed by: PSYCHIATRY & NEUROLOGY

## 2018-08-15 RX ORDER — QUETIAPINE FUMARATE 200 MG/1
200 TABLET, FILM COATED ORAL NIGHTLY
Qty: 30 TABLET | Refills: 3 | Status: SHIPPED | OUTPATIENT
Start: 2018-08-15 | End: 2018-12-10 | Stop reason: SDUPTHER

## 2018-08-15 RX ORDER — BENZTROPINE MESYLATE 1 MG/1
TABLET ORAL
Qty: 60 TABLET | Refills: 3 | Status: SHIPPED | OUTPATIENT
Start: 2018-08-15 | End: 2019-01-08 | Stop reason: SDUPTHER

## 2018-08-15 RX ORDER — HALOPERIDOL 5 MG/1
5 TABLET ORAL DAILY PRN
Qty: 30 TABLET | Refills: 0 | Status: SHIPPED | OUTPATIENT
Start: 2018-08-15 | End: 2019-11-13 | Stop reason: SDUPTHER

## 2018-08-15 NOTE — PROGRESS NOTES
"ESTABLISHED OUTPATIENT VISIT   E/M LEVEL 4: [id number]    ENCOUNTER DATE: 8/15/2018  SITE: [facility], [facility]      HISTORY    CHIEF COMPLAINT   Lay Peres is a 72 y.o. female who presents for followup of   Chief Complaint   Patient presents with    Mood Disorder    Memory Deficits       HPI   (Elements: Location, Quality, Severity, Duration, Timing, Content, Modifying Factors, Associated Signs & Symptoms)    Patient with cogenital deafness, possible MR, chronic schizoaffective disorder, and now dementia, followed by me for disease maintenance.  She comes with representative from [facility] today because she needs a "psych eval" in order to transition from independent living back to a group home setting.  This will serve as that psychiatric evaluation.    Patient can give me little useful information today - responds to my questions with "I'm fine".  Per representative from [facility], she is overall doing OK save some declining memory functioning.  No increase in aggression or behavioral outbursts but is paranoid and often talks about the neighbors fighting, locking her door.  No sundowning - she is sleeping through the night.  No grogginess or side effects on medication.  She did have a CVA in interim - per representative she has regained function with no obvious lingering deficits.  She remains on aggressive psychotropic regimen to control behavior and still is having breakthrough paranoia on this regimen.  I have discussed with sister that as dementia evolves we will likely need to adjust psychotropic regimen - but as of now tolerating well.  She continues to have difficulties with ADLs such as bladder incontinence.  She is moving into group home for increased monitoring as her dementia progresses.        5/9/18 - no major changes save worsening memory.  No med changes.  Investigating moving into higher level of living - group home.  7/19/17 - no major changes save worsening memory.  No " med changes.  16 - no major changes other than worsening memory.  No med changes.  16 - no major changes other than worsening memory.  No med changes.  8/26/15 - progressive short term memory loss but otherwise stable.  No med changes.  I discussed again with sister psychopharmacologic approach.  Of note, this is longstanding regimen started prior to seeing me.  We discussed both of the followin. Usually multiple neuroleptics avoided.  In this case she is on two neuroleptics - haldol decanoate and seroquel.  2. There is risk for cardiovascular events with neuroleptics in dementia.  However patient with comorbid schizoaffective disorder that long predates dementia and is indication for neuroleptic.  Sister understands risks and benefits of keeping med regimen as it is vs adjusting it.  Sister would like to continue regimen as is - states in past when adjustments made patient has decompensated requiring hospitalization.          ROS   Neurologic ROS: positive for - memory loss.  Negative for tremor, akathisia, or tardive dyskinesia  Musculoskeletal ROS: positive for - back pain   ROS: positive for urinary incontinence      PFSH  Past Medical History reviewed: Yes  Family History reviewed: Yes  Social History reviewed: No      PSYCHOTROPIC MEDICATIONS   Haldol dec 100mg IM q2 weeks - also Haldol 5mg prns  Seroquel 200mg bedtime  Cymbalta 30mg daily  Cogentin 1mg bid    Namenda  Aricept    EXAMINATION    VITALS   Most recent vital signs, dated less than 90 days prior to this appointment, were reviewed.   Vitals:    08/15/18 1412   BP: (!) 148/68   Pulse: 83       CONSTITUTIONAL  General Appearance: stated age, casually dressed    MUSCULOSKELETAL  Muscle Strength and Tone: no weakness or spasticity  Abnormal Involuntary Movements: no tremor noted.  No evidence of akathisia or tardive dyskinesia on my exam.  Gait and Station: ambulates without assistance though aide guides her    PSYCHIATRIC   Level of  "Consciousness: alert  Orientation: to person and place, cannot give date again today  Grooming: neat, intact  Psychomotor Behavior: no retardation or agitation.    Speech: rambles on at times, repetitive - often repeating self.  She cannot answer simple questions from me today  Language: reads lips, some dysarthria - difficult to comprehend  Mood: "fine"  Affect: reactive, smiling, pleasant, euthymic.  No irritability noted.   Thought Process: perseverative, concrete  Associations: no loosening of associations  Thought Content: no SI.  +PI at times but none here with me  Memory: impaired  Attention: mild distractibility noted  Fund of Knowledge: limited  Insight: impaired  Judgment: intact    MEDICAL DECISION MAKING    DIAGNOSES AND MANAGEMENT PLANS    New problem(s) (3 points each):   - none    Established problem(s), worsening (2 points each):   - none    Established problem(s), stable/improved (1 point each):  - schizoaffective disorder.  cont current med regimen - this is longstanding regimen and it seems to be keeping her reasonably stable with no appreciable side effects.  - mental retardation.  Perhaps mild, IQ unknown.  Cont structured living program - she is moving up in acuity soon to group home - I'm in agreement with this.  - congenital deafness.  Could be complicating proper diagnosis.  She reads lips.  Neurology notes it may be affecting memory diagnosis.  - dementia.  Neurology input appreciated, will cont to follow.  She remains on aricept and namenda.  As dementia progresses, we will likely need to taper down psychotropics - but she has not yet progressed to this point.  She is s/p CVA in interim - MRI of head reviewed.    - metabolic syndrome.  Will monitor given she is on atypical neuroleptics.  Weight down 5lbs intersession.  She is followed by Dr. Case.  Blood work reviewed.      PRESCRIPTION DRUG MANAGEMENT  Compliance: taking meds as prescribed  Side Effects: none reported  Regimen " Adjustments: cont med regimen as prescribed.  LA prescription monitoring site checked - no entries      DIAGNOSTIC TESTING  Follow cmp and fasting lipids on seroquel   8/8/18 - BMP, CBC reviewed  8/7/18 - CMP reviewed  7/13/18 - HgA1c 5.4, lipid panel, TSH reviewed    7/13/18 - MRI of head reviewed  8/8/18 - EKG QTc 428      Serg Cardenas

## 2018-08-16 ENCOUNTER — OFFICE VISIT (OUTPATIENT)
Dept: NEUROLOGY | Facility: CLINIC | Age: 73
End: 2018-08-16
Payer: MEDICARE

## 2018-08-16 VITALS
SYSTOLIC BLOOD PRESSURE: 142 MMHG | DIASTOLIC BLOOD PRESSURE: 75 MMHG | BODY MASS INDEX: 25.97 KG/M2 | HEIGHT: 62 IN | WEIGHT: 141.13 LBS | HEART RATE: 89 BPM

## 2018-08-16 DIAGNOSIS — E78.5 HYPERLIPIDEMIA, UNSPECIFIED HYPERLIPIDEMIA TYPE: ICD-10-CM

## 2018-08-16 DIAGNOSIS — Z86.73 HISTORY OF STROKE: Primary | ICD-10-CM

## 2018-08-16 DIAGNOSIS — Z09 ENCOUNTER FOR FOLLOW-UP EXAMINATION AFTER COMPLETED TREATMENT FOR CONDITIONS OTHER THAN MALIGNANT NEOPLASM: ICD-10-CM

## 2018-08-16 DIAGNOSIS — I10 ESSENTIAL HYPERTENSION: ICD-10-CM

## 2018-08-16 PROBLEM — G93.6 CYTOTOXIC CEREBRAL EDEMA: Status: RESOLVED | Noted: 2018-07-14 | Resolved: 2018-08-16

## 2018-08-16 PROCEDURE — 99214 OFFICE O/P EST MOD 30 MIN: CPT | Mod: S$PBB,,, | Performed by: PSYCHIATRY & NEUROLOGY

## 2018-08-16 PROCEDURE — 99999 PR PBB SHADOW E&M-EST. PATIENT-LVL III: CPT | Mod: PBBFAC,,, | Performed by: PSYCHIATRY & NEUROLOGY

## 2018-08-16 PROCEDURE — 99213 OFFICE O/P EST LOW 20 MIN: CPT | Mod: PBBFAC | Performed by: PSYCHIATRY & NEUROLOGY

## 2018-08-16 NOTE — ASSESSMENT & PLAN NOTE
Etiology: Undetermined - ESUS (embolic stroke of undetermined source)  DMITRI Absent -- CE Absent --  Absent -- Other Absent   · Diagnostic Orders: 30 day event monitor  · Secondary stroke prevention: Continue asa 325  · Continue current statin therapy  rosuvastatin 20  · Blood pressure goal < 130/80 mmHg   · Stroke Risk Factors Addressed: HTn, HLD  · Stroke education administered

## 2018-08-16 NOTE — PROGRESS NOTES
Vascular Neurology  Clinic Note    Reason For Visit (Chief Complaint): fall w/ admit in 7/13-7/16    HPI: 72 y.o. right handed female with fall, found to have small left MCA territory embolic appearing infarct. She concurrently was diagnosed with UTI.  The aforementioned symptoms have never happened prior to the event. There are no identified triggers or modifying factors. There have been no recurrent events. There are no other associated symptoms.  Baseline significant deficit, she is partially deaf, HTN, DM, HLD, schizoaffective disorder, dementia with behavior disturbance  Now she is currently back to baseline. Patient's  is present and informed me of the history provided.      Brain Imaging:  MRI - embolic infarct in left temporal lobe    Vessel Imaging:  Carotid Ultrasound - wnl  Cardiac Evaluation:  TTE   CONCLUSIONS     1 - Normal left ventricular systolic function (EF 60-65%).     2 - Normal right ventricular systolic function .     3 - Impaired LV relaxation, elevated LAP (grade 2 diastolic dysfunction).     4 - Moderate left atrial enlargement.     5 - Mild tricuspid regurgitation.     6 - Pulmonary hypertension. The estimated PA systolic pressure is greater than 47 mmHg.     7 - Low central venous pressure.   Other:   None  Relevant Labwork:  Recent Labs   Lab  07/13/18   0819   HEMOGLOBIN A1C  5.4   LDL CHOLESTEROL  54.4 L   HDL  33 L   TRIGLYCERIDES  93   CHOLESTEROL  106 L       I independently viewed the above imaging studies in addition to reviewing the report.  I reviewed the above labwork.    Review of Systems  Msk: negative for muscle pain  Skin: negative for pruritis  Neuro: negative for headache  All others negative    Past Medical History  Past Medical History:   Diagnosis Date    Back pain 7/13/2012    Chronic constipation 7/13/2012    Congenital deafness 7/13/2012    Deaf     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 7/13/2012    Hyperlipidemia 7/13/2012    Hypertension  7/13/2012    Macular degeneration     Major depression 7/13/2012    Mild mental retardation (I.Q. 50-70) 7/13/2012    Neck pain 7/13/2012    Paranoid schizophrenia     Schizoaffective disorder, chronic condition 7/13/2012     Family History  No relevant history   Social History  Never Smoker     Medication List with Changes/Refills   Current Medications    ACETAMINOPHEN (TYLENOL) 500 MG CAP    Take 2 capsules (1,000 mg total) by mouth 2 (two) times daily.    ASPIRIN (ECOTRIN) 325 MG EC TABLET    Take 1 tablet (325 mg total) by mouth once daily.    BENZTROPINE (COGENTIN) 1 MG TABLET    TAKE 1 TABLET BY MOUTH twice a day (8am/8pm)    BLOOD SUGAR DIAGNOSTIC STRP    1 strip by Misc.(Non-Drug; Combo Route) route once daily.    CALCIUM CARBONATE (SAWYER-GEST ANTACID ORAL)    Take 1 tablet by mouth 4 (four) times daily before meals and nightly.     CHOLECALCIFEROL, VITAMIN D3, (VITAMIN D3) 1,000 UNIT CAPSULE    Take 1 capsule by mouth Daily.    DONEPEZIL (ARICEPT) 5 MG TABLET    Take 1 tablet (5 mg total) by mouth every morning. To be taken with food    DULOXETINE (CYMBALTA) 30 MG CAPSULE    TAKE 1 CAPSULE BY MOUTH once daily at 8am    FAMOTIDINE (PEPCID) 40 MG TABLET    TAKE 1 TABLET BY MOUTH EVERY EVENING AT 8PM    GABAPENTIN (NEURONTIN) 300 MG CAPSULE    TAKE 1 TABLET (300MG) BY MOUTH AT BEDTIME at 8pm    HALOPERIDOL (HALDOL) 5 MG TABLET    Take 1 tablet (5 mg total) by mouth daily as needed (agitation/paranoia).    HALOPERIDOL DECANOATE (HALDOL DECANOATE) 100 MG/ML INJECTION    Inject 1 mL (100 mg total) into the muscle every 14 (fourteen) days.    LISINOPRIL (PRINIVIL,ZESTRIL) 40 MG TABLET    TAKE 1 TABLET BY MOUTH once daily    MEMANTINE (NAMENDA XR) 28 MG CSPX    Take 1 capsule (28 mg total) by mouth once daily.    METFORMIN (GLUCOPHAGE) 500 MG TABLET    Take 1 tablet (500 mg total) by mouth 2 (two) times daily with meals.    MULTIVITAMIN CAPSULE    Take 1 capsule by mouth once daily.    NITROFURANTOIN,  "MACROCRYSTAL-MONOHYDRATE, (MACROBID) 100 MG CAPSULE    Take 1 capsule (100 mg total) by mouth 2 (two) times daily. for 10 days    NYSTATIN (MYCOSTATIN) OINTMENT    Apply topically 2 (two) times daily.    QUETIAPINE (SEROQUEL) 200 MG TAB    Take 1 tablet (200 mg total) by mouth every evening.    ROSUVASTATIN (CRESTOR) 20 MG TABLET    TAKE 1 TABLET BY MOUTH once daily    SENNA (SENNA) 8.6 MG TABLET    Take 1 tablet by mouth Twice daily.       EXAM  Vital Signs:Blood pressure (!) 142/75, pulse 89, height 5' 2" (1.575 m), weight 64 kg (141 lb 1.5 oz).  General: well appearing without discomfort   Mental Status:alert, profound dysarthria and baseline deafness make conversation quite limited; she can mimic commands   Language: even w/ screaming, unable to identify objects b pointing; unable to read  Cranial Nerves: EOMI, PERRL, no facial asymmetry, tongue to midline, palate midline  Motor: 5/5 power in all extremities, normal tone  Sensory: intact light touch bilaterally, intact proprioception bilaterally  Coordination: no ataxia on finger-to-nose or heel-to-shin testing; no truncal ataxia  Gait & Stance: normal    NIH Stroke Scale:    Level of Consciousness: 0 - alert  LOC Questions: 2 - answers none correctly  LOC Commands: 0 - performs both correctly  Best Gaze: 0 - normal  Visual: 0 - no visual loss  Facial Palsy: 0 - normal  Motor Left Arm: 0 - no drift  Motor Right Arm: 0 - no drift  Motor Left Le - no drift  Motor Right Le - no drift  Limb Ataxia: 0 - absent  Sensory: 0 - normal  Best Language: 2 - severe aphasia  Dysarthria: 2 - near to unintelligible  Extinction and Inattention: 0 - no neglect  NIH Stroke Scale Total: 6        ___________________  ASSESSMENT & PLAN    Problem List Items Addressed This Visit        Unprioritized    History of stroke - Primary    Current Assessment & Plan     Etiology: Undetermined - ESUS (embolic stroke of undetermined source)  DMITRI Absent -- CE Absent --  Absent -- " Other Absent   · Diagnostic Orders: 30 day event monitor  · Secondary stroke prevention: Continue asa 325  · Continue current statin therapy  rosuvastatin 20  · Blood pressure goal < 130/80 mmHg   · Stroke Risk Factors Addressed: HTn, HLD  · Stroke education administered           Relevant Orders    Cardiac event monitor      Other Visit Diagnoses     Encounter for follow-up examination after completed treatment for conditions other than malignant neoplasm         Relevant Orders    Cardiac event monitor          MD Breanna  Vascular Neurology  Office [phone]  Fax [phone]

## 2018-10-23 ENCOUNTER — IMMUNIZATION (OUTPATIENT)
Dept: INTERNAL MEDICINE | Facility: CLINIC | Age: 73
End: 2018-10-23
Payer: MEDICARE

## 2018-10-23 PROCEDURE — G0008 ADMIN INFLUENZA VIRUS VAC: HCPCS | Mod: ,,, | Performed by: INTERNAL MEDICINE

## 2018-10-23 PROCEDURE — G0008 ADMIN INFLUENZA VIRUS VAC: HCPCS | Mod: PBBFAC

## 2018-10-23 PROCEDURE — 90662 IIV NO PRSV INCREASED AG IM: CPT | Mod: PBBFAC | Performed by: INTERNAL MEDICINE

## 2018-11-14 DIAGNOSIS — F25.9 CHRONIC SCHIZOAFFECTIVE DISORDER: ICD-10-CM

## 2018-11-14 RX ORDER — HALOPERIDOL DECANOATE 100 MG/ML
100 INJECTION INTRAMUSCULAR
Qty: 1 ML | Refills: 12 | Status: SHIPPED | OUTPATIENT
Start: 2018-11-14 | End: 2019-03-20 | Stop reason: SDUPTHER

## 2018-11-16 ENCOUNTER — HOSPITAL ENCOUNTER (EMERGENCY)
Facility: HOSPITAL | Age: 73
Discharge: HOME | End: 2018-11-16
Attending: EMERGENCY MEDICINE
Payer: MEDICARE

## 2018-11-16 VITALS
HEIGHT: 62 IN | OXYGEN SATURATION: 96 % | BODY MASS INDEX: 25.95 KG/M2 | RESPIRATION RATE: 16 BRPM | DIASTOLIC BLOOD PRESSURE: 65 MMHG | SYSTOLIC BLOOD PRESSURE: 145 MMHG | HEART RATE: 75 BPM | TEMPERATURE: 98 F | WEIGHT: 141 LBS

## 2018-11-16 DIAGNOSIS — S80.211A ABRASION OF RIGHT KNEE, INITIAL ENCOUNTER: ICD-10-CM

## 2018-11-16 DIAGNOSIS — S69.91XA HAND INJURY, RIGHT, INITIAL ENCOUNTER: ICD-10-CM
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
## 2018-11-16 DIAGNOSIS — S09.90XA TRAUMATIC INJURY OF HEAD, INITIAL ENCOUNTER: ICD-10-CM

## 2018-11-16 PROCEDURE — 99284 EMERGENCY DEPT VISIT MOD MDM: CPT | Mod: 25

## 2018-11-16 PROCEDURE — 25000003 PHARM REV CODE 250: Performed by: PHYSICIAN ASSISTANT

## 2018-11-16 PROCEDURE — 99283 EMERGENCY DEPT VISIT LOW MDM: CPT | Mod: ,,, | Performed by: PHYSICIAN ASSISTANT

## 2018-11-16 RX ORDER — ACETAMINOPHEN 500 MG
1000 TABLET ORAL
Status: COMPLETED | OUTPATIENT
Start: 2018-11-16 | End: 2018-11-16

## 2018-11-16 RX ADMIN — ACETAMINOPHEN 1000 MG: 500 TABLET, FILM COATED ORAL at 03:11

## 2018-11-16 NOTE — ED PROVIDER NOTES
"Encounter Date: 11/16/2018    SCRIBE #1 NOTE: I, Sebastian Bautista , am scribing for, and in the presence of,  Iliana Cantrell MD. I have scribed the following portions of the note - the APC attestation.       History     Chief Complaint   Patient presents with    Fall     slip and fall, + head injury, LOC unknown, caregiver with pt reports nursing staff at [facility] reported episode of "shaking" following fall, not noted on arrival    Hand Injury     right hand     73 year old female with medical history of dementia, congential deafness, intellectual disability, diastolic heart failure, HTN, HLD, Hx of CVA presenting to the ED with the chief complaint of a fall. History and ROS provided by patient, caregiver, sister, and assisted living staff 2/2 mental status. Patient experienced a fall 1-2 hours PTA at the [facility] [employer]. Patient was witnessed to have a fall after the moving was over while she was walking down the aisle. Patient states the theatre was dark and believe she mistepped. Patient reports falling forward and reports striking her head against the ground. She reports having pain to her right hand and right knee. She denies LOC and blood thinner use. She was able to stand up and ambulate without difficulty after the falls. She reports feeling at her baseline health prior to the fall. She denies fever, lightheadedness, dizziness, chest pain, SOB, cough, abdominal pain, nausea, vomiting, dysuria, hematuria, diarrhea, constipation, melena.           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 50-70) 7/13/2012    Neck pain 7/13/2012    Paranoid " schizophrenia     Schizoaffective disorder, chronic condition 7/13/2012     No past surgical history on file.  Family History   Problem Relation Age of Onset    Depression Sister     Depression Brother     Suicide Brother     ADD / ADHD Neg Hx     Alcohol abuse Neg Hx     Anxiety disorder Neg Hx     Bipolar disorder Neg Hx     Dementia Neg Hx     Drug abuse Neg Hx     OCD Neg Hx     Paranoid behavior Neg Hx     Physical abuse Neg Hx     Schizophrenia Neg Hx     Seizures Neg Hx     Self injury Neg Hx     Sexual abuse Neg Hx      Social History     Tobacco Use    Smoking status: Never Smoker    Smokeless tobacco: Never Used   Substance Use Topics    Alcohol use: No    Drug use: No     Review of Systems   Constitutional: Negative for chills, diaphoresis, fatigue and fever.   HENT: Negative for congestion, sore throat and trouble swallowing.    Eyes: Negative for pain, redness and visual disturbance.   Respiratory: Negative for cough and shortness of breath.    Gastrointestinal: Negative for abdominal pain, diarrhea, nausea and vomiting.   Genitourinary: Negative for dysuria, flank pain and hematuria.   Musculoskeletal: Positive for arthralgias and back pain (upper back). Negative for neck pain and neck stiffness.   Skin: Positive for wound (Ecchymosis to R forehead, R hand, R patella).   Neurological: Positive for headaches. Negative for dizziness, weakness, light-headedness and numbness.       Physical Exam     Initial Vitals [11/16/18 1426]   BP Pulse Resp Temp SpO2   (!) 145/65 75 16 97.9 °F (36.6 °C) 96 %      MAP       --         Physical Exam    Constitutional: She appears well-developed and well-nourished. She is not diaphoretic. No distress.   Patient is deaf. Able to communicate by reading lips.    HENT:   Head: Normocephalic.   Mouth/Throat: Oropharynx is clear and moist. No oropharyngeal exudate.   Ecchymosis to R forehead with superficial abrasions.   Eyes: EOM are normal. Pupils are  equal, round, and reactive to light.   Neck: Normal range of motion. Neck supple.   Cardiovascular: Normal rate, regular rhythm and intact distal pulses.   Pulmonary/Chest: Breath sounds normal. No respiratory distress. She has no wheezes.   Abdominal: Soft. There is no tenderness.   Musculoskeletal: Normal range of motion. She exhibits no edema or tenderness.        Right hand: Comments: Ecchymosis and edema overlying dorsal 2nd metacarpal. Patient able to make a fist.    R paraspinal thoracic tenderness. No midline spinal tenderness. No overlying skin changes to back. Ambulates without difficulty.  Ecchymosis and superficial abrasions overlying R patella.   Neurological: She is alert and oriented to person, place, and time. She has normal strength. No cranial nerve deficit or sensory deficit.   Skin: Skin is warm and dry.       ED Course   Procedures  Labs Reviewed - No data to display       Imaging Results          X-Ray Hand 3 view Right (Final result)  Result time 11/16/18 16:21:05    Final result by Ted Simpson Jr., MD (11/16/18 16:21:05)                 Impression:      See above      Electronically signed by: Ted Simpson MD  Date:    11/16/2018  Time:    16:21             Narrative:    EXAMINATION:  XR HAND COMPLETE 3 VIEW RIGHT    CLINICAL HISTORY:  R hand injury 2/2 fall, ecchymosis and edema overlying 2nd metacarpal;    TECHNIQUE:  PA, lateral, and oblique views of the right hand were performed.    COMPARISON:  None    FINDINGS:  Possible ununited fracture of the ulnar styloid appears chronic.  Metallic ring obscures visualization proximal phalanx of the ring finger.  There is narrowing at the IP joints.  No acute fracture seen.                               X-Ray Knee 1 or 2 View Right (Final result)  Result time 11/16/18 16:21:53    Final result by Gurpreet Scott III, MD (11/16/18 16:21:53)                 Impression:      See above      Electronically signed by: Gurpreet Scott  MD  Date:    11/16/2018  Time:    16:21             Narrative:    EXAMINATION:  XR KNEE 1 OR 2 VIEW RIGHT    CLINICAL HISTORY:  Unspecified fall, initial encounter    FINDINGS:  No fracture dislocation bone destruction or OCD seen.                               CT Head Without Contrast (Final result)  Result time 11/16/18 16:27:50    Final result by Arya Flores MD (11/16/18 16:27:50)                 Impression:      No hemorrhage, infarct, or extra-axial fluid collection.    Additional findings reflective of moderate chronic microvascular ischemic disease as above.    Electronically signed by resident: Vicente Bernal MD  Date:    11/16/2018  Time:    16:05    Electronically signed by: Arya Flores MD  Date:    11/16/2018  Time:    16:27             Narrative:    EXAMINATION:  CT HEAD WITHOUT CONTRAST    CLINICAL HISTORY:  Head trauma, minor, GCS>=13, NOC/NEXUS/CCR neg, first study;contusion to R upper forehead;    TECHNIQUE:  Low dose axial CT images obtained throughout the head without intravenous contrast. Sagittal and coronal reconstructions were performed.    COMPARISON:  MR brain 07/13/2018.    FINDINGS:  Intracranial compartment:    Ventricles and sulci are unchanged from prior exam without evidence of hydrocephalus. Scattered patchy and confluent periventricular and supratentorial white matter hypoattenuation reflects sequelae of moderate chronic microvascular ischemic disease.  Previously described punctate infarct in the left lateral frontal lobe not definitively visualized on today's study likely due to proximity to the calvarium and adjacent sulci.  No parenchymal mass, hemorrhage, edema or major vascular distribution infarct.    No extra-axial blood or fluid collections.    Skull/extracranial contents (limited evaluation):    No fracture. Mastoid air cells and paranasal sinuses are essentially clear.                                       APC / Resident Notes:   73 year old female with medical  history of dementia, congential deafness, intellectual disability, diastolic heart failure, HTN, HLD, Hx of CVA, assisted living resident presenting to the ED c/o injuries to her head, right hand, right knee after mechanical fall today.     X-rays negative for acute fracture. Remote ununited fracture of ulnar styloid noted. CTH negative for hemorrhage, infarct, or extra-axial fluid collection. Chronic microvascular ischemia seen.    Patient continues to be neurovascularly intact on reassessment. Patient able to ambulate without assistance. Patient's family member at bedside states she is at her baseline mental status. Do not suspect acute processes at this time. Ace wrap applied to right hand. Instructed patient to use Tylenol and cold compresses for ongoing management of her pain. Patient stable for discharge. I have advised the patient to follow-up with their PCP. Strict return to ED precautions given for new, worsening, or concerning symptoms. I have discussed the care of this patient with my supervising physician.        Scribe Attestation:   Scribe #1: I performed the above scribed service and the documentation accurately describes the services I performed. I attest to the accuracy of the note.    Attending Attestation:     Physician Attestation Statement for NP/PA:   I discussed this assessment and plan of this patient with the NP/PA, but I did not personally examine the patient. The face to face encounter was performed by the NP/PA.                     Clinical Impression:   The primary encounter diagnosis was Fall. Diagnoses of Hand injury, right, initial encounter, Traumatic injury of head, initial encounter, and Abrasion of right knee, initial encounter were also pertinent to this visit.      Disposition:   Disposition: Discharged  Condition: Stable                        Tom Bonner PA-C  11/16/18 0520

## 2018-11-27 ENCOUNTER — HOSPITAL ENCOUNTER (OUTPATIENT)
Dept: RADIOLOGY | Facility: HOSPITAL | Age: 73
Discharge: HOME | End: 2018-11-27
Attending: INTERNAL MEDICINE
Payer: MEDICARE

## 2018-11-27 DIAGNOSIS — M79.643 PAIN OF HAND, UNSPECIFIED LATERALITY: ICD-10-CM

## 2018-11-27 DIAGNOSIS — M79.643 PAIN OF HAND, UNSPECIFIED LATERALITY: Primary | ICD-10-CM

## 2018-11-27 PROCEDURE — 73130 X-RAY EXAM OF HAND: CPT | Mod: 26,50,, | Performed by: RADIOLOGY

## 2018-11-27 PROCEDURE — 73130 X-RAY EXAM OF HAND: CPT | Mod: 50,TC

## 2018-12-05 DIAGNOSIS — F25.9 CHRONIC SCHIZOAFFECTIVE DISORDER: ICD-10-CM

## 2018-12-05 RX ORDER — DULOXETIN HYDROCHLORIDE 30 MG/1
CAPSULE, DELAYED RELEASE ORAL
Qty: 30 CAPSULE | Refills: 2 | Status: SHIPPED | OUTPATIENT
Start: 2018-12-05 | End: 2019-03-07 | Stop reason: SDUPTHER

## 2018-12-10 DIAGNOSIS — F25.9 CHRONIC SCHIZOAFFECTIVE DISORDER: ICD-10-CM

## 2018-12-11 RX ORDER — QUETIAPINE FUMARATE 200 MG/1
200 TABLET, FILM COATED ORAL NIGHTLY
Qty: 30 TABLET | Refills: 1 | Status: SHIPPED | OUTPATIENT
Start: 2018-12-11 | End: 2019-03-07 | Stop reason: SDUPTHER

## 2019-01-08 DIAGNOSIS — F25.9 CHRONIC SCHIZOAFFECTIVE DISORDER: ICD-10-CM

## 2019-01-09 RX ORDER — BENZTROPINE MESYLATE 1 MG/1
TABLET ORAL
Qty: 60 TABLET | Refills: 0 | Status: SHIPPED | OUTPATIENT
Start: 2019-01-09 | End: 2019-01-31 | Stop reason: SDUPTHER

## 2019-01-31 DIAGNOSIS — F25.9 CHRONIC SCHIZOAFFECTIVE DISORDER: ICD-10-CM

## 2019-01-31 RX ORDER — BENZTROPINE MESYLATE 1 MG/1
TABLET ORAL
Qty: 60 TABLET | Refills: 0 | Status: SHIPPED | OUTPATIENT
Start: 2019-01-31 | End: 2019-03-07 | Stop reason: SDUPTHER

## 2019-02-01 ENCOUNTER — TELEPHONE (OUTPATIENT)
Dept: OBSTETRICS AND GYNECOLOGY | Facility: CLINIC | Age: 74
End: 2019-02-01

## 2019-02-01 DIAGNOSIS — Z12.39 SCREENING FOR BREAST CANCER: Primary | ICD-10-CM

## 2019-02-01 NOTE — TELEPHONE ENCOUNTER
----- Message from Leobardo Fuentes sent at 2/1/2019 11:44 AM CST -----  PLEASE PUT ORDERS IN FOR A MAMMO SO I CAN SCHEDULE THANKS

## 2019-02-11 ENCOUNTER — HOSPITAL ENCOUNTER (OUTPATIENT)
Dept: RADIOLOGY | Facility: HOSPITAL | Age: 74
Discharge: HOME | End: 2019-02-11
Attending: OBSTETRICS & GYNECOLOGY
Payer: MEDICARE

## 2019-02-11 DIAGNOSIS — Z12.39 SCREENING FOR BREAST CANCER: ICD-10-CM

## 2019-02-11 PROCEDURE — 77067 SCR MAMMO BI INCL CAD: CPT | Mod: TC

## 2019-02-11 PROCEDURE — 77067 SCR MAMMO BI INCL CAD: CPT | Mod: 26,,, | Performed by: RADIOLOGY

## 2019-02-11 PROCEDURE — 77067 MAMMO DIGITAL SCREENING BILAT WITH CAD: ICD-10-PCS | Mod: 26,,, | Performed by: RADIOLOGY

## 2019-03-07 DIAGNOSIS — F25.9 CHRONIC SCHIZOAFFECTIVE DISORDER: ICD-10-CM

## 2019-03-07 RX ORDER — BENZTROPINE MESYLATE 1 MG/1
TABLET ORAL
Qty: 60 TABLET | Refills: 0 | Status: SHIPPED | OUTPATIENT
Start: 2019-03-07 | End: 2019-03-20 | Stop reason: SDUPTHER

## 2019-03-07 RX ORDER — DULOXETIN HYDROCHLORIDE 30 MG/1
CAPSULE, DELAYED RELEASE ORAL
Qty: 30 CAPSULE | Refills: 2 | Status: SHIPPED | OUTPATIENT
Start: 2019-03-07 | End: 2019-03-20 | Stop reason: SDUPTHER

## 2019-03-07 RX ORDER — QUETIAPINE FUMARATE 200 MG/1
200 TABLET, FILM COATED ORAL NIGHTLY
Qty: 30 TABLET | Refills: 1 | Status: SHIPPED | OUTPATIENT
Start: 2019-03-07 | End: 2019-03-20 | Stop reason: SDUPTHER

## 2019-03-20 ENCOUNTER — OFFICE VISIT (OUTPATIENT)
Dept: PSYCHIATRY | Facility: CLINIC | Age: 74
End: 2019-03-20
Payer: MEDICARE

## 2019-03-20 VITALS
WEIGHT: 140.19 LBS | DIASTOLIC BLOOD PRESSURE: 75 MMHG | BODY MASS INDEX: 25.8 KG/M2 | HEART RATE: 75 BPM | SYSTOLIC BLOOD PRESSURE: 164 MMHG | HEIGHT: 62 IN

## 2019-03-20 DIAGNOSIS — H90.5 DEAFNESS CONGENITAL: ICD-10-CM

## 2019-03-20 DIAGNOSIS — I10 ESSENTIAL HYPERTENSION: ICD-10-CM

## 2019-03-20 DIAGNOSIS — F79 MENTAL RETARDATION: ICD-10-CM

## 2019-03-20 DIAGNOSIS — E78.5 HYPERLIPIDEMIA, UNSPECIFIED HYPERLIPIDEMIA TYPE: ICD-10-CM

## 2019-03-20 DIAGNOSIS — F03.91 DEMENTIA WITH BEHAVIORAL DISTURBANCE, UNSPECIFIED DEMENTIA TYPE: ICD-10-CM

## 2019-03-20 DIAGNOSIS — F25.9 CHRONIC SCHIZOAFFECTIVE DISORDER: Primary | ICD-10-CM

## 2019-03-20 DIAGNOSIS — E13.9 DIABETES MELLITUS DUE TO ABNORMAL INSULIN: ICD-10-CM

## 2019-03-20 PROCEDURE — 99999 PR PBB SHADOW E&M-EST. PATIENT-LVL III: CPT | Mod: PBBFAC,,, | Performed by: PSYCHIATRY & NEUROLOGY

## 2019-03-20 PROCEDURE — 99213 OFFICE O/P EST LOW 20 MIN: CPT | Mod: PBBFAC | Performed by: PSYCHIATRY & NEUROLOGY

## 2019-03-20 PROCEDURE — 99214 PR OFFICE/OUTPT VISIT, EST, LEVL IV, 30-39 MIN: ICD-10-PCS | Mod: S$PBB,,, | Performed by: PSYCHIATRY & NEUROLOGY

## 2019-03-20 PROCEDURE — 99999 PR PBB SHADOW E&M-EST. PATIENT-LVL III: ICD-10-PCS | Mod: PBBFAC,,, | Performed by: PSYCHIATRY & NEUROLOGY

## 2019-03-20 PROCEDURE — 99214 OFFICE O/P EST MOD 30 MIN: CPT | Mod: S$PBB,,, | Performed by: PSYCHIATRY & NEUROLOGY

## 2019-03-20 RX ORDER — HALOPERIDOL DECANOATE 100 MG/ML
100 INJECTION INTRAMUSCULAR
Qty: 1 ML | Refills: 12 | Status: SHIPPED | OUTPATIENT
Start: 2019-03-20 | End: 2019-09-16 | Stop reason: SDUPTHER

## 2019-03-20 RX ORDER — BENZTROPINE MESYLATE 1 MG/1
1 TABLET ORAL 2 TIMES DAILY
Qty: 60 TABLET | Refills: 3 | Status: SHIPPED | OUTPATIENT
Start: 2019-03-20 | End: 2019-05-31 | Stop reason: SDUPTHER

## 2019-03-20 RX ORDER — DULOXETIN HYDROCHLORIDE 30 MG/1
30 CAPSULE, DELAYED RELEASE ORAL DAILY
Qty: 30 CAPSULE | Refills: 3 | Status: SHIPPED | OUTPATIENT
Start: 2019-03-20 | End: 2019-07-08 | Stop reason: SDUPTHER

## 2019-03-20 RX ORDER — QUETIAPINE FUMARATE 200 MG/1
200 TABLET, FILM COATED ORAL NIGHTLY
Qty: 30 TABLET | Refills: 3 | Status: SHIPPED | OUTPATIENT
Start: 2019-03-20 | End: 2019-07-08 | Stop reason: SDUPTHER

## 2019-03-20 NOTE — PROGRESS NOTES
ESTABLISHED OUTPATIENT VISIT   E/M LEVEL 4: [id number]    ENCOUNTER DATE: 3/20/2019  SITE: [facility], [facility]      HISTORY    CHIEF COMPLAINT   Lay Peres is a 73 y.o. female who presents for followup of   Chief Complaint   Patient presents with    Mood Disorder    Memory Deficits       HPI   (Elements: Location, Quality, Severity, Duration, Timing, Content, Modifying Factors, Associated Signs & Symptoms)    Patient with cogenital deafness, possible MR, chronic schizoaffective disorder, and now dementia, followed by me for disease maintenance.  She comes with sister today for follow up evaluation.  Per sister, patient's memory continues to slowly deteriorate.  She is now in new setting at [place] with higher level of monitoring.  Sister reports she is psychiatrically stable.  She can only cite one time where she was paranoid.  Sleep intact.  No movement side effects.  No oversedation.  Fall x1 intersession but due to to tripping.      I spoke with sister again today to discuss risks/benefits of her psychiatric regimen - we have had this discussion in the past.  Sister feels her psychiatric regimen is working well and is not noting side effects - would like to continue the regimen at this time.       8/15/18 - no major changes save worsening memory.  Moving to more supervised setting.  No med changes.  5/9/18 - no major changes save worsening memory.  No med changes.  Investigating moving into higher level of living - group home.  7/19/17 - no major changes save worsening memory.  No med changes.  11/16/16 - no major changes other than worsening memory.  No med changes.  5/12/16 - no major changes other than worsening memory.  No med changes.  8/26/15 - progressive short term memory loss but otherwise stable.  No med changes.  I discussed with sister psychopharmacologic approach.  Of note, this is longstanding regimen started prior to seeing me.  We discussed both of the  "followin. Usually multiple neuroleptics avoided.  In this case she is on two neuroleptics - haldol decanoate and seroquel.   2. There is risk for cardiovascular events with neuroleptics in dementia.  However patient with comorbid schizoaffective disorder that long predates dementia and is indication for neuroleptic.   Sister understands risks and benefits of keeping med regimen as it is vs adjusting it.  Sister would like to continue regimen as is - states in past when adjustments made patient has decompensated  requiring hospitalization.          ROS   Neurologic ROS: positive for - memory loss.  Negative for tremor, akathisia, or tardive dyskinesia   ROS: positive for urinary incontinence      PFSH  Past Medical History reviewed: Yes  Family History reviewed: Yes  Social History reviewed: No      PSYCHOTROPIC MEDICATIONS   Haldol dec 100mg IM q2 weeks - also Haldol 5mg prns (rarely using prn at this time)  Seroquel 200mg bedtime  Cymbalta 30mg daily  Cogentin 1mg bid    Aricept      EXAMINATION    VITALS   Most recent vital signs, dated less than 90 days prior to this appointment, were reviewed.   Vitals:    19 1642   BP: (!) 164/75   Pulse: 75       CONSTITUTIONAL  General Appearance: stated age, casually dressed    MUSCULOSKELETAL  Muscle Strength and Tone: no weakness or spasticity  Abnormal Involuntary Movements: no tremor noted.  No evidence of akathisia or tardive dyskinesia on my exam.  Gait and Station: ambulates without assistance     PSYCHIATRIC   Level of Consciousness: alert  Orientation: to person and place  Grooming: neat, intact  Psychomotor Behavior: no retardation or agitation but noted to be subdued  Speech: decreased spontaneity, when queried does answer simple questions  Language: reads lips, some dysarthria - difficult to comprehend  Mood: "happy"  Affect: reactive, smiling, pleasant, euthymic.  No irritability noted.   Thought Process: perseverative, concrete  Associations: no " loosening of associations  Thought Content: no SI.  +PI at times but overall attenuated  Memory: impaired  Attention: +distractible  Fund of Knowledge: limited  Insight: impaired  Judgment: intact    MEDICAL DECISION MAKING    DIAGNOSES AND MANAGEMENT PLANS    New problem(s) (3 points each):   - none    Established problem(s), worsening (2 points each):   - none    Established problem(s), stable/improved (1 point each):  - schizoaffective disorder.  cont current med regimen - this is longstanding regimen and it seems to be keeping her reasonably stable with no appreciable side effects.  - mental retardation.  Perhaps mild, IQ unknown.  Cont structured living program - she has moved up in [facility] to group home - doing well in this setting.  - congenital deafness.  Could be complicating proper diagnosis.  She reads lips.  Neurology notes it may be affecting memory diagnosis.  - dementia.  Neurology input appreciated, will cont to follow.  She remains on aricept.  As dementia progresses, we will likely need to taper down psychotropics - but she has not yet progressed to this point - I again discussed this with sister today in my office.  - metabolic syndrome.  Will monitor given she is on atypical neuroleptics.  Weight stable intersession.  She is followed by Dr. Case.       PRESCRIPTION DRUG MANAGEMENT  Compliance: taking meds as prescribed  Side Effects: none reported  Regimen Adjustments: cont med regimen as prescribed.  [place] prescription monitoring site checked - no entries      DIAGNOSTIC TESTING  Follow cmp and fasting lipids on seroquel   8/8/18 - BMP, CBC reviewed  8/7/18 - CMP reviewed  7/13/18 - HgA1c 5.4, lipid panel, TSH reviewed    7/13/18 - MRI of head reviewed  8/8/18 - EKG QTc 428      Serg Cardenas

## 2019-04-04 DIAGNOSIS — F02.818 DEMENTIA ASSOCIATED WITH OTHER UNDERLYING DISEASE WITH BEHAVIORAL DISTURBANCE: ICD-10-CM

## 2019-04-04 DIAGNOSIS — F03.91 DEMENTIA WITH BEHAVIORAL DISTURBANCE, UNSPECIFIED DEMENTIA TYPE: ICD-10-CM

## 2019-04-05 RX ORDER — MEMANTINE HYDROCHLORIDE 28 MG/1
1 CAPSULE, EXTENDED RELEASE ORAL DAILY
Qty: 90 CAPSULE | Refills: 3 | Status: SHIPPED | OUTPATIENT
Start: 2019-04-05 | End: 2020-04-14 | Stop reason: SDUPTHER

## 2019-04-05 RX ORDER — DONEPEZIL HYDROCHLORIDE 5 MG/1
5 TABLET, FILM COATED ORAL EVERY MORNING
Qty: 90 TABLET | Refills: 3 | Status: SHIPPED | OUTPATIENT
Start: 2019-04-05 | End: 2020-04-14 | Stop reason: SDUPTHER

## 2019-05-09 NOTE — TELEPHONE ENCOUNTER
Message left x 2 for brother   Notification Instructions: Patient will be notified of biopsy results. However, patient instructed to call the office if not contacted within 2 weeks. Biopsy Method: Personna blade Hemostasis: Aluminum Chloride and Thermocautery Silver Nitrate Text: The wound bed was treated with silver nitrate after the biopsy was performed. Was A Bandage Applied: Yes Body Location Override (Optional - Billing Will Still Be Based On Selected Body Map Location If Applicable): Mid chin Lab: [id number] Bill For Surgical Tray: no Detail Level: Detailed Consent: Written consent was obtained and risks were reviewed including but not limited to scarring, infection, bleeding, scabbing, incomplete removal, nerve damage and allergy to anesthesia. Curettage Text: The wound bed was treated with curettage after the biopsy was performed Additional Anesthesia Volume In Cc (Will Not Render If 0): 0 Anesthesia Type: 1% lidocaine with 1:200,000 epinephrine Cryotherapy Text: The wound bed was treated with cryotherapy after the biopsy was performed. Lab Facility: [id number] Depth Of Biopsy: dermis Electrodesiccation Text: The wound bed was treated with electrodesiccation after the biopsy was performed. Wound Care: Polysporin ointment Billing Type: Third-Party Bill Biopsy Type: H and E Anesthesia Volume In Cc: 3 Post-Care Instructions: I reviewed with the patient in detail post-care instructions. Patient is to keep the biopsy site dry overnight, and then apply bacitracin twice daily until healed. Patient may apply hydrogen peroxide soaks to remove any crusting. Type Of Destruction Used: Curettage Electrodesiccation And Curettage Text: The wound bed was treated with electrodesiccation and curettage after the biopsy was performed. Dressing: bandage

## 2019-05-31 DIAGNOSIS — F25.9 CHRONIC SCHIZOAFFECTIVE DISORDER: ICD-10-CM

## 2019-05-31 RX ORDER — BENZTROPINE MESYLATE 1 MG/1
1 TABLET ORAL 2 TIMES DAILY
Qty: 180 TABLET | Refills: 0 | Status: SHIPPED | OUTPATIENT
Start: 2019-05-31 | End: 2019-11-13 | Stop reason: SDUPTHER

## 2019-07-03 ENCOUNTER — TELEPHONE (OUTPATIENT)
Dept: NEUROLOGY | Facility: CLINIC | Age: 74
End: 2019-07-03

## 2019-07-03 NOTE — TELEPHONE ENCOUNTER
----- Message from Sena Banasl sent at 7/2/2019 10:25 AM CDT -----  Contact: Sister Jose Francisco Segura   Patient was seen 3 years ago and would like to make a new patient about with Dr. Munson. Patient is off balance and is having memory loss and patient may think she needs a change in her current medication    Jose Francisco Segura [phone]    Thank you

## 2019-07-05 ENCOUNTER — TELEPHONE (OUTPATIENT)
Dept: NEUROLOGY | Facility: CLINIC | Age: 74
End: 2019-07-05

## 2019-07-05 NOTE — TELEPHONE ENCOUNTER
----- Message from Kai Thao sent at 7/5/2019 11:13 AM CDT -----  Contact: Jose Francisco ( sister ) @ [phone]  Caller calling to schedule a NP appt to consult for Memory disorders ( former pt of Dr Walters ) pls call

## 2019-07-08 DIAGNOSIS — F25.9 CHRONIC SCHIZOAFFECTIVE DISORDER: ICD-10-CM

## 2019-07-09 RX ORDER — QUETIAPINE FUMARATE 200 MG/1
200 TABLET, FILM COATED ORAL NIGHTLY
Qty: 30 TABLET | Refills: 3 | Status: SHIPPED | OUTPATIENT
Start: 2019-07-09 | End: 2019-11-13 | Stop reason: SDUPTHER

## 2019-07-09 RX ORDER — DULOXETIN HYDROCHLORIDE 30 MG/1
30 CAPSULE, DELAYED RELEASE ORAL DAILY
Qty: 30 CAPSULE | Refills: 3 | Status: SHIPPED | OUTPATIENT
Start: 2019-07-09 | End: 2019-11-13 | Stop reason: SDUPTHER

## 2019-07-09 RX ORDER — BENZTROPINE MESYLATE 1 MG/1
TABLET ORAL
Qty: 60 TABLET | Refills: 3 | Status: SHIPPED | OUTPATIENT
Start: 2019-07-09 | End: 2019-08-16 | Stop reason: SDUPTHER

## 2019-08-15 ENCOUNTER — OFFICE VISIT (OUTPATIENT)
Dept: NEUROLOGY | Facility: CLINIC | Age: 74
End: 2019-08-15
Payer: MEDICARE

## 2019-08-15 VITALS
HEART RATE: 77 BPM | SYSTOLIC BLOOD PRESSURE: 156 MMHG | DIASTOLIC BLOOD PRESSURE: 69 MMHG | BODY MASS INDEX: 25.65 KG/M2 | HEIGHT: 62 IN

## 2019-08-15 DIAGNOSIS — I10 ESSENTIAL HYPERTENSION: ICD-10-CM

## 2019-08-15 DIAGNOSIS — M25.561 ACUTE PAIN OF RIGHT KNEE: Primary | ICD-10-CM

## 2019-08-15 DIAGNOSIS — F25.9 CHRONIC SCHIZOAFFECTIVE DISORDER: ICD-10-CM

## 2019-08-15 DIAGNOSIS — Z86.73 HISTORY OF STROKE: ICD-10-CM

## 2019-08-15 DIAGNOSIS — H90.5 DEAFNESS CONGENITAL: ICD-10-CM

## 2019-08-15 DIAGNOSIS — E78.5 HYPERLIPIDEMIA, UNSPECIFIED HYPERLIPIDEMIA TYPE: ICD-10-CM

## 2019-08-15 DIAGNOSIS — F79 MENTAL RETARDATION: ICD-10-CM

## 2019-08-15 DIAGNOSIS — F03.91 DEMENTIA WITH BEHAVIORAL DISTURBANCE, UNSPECIFIED DEMENTIA TYPE: ICD-10-CM

## 2019-08-15 PROCEDURE — 99213 OFFICE O/P EST LOW 20 MIN: CPT | Mod: PBBFAC | Performed by: NURSE PRACTITIONER

## 2019-08-15 PROCEDURE — 99215 PR OFFICE/OUTPT VISIT, EST, LEVL V, 40-54 MIN: ICD-10-PCS | Mod: S$PBB,,, | Performed by: NURSE PRACTITIONER

## 2019-08-15 PROCEDURE — 99215 OFFICE O/P EST HI 40 MIN: CPT | Mod: S$PBB,,, | Performed by: NURSE PRACTITIONER

## 2019-08-15 PROCEDURE — 99999 PR PBB SHADOW E&M-EST. PATIENT-LVL III: ICD-10-PCS | Mod: PBBFAC,,, | Performed by: NURSE PRACTITIONER

## 2019-08-15 PROCEDURE — 99999 PR PBB SHADOW E&M-EST. PATIENT-LVL III: CPT | Mod: PBBFAC,,, | Performed by: NURSE PRACTITIONER

## 2019-08-15 NOTE — LETTER
August 15, 2019      Cornelius Walters MD  [address]  [address]  [facility] [address]           [facility]  [address]  [facility] [address]  Phone: [phone]  Fax: [phone]          Patient: Lay Peres   MR Number: [id number]   YOB: 1945   Date of Visit: 8/15/2019       Dear Dr. Cornelius Walters:    Thank you for referring Lay Peres to me for evaluation. Attached you will find relevant portions of my assessment and plan of care.    If you have questions, please do not hesitate to call me. I look forward to following Lay Peres along with you.    Sincerely,    Brooke Whitten, MARIANNE    Enclosure  CC:  No Recipients    If you would like to receive this communication electronically, please contact [email or web address] or [phone] to request more information on [email or web address] Link access.    For providers and/or their staff who would like to refer a patient to [facility], please contact us through our one-stop-shop provider referral line, Critical access hospitalierge, at [phone].    If you feel you have received this communication in error or would no longer like to receive these types of communications, please e-mail [email or web address]

## 2019-08-15 NOTE — PROGRESS NOTES
Name: Lay Peres  MRN: [id number]   CSN: [id number]      Date: 8-15-19      Referring physician:  Cornelius Walters MD  [address]  [address]  [place] [address]    Subjective:      Chief Complaint: walking and memory     History of Present Illness (HPI):    Lay Peres is a 73 y.o.  female with partial deafness, HTN, DM, HLD and stroke (L MCA- 72312),  chronic schizoaffective disorder and intellectual disability and resides at [facility] presents today for an initial evaluation of memory loss and is accompanied by sister, Jose Francisco. She was previously followed by Dr. Walters with last visit 6-23-16. She started seeing him in  for memory loss per records.  She was also seen by Dr. Marti for stroke in 7-2016.    Sister picks her up most Fridays to take her home for the weekend. About a month ago, she noticed that she was a little more off balance than normal. At that same time, she seemed to be having a little more trouble with word find, which has improved. However, she still having issues with walking. Sister wants to know if she is on too much medicine or not enough for memory.     Lay expresses that she walks uneven. She points to her R knee and says it hurts. It does not sound like she has had any falls.     Memory is getting worse. She will repeat the same questions, almost on a loop. Jose Francisco says at times, she can almost predict what she is going to ask next.                  Dr. Walters note 6-23-16:  History of Present Illness  HPI   This is a 70-year-old female, who is being seen by me in followup for memory difficulties. She was accompanied by her half sister who reported that she has continued to decline gradually however her behavior has been adequately controlled by her present psychotropic medications.  She has become more forgetful especially with regards to personal hygiene.  She resides at the [facility] for the developmentally  disabled. She has been living there for many years. She has a history of mental retardation, congenital deafness, chronic schizoaffective disorder. There has been a gradual decline in her cognitive functioning since 2012.  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 She is presently being followed by psychiatry for a schizoaffective disorder and is on medications that were reviewed. The patient had a CT scan of the brain that showed involutional changes but was otherwise unremarkable.  She is presently on Namenda XR 28 mg daily.  MAR from [facility] was available for review.        Review of Systems    Past Medical History: The patient  has a past medical history of Back pain (7/13/2012), Chronic constipation (7/13/2012), Congenital deafness (7/13/2012), Deaf, 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 (7/13/2012), Hyperlipidemia (7/13/2012), Hypertension (7/13/2012), Macular degeneration, Major depression (7/13/2012), Mild mental retardation (I.Q. 50-70) (7/13/2012), Neck pain (7/13/2012), Paranoid schizophrenia, and Schizoaffective disorder, chronic condition (7/13/2012).    Social History: The patient  reports that she has never smoked. She has never used smokeless tobacco. She reports that she does not drink alcohol or use drugs.    Family History: Their family history includes Depression in her brother and sister; Suicide in her brother.    Allergies: Patient has no known allergies.     Meds:   Current Outpatient Medications on File Prior to Visit   Medication Sig Dispense Refill    acetaminophen (TYLENOL) 500 mg Cap Take 2 capsules (1,000 mg total) by mouth 2 (two) times daily. 120 capsule 11    aspirin (ECOTRIN) 325 MG EC tablet Take 1 tablet (325 mg total) by mouth once daily.  0    benztropine (COGENTIN) 1 MG tablet Take 1 tablet (1 mg total) by mouth 2 (two) times daily. 180 tablet 0    blood sugar diagnostic  Strp 1 strip by Misc.(Non-Drug; Combo Route) route once daily. 100 strip 4    CALCIUM CARBONATE (SAWYER-GEST ANTACID ORAL) Take 1 tablet by mouth 4 (four) times daily before meals and nightly.       cholecalciferol, vitamin D3, (VITAMIN D3) 1,000 unit capsule Take 1 capsule by mouth Daily.      donepezil (ARICEPT) 5 MG tablet Take 1 tablet (5 mg total) by mouth every morning. To be taken with food 90 tablet 3    DULoxetine (CYMBALTA) 30 MG capsule Take 1 capsule (30 mg total) by mouth once daily. 30 capsule 3    famotidine (PEPCID) 40 MG tablet TAKE 1 TABLET BY MOUTH EVERY EVENING AT 8PM 30 tablet 6    gabapentin (NEURONTIN) 300 MG capsule TAKE 1 TABLET (300MG) BY MOUTH AT BEDTIME at 8pm 30 capsule 6    haloperidol (HALDOL) 5 MG tablet Take 1 tablet (5 mg total) by mouth daily as needed (agitation/paranoia). 30 tablet 0    haloperidol decanoate (HALDOL DECANOATE) 100 mg/mL injection Inject 1 mL (100 mg total) into the muscle every 14 (fourteen) days. 1 mL 12    lisinopril (PRINIVIL,ZESTRIL) 40 MG tablet TAKE 1 TABLET BY MOUTH once daily 30 tablet 11    memantine (NAMENDA XR) 28 mg CSpX Take 1 capsule (28 mg total) by mouth once daily. 90 capsule 3    metformin (GLUCOPHAGE) 500 MG tablet Take 1 tablet (500 mg total) by mouth 2 (two) times daily with meals. 180 tablet 4    multivitamin capsule Take 1 capsule by mouth once daily.      nystatin (MYCOSTATIN) ointment Apply topically 2 (two) times daily.      QUEtiapine (SEROQUEL) 200 MG Tab Take 1 tablet (200 mg total) by mouth every evening. 30 tablet 3    rosuvastatin (CRESTOR) 20 MG tablet TAKE 1 TABLET BY MOUTH once daily 30 tablet 6    senna (SENNA) 8.6 mg tablet Take 1 tablet by mouth Twice daily.      benztropine (COGENTIN) 1 MG tablet TAKE 1 TABLET BY MOUTH twice a day 60 tablet 3     No current facility-administered medications on file prior to visit.        Objective:     Physical Exam:    Vitals:    08/15/19 1516   BP: (!) 156/69   Pulse: 77  "  Height: 5' 2" (1.575 m)     Body mass index is 25.65 kg/m².    Constitutional  Well-developed, well-nourished, appears stated age   Cardiovascular  Radial pulses 2+ and symmetric, no LE edema bilaterally     Awake, very pleasant.   Deaf, diff to understand at times.   Does repeat self frequently.   R knee slt swollen and tender to light palpation.   Ambulates with obvious limp- unable to full weight bear- more toe touch.     Laboratory Results:  No visits with results within 3 Month(s) from this visit.   Latest known visit with results is:   Admission on 08/07/2018, Discharged on 08/08/2018   Component Date Value Ref Range Status    WBC 08/07/2018 10.17  3.90 - 12.70 K/uL Final    RBC 08/07/2018 3.62* 4.00 - 5.40 M/uL Final    Hemoglobin 08/07/2018 9.9* 12.0 - 16.0 g/dL Final    Hematocrit 08/07/2018 33.2* 37.0 - 48.5 % Final    Mean Corpuscular Volume 08/07/2018 92  82 - 98 fL Final    Mean Corpuscular Hemoglobin 08/07/2018 27.3  27.0 - 31.0 pg Final    Mean Corpuscular Hemoglobin Conc 08/07/2018 29.8* 32.0 - 36.0 g/dL Final    RDW 08/07/2018 15.2* 11.5 - 14.5 % Final    Platelets 08/07/2018 286  150 - 350 K/uL Final    MPV 08/07/2018 9.9  9.2 - 12.9 fL Final    Immature Granulocytes 08/07/2018 0.3  0.0 - 0.5 % Final    Gran # (ANC) 08/07/2018 6.7  1.8 - 7.7 K/uL Final    Immature Grans (Abs) 08/07/2018 0.03  0.00 - 0.04 K/uL Final    Lymph # 08/07/2018 2.1  1.0 - 4.8 K/uL Final    Mono # 08/07/2018 1.0  0.3 - 1.0 K/uL Final    Eos # 08/07/2018 0.3  0.0 - 0.5 K/uL Final    Baso # 08/07/2018 0.05  0.00 - 0.20 K/uL Final    nRBC 08/07/2018 0  0 /100 WBC Final    Gran% 08/07/2018 65.9  38.0 - 73.0 % Final    Lymph% 08/07/2018 20.4  18.0 - 48.0 % Final    Mono% 08/07/2018 10.0  4.0 - 15.0 % Final    Eosinophil% 08/07/2018 2.9  0.0 - 8.0 % Final    Basophil% 08/07/2018 0.5  0.0 - 1.9 % Final    Differential Method 08/07/2018 Automated   Final    Sodium 08/07/2018 142  136 - 145 mmol/L Final "    Potassium 08/07/2018 4.8  3.5 - 5.1 mmol/L Final    Chloride 08/07/2018 109  95 - 110 mmol/L Final    CO2 08/07/2018 24  23 - 29 mmol/L Final    Glucose 08/07/2018 111* 70 - 110 mg/dL Final    BUN, Bld 08/07/2018 20  8 - 23 mg/dL Final    Creatinine 08/07/2018 0.8  0.5 - 1.4 mg/dL Final    Calcium 08/07/2018 10.2  8.7 - 10.5 mg/dL Final    Total Protein 08/07/2018 7.9  6.0 - 8.4 g/dL Final    Albumin 08/07/2018 3.5  3.5 - 5.2 g/dL Final    Total Bilirubin 08/07/2018 0.3  0.1 - 1.0 mg/dL Final    Alkaline Phosphatase 08/07/2018 76  55 - 135 U/L Final    AST 08/07/2018 24  10 - 40 U/L Final    ALT 08/07/2018 10  10 - 44 U/L Final    Anion Gap 08/07/2018 9  8 - 16 mmol/L Final    eGFR if African American 08/07/2018 >60.0  >60 mL/min/1.73 m^2 Final    eGFR if non African American 08/07/2018 >60.0  >60 mL/min/1.73 m^2 Final    Troponin I 08/07/2018 0.010  0.000 - 0.026 ng/mL Final    BNP 08/07/2018 23  0 - 99 pg/mL Final    Specimen UA 08/07/2018 Urine, Clean Catch   Final    Color, UA 08/07/2018 Yellow  Yellow, Straw, Sue Final    Appearance, UA 08/07/2018 Hazy* Clear Final    pH, UA 08/07/2018 5.0  5.0 - 8.0 Final    Specific Gravity, UA 08/07/2018 1.010  1.005 - 1.030 Final    Protein, UA 08/07/2018 Negative  Negative Final    Glucose, UA 08/07/2018 Negative  Negative Final    Ketones, UA 08/07/2018 Negative  Negative Final    Bilirubin (UA) 08/07/2018 Negative  Negative Final    Occult Blood UA 08/07/2018 Negative  Negative Final    Nitrite, UA 08/07/2018 Positive* Negative Final    Urobilinogen, UA 08/07/2018 Negative  <2.0 EU/dL Final    Leukocytes, UA 08/07/2018 3+* Negative Final    RBC, UA 08/07/2018 0  0 - 4 /hpf Final    WBC, UA 08/07/2018 >100* 0 - 5 /hpf Final    WBC Clumps, UA 08/07/2018 Rare  None-Rare Final    Bacteria 08/07/2018 Many* None-Occ /hpf Final    Squam Epithel, UA 08/07/2018 0  /hpf Final    Microscopic Comment 08/07/2018 SEE COMMENT   Final    Urine  Culture, Routine 08/07/2018    Final                    Value:ESCHERICHIA COLI  >100,000 cfu/ml      POCT Glucose 08/07/2018 84  70 - 110 mg/dL Final    Troponin I 08/07/2018 0.014  0.000 - 0.026 ng/mL Final    Magnesium 08/07/2018 1.8  1.6 - 2.6 mg/dL Final    Phosphorus 08/07/2018 3.4  2.7 - 4.5 mg/dL Final    Troponin I 08/07/2018 <0.006  0.000 - 0.026 ng/mL Final    POCT Glucose 08/07/2018 84  70 - 110 mg/dL Final    POCT Glucose 08/07/2018 104  70 - 110 mg/dL Final    WBC 08/08/2018 8.52  3.90 - 12.70 K/uL Final    RBC 08/08/2018 3.37* 4.00 - 5.40 M/uL Final    Hemoglobin 08/08/2018 9.5* 12.0 - 16.0 g/dL Final    Hematocrit 08/08/2018 30.4* 37.0 - 48.5 % Final    Mean Corpuscular Volume 08/08/2018 90  82 - 98 fL Final    Mean Corpuscular Hemoglobin 08/08/2018 28.2  27.0 - 31.0 pg Final    Mean Corpuscular Hemoglobin Conc 08/08/2018 31.3* 32.0 - 36.0 g/dL Final    RDW 08/08/2018 15.3* 11.5 - 14.5 % Final    Platelets 08/08/2018 260  150 - 350 K/uL Final    MPV 08/08/2018 10.0  9.2 - 12.9 fL Final    Immature Granulocytes 08/08/2018 0.4  0.0 - 0.5 % Final    Gran # (ANC) 08/08/2018 4.3  1.8 - 7.7 K/uL Final    Immature Grans (Abs) 08/08/2018 0.03  0.00 - 0.04 K/uL Final    Lymph # 08/08/2018 2.6  1.0 - 4.8 K/uL Final    Mono # 08/08/2018 1.0  0.3 - 1.0 K/uL Final    Eos # 08/08/2018 0.5  0.0 - 0.5 K/uL Final    Baso # 08/08/2018 0.07  0.00 - 0.20 K/uL Final    nRBC 08/08/2018 0  0 /100 WBC Final    Gran% 08/08/2018 50.8  38.0 - 73.0 % Final    Lymph% 08/08/2018 31.0  18.0 - 48.0 % Final    Mono% 08/08/2018 11.6  4.0 - 15.0 % Final    Eosinophil% 08/08/2018 5.4  0.0 - 8.0 % Final    Basophil% 08/08/2018 0.8  0.0 - 1.9 % Final    Differential Method 08/08/2018 Automated   Final    Sodium 08/08/2018 142  136 - 145 mmol/L Final    Potassium 08/08/2018 4.3  3.5 - 5.1 mmol/L Final    Chloride 08/08/2018 107  95 - 110 mmol/L Final    CO2 08/08/2018 26  23 - 29 mmol/L Final    Glucose  08/08/2018 99  70 - 110 mg/dL Final    BUN, Bld 08/08/2018 19  8 - 23 mg/dL Final    Creatinine 08/08/2018 0.8  0.5 - 1.4 mg/dL Final    Calcium 08/08/2018 9.6  8.7 - 10.5 mg/dL Final    Anion Gap 08/08/2018 9  8 - 16 mmol/L Final    eGFR if African American 08/08/2018 >60.0  >60 mL/min/1.73 m^2 Final    eGFR if non African American 08/08/2018 >60.0  >60 mL/min/1.73 m^2 Final    POCT Glucose 08/08/2018 99  70 - 110 mg/dL Final    QEF 08/08/2018 55  55 - 65 Final    Diastolic Dysfunction 08/08/2018 Yes*  Final    POCT Glucose 08/08/2018 153* 70 - 110 mg/dL Final           Assessment and Plan     Acute pain of right knee    Dementia with behavioral disturbance, unspecified dementia type    Chronic schizoaffective disorder    Deafness congenital    History of stroke    Hyperlipidemia, unspecified hyperlipidemia type    Essential hypertension    Mental retardation        Medical Decision Making:        Provided ice pack in clinic.   See instructions above.   See urgent care tomorrow.     In regards to memory, would cont meds without change for now.     Not sure how much testing we can do with memory but she is in pain and distracted today but still very pleasant.   Counseled sister about dementia, medications, progression, prognosis.   Follow up 3 months for memory.     I spent 45 minutes face-to-face with the patient and sister with >50% of the time spent with counseling and education regarding:  - results of data, diagnosis, and recommendations stated above  -knee pain and plan  - the prognosis of dementia  - risks and benefits of donepezil and memantine  - importance of diet and exercise    Brooke Whitten, DNP, NP-C  Division of Movement and Memory Disorders  [facility]  [phone]

## 2019-08-16 ENCOUNTER — TELEPHONE (OUTPATIENT)
Dept: INTERNAL MEDICINE | Facility: CLINIC | Age: 74
End: 2019-08-16

## 2019-08-16 ENCOUNTER — OFFICE VISIT (OUTPATIENT)
Dept: INTERNAL MEDICINE | Facility: CLINIC | Age: 74
End: 2019-08-16
Payer: MEDICARE

## 2019-08-16 ENCOUNTER — HOSPITAL ENCOUNTER (OUTPATIENT)
Dept: RADIOLOGY | Facility: HOSPITAL | Age: 74
Discharge: HOME | End: 2019-08-16
Attending: INTERNAL MEDICINE
Payer: MEDICARE

## 2019-08-16 VITALS
WEIGHT: 136.25 LBS | BODY MASS INDEX: 24.14 KG/M2 | HEART RATE: 73 BPM | OXYGEN SATURATION: 99 % | DIASTOLIC BLOOD PRESSURE: 62 MMHG | SYSTOLIC BLOOD PRESSURE: 136 MMHG | HEIGHT: 63 IN

## 2019-08-16 DIAGNOSIS — D64.9 NORMOCYTIC ANEMIA: ICD-10-CM

## 2019-08-16 DIAGNOSIS — M25.561 ACUTE PAIN OF RIGHT KNEE: ICD-10-CM

## 2019-08-16 DIAGNOSIS — M25.561 ACUTE PAIN OF RIGHT KNEE: Primary | ICD-10-CM

## 2019-08-16 DIAGNOSIS — S40.021A CONTUSION OF RIGHT UPPER ARM, INITIAL ENCOUNTER: ICD-10-CM

## 2019-08-16 PROCEDURE — 99999 PR PBB SHADOW E&M-EST. PATIENT-LVL IV: ICD-10-PCS | Mod: PBBFAC,,, | Performed by: INTERNAL MEDICINE

## 2019-08-16 PROCEDURE — 73564 XR KNEE COMP 4 OR MORE VIEWS RIGHT: ICD-10-PCS | Mod: 26,RT,, | Performed by: RADIOLOGY

## 2019-08-16 PROCEDURE — 99214 OFFICE O/P EST MOD 30 MIN: CPT | Mod: S$PBB,,, | Performed by: INTERNAL MEDICINE

## 2019-08-16 PROCEDURE — 73564 X-RAY EXAM KNEE 4 OR MORE: CPT | Mod: TC,RT

## 2019-08-16 PROCEDURE — 99214 OFFICE O/P EST MOD 30 MIN: CPT | Mod: PBBFAC,25 | Performed by: INTERNAL MEDICINE

## 2019-08-16 PROCEDURE — 99999 PR PBB SHADOW E&M-EST. PATIENT-LVL IV: CPT | Mod: PBBFAC,,, | Performed by: INTERNAL MEDICINE

## 2019-08-16 PROCEDURE — 99214 PR OFFICE/OUTPT VISIT, EST, LEVL IV, 30-39 MIN: ICD-10-PCS | Mod: S$PBB,,, | Performed by: INTERNAL MEDICINE

## 2019-08-16 PROCEDURE — 73564 X-RAY EXAM KNEE 4 OR MORE: CPT | Mod: 26,RT,, | Performed by: RADIOLOGY

## 2019-08-16 RX ORDER — MELOXICAM 15 MG/1
15 TABLET ORAL DAILY
Qty: 14 TABLET | Refills: 0 | Status: SHIPPED | OUTPATIENT
Start: 2019-08-16 | End: 2019-08-30

## 2019-08-16 NOTE — Clinical Note
Kenyon Velasco, Ms Belt was having knee pain, I think likely some tendinopathy and/or bursitis. Given 2 wks of Mobic. Her sister today also noted a new lump on her R outer arm; seems like a large bruise, unclear if there was a fall or not. Took picture; could you please check it out on your next visit with her at [place] to ensure it's resolving? Thanks! --Tom

## 2019-08-16 NOTE — PROGRESS NOTES
Xray Subjective:       Patient ID: Lay Peres is a 73 y.o. female.    Chief Complaint: Knee Pain (x2 weeks)    HPI    Patient is accompanied by her sister; the patient has expressive aphasia and significant deafness, sister speaks mostly on behalf of the patient.    Patient of Krista Case MD, here today for Urgent Care visit. Over last 6 mo seen by Psychiatry and Neurology. Last IM visit 07/2018, hasn't been seen by PCP since. Upcoming Neurology visit.    Patient saw Neurology yesterday.  Noted that she was more off balance and her speech has been more of a problem recently.  Patient has had more expressive aphasia and frustration lately as well.  Patient reports that she has had significant knee pain, appears to be for the last week and a half per the facility where she is living.  The day before this started she went bowling.  The facility denies the patient had any falls, though the patient reports that she did have a fall, but unclear timeline.  Yesterday applied ice to the knee twice in the evening, today seems better with ambulating.    Today when the patient was going for blood work her sister noted a area of swollen discoloration in the right upper arm.    Review of Systems   Unable to perform ROS: Other       Objective:      Physical Exam   Constitutional: No distress.   Musculoskeletal:        Right knee: She exhibits swelling. She exhibits normal range of motion, no effusion, no ecchymosis, no deformity, no laceration, no erythema, normal patellar mobility and no bony tenderness. No tenderness found.        Left knee: Normal.   Skin: Skin is warm and dry. She is not diaphoretic.   Firm soft tissue nodule with overlying dusky skin discoloration consistent with bruise on R lateral arm distal to shoulder.    Nursing note and vitals reviewed.            Vitals:    08/16/19 0934   BP: 136/62   BP Location: Right arm   Patient Position: Sitting   BP Method: Large (Manual)   Pulse: 73  "  SpO2: 99%   Weight: 61.8 kg (136 lb 3.9 oz)   Height: 5' 3" (1.6 m)     Body mass index is 24.13 kg/m².    RESULTS: Reviewed labs from last 15 months    Assessment:       1. Acute pain of right knee    2. Normocytic anemia        Plan:   Lay was seen today for knee pain.    Diagnoses and all orders for this visit:    Acute pain of right knee:  New problem, xray today shows no abnormality. Labs stable. Start 2 wk Mobic, tylenol PRN. Continue icing knee two times a day. Written instructions provided to the patient and her sister to take to [place].  -     X-ray Knee Ortho Right; Future  -     X-Ray Knee Complete 4 Or More Views Right; Future  -     Comprehensive metabolic panel; Future    Normocytic anemia:  Prior dx, stable, recheck on labs with iron levels.  -     CBC Without Differential; Future  -     Ferritin; Future  -     Iron and TIBC; Future    Contusion R arm:  New dx, suggests possible fall, there is no tenderness. continue to monitor clinically at [place].    Keep regular follow up appointments with Krista Case MD.   Tom Sue MD  Internal Medicine    Portions of this note were completed using medical dictation software. Please excuse typographical or syntax errors that were missed on review.    "

## 2019-08-16 NOTE — TELEPHONE ENCOUNTER
----- Message from Joelle Mtz sent at 8/16/2019  3:01 PM CDT -----  Contact: [facility]/[phone]  Pharmacy called in regards to talking to the office about a drug interaction with the Rx for     meloxicam (MOBIC) 15 MG tablet 14 tablet 0 8/16/2019 8/30/2019 Take 1 tablet (15 mg total) by mouth once daily. for 14 days -    aspirin (ECOTRIN) 325 MG EC tablet  0 7/15/2018 8/16/2019 No Take 1 tablet (325 mg total) by mouth once daily.     She wanted to know if the dr wants the pt to take these too med's due to it will increase stomach bleeding. MARK ANTHONY pt is on pepcib 40 mg at bed time.     PATIO DRUGS  Please advise

## 2019-09-11 ENCOUNTER — OFFICE VISIT (OUTPATIENT)
Dept: INTERNAL MEDICINE | Facility: CLINIC | Age: 74
End: 2019-09-11
Payer: MEDICARE

## 2019-09-11 VITALS
SYSTOLIC BLOOD PRESSURE: 112 MMHG | DIASTOLIC BLOOD PRESSURE: 64 MMHG | WEIGHT: 136.38 LBS | HEIGHT: 63 IN | BODY MASS INDEX: 24.16 KG/M2 | HEART RATE: 84 BPM | TEMPERATURE: 98 F | OXYGEN SATURATION: 99 %

## 2019-09-11 DIAGNOSIS — I10 ESSENTIAL HYPERTENSION: ICD-10-CM

## 2019-09-11 DIAGNOSIS — E78.5 HYPERLIPIDEMIA, UNSPECIFIED HYPERLIPIDEMIA TYPE: ICD-10-CM

## 2019-09-11 DIAGNOSIS — M54.89 OTHER BACK PAIN, UNSPECIFIED CHRONICITY: ICD-10-CM

## 2019-09-11 DIAGNOSIS — F79 MENTAL RETARDATION: ICD-10-CM

## 2019-09-11 DIAGNOSIS — Z86.73 HISTORY OF STROKE: ICD-10-CM

## 2019-09-11 DIAGNOSIS — M25.561 RIGHT KNEE PAIN, UNSPECIFIED CHRONICITY: Primary | ICD-10-CM

## 2019-09-11 DIAGNOSIS — F03.91 DEMENTIA WITH BEHAVIORAL DISTURBANCE, UNSPECIFIED DEMENTIA TYPE: ICD-10-CM

## 2019-09-11 DIAGNOSIS — Z12.11 SCREENING FOR MALIGNANT NEOPLASM OF COLON: ICD-10-CM

## 2019-09-11 PROCEDURE — 99215 OFFICE O/P EST HI 40 MIN: CPT | Mod: PBBFAC | Performed by: INTERNAL MEDICINE

## 2019-09-11 PROCEDURE — 99215 PR OFFICE/OUTPT VISIT, EST, LEVL V, 40-54 MIN: ICD-10-PCS | Mod: S$PBB,,, | Performed by: INTERNAL MEDICINE

## 2019-09-11 PROCEDURE — 99999 PR PBB SHADOW E&M-EST. PATIENT-LVL V: CPT | Mod: PBBFAC,,, | Performed by: INTERNAL MEDICINE

## 2019-09-11 PROCEDURE — 99999 PR PBB SHADOW E&M-EST. PATIENT-LVL V: ICD-10-PCS | Mod: PBBFAC,,, | Performed by: INTERNAL MEDICINE

## 2019-09-11 PROCEDURE — 99215 OFFICE O/P EST HI 40 MIN: CPT | Mod: S$PBB,,, | Performed by: INTERNAL MEDICINE

## 2019-09-11 RX ORDER — FERROUS GLUCONATE 324(38)MG
324 TABLET ORAL EVERY OTHER DAY
COMMUNITY
End: 2024-01-15

## 2019-09-11 RX ORDER — ACETAMINOPHEN 500 MG
1000 TABLET ORAL 2 TIMES DAILY
Status: ON HOLD | COMMUNITY
End: 2024-01-18

## 2019-09-11 RX ORDER — ASCORBIC ACID 500 MG
500 TABLET ORAL EVERY OTHER DAY
COMMUNITY
End: 2020-09-28

## 2019-09-11 NOTE — PROGRESS NOTES
CHIEF COMPLAINT: follow up mental handicapped, mood, diabetes.      HISTORY OF PRESENT ILLNESS: This is a 73-year-old woman who presents with Sheree her  for follow up.     She presented to the ED on 7/13/18 due to dizziness and inability to walk.  She was hospitalized from 7/13/18 to 7/16/18 due to a UTI and a Punctate acute infarct in the left lateral frontal lobe on MRI brain.  She followed up with neurology 8/16/19. She is currently tunde ing aspirin 325 mg daily and crestor 20 mg daily. She has been doing well. No other signs of stroke    Biggest issue is right knee pain. She fell several weeks ago and still complains of right knee pain. Minimal swelling of the right knee.      Mood has been good. She saw neurology on 8/15/19. She is now forgetting that she has eaten and forgetting that it is night time and will get dressed for the day.  She continues to take Aricept 5 mg daily and Namenda XR 28 mg daily. Memory is the same.  She continues to see Dr Cardenas in psychiatry who has given her haldol 5 mg once daily as needed when she gets agitated just prior her next Haldol injection (which is every 2 weeks). She continues to take Seroquel 200 mg at bedtime and Cogentin 1 mg twce daily and Cymbalta 30 mg daily.       Blood sugars have been doing well. She is checking blood sugars daily. She continues to take metformin 500 mg twice daily. No hypoglycemia. NO diarrhea. Weight is stable       Blood pressure has been well controlled on lisinopril 40 mg daily.  No cough, joint pain, muscle pain, chest pain, shortness of breath, palpitaitons      No heartburn on Pepcid 40 mg daily. No nausea, vomiting, constipation, diarrhea.       She continues to complain of back pain intermittently. She does her exercises at home which helps. She continues to take tylenol 500 mg 2 tablets twice daily, gabapentin 300 mg at bedtime. She sleeps well.                  Past Medical History      Diagnosis    Date           "Hypertension    7/13/2012          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    7/13/2012          Hyperlipidemia    7/13/2012          Congenital deafness    7/13/2012          Schizoaffective disorder, chronic condition    7/13/2012          Major depression    7/13/2012          Chronic constipation    7/13/2012          Neck pain    7/13/2012          Back pain    7/13/2012          Mild mental retardation (I.Q. 50-70)    7/13/2012      MEDICATIONS AND ALLERGIES: Per [facility].       PHYSICAL EXAMINATION:     /64 (BP Location: Right arm, Patient Position: Sitting, BP Method: Large (Manual))   Pulse 84   Temp 98.2 °F (36.8 °C) (Oral)   Ht 5' 3" (1.6 m)   Wt 61.8 kg (136 lb 5.7 oz)   SpO2 99%   BMI 24.15 kg/m²     GENERAL: She is alert, oriented, no apparent distress. Affect within normal limits.    Conjunctiva anicteric. Tympanic membranes clear. Oropharynx clear.    NECK: Supple.    Respiratory: Effort normal. Lungs are clear to auscultation.    HEART: Regular rate and rhythm without murmurs, gallops or rubs.    No lower extremity edema.  ABDOMEN: soft, non distended, non tender, bowel sounds present, no hepatosplenomgaly  Knee without effusion or warmth. Some clicking of the right knee             ASSESSMENT AND PLAN:    1. Right knee pain - home health physical therapy.   2. Acute stroke - modifying risk factors  3. Memory loss - on Aricept and Namenda XR 28 mg daily  4. Diabetes mellitus - stable   5. Back pain -stable  6. Hypertension - controlled.    7. Hyperlipidemia - stable    8. Reflux - controlled.    9. Schizoaffective disorder with memory issues - follow up with neurology and psychiatry    10. Screening - mammogram 2/19 Colonoscopy is due 2018 - ordered. Normal bone density 09/2011.           I will see her back in 2 weeks at [place], sooner if problems arise.      Spent greater than 40 minutes with the patient, greater than 50% in face to face counseling on knee pain, history " stroke, memory loss, mood diosrder.

## 2019-09-14 PROBLEM — R07.9 CHEST PAIN: Status: RESOLVED | Noted: 2018-08-07 | Resolved: 2019-09-14

## 2019-09-14 PROBLEM — N39.0 URINARY TRACT INFECTION: Status: RESOLVED | Noted: 2018-07-14 | Resolved: 2019-09-14

## 2019-09-16 DIAGNOSIS — F25.9 CHRONIC SCHIZOAFFECTIVE DISORDER: ICD-10-CM

## 2019-09-16 RX ORDER — HALOPERIDOL DECANOATE 100 MG/ML
100 INJECTION INTRAMUSCULAR
Qty: 1 ML | Refills: 5 | Status: SHIPPED | OUTPATIENT
Start: 2019-09-16 | End: 2019-11-13 | Stop reason: SDUPTHER

## 2019-09-19 ENCOUNTER — LAB VISIT (OUTPATIENT)
Dept: LAB | Facility: HOSPITAL | Age: 74
End: 2019-09-19
Attending: INTERNAL MEDICINE
Payer: MEDICARE

## 2019-09-19 DIAGNOSIS — E13.9 MATURITY ONSET DIABETES MELLITUS IN YOUNG: Primary | ICD-10-CM

## 2019-09-19 LAB
ALBUMIN SERPL BCP-MCNC: 3.9 G/DL (ref 3.5–5.2)
ALP SERPL-CCNC: 76 U/L (ref 55–135)
ALT SERPL W/O P-5'-P-CCNC: 13 U/L (ref 10–44)
ANION GAP SERPL CALC-SCNC: 6 MMOL/L (ref 8–16)
AST SERPL-CCNC: 22 U/L (ref 10–40)
BASOPHILS # BLD AUTO: 0.06 K/UL (ref 0–0.2)
BASOPHILS NFR BLD: 0.8 % (ref 0–1.9)
BILIRUB SERPL-MCNC: 0.3 MG/DL (ref 0.1–1)
BUN SERPL-MCNC: 19 MG/DL (ref 8–23)
CALCIUM SERPL-MCNC: 9.8 MG/DL (ref 8.7–10.5)
CHLORIDE SERPL-SCNC: 106 MMOL/L (ref 95–110)
CHOLEST SERPL-MCNC: 89 MG/DL (ref 120–199)
CHOLEST/HDLC SERPL: 2.4 {RATIO} (ref 2–5)
CO2 SERPL-SCNC: 31 MMOL/L (ref 23–29)
CREAT SERPL-MCNC: 0.8 MG/DL (ref 0.5–1.4)
DIFFERENTIAL METHOD: ABNORMAL
EOSINOPHIL # BLD AUTO: 0.3 K/UL (ref 0–0.5)
EOSINOPHIL NFR BLD: 3.4 % (ref 0–8)
ERYTHROCYTE [DISTWIDTH] IN BLOOD BY AUTOMATED COUNT: 14.8 % (ref 11.5–14.5)
EST. GFR  (AFRICAN AMERICAN): >60 ML/MIN/1.73 M^2
EST. GFR  (NON AFRICAN AMERICAN): >60 ML/MIN/1.73 M^2
ESTIMATED AVG GLUCOSE: 114 MG/DL (ref 68–131)
GLUCOSE SERPL-MCNC: 91 MG/DL (ref 70–110)
HBA1C MFR BLD HPLC: 5.6 % (ref 4–5.6)
HCT VFR BLD AUTO: 38.7 % (ref 37–48.5)
HDLC SERPL-MCNC: 37 MG/DL (ref 40–75)
HDLC SERPL: 41.6 % (ref 20–50)
HGB BLD-MCNC: 11.4 G/DL (ref 12–16)
IMM GRANULOCYTES # BLD AUTO: 0.02 K/UL (ref 0–0.04)
IMM GRANULOCYTES NFR BLD AUTO: 0.3 % (ref 0–0.5)
LDLC SERPL CALC-MCNC: 36.4 MG/DL (ref 63–159)
LYMPHOCYTES # BLD AUTO: 2.7 K/UL (ref 1–4.8)
LYMPHOCYTES NFR BLD: 33.2 % (ref 18–48)
MCH RBC QN AUTO: 27.2 PG (ref 27–31)
MCHC RBC AUTO-ENTMCNC: 29.5 G/DL (ref 32–36)
MCV RBC AUTO: 92 FL (ref 82–98)
MONOCYTES # BLD AUTO: 0.7 K/UL (ref 0.3–1)
MONOCYTES NFR BLD: 8.9 % (ref 4–15)
NEUTROPHILS # BLD AUTO: 4.3 K/UL (ref 1.8–7.7)
NEUTROPHILS NFR BLD: 53.4 % (ref 38–73)
NONHDLC SERPL-MCNC: 52 MG/DL
NRBC BLD-RTO: 0 /100 WBC
PLATELET # BLD AUTO: 325 K/UL (ref 150–350)
PMV BLD AUTO: 10.3 FL (ref 9.2–12.9)
POTASSIUM SERPL-SCNC: 3.7 MMOL/L (ref 3.5–5.1)
PROT SERPL-MCNC: 7.7 G/DL (ref 6–8.4)
RBC # BLD AUTO: 4.19 M/UL (ref 4–5.4)
SODIUM SERPL-SCNC: 143 MMOL/L (ref 136–145)
TRIGL SERPL-MCNC: 78 MG/DL (ref 30–150)
TSH SERPL DL<=0.005 MIU/L-ACNC: 0.68 UIU/ML (ref 0.4–4)
WBC # BLD AUTO: 7.97 K/UL (ref 3.9–12.7)

## 2019-09-19 PROCEDURE — 36415 COLL VENOUS BLD VENIPUNCTURE: CPT

## 2019-09-19 PROCEDURE — 83036 HEMOGLOBIN GLYCOSYLATED A1C: CPT

## 2019-09-19 PROCEDURE — 85025 COMPLETE CBC W/AUTO DIFF WBC: CPT

## 2019-09-19 PROCEDURE — 84443 ASSAY THYROID STIM HORMONE: CPT

## 2019-09-19 PROCEDURE — 80053 COMPREHEN METABOLIC PANEL: CPT

## 2019-09-19 PROCEDURE — 80061 LIPID PANEL: CPT

## 2019-10-01 ENCOUNTER — PATIENT OUTREACH (OUTPATIENT)
Dept: ADMINISTRATIVE | Facility: OTHER | Age: 74
End: 2019-10-01

## 2019-10-02 ENCOUNTER — OFFICE VISIT (OUTPATIENT)
Dept: ORTHOPEDICS | Facility: CLINIC | Age: 74
End: 2019-10-02
Payer: MEDICARE

## 2019-10-02 VITALS — HEIGHT: 63 IN | BODY MASS INDEX: 24.14 KG/M2 | WEIGHT: 136.25 LBS

## 2019-10-02 DIAGNOSIS — M25.561 RIGHT KNEE PAIN, UNSPECIFIED CHRONICITY: ICD-10-CM

## 2019-10-02 PROCEDURE — 99214 OFFICE O/P EST MOD 30 MIN: CPT | Mod: S$PBB,25,ICN, | Performed by: NURSE PRACTITIONER

## 2019-10-02 PROCEDURE — 20610 PR DRAIN/INJECT LARGE JOINT/BURSA: ICD-10-PCS | Mod: S$PBB,RT,ICN, | Performed by: NURSE PRACTITIONER

## 2019-10-02 PROCEDURE — 99999 PR PBB SHADOW E&M-EST. PATIENT-LVL III: ICD-10-PCS | Mod: PBBFAC,,, | Performed by: NURSE PRACTITIONER

## 2019-10-02 PROCEDURE — 20610 DRAIN/INJ JOINT/BURSA W/O US: CPT | Mod: PBBFAC | Performed by: NURSE PRACTITIONER

## 2019-10-02 PROCEDURE — 99214 PR OFFICE/OUTPT VISIT, EST, LEVL IV, 30-39 MIN: ICD-10-PCS | Mod: S$PBB,25,ICN, | Performed by: NURSE PRACTITIONER

## 2019-10-02 PROCEDURE — 99213 OFFICE O/P EST LOW 20 MIN: CPT | Mod: PBBFAC | Performed by: NURSE PRACTITIONER

## 2019-10-02 PROCEDURE — 99999 PR PBB SHADOW E&M-EST. PATIENT-LVL III: CPT | Mod: PBBFAC,,, | Performed by: NURSE PRACTITIONER

## 2019-10-02 PROCEDURE — 20610 DRAIN/INJ JOINT/BURSA W/O US: CPT | Mod: S$PBB,RT,ICN, | Performed by: NURSE PRACTITIONER

## 2019-10-02 RX ORDER — PREDNISONE 10 MG/1
TABLET ORAL
COMMUNITY
Start: 2019-08-08 | End: 2020-09-28

## 2019-10-02 RX ADMIN — TRIAMCINOLONE ACETONIDE 40 MG: 40 INJECTION, SUSPENSION INTRA-ARTICULAR; INTRAMUSCULAR at 04:10

## 2019-10-02 NOTE — LETTER
October 4, 2019      Krista Case MD  [address]  [facility] [address]           [facility] - Orthopedics  [address]  [facility] [address]  Phone: [phone]          Patient: Lay Peres   MR Number: [id number]   YOB: 1945   Date of Visit: 10/2/2019       Dear Dr. Krista Case:    Thank you for referring Lay Peres to me for evaluation. Attached you will find relevant portions of my assessment and plan of care.    If you have questions, please do not hesitate to call me. I look forward to following Lay Peres along with you.    Sincerely,    Monae Philippe, NP    Enclosure  CC:  No Recipients    If you would like to receive this communication electronically, please contact [email or web address] or [phone] to request more information on [employer] access.    For providers and/or their staff who would like to refer a patient to [facility], please contact us through our one-stop-shop provider referral line, [facility], at [phone].    If you feel you have received this communication in error or would no longer like to receive these types of communications, please e-mail [email or web address]

## 2019-10-02 NOTE — PROGRESS NOTES
"CC: Pain of the Right Knee      HPI: Pt with c/o right knee pain for the past few weeks according to the caregiver that accompanies her to clinic today from [facility]. The patient has mental retardation and deafness and is difficult to understand, but she c/o pain in the right knee. She points to her right knee and says "hurts". The assistant that's with her states that she has had tylenol, but it isn't helping. There is not an nsaid on her records that she brings with her. She will be turning 74 on 10/5, so nsaids can cause an increased risk of GI bleed and kidney problems. She has never been treated in orthopedics for her knee before, but there is an xray in the system from August which was done by Dr. Sue. The xray shows some osteophytes, but no major djd. She denies injury. She does ambulate independently without difficulty.     ROS  General: denies fever and chills  Resp: no c/o sob  CVS: no c/o cp  MSK: c/o right knee pain- unable to specify exact site    PE  General: AAOx3, pleasant and cooperative  Resp: respirations even and unlabored  MSK: right knee exam  0 degrees extension  120 degrees flexion  No warmth or erythema   - effusion  5/5 quad strength  + tenderness over the patella    Xray:  Reviewed by me with the patient and her assistant:The bones are intact.  There is no evidence for acute fracture or bone destruction.  Joint spaces appear relatively well maintained.  There is no evidence for dislocation.  Minimal degenerative changes are present.     Assessment:  Right knee djd    Plan:  Cortisone injection right knee today for pain relief  RICE  Tylenol prn  F/u if no relief    Knee Injection Procedure Note    Diagnosis: right knee degenerative arthritis  Indications: right knee pain  Procedure Details: Verbal consent was obtained for the procedure. The injection site was identified and the skin was prepared with alcohol. The right knee was injected from an anterolateral approach with 1 ml of " Kenalog and 4 ml Lidocaine under sterile technique using a 22 gauge needle. The needle was removed and the area cleansed and dressed.  Complications:  Patient tolerated the procedure well.    she was advised to rest the knee today, using ice and elevation as needed for comfort and swelling.Immediate relief of the knee pain may be short lived and secondary to the lidocaine. she may have an increase in discomfort tonight followed by steady improvement over the next several days. It may take 1-2 weeks following the injection to get the full benefit of the medication.

## 2019-10-04 RX ORDER — TRIAMCINOLONE ACETONIDE 40 MG/ML
40 INJECTION, SUSPENSION INTRA-ARTICULAR; INTRAMUSCULAR
Status: COMPLETED | OUTPATIENT
Start: 2019-10-02 | End: 2019-10-02

## 2019-10-22 ENCOUNTER — IMMUNIZATION (OUTPATIENT)
Dept: INTERNAL MEDICINE | Facility: CLINIC | Age: 74
End: 2019-10-22
Payer: MEDICARE

## 2019-10-22 PROCEDURE — G0008 ADMIN INFLUENZA VIRUS VAC: HCPCS | Mod: PBBFAC

## 2019-10-22 PROCEDURE — 90662 IIV NO PRSV INCREASED AG IM: CPT | Mod: PBBFAC | Performed by: INTERNAL MEDICINE

## 2019-11-11 DIAGNOSIS — F25.9 CHRONIC SCHIZOAFFECTIVE DISORDER: ICD-10-CM

## 2019-11-12 RX ORDER — QUETIAPINE FUMARATE 200 MG/1
200 TABLET, FILM COATED ORAL NIGHTLY
Qty: 30 TABLET | Refills: 3 | OUTPATIENT
Start: 2019-11-12

## 2019-11-12 RX ORDER — BENZTROPINE MESYLATE 1 MG/1
TABLET ORAL
Qty: 60 TABLET | Refills: 3 | OUTPATIENT
Start: 2019-11-12

## 2019-11-12 RX ORDER — DULOXETIN HYDROCHLORIDE 30 MG/1
30 CAPSULE, DELAYED RELEASE ORAL DAILY
Qty: 30 CAPSULE | Refills: 3 | OUTPATIENT
Start: 2019-11-12

## 2019-11-13 ENCOUNTER — PATIENT OUTREACH (OUTPATIENT)
Dept: ADMINISTRATIVE | Facility: OTHER | Age: 74
End: 2019-11-13

## 2019-11-14 ENCOUNTER — OFFICE VISIT (OUTPATIENT)
Dept: NEUROLOGY | Facility: CLINIC | Age: 74
End: 2019-11-14
Payer: MEDICARE

## 2019-11-14 ENCOUNTER — OFFICE VISIT (OUTPATIENT)
Dept: PSYCHIATRY | Facility: CLINIC | Age: 74
End: 2019-11-14
Payer: MEDICARE

## 2019-11-14 VITALS
HEART RATE: 82 BPM | BODY MASS INDEX: 23.94 KG/M2 | DIASTOLIC BLOOD PRESSURE: 71 MMHG | SYSTOLIC BLOOD PRESSURE: 112 MMHG | WEIGHT: 135.13 LBS

## 2019-11-14 VITALS
HEART RATE: 77 BPM | BODY MASS INDEX: 24.13 KG/M2 | DIASTOLIC BLOOD PRESSURE: 72 MMHG | SYSTOLIC BLOOD PRESSURE: 141 MMHG | HEIGHT: 63 IN

## 2019-11-14 DIAGNOSIS — Z86.73 HISTORY OF STROKE: ICD-10-CM

## 2019-11-14 DIAGNOSIS — F03.91 DEMENTIA WITH BEHAVIORAL DISTURBANCE, UNSPECIFIED DEMENTIA TYPE: Primary | ICD-10-CM

## 2019-11-14 DIAGNOSIS — H90.5 DEAFNESS CONGENITAL: ICD-10-CM

## 2019-11-14 DIAGNOSIS — F79 INTELLECTUAL DISABILITY: ICD-10-CM

## 2019-11-14 DIAGNOSIS — F25.9 CHRONIC SCHIZOAFFECTIVE DISORDER: Primary | ICD-10-CM

## 2019-11-14 DIAGNOSIS — E78.5 HYPERLIPIDEMIA, UNSPECIFIED HYPERLIPIDEMIA TYPE: ICD-10-CM

## 2019-11-14 DIAGNOSIS — F03.91 DEMENTIA WITH BEHAVIORAL DISTURBANCE, UNSPECIFIED DEMENTIA TYPE: ICD-10-CM

## 2019-11-14 DIAGNOSIS — I10 ESSENTIAL HYPERTENSION: ICD-10-CM

## 2019-11-14 PROCEDURE — 99999 PR PBB SHADOW E&M-EST. PATIENT-LVL III: ICD-10-PCS | Mod: PBBFAC,,, | Performed by: PSYCHIATRY & NEUROLOGY

## 2019-11-14 PROCEDURE — 99213 OFFICE O/P EST LOW 20 MIN: CPT | Mod: S$PBB,,, | Performed by: NURSE PRACTITIONER

## 2019-11-14 PROCEDURE — 99213 PR OFFICE/OUTPT VISIT, EST, LEVL III, 20-29 MIN: ICD-10-PCS | Mod: S$PBB,,, | Performed by: NURSE PRACTITIONER

## 2019-11-14 PROCEDURE — 99212 OFFICE O/P EST SF 10 MIN: CPT | Mod: PBBFAC | Performed by: NURSE PRACTITIONER

## 2019-11-14 PROCEDURE — 99999 PR PBB SHADOW E&M-EST. PATIENT-LVL II: CPT | Mod: PBBFAC,,, | Performed by: NURSE PRACTITIONER

## 2019-11-14 PROCEDURE — 99999 PR PBB SHADOW E&M-EST. PATIENT-LVL III: CPT | Mod: PBBFAC,,, | Performed by: PSYCHIATRY & NEUROLOGY

## 2019-11-14 PROCEDURE — 99213 OFFICE O/P EST LOW 20 MIN: CPT | Mod: PBBFAC,27 | Performed by: PSYCHIATRY & NEUROLOGY

## 2019-11-14 PROCEDURE — 99214 OFFICE O/P EST MOD 30 MIN: CPT | Mod: S$PBB,,, | Performed by: PSYCHIATRY & NEUROLOGY

## 2019-11-14 PROCEDURE — 99999 PR PBB SHADOW E&M-EST. PATIENT-LVL II: ICD-10-PCS | Mod: PBBFAC,,, | Performed by: NURSE PRACTITIONER

## 2019-11-14 PROCEDURE — 99214 PR OFFICE/OUTPT VISIT, EST, LEVL IV, 30-39 MIN: ICD-10-PCS | Mod: S$PBB,,, | Performed by: PSYCHIATRY & NEUROLOGY

## 2019-11-14 RX ORDER — DULOXETIN HYDROCHLORIDE 30 MG/1
30 CAPSULE, DELAYED RELEASE ORAL DAILY
Qty: 90 CAPSULE | Refills: 1 | Status: SHIPPED | OUTPATIENT
Start: 2019-11-14 | End: 2020-06-11

## 2019-11-14 RX ORDER — HALOPERIDOL DECANOATE 100 MG/ML
100 INJECTION INTRAMUSCULAR
Qty: 1 ML | Refills: 11 | Status: SHIPPED | OUTPATIENT
Start: 2019-11-14 | End: 2020-05-07

## 2019-11-14 RX ORDER — HALOPERIDOL 5 MG/1
5 TABLET ORAL DAILY PRN
Qty: 30 TABLET | Refills: 0 | Status: SHIPPED | OUTPATIENT
Start: 2019-11-14 | End: 2021-04-12

## 2019-11-14 RX ORDER — BENZTROPINE MESYLATE 1 MG/1
1 TABLET ORAL 2 TIMES DAILY
Qty: 180 TABLET | Refills: 1 | Status: SHIPPED | OUTPATIENT
Start: 2019-11-14 | End: 2020-06-11

## 2019-11-14 RX ORDER — QUETIAPINE FUMARATE 200 MG/1
200 TABLET, FILM COATED ORAL NIGHTLY
Qty: 90 TABLET | Refills: 1 | Status: SHIPPED | OUTPATIENT
Start: 2019-11-14 | End: 2020-06-11

## 2019-11-14 NOTE — PROGRESS NOTES
ESTABLISHED OUTPATIENT VISIT   E/M LEVEL 4: [id number]    ENCOUNTER DATE: 2019  SITE: [facility]      HISTORY    CHIEF COMPLAINT   Lay Peres is a 74 y.o. female who presents for followup of   Chief Complaint   Patient presents with    Mood Disorder    Memory Deficits       HPI   (Elements: Location, Quality, Severity, Duration, Timing, Content, Modifying Factors, Associated Signs & Symptoms)    Patient with cogenital deafness, possible MR, chronic schizoaffective disorder, and now dementia, followed by me for disease maintenance.  She comes with  today for follow up evaluation.  Per , patient's memory continues to slowly deteriorate.   gave several examples of confusion.  She has rare anger outbursts.  Otherwise doing well.   denies patient is groggy, oversedated or parkinsonized.  No depression.  No SI.  She remains active.  Sleep is intact.   No movement side effects.    I spoke with  today about risks/benefits of her psychiatric regimen - I have had similar discussions with sister in past.        3/20/19 - no change  8/15/18 - no major changes save worsening memory.  Moving to more supervised setting.  No med changes.  18 - no major changes save worsening memory.  No med changes.  Investigating moving into higher level of living - group home.  17 - no major changes save worsening memory.  No med changes.  16 - no major changes other than worsening memory.  No med changes.  16 - no major changes other than worsening memory.  No med changes.  8/26/15 - progressive short term memory loss but otherwise stable.  No med changes.  I discussed with sister psychopharmacologic approach.  Of note, this is longstanding regimen started prior to seeing me.  We discussed both of the followin. Usually multiple neuroleptics avoided.  In this case she is on two neuroleptics - haldol decanoate and  "seroquel.   2. There is risk for cardiovascular events with neuroleptics in dementia.  However patient with comorbid schizoaffective disorder that long predates dementia and is indication for neuroleptic.   Sister understands risks and benefits of keeping med regimen as it is vs adjusting it.  Sister would like to continue regimen as is - states in past when adjustments made patient has decompensated  requiring hospitalization.          ROS   Neurologic ROS: positive for - memory loss.  Negative for tremor, akathisia, or tardive dyskinesia   ROS: positive for urinary incontinence      PFSH  Past Medical History reviewed: Yes  Family History reviewed: Yes  Social History reviewed: No      PSYCHOTROPIC MEDICATIONS   Haldol dec 100mg IM q2 weeks - also Haldol 5mg prns (infrequent)  Seroquel 200mg bedtime  Cymbalta 30mg daily  Cogentin 1mg bid    Aricept  Namenda      EXAMINATION    VITALS   Most recent vital signs, dated less than 90 days prior to this appointment, were reviewed.   Vitals:    11/14/19 1602   BP: 112/71   Pulse: 82       CONSTITUTIONAL  General Appearance: stated age, casually dressed    MUSCULOSKELETAL  Muscle Strength and Tone: no weakness or spasticity  Abnormal Involuntary Movements: no tremor noted.  No evidence of akathisia or tardive dyskinesia on my exam.  Gait and Station: ambulates without assistance     PSYCHIATRIC   Level of Consciousness: alert  Orientation: to person and place only  Grooming: neat, intact  Psychomotor Behavior: no retardation or agitation  Speech: decreased spontaneity, when queried does answer simple questions  Language: reads lips, some dysarthria - difficult to comprehend  Mood: "ok"  Affect: reactive, smiling, pleasant, euthymic.  No irritability noted.   Thought Process: perseverative, concrete  Associations: no loosening of associations  Thought Content: no SI.  Intermittent PI  Memory: impaired  Attention: +distractible  Fund of Knowledge: limited  Insight: " impaired  Judgment: intact    MEDICAL DECISION MAKING    DIAGNOSES AND MANAGEMENT PLANS    New problem(s) (3 points each):   - none    Established problem(s), worsening (2 points each):   - none    Established problem(s), stable/improved (1 point each):  - schizoaffective disorder.  cont current med regimen - this is longstanding regimen and it seems to be keeping her reasonably stable with no appreciable side effects.  - mental retardation.  Perhaps mild, IQ unknown.  Cont structured living program - doing well in this setting.  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  Complicates other diagnoses.  - dementia.  Follows with neurology.  She is on namenda and aricept.  As dementia progresses, we will likely need to taper down psychotropics - but she has not yet progressed to this point - I again discussed this with  today in my office.  - metabolic syndrome.  Will monitor given she is on atypical neuroleptics.  Weight down 5lbs intersession.  She is followed by Dr. Case.       PRESCRIPTION DRUG MANAGEMENT  Compliance: taking meds as prescribed  Side Effects: none reported  Regimen Adjustments: cont med regimen as prescribed.  [place] prescription monitoring site checked - no entries      DIAGNOSTIC TESTING  Follow cmp and fasting lipids on seroquel   9/19/19 - CMP, CBC, TSH, lipid panel, HgA1c 5.6 - reviewed    7/13/18 - MRI of head reviewed  8/8/18 - EKG QTc 428      Serg Cardenas

## 2019-11-14 NOTE — PROGRESS NOTES
Name: Lay Peres  MRN: [id number]   CSN: [id number]      Date: 11-14-19      Referring physician:  No referring provider defined for this encounter.    Subjective:      Chief Complaint:     History of Present Illness (HPI):    Lay Peres is a 74 y.o.  female with partial deafness, HTN, HLD, and stroke (L MCA- 7-2018), chronic schizoaffective disorder and intellectual disability and resides at [facility] who presents today for a follow-up evaluation of Dementia and is accompanied by transport attendent. Last seen in office 8-15-19. Her sister Jose Francisco was with her last visit. The transport attendant today thought she was here for her knee but called staff.     I tried calling Jose Francisco but was not able to reach her.     I spoke with Cristina, staff member, by phone who offered the following:  Has spells of forgetting. They will give her meds and she will forget she just took.  Mood is good. Gets haldol injection and this helps behavior.   She is generally very sweet.  Appetite good.  Sleep good- a lot at times. Often prefers to be in her bed if she is not participating in an activity.  She has had occasion of elevated BP and was in nursing all day yesterday because of this. PCP aware.     She talks about Susy in [place] and Jose Francisco. These are her sisters. It is not clear what she is saying but she smiles often, adelso when referring to them.    She also repeatedly holds her stomach, says period, finished. No blood. Finished.       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 50-70) (7/13/2012), Neck pain (7/13/2012), Paranoid schizophrenia, and  Schizoaffective disorder, chronic condition (7/13/2012).    Social History: The patient  reports that she has never smoked. She has never used smokeless tobacco. She reports that she does not drink alcohol or use drugs.    Family History: Their family history includes Depression in her brother and sister; Suicide in her brother.    Allergies: Patient has no known allergies.     Meds:   Current Outpatient Medications on File Prior to Visit   Medication Sig Dispense Refill    acetaminophen (TYLENOL) 500 MG tablet Take 1,000 mg by mouth 2 (two) times daily.      aspirin (ECOTRIN) 325 MG EC tablet Take 1 tablet (325 mg total) by mouth once daily.  0    benztropine (COGENTIN) 1 MG tablet Take 1 tablet (1 mg total) by mouth 2 (two) times daily. 180 tablet 0    blood sugar diagnostic Strp 1 strip by Misc.(Non-Drug; Combo Route) route once daily. 100 strip 4    CALCIUM CARBONATE (SAWYER-GEST ANTACID ORAL) Take 1 tablet by mouth 4 (four) times daily as needed.       cholecalciferol, vitamin D3, (VITAMIN D3) 1,000 unit capsule Take 1 capsule by mouth Daily.      donepezil (ARICEPT) 5 MG tablet Take 1 tablet (5 mg total) by mouth every morning. To be taken with food 90 tablet 3    DULoxetine (CYMBALTA) 30 MG capsule Take 1 capsule (30 mg total) by mouth once daily. 30 capsule 3    famotidine (PEPCID) 40 MG tablet TAKE 1 TABLET BY MOUTH EVERY EVENING AT 8PM 30 tablet 6    gabapentin (NEURONTIN) 300 MG capsule TAKE 1 TABLET (300MG) BY MOUTH AT BEDTIME at 8pm 30 capsule 6    haloperidol decanoate (HALDOL DECANOATE) 100 mg/mL injection Inject 1 mL (100 mg total) into the muscle every 14 (fourteen) days. 1 mL 5    lisinopril (PRINIVIL,ZESTRIL) 40 MG tablet TAKE 1 TABLET BY MOUTH once daily 30 tablet 11    memantine (NAMENDA XR) 28 mg CSpX Take 1 capsule (28 mg total) by mouth once daily. 90 capsule 3    metformin (GLUCOPHAGE) 500 MG tablet Take 1 tablet (500 mg total) by mouth 2 (two) times daily with meals. 180 tablet 4  "   multivitamin capsule Take 1 capsule by mouth once daily.      nystatin (MYCOSTATIN) ointment Apply topically 2 (two) times daily as needed.       QUEtiapine (SEROQUEL) 200 MG Tab Take 1 tablet (200 mg total) by mouth every evening. 30 tablet 3    rosuvastatin (CRESTOR) 20 MG tablet TAKE 1 TABLET BY MOUTH once daily 30 tablet 6    senna (SENNA) 8.6 mg tablet Take 1 tablet by mouth Twice daily.      ascorbic acid, vitamin C, (VITAMIN C) 500 MG tablet Take 500 mg by mouth every other day.      ferrous gluconate (FERGON) 324 MG tablet Take 324 mg by mouth every other day.      haloperidol (HALDOL) 5 MG tablet Take 1 tablet (5 mg total) by mouth daily as needed (agitation/paranoia). (Patient not taking: Reported on 11/14/2019) 30 tablet 0    predniSONE (DELTASONE) 10 MG tablet        No current facility-administered medications on file prior to visit.        Objective:     Physical Exam:    Vitals:    11/14/19 1325   BP: (!) 141/72   BP Location: Left arm   Patient Position: Sitting   Pulse: 77   Height: 5' 3" (1.6 m)     Body mass index is 24.13 kg/m².    Constitutional  Well-developed, well-nourished, appears stated age     Awake, alert, pleasant and cooperative.   Knows her bday is in October- thinks "maybe the 10th" but shrugs shoulders and says look there and points to chart. Not able to tell me her age.   Talkative but not easy to understand. Most of what she says is not understood.   Smiles and laughs often.   RR equal and unlabored.   Face symmetric.   EOMI.  Hearing impaired.    Gait normal and steady.      Laboratory Results:  Lab Visit on 09/19/2019   Component Date Value Ref Range Status    WBC 09/19/2019 7.97  3.90 - 12.70 K/uL Final    RBC 09/19/2019 4.19  4.00 - 5.40 M/uL Final    Hemoglobin 09/19/2019 11.4* 12.0 - 16.0 g/dL Final    Hematocrit 09/19/2019 38.7  37.0 - 48.5 % Final    Mean Corpuscular Volume 09/19/2019 92  82 - 98 fL Final    Mean Corpuscular Hemoglobin 09/19/2019 27.2  " 27.0 - 31.0 pg Final    Mean Corpuscular Hemoglobin Conc 09/19/2019 29.5* 32.0 - 36.0 g/dL Final    RDW 09/19/2019 14.8* 11.5 - 14.5 % Final    Platelets 09/19/2019 325  150 - 350 K/uL Final    MPV 09/19/2019 10.3  9.2 - 12.9 fL Final    Immature Granulocytes 09/19/2019 0.3  0.0 - 0.5 % Final    Gran # (ANC) 09/19/2019 4.3  1.8 - 7.7 K/uL Final    Immature Grans (Abs) 09/19/2019 0.02  0.00 - 0.04 K/uL Final    Lymph # 09/19/2019 2.7  1.0 - 4.8 K/uL Final    Mono # 09/19/2019 0.7  0.3 - 1.0 K/uL Final    Eos # 09/19/2019 0.3  0.0 - 0.5 K/uL Final    Baso # 09/19/2019 0.06  0.00 - 0.20 K/uL Final    nRBC 09/19/2019 0  0 /100 WBC Final    Gran% 09/19/2019 53.4  38.0 - 73.0 % Final    Lymph% 09/19/2019 33.2  18.0 - 48.0 % Final    Mono% 09/19/2019 8.9  4.0 - 15.0 % Final    Eosinophil% 09/19/2019 3.4  0.0 - 8.0 % Final    Basophil% 09/19/2019 0.8  0.0 - 1.9 % Final    Differential Method 09/19/2019 Automated   Final    Sodium 09/19/2019 143  136 - 145 mmol/L Final    Potassium 09/19/2019 3.7  3.5 - 5.1 mmol/L Final    Chloride 09/19/2019 106  95 - 110 mmol/L Final    CO2 09/19/2019 31* 23 - 29 mmol/L Final    Glucose 09/19/2019 91  70 - 110 mg/dL Final    BUN, Bld 09/19/2019 19  8 - 23 mg/dL Final    Creatinine 09/19/2019 0.8  0.5 - 1.4 mg/dL Final    Calcium 09/19/2019 9.8  8.7 - 10.5 mg/dL Final    Total Protein 09/19/2019 7.7  6.0 - 8.4 g/dL Final    Albumin 09/19/2019 3.9  3.5 - 5.2 g/dL Final    Total Bilirubin 09/19/2019 0.3  0.1 - 1.0 mg/dL Final    Alkaline Phosphatase 09/19/2019 76  55 - 135 U/L Final    AST 09/19/2019 22  10 - 40 U/L Final    ALT 09/19/2019 13  10 - 44 U/L Final    Anion Gap 09/19/2019 6* 8 - 16 mmol/L Final    eGFR if African American 09/19/2019 >60.0  >60 mL/min/1.73 m^2 Final    eGFR if non African American 09/19/2019 >60.0  >60 mL/min/1.73 m^2 Final    Hemoglobin A1C 09/19/2019 5.6  4.0 - 5.6 % Final    Estimated Avg Glucose 09/19/2019 114  86 - 131  mg/dL Final    Cholesterol 09/19/2019 89* 120 - 199 mg/dL Final    Triglycerides 09/19/2019 78  30 - 150 mg/dL Final    HDL 09/19/2019 37* 40 - 75 mg/dL Final    LDL Cholesterol 09/19/2019 36.4* 63.0 - 159.0 mg/dL Final    Hdl/Cholesterol Ratio 09/19/2019 41.6  20.0 - 50.0 % Final    Total Cholesterol/HDL Ratio 09/19/2019 2.4  2.0 - 5.0 Final    Non-HDL Cholesterol 09/19/2019 52  mg/dL Final    TSH 09/19/2019 0.679  0.400 - 4.000 uIU/mL Final   Lab Visit on 08/16/2019   Component Date Value Ref Range Status    WBC 08/16/2019 10.24  3.90 - 12.70 K/uL Final    RBC 08/16/2019 4.03  4.00 - 5.40 M/uL Final    Hemoglobin 08/16/2019 11.0* 12.0 - 16.0 g/dL Final    Hematocrit 08/16/2019 36.5* 37.0 - 48.5 % Final    Mean Corpuscular Volume 08/16/2019 91  82 - 98 fL Final    Mean Corpuscular Hemoglobin 08/16/2019 27.3  27.0 - 31.0 pg Final    Mean Corpuscular Hemoglobin Conc 08/16/2019 30.1* 32.0 - 36.0 g/dL Final    RDW 08/16/2019 14.9* 11.5 - 14.5 % Final    Platelets 08/16/2019 277  150 - 350 K/uL Final    MPV 08/16/2019 10.0  9.2 - 12.9 fL Final    Ferritin 08/16/2019 54  20.0 - 300.0 ng/mL Final    Iron 08/16/2019 25* 30 - 160 ug/dL Final    Transferrin 08/16/2019 217  200 - 375 mg/dL Final    TIBC 08/16/2019 321  250 - 450 ug/dL Final    Saturated Iron 08/16/2019 8* 20 - 50 % Final    Sodium 08/16/2019 145  136 - 145 mmol/L Final    Potassium 08/16/2019 4.3  3.5 - 5.1 mmol/L Final    Chloride 08/16/2019 107  95 - 110 mmol/L Final    CO2 08/16/2019 29  23 - 29 mmol/L Final    Glucose 08/16/2019 102  70 - 110 mg/dL Final    BUN, Bld 08/16/2019 19  8 - 23 mg/dL Final    Creatinine 08/16/2019 0.8  0.5 - 1.4 mg/dL Final    Calcium 08/16/2019 10.0  8.7 - 10.5 mg/dL Final    Total Protein 08/16/2019 7.2  6.0 - 8.4 g/dL Final    Albumin 08/16/2019 3.7  3.5 - 5.2 g/dL Final    Total Bilirubin 08/16/2019 0.2  0.1 - 1.0 mg/dL Final    Alkaline Phosphatase 08/16/2019 66  55 - 135 U/L Final    AST  08/16/2019 12  10 - 40 U/L Final    ALT 08/16/2019 12  10 - 44 U/L Final    Anion Gap 08/16/2019 9  8 - 16 mmol/L Final    eGFR if African American 08/16/2019 >60  >60 mL/min/1.73 m^2 Final    eGFR if non African American 08/16/2019 >60  >60 mL/min/1.73 m^2 Final       Imaging:   Independent review of images:             Impression       No hemorrhage, infarct, or extra-axial fluid collection.    Additional findings reflective of moderate chronic microvascular ischemic disease as above.    Electronically signed by resident: Vicente Bernal MD  Date: 11/16/2018       Assessment and Plan     Dementia with behavioral disturbance, unspecified dementia type    History of stroke    Mental retardation    Deafness congenital    Hyperlipidemia, unspecified hyperlipidemia type    Essential hypertension        Medical Decision Making:    Continue meds without change.   Call for problems.  Follow up 6 months, unless needed sooner.       Brooke Whitten, FROILAN, NP-C  Division of Movement and Memory Disorders  [facility]  [phone]

## 2020-01-30 ENCOUNTER — TELEPHONE (OUTPATIENT)
Dept: INTERNAL MEDICINE | Facility: CLINIC | Age: 75
End: 2020-01-30

## 2020-01-30 DIAGNOSIS — R13.10 SWALLOWING DISORDER: Primary | ICD-10-CM

## 2020-01-30 DIAGNOSIS — I69.320 APHASIA FOLLOWING CEREBRAL INFARCTION: ICD-10-CM

## 2020-02-04 ENCOUNTER — TELEPHONE (OUTPATIENT)
Dept: INTERNAL MEDICINE | Facility: CLINIC | Age: 75
End: 2020-02-04

## 2020-02-04 DIAGNOSIS — I63.9 CEREBROVASCULAR ACCIDENT (CVA), UNSPECIFIED MECHANISM: ICD-10-CM

## 2020-02-04 DIAGNOSIS — R13.10 SWALLOWING DISORDER: Primary | ICD-10-CM

## 2020-02-04 NOTE — TELEPHONE ENCOUNTER
----- Message from Asha Chatterjee MA sent at 2/3/2020  2:24 PM CST -----  Regarding: MBSS  Good Afternoon,       Hi this patient has an order for a Fl. Modified Swallow Study. Patient will need another order for the Speech Therapist. This order is a SLP video. Please put this order in so patient can get schedule for MBSS.    Thank You,  SAMI Barry

## 2020-02-13 ENCOUNTER — TELEPHONE (OUTPATIENT)
Dept: INTERNAL MEDICINE | Facility: CLINIC | Age: 75
End: 2020-02-13

## 2020-02-13 DIAGNOSIS — M25.569 KNEE PAIN, UNSPECIFIED CHRONICITY, UNSPECIFIED LATERALITY: Primary | ICD-10-CM

## 2020-02-13 RX ORDER — MELOXICAM 15 MG/1
15 TABLET ORAL DAILY
Qty: 20 TABLET | Refills: 0 | Status: SHIPPED | OUTPATIENT
Start: 2020-02-13 | End: 2020-03-04

## 2020-02-13 NOTE — TELEPHONE ENCOUNTER
----- Message from Paula Negrete sent at 2/13/2020  4:17 PM CST -----  Contact: Jose Francisco---Sister---[phone]  Patient Returning Call from [facility]    Who Left Message for Patient: provider    Communication Preference: Jose Francisco---Sister---[phone]    Additional Information:  Jose Francisco is requesting a call back

## 2020-02-14 ENCOUNTER — HOSPITAL ENCOUNTER (OUTPATIENT)
Dept: RADIOLOGY | Facility: HOSPITAL | Age: 75
Discharge: HOME | End: 2020-02-14
Attending: INTERNAL MEDICINE
Payer: MEDICARE

## 2020-02-14 DIAGNOSIS — M25.569 KNEE PAIN, UNSPECIFIED CHRONICITY, UNSPECIFIED LATERALITY: ICD-10-CM

## 2020-02-14 PROCEDURE — 73562 XR KNEE ORTHO BILAT: ICD-10-PCS | Mod: 26,50,, | Performed by: RADIOLOGY

## 2020-02-14 PROCEDURE — 73562 X-RAY EXAM OF KNEE 3: CPT | Mod: TC,50

## 2020-02-14 PROCEDURE — 73562 X-RAY EXAM OF KNEE 3: CPT | Mod: 26,50,, | Performed by: RADIOLOGY

## 2020-02-14 NOTE — TELEPHONE ENCOUNTER
No I did not call her sister.   Find out what her sister needs    I saw nichelle at [place] on Tuesday and she was complaining of knee pain.  She neds xrays of her knees and see Monae Philippe in ortho  I put nichelle on an anti inflammatory for her knee pain

## 2020-02-16 ENCOUNTER — PATIENT OUTREACH (OUTPATIENT)
Dept: ADMINISTRATIVE | Facility: OTHER | Age: 75
End: 2020-02-16

## 2020-02-16 ENCOUNTER — TELEPHONE (OUTPATIENT)
Dept: ADMINISTRATIVE | Facility: OTHER | Age: 75
End: 2020-02-16

## 2020-02-16 DIAGNOSIS — Z12.31 ENCOUNTER FOR SCREENING MAMMOGRAM FOR MALIGNANT NEOPLASM OF BREAST: Primary | ICD-10-CM

## 2020-02-16 NOTE — LETTER
February 16, 2020        Gloria Apodaca MD  [address]  [place] [address]             [facility]  [address]  [facility] [address]  Phone: [phone]   Patient: Lay Peres   MR Number: [id number]   YOB: 1945           Dear Dr. Apodaca:    Lay Peres is a patient of Dr. Krista Case (PCP) at [facility]. While reviewing her chart, it has come to our attention that there is an outstanding procedure. Please help keep our Health Maintenance records as accurate and as up to date as possible by supplying the following:     Eye exam                                                                             Please fax to [facility] @ [phone].    Thank you for your assistance in our patient's healthcare.     Sincerely,       Dede Quiles MA   Panel Care Coordinator  [facility]

## 2020-02-17 ENCOUNTER — TELEPHONE (OUTPATIENT)
Dept: INTERNAL MEDICINE | Facility: CLINIC | Age: 75
End: 2020-02-17

## 2020-02-17 ENCOUNTER — OFFICE VISIT (OUTPATIENT)
Dept: ORTHOPEDICS | Facility: CLINIC | Age: 75
End: 2020-02-17
Payer: MEDICARE

## 2020-02-17 VITALS — HEIGHT: 63 IN | WEIGHT: 135.13 LBS | BODY MASS INDEX: 23.94 KG/M2

## 2020-02-17 DIAGNOSIS — M17.11 PRIMARY OSTEOARTHRITIS OF RIGHT KNEE: Primary | ICD-10-CM

## 2020-02-17 PROCEDURE — 20610 DRAIN/INJ JOINT/BURSA W/O US: CPT | Mod: PBBFAC | Performed by: NURSE PRACTITIONER

## 2020-02-17 PROCEDURE — 99999 PR PBB SHADOW E&M-EST. PATIENT-LVL II: CPT | Mod: PBBFAC,,, | Performed by: NURSE PRACTITIONER

## 2020-02-17 PROCEDURE — 99999 PR PBB SHADOW E&M-EST. PATIENT-LVL II: ICD-10-PCS | Mod: PBBFAC,,, | Performed by: NURSE PRACTITIONER

## 2020-02-17 PROCEDURE — 99213 OFFICE O/P EST LOW 20 MIN: CPT | Mod: S$PBB,25,, | Performed by: NURSE PRACTITIONER

## 2020-02-17 PROCEDURE — 99212 OFFICE O/P EST SF 10 MIN: CPT | Mod: PBBFAC,25 | Performed by: NURSE PRACTITIONER

## 2020-02-17 PROCEDURE — 99213 PR OFFICE/OUTPT VISIT, EST, LEVL III, 20-29 MIN: ICD-10-PCS | Mod: S$PBB,25,, | Performed by: NURSE PRACTITIONER

## 2020-02-17 PROCEDURE — 20610 PR DRAIN/INJECT LARGE JOINT/BURSA: ICD-10-PCS | Mod: S$PBB,RT,, | Performed by: NURSE PRACTITIONER

## 2020-02-17 PROCEDURE — 20610 DRAIN/INJ JOINT/BURSA W/O US: CPT | Mod: S$PBB,RT,, | Performed by: NURSE PRACTITIONER

## 2020-02-17 RX ADMIN — TRIAMCINOLONE ACETONIDE 40 MG: 40 INJECTION, SUSPENSION INTRA-ARTICULAR; INTRAMUSCULAR at 04:02

## 2020-02-17 NOTE — TELEPHONE ENCOUNTER
Spoke to kierra, pt sister, informed of dr. msua's message, kierra verbalized understanding.  kierra stated she is bringing pt to orthopedic today for injection in her knee.   No other questions.

## 2020-02-17 NOTE — TELEPHONE ENCOUNTER
----- Message from Krista Case MD sent at 2/14/2020  8:01 PM CST -----  She has mild arthritis in her knees. Please let her sister know  SF

## 2020-02-17 NOTE — TELEPHONE ENCOUNTER
----- Message from Tere Cortes sent at 2/17/2020 10:01 AM CST -----  Contact: Jose Francisco/Sister [phone]  Returned Call    Who left the message: Linda Aguirre RN      When did the practice call: 02/17/2020 9:55am    Please advise.    Thank You

## 2020-02-17 NOTE — PROGRESS NOTES
"CC: Pain of the Right Knee      HPI: Pt is a resident of [facility]. She comes in today with her sister. She was seen last October for c/o right knee pain. Her sister states that she has started to c/o right knee pain again. She is not able to localize the pain. She points to her knee and says "hurts". The injection that she had in October relieved her pain until now. Her sister says that she would like to have another injection today for pain relief. She is ambulating without assistive device. There is a limp.    ROS- done with assistance of patient's sister  General: denies fever and chills  Resp: no c/o sob  CVS: no c/o cp  MSK: c/o right knee "hurts"    PE  General: AAOx3, pleasant and cooperative  Resp: respirations even and unlabored  MSK: right knee exam  0 degrees extension  120 degrees flexion  No warmth or erythema   - effusion  + tenderness over the medial joint line    Assessment:  Right knee djd    Plan:  Cortisone injection right knee today  RICE  Tylenol prn  F/u as needed    Knee Injection Procedure Note  Diagnosis: right knee degenerative arthritis  Indications: right knee pain  Procedure Details: Verbal consent was obtained for the procedure. The injection site was identified and the skin was prepared with alcohol. The right knee was injected from an anterolateral approach with 1 ml of Kenalog and 4 ml Lidocaine under sterile technique using a 22 gauge needle. The needle was removed and the area cleansed and dressed.  Complications:  Patient tolerated the procedure well.    she was advised to rest the knee today, using ice and elevation as needed for comfort and swelling.Immediate relief of the knee pain may be short lived and secondary to the lidocaine. she may have an increase in discomfort tonight followed by steady improvement over the next several days. It may take 1-2 weeks following the injection to get the full benefit of the medication.      "

## 2020-02-19 RX ORDER — TRIAMCINOLONE ACETONIDE 40 MG/ML
40 INJECTION, SUSPENSION INTRA-ARTICULAR; INTRAMUSCULAR
Status: COMPLETED | OUTPATIENT
Start: 2020-02-17 | End: 2020-02-17

## 2020-02-26 ENCOUNTER — TELEPHONE (OUTPATIENT)
Dept: RADIOLOGY | Facility: HOSPITAL | Age: 75
End: 2020-02-26

## 2020-02-27 ENCOUNTER — HOSPITAL ENCOUNTER (OUTPATIENT)
Dept: RADIOLOGY | Facility: HOSPITAL | Age: 75
Discharge: HOME | End: 2020-02-27
Attending: INTERNAL MEDICINE
Payer: MEDICARE

## 2020-02-27 ENCOUNTER — CLINICAL SUPPORT (OUTPATIENT)
Dept: SPEECH THERAPY | Facility: HOSPITAL | Age: 75
End: 2020-02-27
Attending: INTERNAL MEDICINE
Payer: MEDICARE

## 2020-02-27 ENCOUNTER — TELEPHONE (OUTPATIENT)
Dept: INTERNAL MEDICINE | Facility: CLINIC | Age: 75
End: 2020-02-27

## 2020-02-27 DIAGNOSIS — R13.12 OROPHARYNGEAL DYSPHAGIA: Primary | ICD-10-CM

## 2020-02-27 DIAGNOSIS — R13.10 SWALLOWING DISORDER: ICD-10-CM

## 2020-02-27 DIAGNOSIS — I69.320 APHASIA FOLLOWING CEREBRAL INFARCTION: ICD-10-CM

## 2020-02-27 DIAGNOSIS — I63.9 CEREBROVASCULAR ACCIDENT (CVA), UNSPECIFIED MECHANISM: ICD-10-CM

## 2020-02-27 PROCEDURE — 74230 X-RAY XM SWLNG FUNCJ C+: CPT | Mod: TC

## 2020-02-27 PROCEDURE — A9698 NON-RAD CONTRAST MATERIALNOC: HCPCS | Performed by: INTERNAL MEDICINE

## 2020-02-27 PROCEDURE — 92611 MOTION FLUOROSCOPY/SWALLOW: CPT | Mod: GN

## 2020-02-27 PROCEDURE — 25500020 PHARM REV CODE 255: Performed by: INTERNAL MEDICINE

## 2020-02-27 PROCEDURE — 74230 FL MODIFIED BARIUM SWALLOW SPEECH STUDY: ICD-10-PCS | Mod: 26,,, | Performed by: RADIOLOGY

## 2020-02-27 PROCEDURE — 74230 X-RAY XM SWLNG FUNCJ C+: CPT | Mod: 26,,, | Performed by: RADIOLOGY

## 2020-02-27 RX ADMIN — BARIUM SULFATE 80 ML: 0.81 POWDER, FOR SUSPENSION ORAL at 02:02

## 2020-02-27 NOTE — TELEPHONE ENCOUNTER
----- Message from Rona Álvarez sent at 2/27/2020 10:06 AM CST -----  Contact: [phone]/ Jose Francisco Segura/ juan  Patient's sister is requesting a call from the office regarding a test that was schedule for the patient today.  She stating she knew nothing about it and wants to know why the patient needs to have the test done.    Please advise, thank you.

## 2020-02-27 NOTE — PROGRESS NOTES
MODIFIED BARIUM SWALLOW STUDY    REASON FOR REFERRAL:  Lay Peres, age 74, was referred by Dr. Krista Csae, internist, for a Modified Barium Swallow Study to rule out aspiration, assess his overall swallowing function, and determine safest consistencies for oral intake.     Ms. Peres arrived with a transport attendant from [facility], which is where she resides. Ms. Peres was unable to answer questions regarding her personal history; however, upon entering the room, she communicated via sign that she is deaf. Per chart, patient reads lips. Ms. Peres's transport attendant reported that recently, Ms. Peres had begun to clear her throat more often while eating.     Per chart review of Dr. Case's note on 9/11/2019: She presented to the ED on 7/13/18 due to dizziness and inability to walk.  She was hospitalized from 7/13/18 to 7/16/18 due to a UTI and a Punctate acute infarct in the left lateral frontal lobe on MRI brain.  She followed up with neurology 8/16/19. She is currently tunde ing aspirin 325 mg daily and crestor 20 mg daily. She has been doing well. No other signs of stroke    MEDICAL HISTORY:  Past Medical History:   Diagnosis Date    Back pain 7/13/2012    Chronic constipation 7/13/2012    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 50-70) 7/13/2012    Neck pain 7/13/2012    Paranoid schizophrenia     Schizoaffective disorder, chronic condition 7/13/2012      SURGICAL HISTORY:  No past surgical history on file.    SWALLOWING HISTORY:  Per speech pathology note on 7/16/2018, patient was on a dental soft diet with nectar thickened liquids during her hospital admission. It was recommended she undergo a MBSS; however, there are no records of a MBSS in pt's chart. Per conversation w/  "transport attendant, Ms. Peres is "eating and drinking everything" and consumes her pills whole.     FAMILY HISTORY:  Family History   Problem Relation Age of Onset    Depression Sister     Depression Brother     Suicide Brother     ADD / ADHD Neg Hx     Alcohol abuse Neg Hx     Anxiety disorder Neg Hx     Bipolar disorder Neg Hx     Dementia Neg Hx     Drug abuse Neg Hx     OCD Neg Hx     Paranoid behavior Neg Hx     Physical abuse Neg Hx     Schizophrenia Neg Hx     Seizures Neg Hx     Self injury Neg Hx     Sexual abuse Neg Hx         SOCIAL HISTORY:  Patient lives at [facility] and is mentally handicapped.     BEHAVIOR:  Lay Peres was a very pleasant woman. She was able to fully cooperate during the study, provided some cuing. She required some cuing to stay within frame during the study. During one instance with solid trial, patient attempted talking to clinician while chewing. Clinician cued patient to continue chewing without talking. She was able to follow basic, one-step commands. Results of today's assessment were considered indicative of her current levels of swallowing functioning.      HEARING:  Per chart review and patient report, patient is deaf. Clinician spoke slowly and with increased volume to ensure understanding of commands. Patient followed basic, one-step commands.     ORAL PERIPHERAL:   Informal examination of the oral mechanism revealed structures and functioning within normal limits for swallowing and speech purposes. Pt was highly unintelligible when speaking in sentences. Patient communicated many one word utterances that were intelligible.     Voice quality was within normal limits with no wet quality before, during, or after the study.     TEST FINDINGS:   She was seen in Radiology with the Radiologist for a Modified Barium Swallow Study. She was seated on a stool for a left lateral videofluoroscopic view.      Consistencies assessed using " radiopaque barium contrast:  - Thin liquid (Single and continuous sips)   - Thin puree (1 tsp)  - Thick puree (1 tsp)   - Solid (1/2 cracker)   - 12.5 mm barium tablet    Strategies:  - Alternating solid w/ liquid wash: reduced pharyngeal residue.  -Multiple swallows per bolus (2) which were spontaneously completed by pt: reduced pharyngeal residue with liquids.    Phases:  Oral:  She was able to obtain liquid and strip utensils well with no loss of material from the oral cavity. She moved boluses through the oral cavity with appropriate transit time. There was no pooling of liquids in the mouth.   Pharyngeal: Boluses moved through the pharyngeal phase with flash penetration on 2/4 sequential sips of thin liquid. No penetration or aspiration with single sip of thin liquid, thin puree, thick puree, or solid. No aspiration and no nasal regurgitation observed.     - Timely initiation with 2/4 sequential sips of thin liquid. Premature spillage was observed on 2/4 sequential cup sips of thin liquid; boluses reached the pyriform sinuses prior to swallow initiation. Premature spillage was also observed with thin puree, thick puree, and solid consistencies reaching the valleculae prior to swallow initiation.   - Adequate soft palate elevation  - Adequate laryngeal elevation and anterior hyoid excursion  - Reduced tongue base retraction based on vallecular residue.  - Complete epiglottic inversion.  - Complete laryngeal vestibular closure; flash penetration observed with sequential sips of thin liquid which was expressed in to the laryngeal vestibule from material prematurely spilled to the pyriforms.   - Mildly reduced based on pharyngeal residue pharyngeal stripping wave.  - Adequate PE segment opening.  - Pharyngeal residue as follows:   *Mild pyriform sinus residue across all consistencies   *Mild to moderate vallecular residue, which increased with more viscous consistencies.    Cervical Esophageal:  Boluses entered the  upper esophagus within normal limits.  No obstruction was noted.    Rosenbeck 8-point Penetration-Aspiration Scale:    1 - Material does not enter airway.    IMPRESSIONS:   This 74 y.o. woman appears to present with  1. Mild swallowing dysfunction c/b premature spillage, reduced pharyngeal contraction, and reduced tongue base retraction resulting in flash penetration with 2/4 sequential sips of thin liquid; mild to moderate vallecular residue; and mild pyriform sinus residue. No aspiration observed.   2. History of Dementia.   3. History of mental handicap.    RECOMMENDATIONS/PLAN OF CARE:   It is felt that she would benefit from  1. Continuation of her current regular consistency diet with thin liquids using the following strategies and common aspiration precautions, including, but not limited to   A.  Appropriate upright seating for all eating and drinking.   B.  Small bites (1/2 tsp) of solid consistencies.   C. Alternating solids with a liquid wash.   D. One sip of liquid at a time.    E. Take medications whole w/ thin liquid.    F. Multiple swallows (2) per bolus.   G. Minimize distractions while eating and drinking.    H. Thorough chewing.    I.  Monitoring for any signs/symptoms of aspiration (such as wet/gurgly voice that does not clear with coughing, inability to make any voice sounds, any persistent coughing with oral intake, otherwise unexplained fever, unexplained increased or new difficulty or discomfort breathing, unexplained increase in sleepiness/lethargy/significant fatigue, unexplained increase or new onset confusion or change in cognitive functioning, or any other unexplained change in health or well-being that could be related to swallowing).  2. Repeat MBSS if swallowing function changes or worsens.  3. Follow up with Dr. Case as directed.

## 2020-02-27 NOTE — TELEPHONE ENCOUNTER
Pt sister is calling to find out why a swallow study was ordered. She states that she spoke with Dago and the  and they both advised her that they were not given a reason it was needed. Pt sister would like PCP to give an explanation as to why. Please advise.

## 2020-03-01 NOTE — TELEPHONE ENCOUNTER
Due to her history of stroke in 2018, the state required an updated diet texture order. We needed to make sure she was on the safest diet texture.

## 2020-03-03 NOTE — PLAN OF CARE
IMPRESSIONS:   This 74 y.o. woman appears to present with  1. Mild swallowing dysfunction c/b premature spillage, reduced pharyngeal contraction, and reduced tongue base retraction resulting in flash penetration with 2/4 sequential sips of thin liquid; mild to moderate vallecular residue; and mild pyriform sinus residue. No aspiration observed.   2. History of Dementia.   3. History of mental handicap.    RECOMMENDATIONS/PLAN OF CARE:   It is felt that she would benefit from  1. Continuation of her current regular consistency diet with thin liquids using the following strategies and common aspiration precautions, including, but not limited to   A.  Appropriate upright seating for all eating and drinking.   B.  Small bites (1/2 tsp) of solid consistencies.   C. Alternating solids with a liquid wash.   D. One sip of liquid at a time.    E. Take medications whole w/ thin liquid.    F. Multiple swallows (2) per bolus.   G. Minimize distractions while eating and drinking.    H. Thorough chewing.    I.  Monitoring for any signs/symptoms of aspiration (such as wet/gurgly voice that does not clear with coughing, inability to make any voice sounds, any persistent coughing with oral intake, otherwise unexplained fever, unexplained increased or new difficulty or discomfort breathing, unexplained increase in sleepiness/lethargy/significant fatigue, unexplained increase or new onset confusion or change in cognitive functioning, or any other unexplained change in health or well-being that could be related to swallowing).  2. Repeat MBSS if swallowing function changes or worsens.  3. Follow up with Dr. Case as directed.

## 2020-04-14 DIAGNOSIS — F02.818 DEMENTIA ASSOCIATED WITH OTHER UNDERLYING DISEASE WITH BEHAVIORAL DISTURBANCE: ICD-10-CM

## 2020-04-14 DIAGNOSIS — F03.91 DEMENTIA WITH BEHAVIORAL DISTURBANCE, UNSPECIFIED DEMENTIA TYPE: ICD-10-CM

## 2020-04-15 RX ORDER — MEMANTINE HYDROCHLORIDE 28 MG/1
1 CAPSULE, EXTENDED RELEASE ORAL DAILY
Qty: 90 CAPSULE | Refills: 3 | Status: SHIPPED | OUTPATIENT
Start: 2020-04-15 | End: 2021-03-04

## 2020-04-15 RX ORDER — DONEPEZIL HYDROCHLORIDE 5 MG/1
5 TABLET, FILM COATED ORAL EVERY MORNING
Qty: 90 TABLET | Refills: 3 | Status: SHIPPED | OUTPATIENT
Start: 2020-04-15 | End: 2021-03-04

## 2020-05-04 DIAGNOSIS — F25.9 CHRONIC SCHIZOAFFECTIVE DISORDER: ICD-10-CM

## 2020-05-04 RX ORDER — QUETIAPINE FUMARATE 200 MG/1
200 TABLET, FILM COATED ORAL NIGHTLY
Qty: 90 TABLET | Refills: 1 | OUTPATIENT
Start: 2020-05-04

## 2020-05-04 RX ORDER — DULOXETIN HYDROCHLORIDE 30 MG/1
30 CAPSULE, DELAYED RELEASE ORAL DAILY
Qty: 90 CAPSULE | Refills: 1 | OUTPATIENT
Start: 2020-05-04

## 2020-05-04 RX ORDER — BENZTROPINE MESYLATE 1 MG/1
TABLET ORAL
Qty: 180 TABLET | Refills: 1 | OUTPATIENT
Start: 2020-05-04

## 2020-05-07 DIAGNOSIS — F25.9 CHRONIC SCHIZOAFFECTIVE DISORDER: ICD-10-CM

## 2020-05-07 RX ORDER — HALOPERIDOL DECANOATE 100 MG/ML
100 INJECTION INTRAMUSCULAR
Qty: 1 ML | Refills: 1 | Status: SHIPPED | OUTPATIENT
Start: 2020-05-07 | End: 2020-06-01

## 2020-05-20 ENCOUNTER — TELEPHONE (OUTPATIENT)
Dept: NEUROLOGY | Facility: CLINIC | Age: 75
End: 2020-05-20

## 2020-05-20 NOTE — TELEPHONE ENCOUNTER
Spoke with Saniya ( w/[facility]) she was informed the pt 5/26 appt will need to be converted to a virtual visit through the portal due to no in person at this time. Saniya voiced understanding stating she will reschedule for a later date in person. appt rescheduled for 8/25 at 2:15. Mailed appt letter

## 2020-05-25 DIAGNOSIS — F25.9 CHRONIC SCHIZOAFFECTIVE DISORDER: ICD-10-CM

## 2020-05-25 RX ORDER — DULOXETIN HYDROCHLORIDE 30 MG/1
30 CAPSULE, DELAYED RELEASE ORAL DAILY
Qty: 90 CAPSULE | Refills: 1 | OUTPATIENT
Start: 2020-05-25

## 2020-05-25 RX ORDER — QUETIAPINE FUMARATE 200 MG/1
200 TABLET, FILM COATED ORAL NIGHTLY
Qty: 90 TABLET | Refills: 1 | OUTPATIENT
Start: 2020-05-25

## 2020-05-25 RX ORDER — BENZTROPINE MESYLATE 1 MG/1
TABLET ORAL
Qty: 180 TABLET | Refills: 1 | OUTPATIENT
Start: 2020-05-25

## 2020-07-20 RX ORDER — HALOPERIDOL DECANOATE 100 MG/ML
100 INJECTION INTRAMUSCULAR
Qty: 1 ML | Refills: 12 | Status: CANCELLED | OUTPATIENT
Start: 2020-07-20

## 2020-07-20 RX ORDER — HALOPERIDOL 5 MG/1
5 TABLET ORAL DAILY PRN
Qty: 30 TABLET | Refills: 0 | Status: CANCELLED | OUTPATIENT
Start: 2020-07-20

## 2020-07-23 ENCOUNTER — OFFICE VISIT (OUTPATIENT)
Dept: PSYCHIATRY | Facility: CLINIC | Age: 75
End: 2020-07-23
Payer: MEDICARE

## 2020-07-23 VITALS — HEIGHT: 62 IN | BODY MASS INDEX: 24.72 KG/M2 | RESPIRATION RATE: 16 BRPM

## 2020-07-23 DIAGNOSIS — H90.5 DEAFNESS CONGENITAL: ICD-10-CM

## 2020-07-23 DIAGNOSIS — F25.9 CHRONIC SCHIZOAFFECTIVE DISORDER: Primary | ICD-10-CM

## 2020-07-23 DIAGNOSIS — I10 ESSENTIAL HYPERTENSION: ICD-10-CM

## 2020-07-23 DIAGNOSIS — F79 INTELLECTUAL DISABILITY: ICD-10-CM

## 2020-07-23 DIAGNOSIS — E78.5 HYPERLIPIDEMIA, UNSPECIFIED HYPERLIPIDEMIA TYPE: ICD-10-CM

## 2020-07-23 DIAGNOSIS — F03.91 DEMENTIA WITH BEHAVIORAL DISTURBANCE, UNSPECIFIED DEMENTIA TYPE: ICD-10-CM

## 2020-07-23 DIAGNOSIS — E13.9 DIABETES MELLITUS DUE TO ABNORMAL INSULIN: ICD-10-CM

## 2020-07-23 PROCEDURE — 99214 PR OFFICE/OUTPT VISIT, EST, LEVL IV, 30-39 MIN: ICD-10-PCS | Mod: 95,,, | Performed by: PSYCHIATRY & NEUROLOGY

## 2020-07-23 PROCEDURE — 99214 OFFICE O/P EST MOD 30 MIN: CPT | Mod: 95,,, | Performed by: PSYCHIATRY & NEUROLOGY

## 2020-07-23 RX ORDER — HALOPERIDOL DECANOATE 50 MG/ML
50 INJECTION INTRAMUSCULAR
Qty: 1 ML | Refills: 11 | Status: SHIPPED | OUTPATIENT
Start: 2020-07-23 | End: 2020-12-14

## 2020-07-23 RX ORDER — DULOXETIN HYDROCHLORIDE 30 MG/1
30 CAPSULE, DELAYED RELEASE ORAL DAILY
Qty: 90 CAPSULE | Refills: 1 | Status: SHIPPED | OUTPATIENT
Start: 2020-07-23 | End: 2021-01-21

## 2020-07-23 RX ORDER — QUETIAPINE FUMARATE 200 MG/1
200 TABLET, FILM COATED ORAL NIGHTLY
Qty: 90 TABLET | Refills: 1 | Status: SHIPPED | OUTPATIENT
Start: 2020-07-23 | End: 2021-01-21

## 2020-07-23 RX ORDER — BENZTROPINE MESYLATE 1 MG/1
1 TABLET ORAL 2 TIMES DAILY
Qty: 180 TABLET | Refills: 1 | Status: SHIPPED | OUTPATIENT
Start: 2020-07-23 | End: 2021-01-21

## 2020-07-23 NOTE — PATIENT INSTRUCTIONS
Lower haldol decanoate to 50mg shot every two weeks  Otherwise continue medication regimen as prescribed

## 2020-07-23 NOTE — PROGRESS NOTES
The patient location is: Home at [facility], in [place]  The chief complaint leading to consultation is: mood disorder, memory loss    Visit type: audiovisual    Face to Face time with patient: 15 minutes  22 minutes of total time spent on the encounter, which includes face to face time and non-face to face time preparing to see the patient (eg, review of tests), Obtaining and/or reviewing separately obtained history, Documenting clinical information in the electronic or other health record, Independently interpreting results (not separately reported) and communicating results to the patient/family/caregiver, or Care coordination (not separately reported).         Each patient to whom he or she provides medical services by telemedicine is:  (1) informed of the relationship between the physician and patient and the respective role of any other health care provider with respect to management of the patient; and (2) notified that he or she may decline to receive medical services by telemedicine and may withdraw from such care at any time.      ESTABLISHED OUTPATIENT VISIT   E/M LEVEL 4: [id number]    ENCOUNTER DATE: 7/23/2020  SITE: [facility], [address]      HISTORY    CHIEF COMPLAINT   Lay Peres is a 74 y.o. female who presents for followup of   Chief Complaint   Patient presents with    Mood Disorder    Memory Deficits       HPI   (Elements: Location, Quality, Severity, Duration, Timing, Content, Modifying Factors, Associated Signs & Symptoms)    Patient with cogenital deafness, possible MR, chronic schizoaffective disorder, and now dementia, followed by me for disease maintenance.  She is seen with  today for follow up evaluation.  Per , patient's memory continues to slowly deteriorate.  No anger outbursts or paranoia noted.   denies patient is groggy, oversedated or parkinsonized.  However she does have some issues with her gait and is considered  a falls risk.  No depression.  Sleep disrupted at times.   No movement side effects.    I spoke with  today about risks/benefits of her psychiatric regimen - I have had similar discussions with sister in past.        19 - no change save worsening memory  3/20/19 - no change  8/15/18 - no major changes save worsening memory.  Moving to more supervised setting.  No med changes.  18 - no major changes save worsening memory.  No med changes.  Investigating moving into higher level of living - group home.  17 - no major changes save worsening memory.  No med changes.  16 - no major changes other than worsening memory.  No med changes.  16 - no major changes other than worsening memory.  No med changes.  8/26/15 - progressive short term memory loss but otherwise stable.  No med changes.  I discussed with sister psychopharmacologic approach.  Of note, this is longstanding regimen started prior to seeing me.  We discussed both of the followin. Usually multiple neuroleptics avoided.  In this case she is on two neuroleptics - haldol decanoate and seroquel.   2. There is risk for cardiovascular events with neuroleptics in dementia.  However patient with comorbid schizoaffective disorder that long predates dementia and is indication for neuroleptic.   Sister understands risks and benefits of keeping med regimen as it is vs adjusting it.  Sister would like to continue regimen as is - states in past when adjustments made patient has decompensated  requiring hospitalization.          ROS   Neurologic ROS: positive for - memory loss.  Negative for tremor, akathisia, or tardive dyskinesia   ROS: positive for urinary incontinence  GI ROS: no stool incontinence      PFSH  Past Medical History reviewed: Yes  Family History reviewed: Yes  Social History reviewed: No      PSYCHOTROPIC MEDICATIONS   Haldol dec 100mg IM q2 weeks - also Haldol 5mg prns (infrequent)  Seroquel 200mg  "bedtime  Cymbalta 30mg daily  Cogentin 1mg bid    Aricept  Namenda      EXAMINATION    VITALS   Most recent vital signs, dated less than 90 days prior to this appointment, were reviewed.   Vitals:    07/23/20 0951   Resp: 16       CONSTITUTIONAL  General Appearance: stated age, casually dressed    MUSCULOSKELETAL  Muscle Strength and Tone: no weakness or spasticity  Abnormal Involuntary Movements: no tremor noted.  No evidence of akathisia or tardive dyskinesia on my exam.  Gait and Station: ambulates but is unsteady per     PSYCHIATRIC   Level of Consciousness: alert  Orientation: to person and place only  Grooming: neat, intact  Psychomotor Behavior: no retardation or agitation  Speech: noted to be rambling, when queried does answer simple questions  Language: reads lips, some dysarthria - difficult to comprehend  Mood: "ok"  Affect: reactive, smiling, pleasant, euthymic.  No irritability noted.   Thought Process: perseverative, concrete  Associations: no loosening of associations  Thought Content: no PI.  Memory: impaired  Attention: +distractible  Fund of Knowledge: limited  Insight: impaired  Judgment: intact    MEDICAL DECISION MAKING    DIAGNOSES AND MANAGEMENT PLANS    New problem(s) (3 points each):   - none    Established problem(s), worsening (2 points each):   - none    Established problem(s), stable/improved (1 point each):  - schizoaffective disorder.  As she ages, I am concerned that she is at increasing risk for side effects with current regimen - we will attempt to find minimal effective dose.  Will lower haldol decanoate to 50mg IM q2 weeks.  Otherwise cont current med regimen.  Currently no significant side effects on regimen - cont to monitor.  - mental retardation.  Perhaps mild, IQ unknown.  Cont structured living program - doing well in this setting.  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  Complicates other diagnoses.  - dementia.  Follows with neurology.  She is on namenda and " aricept.  As dementia progresses, we will likely need to taper down psychotropics - we will begin this today.  - metabolic syndrome.  Will monitor given she is on atypical neuroleptics.  She is followed by Dr. Case.       PRESCRIPTION DRUG MANAGEMENT  Compliance: taking meds as prescribed  Side Effects: none reported  Regimen Adjustments: cont med regimen as prescribed.  [place] prescription monitoring site checked - no entries      DIAGNOSTIC TESTING  Follow cmp and fasting lipids on seroquel   9/19/19 - CMP, CBC, TSH, lipid panel, HgA1c 5.6 - reviewed    7/13/18 - MRI of head reviewed  8/8/18 - EKG QTc 428      Serg Cardenas

## 2020-07-31 ENCOUNTER — LAB VISIT (OUTPATIENT)
Dept: LAB | Facility: OTHER | Age: 75
End: 2020-07-31
Payer: MEDICARE

## 2020-07-31 DIAGNOSIS — Z20.822 SUSPECTED COVID-19 VIRUS INFECTION: ICD-10-CM

## 2020-07-31 DIAGNOSIS — Z03.818 ENCOUNTER FOR OBSERVATION FOR SUSPECTED EXPOSURE TO OTHER BIOLOGICAL AGENTS RULED OUT: ICD-10-CM

## 2020-07-31 PROCEDURE — U0003 INFECTIOUS AGENT DETECTION BY NUCLEIC ACID (DNA OR RNA); SEVERE ACUTE RESPIRATORY SYNDROME CORONAVIRUS 2 (SARS-COV-2) (CORONAVIRUS DISEASE [COVID-19]), AMPLIFIED PROBE TECHNIQUE, MAKING USE OF HIGH THROUGHPUT TECHNOLOGIES AS DESCRIBED BY CMS-2020-01-R: HCPCS | Mod: HCNC

## 2020-08-01 LAB — SARS-COV-2 RNA RESP QL NAA+PROBE: NOT DETECTED

## 2020-08-24 ENCOUNTER — TELEPHONE (OUTPATIENT)
Dept: NEUROLOGY | Facility: CLINIC | Age: 75
End: 2020-08-24

## 2020-08-24 NOTE — TELEPHONE ENCOUNTER
Spoke with Saniya (, Living Facility) she was informed due to the inclement weather appts are being converted to virtual visits. Saniya verbalized understanding rescheduling the appt for 10/8/2020 at 10:15.

## 2020-09-04 ENCOUNTER — LAB VISIT (OUTPATIENT)
Dept: LAB | Facility: OTHER | Age: 75
End: 2020-09-04
Attending: INTERNAL MEDICINE
Payer: MEDICARE

## 2020-09-04 DIAGNOSIS — Z03.818 ENCOUNTER FOR OBSERVATION FOR SUSPECTED EXPOSURE TO OTHER BIOLOGICAL AGENTS RULED OUT: ICD-10-CM

## 2020-09-04 PROCEDURE — U0003 INFECTIOUS AGENT DETECTION BY NUCLEIC ACID (DNA OR RNA); SEVERE ACUTE RESPIRATORY SYNDROME CORONAVIRUS 2 (SARS-COV-2) (CORONAVIRUS DISEASE [COVID-19]), AMPLIFIED PROBE TECHNIQUE, MAKING USE OF HIGH THROUGHPUT TECHNOLOGIES AS DESCRIBED BY CMS-2020-01-R: HCPCS | Mod: HCNC

## 2020-09-05 LAB — SARS-COV-2 RNA RESP QL NAA+PROBE: NOT DETECTED

## 2020-09-14 ENCOUNTER — PATIENT OUTREACH (OUTPATIENT)
Dept: ADMINISTRATIVE | Facility: HOSPITAL | Age: 75
End: 2020-09-14

## 2020-09-28 ENCOUNTER — IMMUNIZATION (OUTPATIENT)
Dept: PHARMACY | Facility: CLINIC | Age: 75
End: 2020-09-28

## 2020-09-28 ENCOUNTER — IMMUNIZATION (OUTPATIENT)
Dept: PHARMACY | Facility: CLINIC | Age: 75
End: 2020-09-28
Payer: MEDICARE

## 2020-09-28 ENCOUNTER — OFFICE VISIT (OUTPATIENT)
Dept: INTERNAL MEDICINE | Facility: CLINIC | Age: 75
End: 2020-09-28
Payer: MEDICARE

## 2020-09-28 ENCOUNTER — TELEPHONE (OUTPATIENT)
Dept: INTERNAL MEDICINE | Facility: CLINIC | Age: 75
End: 2020-09-28

## 2020-09-28 VITALS
OXYGEN SATURATION: 96 % | WEIGHT: 150.56 LBS | DIASTOLIC BLOOD PRESSURE: 60 MMHG | BODY MASS INDEX: 25.7 KG/M2 | HEIGHT: 64 IN | HEART RATE: 87 BPM | SYSTOLIC BLOOD PRESSURE: 120 MMHG

## 2020-09-28 DIAGNOSIS — I10 ESSENTIAL HYPERTENSION: ICD-10-CM

## 2020-09-28 DIAGNOSIS — Z00.00 ROUTINE GENERAL MEDICAL EXAMINATION AT A HEALTH CARE FACILITY: ICD-10-CM

## 2020-09-28 DIAGNOSIS — M81.0 OSTEOPOROSIS, UNSPECIFIED OSTEOPOROSIS TYPE, UNSPECIFIED PATHOLOGICAL FRACTURE PRESENCE: ICD-10-CM

## 2020-09-28 DIAGNOSIS — I50.32 CHRONIC DIASTOLIC HEART FAILURE: ICD-10-CM

## 2020-09-28 DIAGNOSIS — M17.11 PRIMARY OSTEOARTHRITIS OF RIGHT KNEE: Primary | ICD-10-CM

## 2020-09-28 DIAGNOSIS — F79 INTELLECTUAL DISABILITY: ICD-10-CM

## 2020-09-28 DIAGNOSIS — E13.9 DIABETES MELLITUS DUE TO ABNORMAL INSULIN: ICD-10-CM

## 2020-09-28 DIAGNOSIS — F03.91 DEMENTIA WITH BEHAVIORAL DISTURBANCE, UNSPECIFIED DEMENTIA TYPE: ICD-10-CM

## 2020-09-28 DIAGNOSIS — M54.89 OTHER BACK PAIN, UNSPECIFIED CHRONICITY: ICD-10-CM

## 2020-09-28 DIAGNOSIS — Z12.11 SCREENING FOR MALIGNANT NEOPLASM OF COLON: ICD-10-CM

## 2020-09-28 DIAGNOSIS — K59.09 CHRONIC CONSTIPATION: ICD-10-CM

## 2020-09-28 DIAGNOSIS — Z86.73 HISTORY OF STROKE: ICD-10-CM

## 2020-09-28 DIAGNOSIS — E78.5 HYPERLIPIDEMIA, UNSPECIFIED HYPERLIPIDEMIA TYPE: ICD-10-CM

## 2020-09-28 PROCEDURE — 99999 PR PBB SHADOW E&M-EST. PATIENT-LVL III: CPT | Mod: PBBFAC,HCNC,, | Performed by: INTERNAL MEDICINE

## 2020-09-28 PROCEDURE — 3074F SYST BP LT 130 MM HG: CPT | Mod: HCNC,CPTII,S$GLB, | Performed by: INTERNAL MEDICINE

## 2020-09-28 PROCEDURE — 3078F DIAST BP <80 MM HG: CPT | Mod: HCNC,CPTII,S$GLB, | Performed by: INTERNAL MEDICINE

## 2020-09-28 PROCEDURE — 99499 UNLISTED E&M SERVICE: CPT | Mod: HCNC,S$GLB,, | Performed by: INTERNAL MEDICINE

## 2020-09-28 PROCEDURE — 99397 PR PREVENTIVE VISIT,EST,65 & OVER: ICD-10-PCS | Mod: 25,HCNC,S$GLB, | Performed by: INTERNAL MEDICINE

## 2020-09-28 PROCEDURE — 3074F PR MOST RECENT SYSTOLIC BLOOD PRESSURE < 130 MM HG: ICD-10-PCS | Mod: HCNC,CPTII,S$GLB, | Performed by: INTERNAL MEDICINE

## 2020-09-28 PROCEDURE — 3078F PR MOST RECENT DIASTOLIC BLOOD PRESSURE < 80 MM HG: ICD-10-PCS | Mod: HCNC,CPTII,S$GLB, | Performed by: INTERNAL MEDICINE

## 2020-09-28 PROCEDURE — 99999 PR PBB SHADOW E&M-EST. PATIENT-LVL III: ICD-10-PCS | Mod: PBBFAC,HCNC,, | Performed by: INTERNAL MEDICINE

## 2020-09-28 PROCEDURE — 99397 PER PM REEVAL EST PAT 65+ YR: CPT | Mod: 25,HCNC,S$GLB, | Performed by: INTERNAL MEDICINE

## 2020-09-28 PROCEDURE — 99499 RISK ADDL DX/OHS AUDIT: ICD-10-PCS | Mod: HCNC,S$GLB,, | Performed by: INTERNAL MEDICINE

## 2020-09-28 RX ORDER — ASPIRIN 325 MG
325 TABLET, DELAYED RELEASE (ENTERIC COATED) ORAL DAILY
Refills: 0 | COMMUNITY
Start: 2020-09-28 | End: 2022-04-25

## 2020-09-28 NOTE — TELEPHONE ENCOUNTER
----- Message from Bk Avila sent at 9/28/2020  2:14 PM CDT -----  Regarding: mammo orders        The Pt would like for you to send her Mammo order over and contact the Pt after so that she will know to call us back to schedule the appt.    Phone # [phone] (ask for Saniya when the order is in)

## 2020-09-28 NOTE — TELEPHONE ENCOUNTER
----- Message from Bk Avila sent at 9/28/2020  2:14 PM CDT -----  Regarding: mammo orders        The Pt would like for you to send her Mammo order over and contact the Pt after so that she will know to call us back to schedule the appt.    Phone # [phone] (ask for Saniya when the order is in)

## 2020-09-28 NOTE — PROGRESS NOTES
CHIEF COMPLAINT: Annual exam and follow up mental handicapped, mood, diabetes.      HISTORY OF PRESENT ILLNESS: This is a 74-year-old woman who presents with Estrellita her  for follow up.     She continues to have right knee pain - she had an injection in the right knee in February 2020 which helped. She is unsteady on her feet. She has not had any falls. She needs a rollator.      She presented to the ED on 7/13/18 due to dizziness and inability to walk.  She was hospitalized from 7/13/18 to 7/16/18 due to a UTI and a Punctate acute infarct in the left lateral frontal lobe on MRI brain.  She followed up with neurology 8/16/19. She is currently tunde ing aspirin 325 mg daily and crestor 20 mg daily. She has been doing well. No other signs of stroke     On 7/23/20 , Her haldol injection has been reduced from 100 mg to 50 mg IM every 2 weeks.  She had one episode of night terrors. She would not sleep in her room that night.   The episode has not happened again. Mood is ok. No other apparent hallucinations.She continues to see Dr Cardenas in psychiatry. She continues to take Seroquel 200 mg at bedtime and Cogentin 1 mg twce daily and Cymbalta 30 mg daily.      She saw neurology on 11/14/19 (to see 10/8/20). Short term memory has been slightly worse. .  She continues to take Aricept 5 mg daily and Namenda XR 28 mg daily.         Blood sugars have been doing well. She is checking blood sugars daily. She continues to take metformin 500 mg twice daily. No hypoglycemia. NO diarrhea. Weight is stable       Blood pressure has been well controlled on lisinopril 40 mg daily.  No cough, joint pain, muscle pain, chest pain, shortness of breath, palpitaitons      No heartburn on Pepcid 40 mg daily. No nausea, vomiting, constipation, diarrhea.       She denies back pain today. . She continues to take tylenol 500 mg 2 tablets twice daily, gabapentin 300 mg at bedtime. She sleeps well.                  Past Medical History     "  Diagnosis    Date          Hypertension    7/13/2012          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    7/13/2012          Hyperlipidemia    7/13/2012          Congenital deafness    7/13/2012          Schizoaffective disorder, chronic condition    7/13/2012          Major depression    7/13/2012          Chronic constipation    7/13/2012          Neck pain    7/13/2012          Back pain    7/13/2012          Mild mental retardation (I.Q. 50-70)    7/13/2012      MEDICATIONS AND ALLERGIES: Per [facility].       PHYSICAL EXAMINATION:     /60   Pulse 87   Ht 5' 4" (1.626 m)   Wt 68.3 kg (150 lb 9.2 oz)   SpO2 96%   BMI 25.85 kg/m²     GENERAL: She is alert, oriented, no apparent distress. Affect within normal limits.    Conjunctiva anicteric. Tympanic membranes clear. Oropharynx clear.    NECK: Supple.    Respiratory: Effort normal. Lungs are clear to auscultation.    HEART: Regular rate and rhythm without murmurs, gallops or rubs.    No lower extremity edema.  ABDOMEN: soft, non distended, non tender, bowel sounds present, no hepatosplenomgaly  BREAST: no abn seen, no nodules palpated, no axillary lad    Knees without effusion or warmth. Some clicking of the right knee    Protective Sensation (w/ 10 gram monofilament):  Right: Intact  Left: Intact    Visual Inspection:  Normal -  Bilateral    Pedal Pulses:   Right: Present  Left: Present    Posterior tibialis:   Right:Present  Left: Present  \               ASSESSMENT AND PLAN:      Annual exam - discussed diet, exercise and safety issues.    1. Right knee pain - home health physical therapy. Back to ortho for injection  2. Acute stroke - modifying risk factors  3. Memory loss /dementia- on Aricept and Namenda XR 28 mg daily  4. Diabetes mellitus - stable   5. Back pain -stable  6. Hypertension - controlled.    7. Hyperlipidemia - stable    8. Reflux - controlled.    9. Schizoaffective disorder with memory issues - follow up with neurology and " psychiatry    10. Screening - mammogram 2/19 - ordered Colonoscopy family has declined colonoscopy. Normal bone density 09/2011.ordered           I will see her back in 2 weeks at [place], sooner if problems arise.

## 2020-09-29 ENCOUNTER — LAB VISIT (OUTPATIENT)
Dept: LAB | Facility: HOSPITAL | Age: 75
End: 2020-09-29
Attending: INTERNAL MEDICINE
Payer: MEDICARE

## 2020-09-29 DIAGNOSIS — E13.9 MATURITY ONSET DIABETES MELLITUS IN YOUNG: ICD-10-CM

## 2020-09-29 DIAGNOSIS — E53.8 BIOTIN-(PROPIONYL-COA-CARBOXYLASE) LIGASE DEFICIENCY: ICD-10-CM

## 2020-09-29 DIAGNOSIS — E55.9 AVITAMINOSIS D: Primary | ICD-10-CM

## 2020-09-29 LAB
25(OH)D3+25(OH)D2 SERPL-MCNC: 47 NG/ML (ref 30–96)
ALBUMIN SERPL BCP-MCNC: 3.9 G/DL (ref 3.5–5.2)
ALBUMIN/CREAT UR: 11.9 UG/MG (ref 0–30)
ALP SERPL-CCNC: 69 U/L (ref 55–135)
ALT SERPL W/O P-5'-P-CCNC: 14 U/L (ref 10–44)
ANION GAP SERPL CALC-SCNC: 11 MMOL/L (ref 8–16)
AST SERPL-CCNC: 19 U/L (ref 10–40)
BASOPHILS # BLD AUTO: 0.07 K/UL (ref 0–0.2)
BASOPHILS NFR BLD: 0.7 % (ref 0–1.9)
BILIRUB SERPL-MCNC: 0.2 MG/DL (ref 0.1–1)
BUN SERPL-MCNC: 21 MG/DL (ref 8–23)
CALCIUM SERPL-MCNC: 9.9 MG/DL (ref 8.7–10.5)
CHLORIDE SERPL-SCNC: 106 MMOL/L (ref 95–110)
CHOLEST SERPL-MCNC: 104 MG/DL (ref 120–199)
CHOLEST/HDLC SERPL: 3.1 {RATIO} (ref 2–5)
CO2 SERPL-SCNC: 28 MMOL/L (ref 23–29)
CREAT SERPL-MCNC: 0.8 MG/DL (ref 0.5–1.4)
CREAT UR-MCNC: 59 MG/DL (ref 15–325)
DIFFERENTIAL METHOD: ABNORMAL
EOSINOPHIL # BLD AUTO: 0.4 K/UL (ref 0–0.5)
EOSINOPHIL NFR BLD: 3.6 % (ref 0–8)
ERYTHROCYTE [DISTWIDTH] IN BLOOD BY AUTOMATED COUNT: 13.6 % (ref 11.5–14.5)
EST. GFR  (AFRICAN AMERICAN): >60 ML/MIN/1.73 M^2
EST. GFR  (NON AFRICAN AMERICAN): >60 ML/MIN/1.73 M^2
ESTIMATED AVG GLUCOSE: 114 MG/DL (ref 68–131)
GLUCOSE SERPL-MCNC: 110 MG/DL (ref 70–110)
HBA1C MFR BLD HPLC: 5.6 % (ref 4–5.6)
HCT VFR BLD AUTO: 40.2 % (ref 37–48.5)
HDLC SERPL-MCNC: 34 MG/DL (ref 40–75)
HDLC SERPL: 32.7 % (ref 20–50)
HGB BLD-MCNC: 12.4 G/DL (ref 12–16)
IMM GRANULOCYTES # BLD AUTO: 0.03 K/UL (ref 0–0.04)
IMM GRANULOCYTES NFR BLD AUTO: 0.3 % (ref 0–0.5)
LDLC SERPL CALC-MCNC: 27.6 MG/DL (ref 63–159)
LYMPHOCYTES # BLD AUTO: 2.4 K/UL (ref 1–4.8)
LYMPHOCYTES NFR BLD: 23.1 % (ref 18–48)
MCH RBC QN AUTO: 28.1 PG (ref 27–31)
MCHC RBC AUTO-ENTMCNC: 30.8 G/DL (ref 32–36)
MCV RBC AUTO: 91 FL (ref 82–98)
MICROALBUMIN UR DL<=1MG/L-MCNC: 7 UG/ML
MONOCYTES # BLD AUTO: 1 K/UL (ref 0.3–1)
MONOCYTES NFR BLD: 9.2 % (ref 4–15)
NEUTROPHILS # BLD AUTO: 6.6 K/UL (ref 1.8–7.7)
NEUTROPHILS NFR BLD: 63.1 % (ref 38–73)
NONHDLC SERPL-MCNC: 70 MG/DL
NRBC BLD-RTO: 0 /100 WBC
PLATELET # BLD AUTO: 273 K/UL (ref 150–350)
PMV BLD AUTO: 10.5 FL (ref 9.2–12.9)
POTASSIUM SERPL-SCNC: 4.1 MMOL/L (ref 3.5–5.1)
PROT SERPL-MCNC: 7.7 G/DL (ref 6–8.4)
RBC # BLD AUTO: 4.42 M/UL (ref 4–5.4)
SODIUM SERPL-SCNC: 145 MMOL/L (ref 136–145)
TRIGL SERPL-MCNC: 212 MG/DL (ref 30–150)
TSH SERPL DL<=0.005 MIU/L-ACNC: 0.76 UIU/ML (ref 0.4–4)
VIT B12 SERPL-MCNC: 309 PG/ML (ref 210–950)
WBC # BLD AUTO: 10.44 K/UL (ref 3.9–12.7)

## 2020-09-29 PROCEDURE — 80053 COMPREHEN METABOLIC PANEL: CPT | Mod: HCNC

## 2020-09-29 PROCEDURE — 85025 COMPLETE CBC W/AUTO DIFF WBC: CPT | Mod: HCNC

## 2020-09-29 PROCEDURE — 80061 LIPID PANEL: CPT | Mod: HCNC

## 2020-09-29 PROCEDURE — 82306 VITAMIN D 25 HYDROXY: CPT | Mod: HCNC

## 2020-09-29 PROCEDURE — 82043 UR ALBUMIN QUANTITATIVE: CPT | Mod: HCNC

## 2020-09-29 PROCEDURE — 83036 HEMOGLOBIN GLYCOSYLATED A1C: CPT | Mod: HCNC

## 2020-09-29 PROCEDURE — 36415 COLL VENOUS BLD VENIPUNCTURE: CPT | Mod: HCNC

## 2020-09-29 PROCEDURE — 84443 ASSAY THYROID STIM HORMONE: CPT | Mod: HCNC

## 2020-09-29 PROCEDURE — 82607 VITAMIN B-12: CPT | Mod: HCNC

## 2020-10-01 PROCEDURE — G0180 PR HOME HEALTH MD CERTIFICATION: ICD-10-PCS | Mod: ,,, | Performed by: INTERNAL MEDICINE

## 2020-10-01 PROCEDURE — G0180 MD CERTIFICATION HHA PATIENT: HCPCS | Mod: ,,, | Performed by: INTERNAL MEDICINE

## 2020-10-02 ENCOUNTER — TELEPHONE (OUTPATIENT)
Dept: INTERNAL MEDICINE | Facility: CLINIC | Age: 75
End: 2020-10-02

## 2020-10-04 ENCOUNTER — TELEPHONE (OUTPATIENT)
Dept: INTERNAL MEDICINE | Facility: CLINIC | Age: 75
End: 2020-10-04

## 2020-10-04 DIAGNOSIS — Z12.11 SCREENING FOR MALIGNANT NEOPLASM OF COLON: Primary | ICD-10-CM

## 2020-10-05 ENCOUNTER — HOSPITAL ENCOUNTER (OUTPATIENT)
Dept: RADIOLOGY | Facility: CLINIC | Age: 75
Discharge: HOME | End: 2020-10-05
Attending: INTERNAL MEDICINE
Payer: MEDICARE

## 2020-10-05 ENCOUNTER — PATIENT OUTREACH (OUTPATIENT)
Dept: ADMINISTRATIVE | Facility: OTHER | Age: 75
End: 2020-10-05

## 2020-10-05 ENCOUNTER — DOCUMENTATION ONLY (OUTPATIENT)
Dept: INTERNAL MEDICINE | Facility: CLINIC | Age: 75
End: 2020-10-05

## 2020-10-05 ENCOUNTER — HOSPITAL ENCOUNTER (OUTPATIENT)
Dept: RADIOLOGY | Facility: HOSPITAL | Age: 75
Discharge: HOME | End: 2020-10-05
Attending: INTERNAL MEDICINE
Payer: MEDICARE

## 2020-10-05 DIAGNOSIS — Z12.31 ENCOUNTER FOR SCREENING MAMMOGRAM FOR MALIGNANT NEOPLASM OF BREAST: ICD-10-CM

## 2020-10-05 DIAGNOSIS — M81.0 OSTEOPOROSIS, UNSPECIFIED OSTEOPOROSIS TYPE, UNSPECIFIED PATHOLOGICAL FRACTURE PRESENCE: ICD-10-CM

## 2020-10-05 PROCEDURE — 77080 DEXA BONE DENSITY SPINE HIP: ICD-10-PCS | Mod: 26,HCNC,, | Performed by: INTERNAL MEDICINE

## 2020-10-05 PROCEDURE — 77080 DXA BONE DENSITY AXIAL: CPT | Mod: 26,HCNC,, | Performed by: INTERNAL MEDICINE

## 2020-10-05 PROCEDURE — 77063 BREAST TOMOSYNTHESIS BI: CPT | Mod: 26,HCNC,, | Performed by: RADIOLOGY

## 2020-10-05 PROCEDURE — 77067 MAMMO DIGITAL SCREENING BILAT WITH TOMO: ICD-10-PCS | Mod: 26,HCNC,, | Performed by: RADIOLOGY

## 2020-10-05 PROCEDURE — 77067 SCR MAMMO BI INCL CAD: CPT | Mod: TC,HCNC

## 2020-10-05 PROCEDURE — 77080 DXA BONE DENSITY AXIAL: CPT | Mod: TC,HCNC

## 2020-10-05 PROCEDURE — 77063 MAMMO DIGITAL SCREENING BILAT WITH TOMO: ICD-10-PCS | Mod: 26,HCNC,, | Performed by: RADIOLOGY

## 2020-10-05 PROCEDURE — 77067 SCR MAMMO BI INCL CAD: CPT | Mod: 26,HCNC,, | Performed by: RADIOLOGY

## 2020-10-05 NOTE — PROGRESS NOTES
Requested updates within Care Everywhere.  Patient's chart was reviewed for overdue AWILDA topics.  Immunizations reconciled.    Orders placed:n/a  Tasked appts:n/a  Labs Linked:n/a

## 2020-10-08 ENCOUNTER — OFFICE VISIT (OUTPATIENT)
Dept: NEUROLOGY | Facility: CLINIC | Age: 75
End: 2020-10-08
Payer: MEDICARE

## 2020-10-08 DIAGNOSIS — F25.9 CHRONIC SCHIZOAFFECTIVE DISORDER: ICD-10-CM

## 2020-10-08 DIAGNOSIS — F03.91 DEMENTIA WITH BEHAVIORAL DISTURBANCE, UNSPECIFIED DEMENTIA TYPE: Primary | ICD-10-CM

## 2020-10-08 DIAGNOSIS — F79 INTELLECTUAL DISABILITY: ICD-10-CM

## 2020-10-08 DIAGNOSIS — H90.5 DEAFNESS CONGENITAL: ICD-10-CM

## 2020-10-08 PROCEDURE — 99999 PR PBB SHADOW E&M-EST. PATIENT-LVL I: CPT | Mod: PBBFAC,HCNC,, | Performed by: NURSE PRACTITIONER

## 2020-10-08 PROCEDURE — 99212 PR OFFICE/OUTPT VISIT, EST, LEVL II, 10-19 MIN: ICD-10-PCS | Mod: HCNC,S$GLB,, | Performed by: NURSE PRACTITIONER

## 2020-10-08 PROCEDURE — 1159F MED LIST DOCD IN RCRD: CPT | Mod: HCNC,S$GLB,, | Performed by: NURSE PRACTITIONER

## 2020-10-08 PROCEDURE — 1159F PR MEDICATION LIST DOCUMENTED IN MEDICAL RECORD: ICD-10-PCS | Mod: HCNC,S$GLB,, | Performed by: NURSE PRACTITIONER

## 2020-10-08 PROCEDURE — 99212 OFFICE O/P EST SF 10 MIN: CPT | Mod: HCNC,S$GLB,, | Performed by: NURSE PRACTITIONER

## 2020-10-08 PROCEDURE — 99999 PR PBB SHADOW E&M-EST. PATIENT-LVL I: ICD-10-PCS | Mod: PBBFAC,HCNC,, | Performed by: NURSE PRACTITIONER

## 2020-10-08 PROCEDURE — 99499 UNLISTED E&M SERVICE: CPT | Mod: S$GLB,,, | Performed by: NURSE PRACTITIONER

## 2020-10-08 PROCEDURE — 1101F PR PT FALLS ASSESS DOC 0-1 FALLS W/OUT INJ PAST YR: ICD-10-PCS | Mod: HCNC,CPTII,S$GLB, | Performed by: NURSE PRACTITIONER

## 2020-10-08 PROCEDURE — 1101F PT FALLS ASSESS-DOCD LE1/YR: CPT | Mod: HCNC,CPTII,S$GLB, | Performed by: NURSE PRACTITIONER

## 2020-10-08 PROCEDURE — 99499 RISK ADDL DX/OHS AUDIT: ICD-10-PCS | Mod: S$GLB,,, | Performed by: NURSE PRACTITIONER

## 2020-10-08 NOTE — PROGRESS NOTES
"Name: Lay Peres  MRN: [id number]   CSN: [id number]      Date: 10-8-2020      Referring physician:  No referring provider defined for this encounter.    Subjective:      Chief Complaint: Follow up     History of Present Illness (HPI):    Lay Peres is a 75 y.o.  female with deafness, HTN, HLD and stroke (L MCA ), chronic schizoaffective disroder, intellectual disability who presents today for a follow-up evaluation of Dementia and is accompanied by nursing attendant (Saniya). She is a resident of [facility]. Last seen in office by me 19. At that time, meds cont without change.  She was previously followed by Dr. Walters.      She says she feels "fine."  Saniya says they tell her she is doing fine, no issues or problems. Mood good, no behavior issues.      Lay says her mother .   She pulls at her mask and says to open mouth (she reads lips and cannot see my mouth with mask on- switched to face shield). She smiles.     When brought up, she talks and laughs about Roey.         Review of Systems   Unable to perform ROS: Dementia       Past Medical History: The patient  has a past medical history of Back pain (2012), Chronic constipation (2012), Congenital deafness (2012), Deaf, 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 (2012), Hyperlipidemia (2012), Hypertension (2012), Macular degeneration, Major depression (2012), Mild mental retardation (I.Q. 50-70) (2012), Neck pain (2012), Paranoid schizophrenia, and Schizoaffective disorder, chronic condition (2012).    Social History: The patient  reports that she has never smoked. She has never used smokeless tobacco. She reports that she does not drink alcohol or use drugs.    Family History: Their family history includes Depression in her brother and sister; Suicide in her brother.    Allergies: Patient has no known allergies.     Meds:   Current Outpatient Medications on File " Prior to Visit   Medication Sig Dispense Refill    acetaminophen (TYLENOL) 500 MG tablet Take 1,000 mg by mouth 2 (two) times daily.      aspirin (ECOTRIN) 325 MG EC tablet Take 1 tablet (325 mg total) by mouth once daily.  0    benztropine (COGENTIN) 1 MG tablet Take 1 tablet (1 mg total) by mouth 2 (two) times daily. 180 tablet 1    blood sugar diagnostic Strp 1 strip by Misc.(Non-Drug; Combo Route) route once daily. 100 strip 4    cholecalciferol, vitamin D3, (VITAMIN D3) 1,000 unit capsule Take 1 capsule by mouth Daily.      donepeziL (ARICEPT) 5 MG tablet Take 1 tablet (5 mg total) by mouth every morning. To be taken with food 90 tablet 3    DULoxetine (CYMBALTA) 30 MG capsule Take 1 capsule (30 mg total) by mouth once daily. 90 capsule 1    famotidine (PEPCID) 40 MG tablet TAKE 1 TABLET BY MOUTH EVERY EVENING AT 8PM 30 tablet 6    ferrous gluconate (FERGON) 324 MG tablet Take 324 mg by mouth every other day.      gabapentin (NEURONTIN) 300 MG capsule TAKE 1 TABLET (300MG) BY MOUTH AT BEDTIME at 8pm 30 capsule 6    haloperidol (HALDOL) 5 MG tablet Take 1 tablet (5 mg total) by mouth daily as needed (agitation/paranoia). (Patient not taking: Reported on 9/28/2020) 30 tablet 0    haloperidol decanoate (HALDOL DECANOATE) 50 mg/mL injection Inject 1 mL (50 mg total) into the muscle every 14 (fourteen) days. 1 mL 11    lisinopril (PRINIVIL,ZESTRIL) 40 MG tablet TAKE 1 TABLET BY MOUTH once daily 30 tablet 11    memantine (NAMENDA XR) 28 mg CSpX Take 1 capsule (28 mg total) by mouth once daily. 90 capsule 3    metformin (GLUCOPHAGE) 500 MG tablet Take 1 tablet (500 mg total) by mouth 2 (two) times daily with meals. 180 tablet 4    multivitamin capsule Take 1 capsule by mouth once daily.      nystatin (MYCOSTATIN) ointment Apply topically 2 (two) times daily as needed.       QUEtiapine (SEROQUEL) 200 MG Tab Take 1 tablet (200 mg total) by mouth every evening. 90 tablet 1    rosuvastatin (CRESTOR) 20  MG tablet TAKE 1 TABLET BY MOUTH once daily 30 tablet 6    senna (SENNA) 8.6 mg tablet Take 1 tablet by mouth Twice daily.       No current facility-administered medications on file prior to visit.        Objective:     Physical Exam:      Constitutional  Well-developed, well-nourished, appears stated age   Cardiovascular  no LE edema bilaterally     Awake, alert, pleasant and cooperative.   Talks often but most words diff to understand.  RR equal and unlabored.   Face symmetric.   EOMI.  Hearing impaired.  No tremor.   No rigidity.   No bradykinesia.   Gait normal and steady.      Laboratory Results:  Lab Visit on 09/29/2020   Component Date Value Ref Range Status    Microalbum.,U,Random 09/29/2020 7.0  ug/mL Final    Creatinine, Random Ur 09/29/2020 59.0  15.0 - 325.0 mg/dL Final    Microalb Creat Ratio 09/29/2020 11.9  0.0 - 30.0 ug/mg Final    WBC 09/29/2020 10.44  3.90 - 12.70 K/uL Final    RBC 09/29/2020 4.42  4.00 - 5.40 M/uL Final    Hemoglobin 09/29/2020 12.4  12.0 - 16.0 g/dL Final    Hematocrit 09/29/2020 40.2  37.0 - 48.5 % Final    Mean Corpuscular Volume 09/29/2020 91  82 - 98 fL Final    Mean Corpuscular Hemoglobin 09/29/2020 28.1  27.0 - 31.0 pg Final    Mean Corpuscular Hemoglobin Conc 09/29/2020 30.8* 32.0 - 36.0 g/dL Final    RDW 09/29/2020 13.6  11.5 - 14.5 % Final    Platelets 09/29/2020 273  150 - 350 K/uL Final    MPV 09/29/2020 10.5  9.2 - 12.9 fL Final    Immature Granulocytes 09/29/2020 0.3  0.0 - 0.5 % Final    Gran # (ANC) 09/29/2020 6.6  1.8 - 7.7 K/uL Final    Immature Grans (Abs) 09/29/2020 0.03  0.00 - 0.04 K/uL Final    Lymph # 09/29/2020 2.4  1.0 - 4.8 K/uL Final    Mono # 09/29/2020 1.0  0.3 - 1.0 K/uL Final    Eos # 09/29/2020 0.4  0.0 - 0.5 K/uL Final    Baso # 09/29/2020 0.07  0.00 - 0.20 K/uL Final    nRBC 09/29/2020 0  0 /100 WBC Final    Gran% 09/29/2020 63.1  38.0 - 73.0 % Final    Lymph% 09/29/2020 23.1  18.0 - 48.0 % Final    Mono% 09/29/2020 9.2   4.0 - 15.0 % Final    Eosinophil% 09/29/2020 3.6  0.0 - 8.0 % Final    Basophil% 09/29/2020 0.7  0.0 - 1.9 % Final    Differential Method 09/29/2020 Automated   Final    Sodium 09/29/2020 145  136 - 145 mmol/L Final    Potassium 09/29/2020 4.1  3.5 - 5.1 mmol/L Final    Chloride 09/29/2020 106  95 - 110 mmol/L Final    CO2 09/29/2020 28  23 - 29 mmol/L Final    Glucose 09/29/2020 110  70 - 110 mg/dL Final    BUN, Bld 09/29/2020 21  8 - 23 mg/dL Final    Creatinine 09/29/2020 0.8  0.5 - 1.4 mg/dL Final    Calcium 09/29/2020 9.9  8.7 - 10.5 mg/dL Final    Total Protein 09/29/2020 7.7  6.0 - 8.4 g/dL Final    Albumin 09/29/2020 3.9  3.5 - 5.2 g/dL Final    Total Bilirubin 09/29/2020 0.2  0.1 - 1.0 mg/dL Final    Alkaline Phosphatase 09/29/2020 69  55 - 135 U/L Final    AST 09/29/2020 19  10 - 40 U/L Final    ALT 09/29/2020 14  10 - 44 U/L Final    Anion Gap 09/29/2020 11  8 - 16 mmol/L Final    eGFR if African American 09/29/2020 >60.0  >60 mL/min/1.73 m^2 Final    eGFR if non African American 09/29/2020 >60.0  >60 mL/min/1.73 m^2 Final    Hemoglobin A1C 09/29/2020 5.6  4.0 - 5.6 % Final    Estimated Avg Glucose 09/29/2020 114  68 - 131 mg/dL Final    Cholesterol 09/29/2020 104* 120 - 199 mg/dL Final    Triglycerides 09/29/2020 212* 30 - 150 mg/dL Final    HDL 09/29/2020 34* 40 - 75 mg/dL Final    LDL Cholesterol 09/29/2020 27.6* 63.0 - 159.0 mg/dL Final    Hdl/Cholesterol Ratio 09/29/2020 32.7  20.0 - 50.0 % Final    Total Cholesterol/HDL Ratio 09/29/2020 3.1  2.0 - 5.0 Final    Non-HDL Cholesterol 09/29/2020 70  mg/dL Final    TSH 09/29/2020 0.756  0.400 - 4.000 uIU/mL Final    Vitamin B-12 09/29/2020 309  210 - 950 pg/mL Final    Vit D, 25-Hydroxy 09/29/2020 47  30 - 96 ng/mL Final   Lab Visit on 09/04/2020   Component Date Value Ref Range Status    SARS-CoV2 (COVID-19) Qualitative P* 09/04/2020 Not Detected  Not Detected Final   Lab Visit on 07/31/2020   Component Date Value Ref  Range Status    SARS-CoV2 (COVID-19) Qualitative P* 07/31/2020 Not Detected  Not Detected Final       Imaging:     Imaging:   Independent review of images:               Impression         No hemorrhage, infarct, or extra-axial fluid collection.    Additional findings reflective of moderate chronic microvascular ischemic disease as above.    Electronically signed by resident: Vicente Bernal MD  Date: 11/16/2018        Assessment and Plan     Dementia with behavioral disturbance, unspecified dementia type    Mental retardation    Deafness congenital    Chronic schizoaffective disorder        Medical Decision Making:    Per nursing attendant that accompanies her today, she was told by staff that Lay is doing well, no issues at this time.   Continue same without change for now.  Call for problems or questions.   Follow up one year, unless needed sooner.       Brooke Whitten, FROILAN, NP-C  Division of Movement and Memory Disorders  [facility]  [phone]

## 2020-10-09 ENCOUNTER — LAB VISIT (OUTPATIENT)
Dept: LAB | Facility: OTHER | Age: 75
End: 2020-10-09
Attending: INTERNAL MEDICINE
Payer: MEDICARE

## 2020-10-09 DIAGNOSIS — Z03.818 ENCOUNTER FOR OBSERVATION FOR SUSPECTED EXPOSURE TO OTHER BIOLOGICAL AGENTS RULED OUT: ICD-10-CM

## 2020-10-09 PROCEDURE — U0003 INFECTIOUS AGENT DETECTION BY NUCLEIC ACID (DNA OR RNA); SEVERE ACUTE RESPIRATORY SYNDROME CORONAVIRUS 2 (SARS-COV-2) (CORONAVIRUS DISEASE [COVID-19]), AMPLIFIED PROBE TECHNIQUE, MAKING USE OF HIGH THROUGHPUT TECHNOLOGIES AS DESCRIBED BY CMS-2020-01-R: HCPCS | Mod: HCNC

## 2020-10-10 LAB — SARS-COV-2 RNA RESP QL NAA+PROBE: NOT DETECTED

## 2020-10-11 DIAGNOSIS — M47.817 SPONDYLOSIS WITHOUT MYELOPATHY OR RADICULOPATHY, LUMBOSACRAL REGION: ICD-10-CM

## 2020-10-20 ENCOUNTER — HOSPITAL ENCOUNTER (OUTPATIENT)
Dept: RADIOLOGY | Facility: HOSPITAL | Age: 75
Discharge: HOME | End: 2020-10-20
Attending: INTERNAL MEDICINE
Payer: MEDICARE

## 2020-10-20 DIAGNOSIS — M47.817 SPONDYLOSIS WITHOUT MYELOPATHY OR RADICULOPATHY, LUMBOSACRAL REGION: ICD-10-CM

## 2020-10-20 PROCEDURE — 72100 X-RAY EXAM L-S SPINE 2/3 VWS: CPT | Mod: TC,HCNC

## 2020-10-20 PROCEDURE — 72100 X-RAY EXAM L-S SPINE 2/3 VWS: CPT | Mod: 26,HCNC,, | Performed by: RADIOLOGY

## 2020-10-20 PROCEDURE — 72100 XR LUMBAR SPINE AP AND LATERAL: ICD-10-PCS | Mod: 26,HCNC,, | Performed by: RADIOLOGY

## 2020-10-22 ENCOUNTER — EXTERNAL HOME HEALTH (OUTPATIENT)
Dept: HOME HEALTH SERVICES | Facility: HOSPITAL | Age: 75
End: 2020-10-22
Payer: MEDICARE

## 2020-10-27 ENCOUNTER — IMMUNIZATION (OUTPATIENT)
Dept: INTERNAL MEDICINE | Facility: CLINIC | Age: 75
End: 2020-10-27
Payer: MEDICARE

## 2020-10-27 PROCEDURE — 90694 VACC AIIV4 NO PRSRV 0.5ML IM: CPT | Mod: HCNC,S$GLB,, | Performed by: INTERNAL MEDICINE

## 2020-10-27 PROCEDURE — G0008 FLU VACCINE - QUADRIVALENT - ADJUVANTED: ICD-10-PCS | Mod: HCNC,S$GLB,, | Performed by: INTERNAL MEDICINE

## 2020-10-27 PROCEDURE — 90694 FLU VACCINE - QUADRIVALENT - ADJUVANTED: ICD-10-PCS | Mod: HCNC,S$GLB,, | Performed by: INTERNAL MEDICINE

## 2020-10-27 PROCEDURE — G0008 ADMIN INFLUENZA VIRUS VAC: HCPCS | Mod: HCNC,S$GLB,, | Performed by: INTERNAL MEDICINE

## 2020-11-18 ENCOUNTER — LAB VISIT (OUTPATIENT)
Dept: LAB | Facility: OTHER | Age: 75
End: 2020-11-18
Payer: MEDICARE

## 2020-11-18 DIAGNOSIS — Z03.818 ENCOUNTER FOR OBSERVATION FOR SUSPECTED EXPOSURE TO OTHER BIOLOGICAL AGENTS RULED OUT: ICD-10-CM

## 2020-11-18 PROCEDURE — U0003 INFECTIOUS AGENT DETECTION BY NUCLEIC ACID (DNA OR RNA); SEVERE ACUTE RESPIRATORY SYNDROME CORONAVIRUS 2 (SARS-COV-2) (CORONAVIRUS DISEASE [COVID-19]), AMPLIFIED PROBE TECHNIQUE, MAKING USE OF HIGH THROUGHPUT TECHNOLOGIES AS DESCRIBED BY CMS-2020-01-R: HCPCS | Mod: HCNC

## 2020-11-21 LAB — SARS-COV-2 RNA RESP QL NAA+PROBE: NORMAL

## 2020-11-28 DIAGNOSIS — Z91.89 AT INCREASED RISK OF EXPOSURE TO COVID-19 VIRUS: Primary | ICD-10-CM

## 2020-11-30 ENCOUNTER — LAB VISIT (OUTPATIENT)
Dept: INTERNAL MEDICINE | Facility: CLINIC | Age: 75
End: 2020-11-30
Payer: MEDICARE

## 2020-11-30 DIAGNOSIS — Z91.89 AT INCREASED RISK OF EXPOSURE TO COVID-19 VIRUS: ICD-10-CM

## 2020-11-30 PROCEDURE — U0003 INFECTIOUS AGENT DETECTION BY NUCLEIC ACID (DNA OR RNA); SEVERE ACUTE RESPIRATORY SYNDROME CORONAVIRUS 2 (SARS-COV-2) (CORONAVIRUS DISEASE [COVID-19]), AMPLIFIED PROBE TECHNIQUE, MAKING USE OF HIGH THROUGHPUT TECHNOLOGIES AS DESCRIBED BY CMS-2020-01-R: HCPCS | Mod: HCNC

## 2020-12-01 DIAGNOSIS — U07.1 COVID-19 VIRUS DETECTED: ICD-10-CM

## 2020-12-01 LAB — SARS-COV-2 RNA RESP QL NAA+PROBE: DETECTED

## 2020-12-04 ENCOUNTER — LAB VISIT (OUTPATIENT)
Dept: LAB | Facility: OTHER | Age: 75
End: 2020-12-04
Payer: MEDICARE

## 2020-12-04 DIAGNOSIS — Z03.818 ENCOUNTER FOR OBSERVATION FOR SUSPECTED EXPOSURE TO OTHER BIOLOGICAL AGENTS RULED OUT: ICD-10-CM

## 2020-12-04 PROCEDURE — U0003 INFECTIOUS AGENT DETECTION BY NUCLEIC ACID (DNA OR RNA); SEVERE ACUTE RESPIRATORY SYNDROME CORONAVIRUS 2 (SARS-COV-2) (CORONAVIRUS DISEASE [COVID-19]), AMPLIFIED PROBE TECHNIQUE, MAKING USE OF HIGH THROUGHPUT TECHNOLOGIES AS DESCRIBED BY CMS-2020-01-R: HCPCS | Mod: HCNC

## 2020-12-08 LAB — SARS-COV-2 RNA RESP QL NAA+PROBE: NOT DETECTED

## 2020-12-15 ENCOUNTER — LAB VISIT (OUTPATIENT)
Dept: LAB | Facility: OTHER | Age: 75
End: 2020-12-15
Payer: MEDICARE

## 2020-12-15 DIAGNOSIS — Z03.818 ENCOUNTER FOR OBSERVATION FOR SUSPECTED EXPOSURE TO OTHER BIOLOGICAL AGENTS RULED OUT: ICD-10-CM

## 2020-12-15 PROCEDURE — U0003 INFECTIOUS AGENT DETECTION BY NUCLEIC ACID (DNA OR RNA); SEVERE ACUTE RESPIRATORY SYNDROME CORONAVIRUS 2 (SARS-COV-2) (CORONAVIRUS DISEASE [COVID-19]), AMPLIFIED PROBE TECHNIQUE, MAKING USE OF HIGH THROUGHPUT TECHNOLOGIES AS DESCRIBED BY CMS-2020-01-R: HCPCS | Mod: HCNC

## 2020-12-17 LAB — SARS-COV-2 RNA RESP QL NAA+PROBE: NOT DETECTED

## 2021-01-01 NOTE — NURSING
Patient febrile overnight, restless and c/o pain. PRN tylenol and seroquel given with no change in behavior. Bladder scan <100mL. Faye with stroke team notified; pyridium ordered. Will continue to monitor.    There are no Wet Read(s) to document.

## 2021-01-22 ENCOUNTER — LAB VISIT (OUTPATIENT)
Dept: LAB | Facility: OTHER | Age: 76
End: 2021-01-22
Payer: MEDICARE

## 2021-01-22 DIAGNOSIS — Z20.822 ENCOUNTER FOR LABORATORY TESTING FOR COVID-19 VIRUS: ICD-10-CM

## 2021-01-22 PROCEDURE — U0003 INFECTIOUS AGENT DETECTION BY NUCLEIC ACID (DNA OR RNA); SEVERE ACUTE RESPIRATORY SYNDROME CORONAVIRUS 2 (SARS-COV-2) (CORONAVIRUS DISEASE [COVID-19]), AMPLIFIED PROBE TECHNIQUE, MAKING USE OF HIGH THROUGHPUT TECHNOLOGIES AS DESCRIBED BY CMS-2020-01-R: HCPCS | Mod: HCNC

## 2021-01-24 LAB — SARS-COV-2 RNA RESP QL NAA+PROBE: NOT DETECTED

## 2021-02-04 ENCOUNTER — HOSPITAL ENCOUNTER (EMERGENCY)
Facility: HOSPITAL | Age: 76
Discharge: HOME | End: 2021-02-04
Attending: EMERGENCY MEDICINE
Payer: MEDICARE

## 2021-02-04 VITALS
BODY MASS INDEX: 29.02 KG/M2 | RESPIRATION RATE: 20 BRPM | SYSTOLIC BLOOD PRESSURE: 150 MMHG | OXYGEN SATURATION: 100 % | HEART RATE: 75 BPM | DIASTOLIC BLOOD PRESSURE: 61 MMHG | HEIGHT: 64 IN | WEIGHT: 170 LBS | TEMPERATURE: 98 F

## 2021-02-04 DIAGNOSIS — R07.9 CHEST PAIN: ICD-10-CM

## 2021-02-04 LAB
ALBUMIN SERPL BCP-MCNC: 3.6 G/DL (ref 3.5–5.2)
ALP SERPL-CCNC: 74 U/L (ref 55–135)
ALT SERPL W/O P-5'-P-CCNC: 13 U/L (ref 10–44)
ANION GAP SERPL CALC-SCNC: 10 MMOL/L (ref 8–16)
AST SERPL-CCNC: 28 U/L (ref 10–40)
BASOPHILS # BLD AUTO: 0.06 K/UL (ref 0–0.2)
BASOPHILS NFR BLD: 0.6 % (ref 0–1.9)
BILIRUB DIRECT SERPL-MCNC: 0.1 MG/DL (ref 0.1–0.3)
BILIRUB SERPL-MCNC: 0.3 MG/DL (ref 0.1–1)
BILIRUB UR QL STRIP: NEGATIVE
BUN SERPL-MCNC: 19 MG/DL (ref 8–23)
CALCIUM SERPL-MCNC: 9.2 MG/DL (ref 8.7–10.5)
CHLORIDE SERPL-SCNC: 107 MMOL/L (ref 95–110)
CLARITY UR REFRACT.AUTO: CLEAR
CO2 SERPL-SCNC: 25 MMOL/L (ref 23–29)
COLOR UR AUTO: YELLOW
CREAT SERPL-MCNC: 0.8 MG/DL (ref 0.5–1.4)
CTP QC/QA: YES
DIFFERENTIAL METHOD: ABNORMAL
EOSINOPHIL # BLD AUTO: 0.4 K/UL (ref 0–0.5)
EOSINOPHIL NFR BLD: 4 % (ref 0–8)
ERYTHROCYTE [DISTWIDTH] IN BLOOD BY AUTOMATED COUNT: 14.5 % (ref 11.5–14.5)
EST. GFR  (AFRICAN AMERICAN): >60 ML/MIN/1.73 M^2
EST. GFR  (NON AFRICAN AMERICAN): >60 ML/MIN/1.73 M^2
GLUCOSE SERPL-MCNC: 95 MG/DL (ref 70–110)
GLUCOSE UR QL STRIP: NEGATIVE
HCT VFR BLD AUTO: 37.3 % (ref 37–48.5)
HCV AB SERPL QL IA: NEGATIVE
HGB BLD-MCNC: 11.4 G/DL (ref 12–16)
HGB UR QL STRIP: NEGATIVE
IMM GRANULOCYTES # BLD AUTO: 0.02 K/UL (ref 0–0.04)
IMM GRANULOCYTES NFR BLD AUTO: 0.2 % (ref 0–0.5)
KETONES UR QL STRIP: NEGATIVE
LEUKOCYTE ESTERASE UR QL STRIP: NEGATIVE
LIPASE SERPL-CCNC: 15 U/L (ref 4–60)
LYMPHOCYTES # BLD AUTO: 3.3 K/UL (ref 1–4.8)
LYMPHOCYTES NFR BLD: 32.9 % (ref 18–48)
MAGNESIUM SERPL-MCNC: 1.7 MG/DL (ref 1.6–2.6)
MCH RBC QN AUTO: 27.1 PG (ref 27–31)
MCHC RBC AUTO-ENTMCNC: 30.6 G/DL (ref 32–36)
MCV RBC AUTO: 89 FL (ref 82–98)
MICROSCOPIC COMMENT: NORMAL
MONOCYTES # BLD AUTO: 0.9 K/UL (ref 0.3–1)
MONOCYTES NFR BLD: 9.1 % (ref 4–15)
NEUTROPHILS # BLD AUTO: 5.4 K/UL (ref 1.8–7.7)
NEUTROPHILS NFR BLD: 53.2 % (ref 38–73)
NITRITE UR QL STRIP: NEGATIVE
NRBC BLD-RTO: 0 /100 WBC
PH UR STRIP: 6 [PH] (ref 5–8)
PLATELET # BLD AUTO: 312 K/UL (ref 150–350)
PMV BLD AUTO: 10.7 FL (ref 9.2–12.9)
POTASSIUM SERPL-SCNC: 4.7 MMOL/L (ref 3.5–5.1)
PROT SERPL-MCNC: 7.8 G/DL (ref 6–8.4)
PROT UR QL STRIP: NEGATIVE
RBC # BLD AUTO: 4.2 M/UL (ref 4–5.4)
RBC #/AREA URNS AUTO: 0 /HPF (ref 0–4)
SARS-COV-2 RDRP RESP QL NAA+PROBE: NEGATIVE
SODIUM SERPL-SCNC: 142 MMOL/L (ref 136–145)
SP GR UR STRIP: 1.01 (ref 1–1.03)
SQUAMOUS #/AREA URNS AUTO: 0 /HPF
TROPONIN I SERPL DL<=0.01 NG/ML-MCNC: 0.01 NG/ML (ref 0–0.03)
URN SPEC COLLECT METH UR: NORMAL
WBC # BLD AUTO: 10.14 K/UL (ref 3.9–12.7)
WBC #/AREA URNS AUTO: 1 /HPF (ref 0–5)

## 2021-02-04 PROCEDURE — 93010 ELECTROCARDIOGRAM REPORT: CPT | Mod: ,,, | Performed by: INTERNAL MEDICINE

## 2021-02-04 PROCEDURE — 99285 EMERGENCY DEPT VISIT HI MDM: CPT | Mod: 25

## 2021-02-04 PROCEDURE — 85025 COMPLETE CBC W/AUTO DIFF WBC: CPT

## 2021-02-04 PROCEDURE — 93010 EKG 12-LEAD: ICD-10-PCS | Mod: ,,, | Performed by: INTERNAL MEDICINE

## 2021-02-04 PROCEDURE — 86803 HEPATITIS C AB TEST: CPT

## 2021-02-04 PROCEDURE — 83735 ASSAY OF MAGNESIUM: CPT

## 2021-02-04 PROCEDURE — 84484 ASSAY OF TROPONIN QUANT: CPT

## 2021-02-04 PROCEDURE — 83690 ASSAY OF LIPASE: CPT

## 2021-02-04 PROCEDURE — 93005 ELECTROCARDIOGRAM TRACING: CPT

## 2021-02-04 PROCEDURE — 80076 HEPATIC FUNCTION PANEL: CPT

## 2021-02-04 PROCEDURE — 99284 EMERGENCY DEPT VISIT MOD MDM: CPT | Mod: ,,, | Performed by: EMERGENCY MEDICINE

## 2021-02-04 PROCEDURE — 81001 URINALYSIS AUTO W/SCOPE: CPT

## 2021-02-04 PROCEDURE — 99284 PR EMERGENCY DEPT VISIT,LEVEL IV: ICD-10-PCS | Mod: ,,, | Performed by: EMERGENCY MEDICINE

## 2021-02-04 PROCEDURE — U0002 COVID-19 LAB TEST NON-CDC: HCPCS | Performed by: EMERGENCY MEDICINE

## 2021-02-04 PROCEDURE — 80048 BASIC METABOLIC PNL TOTAL CA: CPT

## 2021-02-04 RX ORDER — ASPIRIN 325 MG
325 TABLET ORAL
Status: CANCELLED | OUTPATIENT
Start: 2021-02-04 | End: 2021-02-04

## 2021-02-08 NOTE — ASSESSMENT & PLAN NOTE
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 There is not mass effect associated with it. We will continue to monitor the patients clinical exam for any worsening of symptoms which may indicate expansion of the stroke or the area of the edema resulting in the clinical change. 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    [employer]       Patent Foramen Ovale   AMBULATORY CARE:   [id number] [facility]) Rollins Medical Soluitons , Inc  or [facility] [employer]   The above information is an  only  It is not intended as medical advice for individual conditions or treatments  Talk to your doctor, nurse or pharmacist before following any medical regimen to see if it is safe and effective for you

## 2021-02-25 ENCOUNTER — LAB VISIT (OUTPATIENT)
Dept: LAB | Facility: OTHER | Age: 76
End: 2021-02-25
Payer: MEDICARE

## 2021-02-25 DIAGNOSIS — Z20.822 ENCOUNTER FOR LABORATORY TESTING FOR COVID-19 VIRUS: ICD-10-CM

## 2021-02-25 PROCEDURE — U0003 INFECTIOUS AGENT DETECTION BY NUCLEIC ACID (DNA OR RNA); SEVERE ACUTE RESPIRATORY SYNDROME CORONAVIRUS 2 (SARS-COV-2) (CORONAVIRUS DISEASE [COVID-19]), AMPLIFIED PROBE TECHNIQUE, MAKING USE OF HIGH THROUGHPUT TECHNOLOGIES AS DESCRIBED BY CMS-2020-01-R: HCPCS

## 2021-02-26 LAB — SARS-COV-2 RNA RESP QL NAA+PROBE: NOT DETECTED

## 2021-03-05 ENCOUNTER — PATIENT MESSAGE (OUTPATIENT)
Dept: OPTOMETRY | Facility: CLINIC | Age: 76
End: 2021-03-05

## 2021-03-19 ENCOUNTER — LAB VISIT (OUTPATIENT)
Dept: LAB | Facility: OTHER | Age: 76
End: 2021-03-19
Payer: MEDICARE

## 2021-03-19 DIAGNOSIS — Z20.822 ENCOUNTER FOR LABORATORY TESTING FOR COVID-19 VIRUS: ICD-10-CM

## 2021-03-19 PROCEDURE — U0003 INFECTIOUS AGENT DETECTION BY NUCLEIC ACID (DNA OR RNA); SEVERE ACUTE RESPIRATORY SYNDROME CORONAVIRUS 2 (SARS-COV-2) (CORONAVIRUS DISEASE [COVID-19]), AMPLIFIED PROBE TECHNIQUE, MAKING USE OF HIGH THROUGHPUT TECHNOLOGIES AS DESCRIBED BY CMS-2020-01-R: HCPCS | Performed by: NURSE PRACTITIONER

## 2021-03-20 LAB — SARS-COV-2 RNA RESP QL NAA+PROBE: NOT DETECTED

## 2021-04-15 ENCOUNTER — DOCUMENTATION ONLY (OUTPATIENT)
Dept: PSYCHIATRY | Facility: CLINIC | Age: 76
End: 2021-04-15

## 2021-04-16 ENCOUNTER — LAB VISIT (OUTPATIENT)
Dept: LAB | Facility: OTHER | Age: 76
End: 2021-04-16
Payer: MEDICARE

## 2021-04-16 DIAGNOSIS — Z20.822 ENCOUNTER FOR LABORATORY TESTING FOR COVID-19 VIRUS: ICD-10-CM

## 2021-04-16 PROCEDURE — U0003 INFECTIOUS AGENT DETECTION BY NUCLEIC ACID (DNA OR RNA); SEVERE ACUTE RESPIRATORY SYNDROME CORONAVIRUS 2 (SARS-COV-2) (CORONAVIRUS DISEASE [COVID-19]), AMPLIFIED PROBE TECHNIQUE, MAKING USE OF HIGH THROUGHPUT TECHNOLOGIES AS DESCRIBED BY CMS-2020-01-R: HCPCS | Performed by: NURSE PRACTITIONER

## 2021-04-17 LAB — SARS-COV-2 RNA RESP QL NAA+PROBE: NOT DETECTED

## 2021-05-07 ENCOUNTER — LAB VISIT (OUTPATIENT)
Dept: LAB | Facility: OTHER | Age: 76
End: 2021-05-07
Payer: MEDICARE

## 2021-05-07 DIAGNOSIS — Z20.822 ENCOUNTER FOR LABORATORY TESTING FOR COVID-19 VIRUS: ICD-10-CM

## 2021-05-07 PROCEDURE — U0003 INFECTIOUS AGENT DETECTION BY NUCLEIC ACID (DNA OR RNA); SEVERE ACUTE RESPIRATORY SYNDROME CORONAVIRUS 2 (SARS-COV-2) (CORONAVIRUS DISEASE [COVID-19]), AMPLIFIED PROBE TECHNIQUE, MAKING USE OF HIGH THROUGHPUT TECHNOLOGIES AS DESCRIBED BY CMS-2020-01-R: HCPCS | Performed by: NURSE PRACTITIONER

## 2021-05-08 ENCOUNTER — PATIENT OUTREACH (OUTPATIENT)
Dept: ADMINISTRATIVE | Facility: OTHER | Age: 76
End: 2021-05-08

## 2021-05-08 DIAGNOSIS — E13.9 DIABETES MELLITUS DUE TO ABNORMAL INSULIN: Primary | ICD-10-CM

## 2021-05-08 LAB — SARS-COV-2 RNA RESP QL NAA+PROBE: NOT DETECTED

## 2021-05-11 ENCOUNTER — OFFICE VISIT (OUTPATIENT)
Dept: OTOLARYNGOLOGY | Facility: CLINIC | Age: 76
End: 2021-05-11
Payer: MEDICARE

## 2021-05-11 VITALS — HEART RATE: 81 BPM | SYSTOLIC BLOOD PRESSURE: 132 MMHG | DIASTOLIC BLOOD PRESSURE: 81 MMHG

## 2021-05-11 DIAGNOSIS — H61.22 IMPACTED CERUMEN OF LEFT EAR: Primary | ICD-10-CM

## 2021-05-11 PROCEDURE — 99499 UNLISTED E&M SERVICE: CPT | Mod: S$GLB,,, | Performed by: NURSE PRACTITIONER

## 2021-05-11 PROCEDURE — 3288F FALL RISK ASSESSMENT DOCD: CPT | Mod: CPTII,S$GLB,, | Performed by: NURSE PRACTITIONER

## 2021-05-11 PROCEDURE — 1126F AMNT PAIN NOTED NONE PRSNT: CPT | Mod: S$GLB,,, | Performed by: NURSE PRACTITIONER

## 2021-05-11 PROCEDURE — 99999 PR PBB SHADOW E&M-EST. PATIENT-LVL III: ICD-10-PCS | Mod: PBBFAC,,, | Performed by: NURSE PRACTITIONER

## 2021-05-11 PROCEDURE — 69210 EAR CERUMEN REMOVAL: ICD-10-PCS | Mod: S$GLB,,, | Performed by: NURSE PRACTITIONER

## 2021-05-11 PROCEDURE — 99499 NO LOS: ICD-10-PCS | Mod: S$GLB,,, | Performed by: NURSE PRACTITIONER

## 2021-05-11 PROCEDURE — 1101F PT FALLS ASSESS-DOCD LE1/YR: CPT | Mod: CPTII,S$GLB,, | Performed by: NURSE PRACTITIONER

## 2021-05-11 PROCEDURE — 1126F PR PAIN SEVERITY QUANTIFIED, NO PAIN PRESENT: ICD-10-PCS | Mod: S$GLB,,, | Performed by: NURSE PRACTITIONER

## 2021-05-11 PROCEDURE — 3288F PR FALLS RISK ASSESSMENT DOCUMENTED: ICD-10-PCS | Mod: CPTII,S$GLB,, | Performed by: NURSE PRACTITIONER

## 2021-05-11 PROCEDURE — 99999 PR PBB SHADOW E&M-EST. PATIENT-LVL III: CPT | Mod: PBBFAC,,, | Performed by: NURSE PRACTITIONER

## 2021-05-11 PROCEDURE — 1101F PR PT FALLS ASSESS DOC 0-1 FALLS W/OUT INJ PAST YR: ICD-10-PCS | Mod: CPTII,S$GLB,, | Performed by: NURSE PRACTITIONER

## 2021-05-11 PROCEDURE — 69210 REMOVE IMPACTED EAR WAX UNI: CPT | Mod: S$GLB,,, | Performed by: NURSE PRACTITIONER

## 2021-06-09 DIAGNOSIS — F25.9 CHRONIC SCHIZOAFFECTIVE DISORDER: ICD-10-CM

## 2021-06-11 RX ORDER — BENZTROPINE MESYLATE 1 MG/1
TABLET ORAL
Qty: 60 TABLET | Refills: 0 | Status: SHIPPED | OUTPATIENT
Start: 2021-06-11 | End: 2021-07-08 | Stop reason: SDUPTHER

## 2021-06-11 RX ORDER — DULOXETIN HYDROCHLORIDE 30 MG/1
30 CAPSULE, DELAYED RELEASE ORAL DAILY
Qty: 30 CAPSULE | Refills: 0 | Status: SHIPPED | OUTPATIENT
Start: 2021-06-11 | End: 2021-07-08 | Stop reason: SDUPTHER

## 2021-06-11 RX ORDER — QUETIAPINE FUMARATE 200 MG/1
200 TABLET, FILM COATED ORAL NIGHTLY
Qty: 30 TABLET | Refills: 0 | Status: SHIPPED | OUTPATIENT
Start: 2021-06-11 | End: 2021-07-08 | Stop reason: SDUPTHER

## 2021-06-18 ENCOUNTER — LAB VISIT (OUTPATIENT)
Dept: LAB | Facility: OTHER | Age: 76
End: 2021-06-18
Payer: MEDICARE

## 2021-06-18 DIAGNOSIS — Z20.822 ENCOUNTER FOR LABORATORY TESTING FOR COVID-19 VIRUS: ICD-10-CM

## 2021-06-18 PROCEDURE — U0003 INFECTIOUS AGENT DETECTION BY NUCLEIC ACID (DNA OR RNA); SEVERE ACUTE RESPIRATORY SYNDROME CORONAVIRUS 2 (SARS-COV-2) (CORONAVIRUS DISEASE [COVID-19]), AMPLIFIED PROBE TECHNIQUE, MAKING USE OF HIGH THROUGHPUT TECHNOLOGIES AS DESCRIBED BY CMS-2020-01-R: HCPCS | Performed by: NURSE PRACTITIONER

## 2021-06-20 LAB — SARS-COV-2 RNA RESP QL NAA+PROBE: NOT DETECTED

## 2021-07-02 ENCOUNTER — LAB VISIT (OUTPATIENT)
Dept: LAB | Facility: OTHER | Age: 76
End: 2021-07-02
Payer: MEDICARE

## 2021-07-02 DIAGNOSIS — Z20.822 ENCOUNTER FOR LABORATORY TESTING FOR COVID-19 VIRUS: ICD-10-CM

## 2021-07-02 PROCEDURE — U0003 INFECTIOUS AGENT DETECTION BY NUCLEIC ACID (DNA OR RNA); SEVERE ACUTE RESPIRATORY SYNDROME CORONAVIRUS 2 (SARS-COV-2) (CORONAVIRUS DISEASE [COVID-19]), AMPLIFIED PROBE TECHNIQUE, MAKING USE OF HIGH THROUGHPUT TECHNOLOGIES AS DESCRIBED BY CMS-2020-01-R: HCPCS | Performed by: NURSE PRACTITIONER

## 2021-07-03 LAB — SARS-COV-2 RNA RESP QL NAA+PROBE: NOT DETECTED

## 2021-07-08 ENCOUNTER — OFFICE VISIT (OUTPATIENT)
Dept: PSYCHIATRY | Facility: CLINIC | Age: 76
End: 2021-07-08
Payer: MEDICARE

## 2021-07-08 VITALS
WEIGHT: 156.06 LBS | DIASTOLIC BLOOD PRESSURE: 95 MMHG | HEART RATE: 84 BPM | BODY MASS INDEX: 26.79 KG/M2 | SYSTOLIC BLOOD PRESSURE: 143 MMHG

## 2021-07-08 DIAGNOSIS — I10 ESSENTIAL HYPERTENSION: ICD-10-CM

## 2021-07-08 DIAGNOSIS — F79 INTELLECTUAL DISABILITY: ICD-10-CM

## 2021-07-08 DIAGNOSIS — E13.9 DIABETES MELLITUS DUE TO ABNORMAL INSULIN: ICD-10-CM

## 2021-07-08 DIAGNOSIS — H90.5 DEAFNESS CONGENITAL: ICD-10-CM

## 2021-07-08 DIAGNOSIS — F25.9 CHRONIC SCHIZOAFFECTIVE DISORDER: Primary | ICD-10-CM

## 2021-07-08 DIAGNOSIS — F03.91 DEMENTIA WITH BEHAVIORAL DISTURBANCE, UNSPECIFIED DEMENTIA TYPE: ICD-10-CM

## 2021-07-08 DIAGNOSIS — E78.5 HYPERLIPIDEMIA, UNSPECIFIED HYPERLIPIDEMIA TYPE: ICD-10-CM

## 2021-07-08 PROCEDURE — 1159F MED LIST DOCD IN RCRD: CPT | Mod: S$GLB,,, | Performed by: PSYCHIATRY & NEUROLOGY

## 2021-07-08 PROCEDURE — 1159F PR MEDICATION LIST DOCUMENTED IN MEDICAL RECORD: ICD-10-PCS | Mod: S$GLB,,, | Performed by: PSYCHIATRY & NEUROLOGY

## 2021-07-08 PROCEDURE — 99214 PR OFFICE/OUTPT VISIT, EST, LEVL IV, 30-39 MIN: ICD-10-PCS | Mod: S$GLB,,, | Performed by: PSYCHIATRY & NEUROLOGY

## 2021-07-08 PROCEDURE — 99999 PR PBB SHADOW E&M-EST. PATIENT-LVL II: ICD-10-PCS | Mod: PBBFAC,,, | Performed by: PSYCHIATRY & NEUROLOGY

## 2021-07-08 PROCEDURE — 99499 RISK ADDL DX/OHS AUDIT: ICD-10-PCS | Mod: S$GLB,,, | Performed by: PSYCHIATRY & NEUROLOGY

## 2021-07-08 PROCEDURE — 99499 UNLISTED E&M SERVICE: CPT | Mod: S$GLB,,, | Performed by: PSYCHIATRY & NEUROLOGY

## 2021-07-08 PROCEDURE — 99999 PR PBB SHADOW E&M-EST. PATIENT-LVL II: CPT | Mod: PBBFAC,,, | Performed by: PSYCHIATRY & NEUROLOGY

## 2021-07-08 PROCEDURE — 99214 OFFICE O/P EST MOD 30 MIN: CPT | Mod: S$GLB,,, | Performed by: PSYCHIATRY & NEUROLOGY

## 2021-07-08 RX ORDER — BENZTROPINE MESYLATE 1 MG/1
1 TABLET ORAL 2 TIMES DAILY
Qty: 60 TABLET | Refills: 6 | Status: SHIPPED | OUTPATIENT
Start: 2021-07-08 | End: 2022-01-11 | Stop reason: SDUPTHER

## 2021-07-08 RX ORDER — QUETIAPINE FUMARATE 200 MG/1
200 TABLET, FILM COATED ORAL NIGHTLY
Qty: 30 TABLET | Refills: 6 | Status: SHIPPED | OUTPATIENT
Start: 2021-07-08 | End: 2022-01-11 | Stop reason: SDUPTHER

## 2021-07-08 RX ORDER — HALOPERIDOL DECANOATE 50 MG/ML
25 INJECTION INTRAMUSCULAR
Qty: 0.5 ML | Refills: 12 | Status: SHIPPED | OUTPATIENT
Start: 2021-07-08 | End: 2021-11-15

## 2021-07-08 RX ORDER — DULOXETIN HYDROCHLORIDE 30 MG/1
30 CAPSULE, DELAYED RELEASE ORAL DAILY
Qty: 30 CAPSULE | Refills: 6 | Status: SHIPPED | OUTPATIENT
Start: 2021-07-08 | End: 2022-01-11 | Stop reason: SDUPTHER

## 2021-07-23 ENCOUNTER — LAB VISIT (OUTPATIENT)
Dept: LAB | Facility: OTHER | Age: 76
End: 2021-07-23
Payer: MEDICARE

## 2021-07-23 DIAGNOSIS — Z20.822 ENCOUNTER FOR LABORATORY TESTING FOR COVID-19 VIRUS: ICD-10-CM

## 2021-07-23 PROCEDURE — U0003 INFECTIOUS AGENT DETECTION BY NUCLEIC ACID (DNA OR RNA); SEVERE ACUTE RESPIRATORY SYNDROME CORONAVIRUS 2 (SARS-COV-2) (CORONAVIRUS DISEASE [COVID-19]), AMPLIFIED PROBE TECHNIQUE, MAKING USE OF HIGH THROUGHPUT TECHNOLOGIES AS DESCRIBED BY CMS-2020-01-R: HCPCS | Performed by: NURSE PRACTITIONER

## 2021-07-26 LAB
SARS-COV-2 RNA RESP QL NAA+PROBE: NOT DETECTED
SARS-COV-2- CYCLE NUMBER: -1

## 2021-08-03 ENCOUNTER — PATIENT MESSAGE (OUTPATIENT)
Dept: ADMINISTRATIVE | Facility: HOSPITAL | Age: 76
End: 2021-08-03

## 2021-08-06 ENCOUNTER — LAB VISIT (OUTPATIENT)
Dept: LAB | Facility: OTHER | Age: 76
End: 2021-08-06
Attending: INTERNAL MEDICINE
Payer: MEDICARE

## 2021-08-06 DIAGNOSIS — Z20.822 ENCOUNTER FOR LABORATORY TESTING FOR COVID-19 VIRUS: ICD-10-CM

## 2021-08-06 PROCEDURE — U0003 INFECTIOUS AGENT DETECTION BY NUCLEIC ACID (DNA OR RNA); SEVERE ACUTE RESPIRATORY SYNDROME CORONAVIRUS 2 (SARS-COV-2) (CORONAVIRUS DISEASE [COVID-19]), AMPLIFIED PROBE TECHNIQUE, MAKING USE OF HIGH THROUGHPUT TECHNOLOGIES AS DESCRIBED BY CMS-2020-01-R: HCPCS | Performed by: NURSE PRACTITIONER

## 2021-08-10 LAB
SARS-COV-2 RNA RESP QL NAA+PROBE: NOT DETECTED
SARS-COV-2- CYCLE NUMBER: -1

## 2021-08-23 ENCOUNTER — HOSPITAL ENCOUNTER (EMERGENCY)
Facility: HOSPITAL | Age: 76
Discharge: HOME | End: 2021-08-23
Attending: EMERGENCY MEDICINE
Payer: MEDICARE

## 2021-08-23 VITALS
TEMPERATURE: 99 F | DIASTOLIC BLOOD PRESSURE: 65 MMHG | RESPIRATION RATE: 20 BRPM | HEART RATE: 70 BPM | HEIGHT: 64 IN | WEIGHT: 145 LBS | BODY MASS INDEX: 24.75 KG/M2 | SYSTOLIC BLOOD PRESSURE: 148 MMHG | OXYGEN SATURATION: 97 %

## 2021-08-23 DIAGNOSIS — S80.02XA CONTUSION OF LEFT KNEE, INITIAL ENCOUNTER: ICD-10-CM
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
## 2021-08-23 DIAGNOSIS — S00.83XA CONTUSION OF FACE, INITIAL ENCOUNTER: Primary | ICD-10-CM

## 2021-08-23 PROCEDURE — 99284 PR EMERGENCY DEPT VISIT,LEVEL IV: ICD-10-PCS | Mod: ,,, | Performed by: EMERGENCY MEDICINE

## 2021-08-23 PROCEDURE — 99284 EMERGENCY DEPT VISIT MOD MDM: CPT | Mod: ,,, | Performed by: EMERGENCY MEDICINE

## 2021-08-23 PROCEDURE — 99284 EMERGENCY DEPT VISIT MOD MDM: CPT

## 2021-08-26 ENCOUNTER — LAB VISIT (OUTPATIENT)
Dept: LAB | Facility: OTHER | Age: 76
End: 2021-08-26
Payer: MEDICARE

## 2021-08-26 DIAGNOSIS — Z20.822 ENCOUNTER FOR LABORATORY TESTING FOR COVID-19 VIRUS: ICD-10-CM

## 2021-08-26 PROCEDURE — U0003 INFECTIOUS AGENT DETECTION BY NUCLEIC ACID (DNA OR RNA); SEVERE ACUTE RESPIRATORY SYNDROME CORONAVIRUS 2 (SARS-COV-2) (CORONAVIRUS DISEASE [COVID-19]), AMPLIFIED PROBE TECHNIQUE, MAKING USE OF HIGH THROUGHPUT TECHNOLOGIES AS DESCRIBED BY CMS-2020-01-R: HCPCS | Performed by: NURSE PRACTITIONER

## 2021-08-28 LAB
SARS-COV-2 RNA RESP QL NAA+PROBE: NOT DETECTED
SARS-COV-2- CYCLE NUMBER: NORMAL

## 2021-09-16 ENCOUNTER — LAB VISIT (OUTPATIENT)
Dept: LAB | Facility: OTHER | Age: 76
End: 2021-09-16
Payer: MEDICARE

## 2021-09-16 DIAGNOSIS — Z20.822 ENCOUNTER FOR LABORATORY TESTING FOR COVID-19 VIRUS: ICD-10-CM

## 2021-09-16 PROCEDURE — U0003 INFECTIOUS AGENT DETECTION BY NUCLEIC ACID (DNA OR RNA); SEVERE ACUTE RESPIRATORY SYNDROME CORONAVIRUS 2 (SARS-COV-2) (CORONAVIRUS DISEASE [COVID-19]), AMPLIFIED PROBE TECHNIQUE, MAKING USE OF HIGH THROUGHPUT TECHNOLOGIES AS DESCRIBED BY CMS-2020-01-R: HCPCS | Performed by: NURSE PRACTITIONER

## 2021-09-17 LAB
SARS-COV-2 RNA RESP QL NAA+PROBE: NOT DETECTED
SARS-COV-2- CYCLE NUMBER: NORMAL

## 2021-09-22 ENCOUNTER — LAB VISIT (OUTPATIENT)
Dept: LAB | Facility: HOSPITAL | Age: 76
End: 2021-09-22
Attending: INTERNAL MEDICINE
Payer: MEDICARE

## 2021-09-22 DIAGNOSIS — E13.9 MATURITY ONSET DIABETES MELLITUS IN YOUNG: Primary | ICD-10-CM

## 2021-09-22 LAB
25(OH)D3+25(OH)D2 SERPL-MCNC: 51 NG/ML (ref 30–96)
ALBUMIN SERPL BCP-MCNC: 3.6 G/DL (ref 3.5–5.2)
ALP SERPL-CCNC: 67 U/L (ref 55–135)
ALT SERPL W/O P-5'-P-CCNC: 16 U/L (ref 10–44)
ANION GAP SERPL CALC-SCNC: 13 MMOL/L (ref 8–16)
AST SERPL-CCNC: 28 U/L (ref 10–40)
BASOPHILS # BLD AUTO: 0.08 K/UL (ref 0–0.2)
BASOPHILS NFR BLD: 0.7 % (ref 0–1.9)
BILIRUB SERPL-MCNC: 0.4 MG/DL (ref 0.1–1)
BUN SERPL-MCNC: 17 MG/DL (ref 8–23)
CALCIUM SERPL-MCNC: 10.2 MG/DL (ref 8.7–10.5)
CHLORIDE SERPL-SCNC: 110 MMOL/L (ref 95–110)
CHOLEST SERPL-MCNC: 93 MG/DL (ref 120–199)
CHOLEST/HDLC SERPL: 2.9 {RATIO} (ref 2–5)
CO2 SERPL-SCNC: 20 MMOL/L (ref 23–29)
CREAT SERPL-MCNC: 0.9 MG/DL (ref 0.5–1.4)
DIFFERENTIAL METHOD: ABNORMAL
EOSINOPHIL # BLD AUTO: 0.5 K/UL (ref 0–0.5)
EOSINOPHIL NFR BLD: 4.4 % (ref 0–8)
ERYTHROCYTE [DISTWIDTH] IN BLOOD BY AUTOMATED COUNT: 14.6 % (ref 11.5–14.5)
EST. GFR  (AFRICAN AMERICAN): >60 ML/MIN/1.73 M^2
EST. GFR  (NON AFRICAN AMERICAN): >60 ML/MIN/1.73 M^2
ESTIMATED AVG GLUCOSE: 117 MG/DL (ref 68–131)
GLUCOSE SERPL-MCNC: 98 MG/DL (ref 70–110)
HBA1C MFR BLD: 5.7 % (ref 4–5.6)
HCT VFR BLD AUTO: 36.3 % (ref 37–48.5)
HDLC SERPL-MCNC: 32 MG/DL (ref 40–75)
HDLC SERPL: 34.4 % (ref 20–50)
HGB BLD-MCNC: 11.4 G/DL (ref 12–16)
IMM GRANULOCYTES # BLD AUTO: 0.03 K/UL (ref 0–0.04)
IMM GRANULOCYTES NFR BLD AUTO: 0.3 % (ref 0–0.5)
LDLC SERPL CALC-MCNC: 26.8 MG/DL (ref 63–159)
LYMPHOCYTES # BLD AUTO: 2.5 K/UL (ref 1–4.8)
LYMPHOCYTES NFR BLD: 22.3 % (ref 18–48)
MCH RBC QN AUTO: 27.7 PG (ref 27–31)
MCHC RBC AUTO-ENTMCNC: 31.4 G/DL (ref 32–36)
MCV RBC AUTO: 88 FL (ref 82–98)
MONOCYTES # BLD AUTO: 1.1 K/UL (ref 0.3–1)
MONOCYTES NFR BLD: 9.8 % (ref 4–15)
NEUTROPHILS # BLD AUTO: 7.1 K/UL (ref 1.8–7.7)
NEUTROPHILS NFR BLD: 62.5 % (ref 38–73)
NONHDLC SERPL-MCNC: 61 MG/DL
NRBC BLD-RTO: 0 /100 WBC
PLATELET # BLD AUTO: 312 K/UL (ref 150–450)
PMV BLD AUTO: 10.7 FL (ref 9.2–12.9)
POTASSIUM SERPL-SCNC: 4 MMOL/L (ref 3.5–5.1)
PROT SERPL-MCNC: 7.4 G/DL (ref 6–8.4)
RBC # BLD AUTO: 4.12 M/UL (ref 4–5.4)
SODIUM SERPL-SCNC: 143 MMOL/L (ref 136–145)
TRIGL SERPL-MCNC: 171 MG/DL (ref 30–150)
TSH SERPL DL<=0.005 MIU/L-ACNC: 0.67 UIU/ML (ref 0.4–4)
VIT B12 SERPL-MCNC: 396 PG/ML (ref 210–950)
WBC # BLD AUTO: 11.38 K/UL (ref 3.9–12.7)

## 2021-09-22 PROCEDURE — 83036 HEMOGLOBIN GLYCOSYLATED A1C: CPT | Performed by: INTERNAL MEDICINE

## 2021-09-22 PROCEDURE — 82306 VITAMIN D 25 HYDROXY: CPT | Performed by: INTERNAL MEDICINE

## 2021-09-22 PROCEDURE — 36415 COLL VENOUS BLD VENIPUNCTURE: CPT | Performed by: INTERNAL MEDICINE

## 2021-09-22 PROCEDURE — 82607 VITAMIN B-12: CPT | Performed by: INTERNAL MEDICINE

## 2021-09-22 PROCEDURE — 84443 ASSAY THYROID STIM HORMONE: CPT | Performed by: INTERNAL MEDICINE

## 2021-09-22 PROCEDURE — 80053 COMPREHEN METABOLIC PANEL: CPT | Performed by: INTERNAL MEDICINE

## 2021-09-22 PROCEDURE — 85025 COMPLETE CBC W/AUTO DIFF WBC: CPT | Performed by: INTERNAL MEDICINE

## 2021-09-22 PROCEDURE — 80061 LIPID PANEL: CPT | Performed by: INTERNAL MEDICINE

## 2021-09-23 ENCOUNTER — LAB VISIT (OUTPATIENT)
Dept: LAB | Facility: OTHER | Age: 76
End: 2021-09-23
Payer: MEDICARE

## 2021-09-23 DIAGNOSIS — Z20.822 ENCOUNTER FOR LABORATORY TESTING FOR COVID-19 VIRUS: ICD-10-CM

## 2021-09-23 PROCEDURE — U0003 INFECTIOUS AGENT DETECTION BY NUCLEIC ACID (DNA OR RNA); SEVERE ACUTE RESPIRATORY SYNDROME CORONAVIRUS 2 (SARS-COV-2) (CORONAVIRUS DISEASE [COVID-19]), AMPLIFIED PROBE TECHNIQUE, MAKING USE OF HIGH THROUGHPUT TECHNOLOGIES AS DESCRIBED BY CMS-2020-01-R: HCPCS | Performed by: NURSE PRACTITIONER

## 2021-09-24 LAB
SARS-COV-2 RNA RESP QL NAA+PROBE: NOT DETECTED
SARS-COV-2- CYCLE NUMBER: NORMAL

## 2021-09-26 RX ORDER — CYANOCOBALAMIN 1000 UG/ML
INJECTION, SOLUTION INTRAMUSCULAR; SUBCUTANEOUS
Qty: 10 ML | Refills: 3 | Status: ON HOLD | OUTPATIENT
Start: 2021-09-26 | End: 2024-01-18 | Stop reason: HOSPADM

## 2021-09-30 ENCOUNTER — LAB VISIT (OUTPATIENT)
Dept: LAB | Facility: OTHER | Age: 76
End: 2021-09-30
Payer: MEDICARE

## 2021-09-30 DIAGNOSIS — Z20.822 ENCOUNTER FOR LABORATORY TESTING FOR COVID-19 VIRUS: ICD-10-CM

## 2021-09-30 PROCEDURE — U0003 INFECTIOUS AGENT DETECTION BY NUCLEIC ACID (DNA OR RNA); SEVERE ACUTE RESPIRATORY SYNDROME CORONAVIRUS 2 (SARS-COV-2) (CORONAVIRUS DISEASE [COVID-19]), AMPLIFIED PROBE TECHNIQUE, MAKING USE OF HIGH THROUGHPUT TECHNOLOGIES AS DESCRIBED BY CMS-2020-01-R: HCPCS | Mod: HCNC | Performed by: NURSE PRACTITIONER

## 2021-10-01 LAB
SARS-COV-2 RNA RESP QL NAA+PROBE: NOT DETECTED
SARS-COV-2- CYCLE NUMBER: NORMAL

## 2021-10-07 ENCOUNTER — LAB VISIT (OUTPATIENT)
Dept: LAB | Facility: OTHER | Age: 76
End: 2021-10-07
Payer: MEDICARE

## 2021-10-07 DIAGNOSIS — Z20.822 ENCOUNTER FOR LABORATORY TESTING FOR COVID-19 VIRUS: ICD-10-CM

## 2021-10-07 PROCEDURE — U0003 INFECTIOUS AGENT DETECTION BY NUCLEIC ACID (DNA OR RNA); SEVERE ACUTE RESPIRATORY SYNDROME CORONAVIRUS 2 (SARS-COV-2) (CORONAVIRUS DISEASE [COVID-19]), AMPLIFIED PROBE TECHNIQUE, MAKING USE OF HIGH THROUGHPUT TECHNOLOGIES AS DESCRIBED BY CMS-2020-01-R: HCPCS | Mod: HCNC | Performed by: NURSE PRACTITIONER

## 2021-10-08 LAB
SARS-COV-2 RNA RESP QL NAA+PROBE: NOT DETECTED
SARS-COV-2- CYCLE NUMBER: NORMAL

## 2021-10-18 ENCOUNTER — PATIENT MESSAGE (OUTPATIENT)
Dept: ADMINISTRATIVE | Facility: HOSPITAL | Age: 76
End: 2021-10-18
Payer: MEDICARE

## 2021-10-21 ENCOUNTER — LAB VISIT (OUTPATIENT)
Dept: LAB | Facility: OTHER | Age: 76
End: 2021-10-21
Payer: MEDICARE

## 2021-10-21 DIAGNOSIS — Z20.822 ENCOUNTER FOR LABORATORY TESTING FOR COVID-19 VIRUS: ICD-10-CM

## 2021-10-21 PROCEDURE — U0003 INFECTIOUS AGENT DETECTION BY NUCLEIC ACID (DNA OR RNA); SEVERE ACUTE RESPIRATORY SYNDROME CORONAVIRUS 2 (SARS-COV-2) (CORONAVIRUS DISEASE [COVID-19]), AMPLIFIED PROBE TECHNIQUE, MAKING USE OF HIGH THROUGHPUT TECHNOLOGIES AS DESCRIBED BY CMS-2020-01-R: HCPCS | Mod: HCNC | Performed by: NURSE PRACTITIONER

## 2021-10-22 LAB
SARS-COV-2 RNA RESP QL NAA+PROBE: NOT DETECTED
SARS-COV-2- CYCLE NUMBER: NORMAL

## 2021-10-26 ENCOUNTER — IMMUNIZATION (OUTPATIENT)
Dept: INTERNAL MEDICINE | Facility: CLINIC | Age: 76
End: 2021-10-26
Payer: MEDICARE

## 2021-10-26 PROCEDURE — 90694 FLU VACCINE - QUADRIVALENT - ADJUVANTED: ICD-10-PCS | Mod: HCNC,S$GLB,, | Performed by: INTERNAL MEDICINE

## 2021-10-26 PROCEDURE — G0008 FLU VACCINE - QUADRIVALENT - ADJUVANTED: ICD-10-PCS | Mod: HCNC,S$GLB,, | Performed by: INTERNAL MEDICINE

## 2021-10-26 PROCEDURE — G0008 ADMIN INFLUENZA VIRUS VAC: HCPCS | Mod: HCNC,S$GLB,, | Performed by: INTERNAL MEDICINE

## 2021-10-26 PROCEDURE — 90694 VACC AIIV4 NO PRSRV 0.5ML IM: CPT | Mod: HCNC,S$GLB,, | Performed by: INTERNAL MEDICINE

## 2021-10-28 ENCOUNTER — LAB VISIT (OUTPATIENT)
Dept: LAB | Facility: HOSPITAL | Age: 76
End: 2021-10-28
Attending: INTERNAL MEDICINE
Payer: MEDICARE

## 2021-10-28 DIAGNOSIS — Z79.899 ENCOUNTER FOR LONG-TERM (CURRENT) USE OF OTHER MEDICATIONS: Primary | ICD-10-CM

## 2021-10-28 LAB
ALBUMIN SERPL BCP-MCNC: 3.6 G/DL (ref 3.5–5.2)
ALP SERPL-CCNC: 61 U/L (ref 55–135)
ALT SERPL W/O P-5'-P-CCNC: 12 U/L (ref 10–44)
ANION GAP SERPL CALC-SCNC: 7 MMOL/L (ref 8–16)
AST SERPL-CCNC: 22 U/L (ref 10–40)
BASOPHILS # BLD AUTO: 0.07 K/UL (ref 0–0.2)
BASOPHILS NFR BLD: 0.8 % (ref 0–1.9)
BILIRUB SERPL-MCNC: 0.3 MG/DL (ref 0.1–1)
BUN SERPL-MCNC: 21 MG/DL (ref 8–23)
CALCIUM SERPL-MCNC: 9.8 MG/DL (ref 8.7–10.5)
CHLORIDE SERPL-SCNC: 111 MMOL/L (ref 95–110)
CO2 SERPL-SCNC: 25 MMOL/L (ref 23–29)
CREAT SERPL-MCNC: 0.8 MG/DL (ref 0.5–1.4)
DIFFERENTIAL METHOD: ABNORMAL
EOSINOPHIL # BLD AUTO: 0.4 K/UL (ref 0–0.5)
EOSINOPHIL NFR BLD: 4.9 % (ref 0–8)
ERYTHROCYTE [DISTWIDTH] IN BLOOD BY AUTOMATED COUNT: 14.6 % (ref 11.5–14.5)
EST. GFR  (AFRICAN AMERICAN): >60 ML/MIN/1.73 M^2
EST. GFR  (NON AFRICAN AMERICAN): >60 ML/MIN/1.73 M^2
GLUCOSE SERPL-MCNC: 96 MG/DL (ref 70–110)
HCT VFR BLD AUTO: 35.8 % (ref 37–48.5)
HGB BLD-MCNC: 11.1 G/DL (ref 12–16)
IMM GRANULOCYTES # BLD AUTO: 0.02 K/UL (ref 0–0.04)
IMM GRANULOCYTES NFR BLD AUTO: 0.2 % (ref 0–0.5)
LYMPHOCYTES # BLD AUTO: 2.5 K/UL (ref 1–4.8)
LYMPHOCYTES NFR BLD: 27.1 % (ref 18–48)
MCH RBC QN AUTO: 27.5 PG (ref 27–31)
MCHC RBC AUTO-ENTMCNC: 31 G/DL (ref 32–36)
MCV RBC AUTO: 89 FL (ref 82–98)
MONOCYTES # BLD AUTO: 0.9 K/UL (ref 0.3–1)
MONOCYTES NFR BLD: 10.3 % (ref 4–15)
NEUTROPHILS # BLD AUTO: 5.1 K/UL (ref 1.8–7.7)
NEUTROPHILS NFR BLD: 56.7 % (ref 38–73)
NRBC BLD-RTO: 0 /100 WBC
PLATELET # BLD AUTO: 260 K/UL (ref 150–450)
PMV BLD AUTO: 10.9 FL (ref 9.2–12.9)
POTASSIUM SERPL-SCNC: 4.1 MMOL/L (ref 3.5–5.1)
PROT SERPL-MCNC: 7.1 G/DL (ref 6–8.4)
RBC # BLD AUTO: 4.03 M/UL (ref 4–5.4)
SODIUM SERPL-SCNC: 143 MMOL/L (ref 136–145)
WBC # BLD AUTO: 9.05 K/UL (ref 3.9–12.7)

## 2021-10-28 PROCEDURE — 80053 COMPREHEN METABOLIC PANEL: CPT | Mod: HCNC | Performed by: INTERNAL MEDICINE

## 2021-10-28 PROCEDURE — 85025 COMPLETE CBC W/AUTO DIFF WBC: CPT | Mod: HCNC | Performed by: INTERNAL MEDICINE

## 2021-11-01 ENCOUNTER — OFFICE VISIT (OUTPATIENT)
Dept: INTERNAL MEDICINE | Facility: CLINIC | Age: 76
End: 2021-11-01
Payer: MEDICARE

## 2021-11-01 VITALS
DIASTOLIC BLOOD PRESSURE: 68 MMHG | SYSTOLIC BLOOD PRESSURE: 126 MMHG | HEART RATE: 71 BPM | BODY MASS INDEX: 25.36 KG/M2 | WEIGHT: 148.56 LBS | OXYGEN SATURATION: 98 % | HEIGHT: 64 IN

## 2021-11-01 DIAGNOSIS — E78.5 HYPERLIPIDEMIA, UNSPECIFIED HYPERLIPIDEMIA TYPE: ICD-10-CM

## 2021-11-01 DIAGNOSIS — E13.9 DIABETES MELLITUS DUE TO ABNORMAL INSULIN: ICD-10-CM

## 2021-11-01 DIAGNOSIS — F79 INTELLECTUAL DISABILITY: ICD-10-CM

## 2021-11-01 DIAGNOSIS — R41.3 OTHER AMNESIA: ICD-10-CM

## 2021-11-01 DIAGNOSIS — R26.9 GAIT ABNORMALITY: Primary | ICD-10-CM

## 2021-11-01 DIAGNOSIS — H90.5 DEAFNESS CONGENITAL: ICD-10-CM

## 2021-11-01 DIAGNOSIS — I10 PRIMARY HYPERTENSION: ICD-10-CM

## 2021-11-01 DIAGNOSIS — F03.91 DEMENTIA WITH BEHAVIORAL DISTURBANCE, UNSPECIFIED DEMENTIA TYPE: ICD-10-CM

## 2021-11-01 PROCEDURE — 99999 PR PBB SHADOW E&M-EST. PATIENT-LVL IV: CPT | Mod: PBBFAC,,, | Performed by: INTERNAL MEDICINE

## 2021-11-01 PROCEDURE — 1159F MED LIST DOCD IN RCRD: CPT | Mod: CPTII,S$GLB,, | Performed by: INTERNAL MEDICINE

## 2021-11-01 PROCEDURE — 99999 PR PBB SHADOW E&M-EST. PATIENT-LVL IV: ICD-10-PCS | Mod: PBBFAC,,, | Performed by: INTERNAL MEDICINE

## 2021-11-01 PROCEDURE — 3074F PR MOST RECENT SYSTOLIC BLOOD PRESSURE < 130 MM HG: ICD-10-PCS | Mod: CPTII,S$GLB,, | Performed by: INTERNAL MEDICINE

## 2021-11-01 PROCEDURE — 1100F PTFALLS ASSESS-DOCD GE2>/YR: CPT | Mod: CPTII,S$GLB,, | Performed by: INTERNAL MEDICINE

## 2021-11-01 PROCEDURE — 3288F PR FALLS RISK ASSESSMENT DOCUMENTED: ICD-10-PCS | Mod: CPTII,S$GLB,, | Performed by: INTERNAL MEDICINE

## 2021-11-01 PROCEDURE — 99215 OFFICE O/P EST HI 40 MIN: CPT | Mod: S$GLB,,, | Performed by: INTERNAL MEDICINE

## 2021-11-01 PROCEDURE — 3078F PR MOST RECENT DIASTOLIC BLOOD PRESSURE < 80 MM HG: ICD-10-PCS | Mod: CPTII,S$GLB,, | Performed by: INTERNAL MEDICINE

## 2021-11-01 PROCEDURE — 3288F FALL RISK ASSESSMENT DOCD: CPT | Mod: CPTII,S$GLB,, | Performed by: INTERNAL MEDICINE

## 2021-11-01 PROCEDURE — 99499 UNLISTED E&M SERVICE: CPT | Mod: HCNC,S$GLB,, | Performed by: INTERNAL MEDICINE

## 2021-11-01 PROCEDURE — 1100F PR PT FALLS ASSESS DOC 2+ FALLS/FALL W/INJURY/YR: ICD-10-PCS | Mod: CPTII,S$GLB,, | Performed by: INTERNAL MEDICINE

## 2021-11-01 PROCEDURE — 1125F AMNT PAIN NOTED PAIN PRSNT: CPT | Mod: CPTII,S$GLB,, | Performed by: INTERNAL MEDICINE

## 2021-11-01 PROCEDURE — 99499 RISK ADDL DX/OHS AUDIT: ICD-10-PCS | Mod: HCNC,S$GLB,, | Performed by: INTERNAL MEDICINE

## 2021-11-01 PROCEDURE — 3078F DIAST BP <80 MM HG: CPT | Mod: CPTII,S$GLB,, | Performed by: INTERNAL MEDICINE

## 2021-11-01 PROCEDURE — 1125F PR PAIN SEVERITY QUANTIFIED, PAIN PRESENT: ICD-10-PCS | Mod: CPTII,S$GLB,, | Performed by: INTERNAL MEDICINE

## 2021-11-01 PROCEDURE — 3074F SYST BP LT 130 MM HG: CPT | Mod: CPTII,S$GLB,, | Performed by: INTERNAL MEDICINE

## 2021-11-01 PROCEDURE — 99215 PR OFFICE/OUTPT VISIT, EST, LEVL V, 40-54 MIN: ICD-10-PCS | Mod: S$GLB,,, | Performed by: INTERNAL MEDICINE

## 2021-11-01 PROCEDURE — 1159F PR MEDICATION LIST DOCUMENTED IN MEDICAL RECORD: ICD-10-PCS | Mod: CPTII,S$GLB,, | Performed by: INTERNAL MEDICINE

## 2021-11-02 RX ORDER — LISINOPRIL 20 MG/1
40 TABLET ORAL DAILY
Qty: 30 TABLET | Refills: 11 | Status: SHIPPED | OUTPATIENT
Start: 2021-11-02 | End: 2021-11-03 | Stop reason: SDUPTHER

## 2021-11-02 RX ORDER — METFORMIN HYDROCHLORIDE 500 MG/1
500 TABLET ORAL NIGHTLY
Qty: 30 TABLET | Refills: 11 | Status: SHIPPED | OUTPATIENT
Start: 2021-11-02 | End: 2023-01-29

## 2021-11-02 RX ORDER — ROSUVASTATIN CALCIUM 10 MG/1
20 TABLET, COATED ORAL DAILY
Qty: 30 TABLET | Refills: 11 | Status: SHIPPED | OUTPATIENT
Start: 2021-11-02 | End: 2021-11-03 | Stop reason: SDUPTHER

## 2021-11-03 DIAGNOSIS — Z12.31 SCREENING MAMMOGRAM FOR BREAST CANCER: Primary | ICD-10-CM

## 2021-11-03 RX ORDER — ROSUVASTATIN CALCIUM 10 MG/1
10 TABLET, COATED ORAL DAILY
Qty: 30 TABLET | Refills: 11 | Status: SHIPPED | OUTPATIENT
Start: 2021-11-03 | End: 2022-01-26 | Stop reason: SDUPTHER

## 2021-11-03 RX ORDER — LISINOPRIL 20 MG/1
20 TABLET ORAL DAILY
Qty: 30 TABLET | Refills: 11 | Status: ON HOLD | OUTPATIENT
Start: 2021-11-03 | End: 2024-01-18 | Stop reason: HOSPADM

## 2021-11-04 ENCOUNTER — HOSPITAL ENCOUNTER (OUTPATIENT)
Dept: RADIOLOGY | Facility: HOSPITAL | Age: 76
Discharge: HOME | End: 2021-11-04
Attending: INTERNAL MEDICINE
Payer: MEDICARE

## 2021-11-04 DIAGNOSIS — R41.3 OTHER AMNESIA: ICD-10-CM

## 2021-11-04 DIAGNOSIS — Z20.822 ENCOUNTER FOR LABORATORY TESTING FOR COVID-19 VIRUS: ICD-10-CM

## 2021-11-04 PROCEDURE — 70450 CT HEAD WITHOUT CONTRAST: ICD-10-PCS | Mod: 26,,, | Performed by: RADIOLOGY

## 2021-11-04 PROCEDURE — 70450 CT HEAD/BRAIN W/O DYE: CPT | Mod: TC

## 2021-11-04 PROCEDURE — 70450 CT HEAD/BRAIN W/O DYE: CPT | Mod: 26,,, | Performed by: RADIOLOGY

## 2021-11-11 ENCOUNTER — LAB VISIT (OUTPATIENT)
Dept: LAB | Facility: OTHER | Age: 76
End: 2021-11-11
Payer: MEDICARE

## 2021-11-11 DIAGNOSIS — Z20.822 ENCOUNTER FOR LABORATORY TESTING FOR COVID-19 VIRUS: ICD-10-CM

## 2021-11-11 PROCEDURE — U0003 INFECTIOUS AGENT DETECTION BY NUCLEIC ACID (DNA OR RNA); SEVERE ACUTE RESPIRATORY SYNDROME CORONAVIRUS 2 (SARS-COV-2) (CORONAVIRUS DISEASE [COVID-19]), AMPLIFIED PROBE TECHNIQUE, MAKING USE OF HIGH THROUGHPUT TECHNOLOGIES AS DESCRIBED BY CMS-2020-01-R: HCPCS | Performed by: NURSE PRACTITIONER

## 2021-11-12 LAB
SARS-COV-2 RNA RESP QL NAA+PROBE: NOT DETECTED
SARS-COV-2- CYCLE NUMBER: NORMAL

## 2021-11-15 ENCOUNTER — TELEPHONE (OUTPATIENT)
Dept: PSYCHIATRY | Facility: HOSPITAL | Age: 76
End: 2021-11-15
Payer: MEDICARE

## 2021-11-15 DIAGNOSIS — F25.9 CHRONIC SCHIZOAFFECTIVE DISORDER: ICD-10-CM

## 2021-11-15 RX ORDER — HALOPERIDOL DECANOATE 50 MG/ML
50 INJECTION INTRAMUSCULAR
Qty: 1 ML | Refills: 12 | Status: SHIPPED | OUTPATIENT
Start: 2021-11-15 | End: 2022-01-11 | Stop reason: SDUPTHER

## 2021-11-18 ENCOUNTER — LAB VISIT (OUTPATIENT)
Dept: LAB | Facility: OTHER | Age: 76
End: 2021-11-18
Payer: MEDICARE

## 2021-11-18 DIAGNOSIS — Z20.822 ENCOUNTER FOR LABORATORY TESTING FOR COVID-19 VIRUS: ICD-10-CM

## 2021-11-18 PROCEDURE — U0003 INFECTIOUS AGENT DETECTION BY NUCLEIC ACID (DNA OR RNA); SEVERE ACUTE RESPIRATORY SYNDROME CORONAVIRUS 2 (SARS-COV-2) (CORONAVIRUS DISEASE [COVID-19]), AMPLIFIED PROBE TECHNIQUE, MAKING USE OF HIGH THROUGHPUT TECHNOLOGIES AS DESCRIBED BY CMS-2020-01-R: HCPCS | Performed by: NURSE PRACTITIONER

## 2021-11-19 LAB
SARS-COV-2 RNA RESP QL NAA+PROBE: NOT DETECTED
SARS-COV-2- CYCLE NUMBER: NORMAL

## 2021-12-01 DIAGNOSIS — M45.9 ANKYLOSING SPONDYLITIS, UNSPECIFIED SITE OF SPINE: Primary | ICD-10-CM

## 2021-12-02 ENCOUNTER — LAB VISIT (OUTPATIENT)
Dept: LAB | Facility: OTHER | Age: 76
End: 2021-12-02
Payer: MEDICARE

## 2021-12-02 DIAGNOSIS — Z20.822 ENCOUNTER FOR LABORATORY TESTING FOR COVID-19 VIRUS: ICD-10-CM

## 2021-12-02 PROCEDURE — U0003 INFECTIOUS AGENT DETECTION BY NUCLEIC ACID (DNA OR RNA); SEVERE ACUTE RESPIRATORY SYNDROME CORONAVIRUS 2 (SARS-COV-2) (CORONAVIRUS DISEASE [COVID-19]), AMPLIFIED PROBE TECHNIQUE, MAKING USE OF HIGH THROUGHPUT TECHNOLOGIES AS DESCRIBED BY CMS-2020-01-R: HCPCS | Mod: HCNC | Performed by: NURSE PRACTITIONER

## 2021-12-03 LAB
SARS-COV-2 RNA RESP QL NAA+PROBE: NOT DETECTED
SARS-COV-2- CYCLE NUMBER: NORMAL

## 2021-12-06 ENCOUNTER — HOSPITAL ENCOUNTER (OUTPATIENT)
Dept: RADIOLOGY | Facility: HOSPITAL | Age: 76
Discharge: HOME | End: 2021-12-06
Attending: INTERNAL MEDICINE
Payer: MEDICARE

## 2021-12-06 VITALS — BODY MASS INDEX: 25.27 KG/M2 | WEIGHT: 148 LBS | HEIGHT: 64 IN

## 2021-12-06 DIAGNOSIS — Z12.31 SCREENING MAMMOGRAM FOR BREAST CANCER: ICD-10-CM

## 2021-12-06 PROCEDURE — 77067 SCR MAMMO BI INCL CAD: CPT | Mod: 26,HCNC,, | Performed by: RADIOLOGY

## 2021-12-06 PROCEDURE — 77067 MAMMO DIGITAL SCREENING BILAT WITH TOMO: ICD-10-PCS | Mod: 26,HCNC,, | Performed by: RADIOLOGY

## 2021-12-06 PROCEDURE — 77063 BREAST TOMOSYNTHESIS BI: CPT | Mod: 26,HCNC,, | Performed by: RADIOLOGY

## 2021-12-06 PROCEDURE — 77063 MAMMO DIGITAL SCREENING BILAT WITH TOMO: ICD-10-PCS | Mod: 26,HCNC,, | Performed by: RADIOLOGY

## 2021-12-06 PROCEDURE — 77067 SCR MAMMO BI INCL CAD: CPT | Mod: TC,HCNC

## 2021-12-09 ENCOUNTER — LAB VISIT (OUTPATIENT)
Dept: LAB | Facility: OTHER | Age: 76
End: 2021-12-09
Payer: MEDICARE

## 2021-12-09 DIAGNOSIS — Z20.822 ENCOUNTER FOR LABORATORY TESTING FOR COVID-19 VIRUS: ICD-10-CM

## 2021-12-09 PROCEDURE — U0003 INFECTIOUS AGENT DETECTION BY NUCLEIC ACID (DNA OR RNA); SEVERE ACUTE RESPIRATORY SYNDROME CORONAVIRUS 2 (SARS-COV-2) (CORONAVIRUS DISEASE [COVID-19]), AMPLIFIED PROBE TECHNIQUE, MAKING USE OF HIGH THROUGHPUT TECHNOLOGIES AS DESCRIBED BY CMS-2020-01-R: HCPCS | Mod: HCNC | Performed by: NURSE PRACTITIONER

## 2021-12-10 LAB
SARS-COV-2 RNA RESP QL NAA+PROBE: NOT DETECTED
SARS-COV-2- CYCLE NUMBER: NORMAL

## 2021-12-16 ENCOUNTER — LAB VISIT (OUTPATIENT)
Dept: LAB | Facility: OTHER | Age: 76
End: 2021-12-16
Payer: MEDICARE

## 2021-12-16 DIAGNOSIS — Z20.822 ENCOUNTER FOR LABORATORY TESTING FOR COVID-19 VIRUS: ICD-10-CM

## 2021-12-16 PROCEDURE — U0003 INFECTIOUS AGENT DETECTION BY NUCLEIC ACID (DNA OR RNA); SEVERE ACUTE RESPIRATORY SYNDROME CORONAVIRUS 2 (SARS-COV-2) (CORONAVIRUS DISEASE [COVID-19]), AMPLIFIED PROBE TECHNIQUE, MAKING USE OF HIGH THROUGHPUT TECHNOLOGIES AS DESCRIBED BY CMS-2020-01-R: HCPCS | Mod: HCNC | Performed by: NURSE PRACTITIONER

## 2021-12-17 LAB
SARS-COV-2 RNA RESP QL NAA+PROBE: NOT DETECTED
SARS-COV-2- CYCLE NUMBER: NORMAL

## 2021-12-20 ENCOUNTER — TELEPHONE (OUTPATIENT)
Dept: INTERNAL MEDICINE | Facility: CLINIC | Age: 76
End: 2021-12-20
Payer: MEDICARE

## 2021-12-22 ENCOUNTER — TELEPHONE (OUTPATIENT)
Dept: INTERNAL MEDICINE | Facility: CLINIC | Age: 76
End: 2021-12-22
Payer: MEDICARE

## 2021-12-23 ENCOUNTER — LAB VISIT (OUTPATIENT)
Dept: LAB | Facility: OTHER | Age: 76
End: 2021-12-23
Payer: MEDICARE

## 2021-12-23 DIAGNOSIS — Z20.822 ENCOUNTER FOR LABORATORY TESTING FOR COVID-19 VIRUS: ICD-10-CM

## 2021-12-23 LAB
SARS-COV-2 RNA RESP QL NAA+PROBE: NOT DETECTED
SARS-COV-2- CYCLE NUMBER: NORMAL

## 2021-12-23 PROCEDURE — U0003 INFECTIOUS AGENT DETECTION BY NUCLEIC ACID (DNA OR RNA); SEVERE ACUTE RESPIRATORY SYNDROME CORONAVIRUS 2 (SARS-COV-2) (CORONAVIRUS DISEASE [COVID-19]), AMPLIFIED PROBE TECHNIQUE, MAKING USE OF HIGH THROUGHPUT TECHNOLOGIES AS DESCRIBED BY CMS-2020-01-R: HCPCS | Mod: HCNC | Performed by: NURSE PRACTITIONER

## 2021-12-27 ENCOUNTER — PATIENT OUTREACH (OUTPATIENT)
Dept: EMERGENCY MEDICINE | Facility: HOSPITAL | Age: 76
End: 2021-12-27

## 2021-12-27 ENCOUNTER — HOSPITAL ENCOUNTER (OUTPATIENT)
Facility: HOSPITAL | Age: 76
Discharge: HOME | End: 2021-12-29
Attending: EMERGENCY MEDICINE | Admitting: INTERNAL MEDICINE
Payer: MEDICARE

## 2021-12-27 DIAGNOSIS — K52.9 ENTERITIS: ICD-10-CM

## 2021-12-27 DIAGNOSIS — N30.00 ACUTE CYSTITIS WITHOUT HEMATURIA: Primary | ICD-10-CM

## 2021-12-27 DIAGNOSIS — N39.0 URINARY TRACT INFECTION WITHOUT HEMATURIA, SITE UNSPECIFIED: ICD-10-CM

## 2021-12-27 LAB
ALBUMIN SERPL BCP-MCNC: 3.5 G/DL (ref 3.5–5.2)
ALP SERPL-CCNC: 94 U/L (ref 55–135)
ALT SERPL W/O P-5'-P-CCNC: 12 U/L (ref 10–44)
ANION GAP SERPL CALC-SCNC: 7 MMOL/L (ref 8–16)
AST SERPL-CCNC: 12 U/L (ref 10–40)
BACTERIA #/AREA URNS AUTO: ABNORMAL /HPF
BASOPHILS # BLD AUTO: 0.07 K/UL (ref 0–0.2)
BASOPHILS NFR BLD: 0.5 % (ref 0–1.9)
BILIRUB SERPL-MCNC: 0.2 MG/DL (ref 0.1–1)
BILIRUB UR QL STRIP: NEGATIVE
BUN SERPL-MCNC: 23 MG/DL (ref 8–23)
BUN SERPL-MCNC: 24 MG/DL (ref 6–30)
CALCIUM SERPL-MCNC: 9.5 MG/DL (ref 8.7–10.5)
CHLORIDE SERPL-SCNC: 108 MMOL/L (ref 95–110)
CHLORIDE SERPL-SCNC: 109 MMOL/L (ref 95–110)
CLARITY UR REFRACT.AUTO: ABNORMAL
CO2 SERPL-SCNC: 24 MMOL/L (ref 23–29)
COLOR UR AUTO: YELLOW
CREAT SERPL-MCNC: 0.7 MG/DL (ref 0.5–1.4)
CREAT SERPL-MCNC: 0.8 MG/DL (ref 0.5–1.4)
CTP QC/QA: YES
DIFFERENTIAL METHOD: ABNORMAL
EOSINOPHIL # BLD AUTO: 0 K/UL (ref 0–0.5)
EOSINOPHIL NFR BLD: 0.2 % (ref 0–8)
ERYTHROCYTE [DISTWIDTH] IN BLOOD BY AUTOMATED COUNT: 13.8 % (ref 11.5–14.5)
EST. GFR  (AFRICAN AMERICAN): >60 ML/MIN/1.73 M^2
EST. GFR  (NON AFRICAN AMERICAN): >60 ML/MIN/1.73 M^2
GLUCOSE SERPL-MCNC: 139 MG/DL (ref 70–110)
GLUCOSE SERPL-MCNC: 141 MG/DL (ref 70–110)
GLUCOSE UR QL STRIP: NEGATIVE
HCT VFR BLD AUTO: 35.6 % (ref 37–48.5)
HCT VFR BLD CALC: 34 %PCV (ref 36–54)
HGB BLD-MCNC: 10.9 G/DL (ref 12–16)
HGB UR QL STRIP: NEGATIVE
HYALINE CASTS UR QL AUTO: 0 /LPF
IMM GRANULOCYTES # BLD AUTO: 0.06 K/UL (ref 0–0.04)
IMM GRANULOCYTES NFR BLD AUTO: 0.4 % (ref 0–0.5)
KETONES UR QL STRIP: NEGATIVE
LACTATE SERPL-SCNC: 1.1 MMOL/L (ref 0.5–2.2)
LEUKOCYTE ESTERASE UR QL STRIP: ABNORMAL
LIPASE SERPL-CCNC: 13 U/L (ref 4–60)
LYMPHOCYTES # BLD AUTO: 2.6 K/UL (ref 1–4.8)
LYMPHOCYTES NFR BLD: 16.8 % (ref 18–48)
MCH RBC QN AUTO: 27.4 PG (ref 27–31)
MCHC RBC AUTO-ENTMCNC: 30.6 G/DL (ref 32–36)
MCV RBC AUTO: 89 FL (ref 82–98)
MICROSCOPIC COMMENT: ABNORMAL
MONOCYTES # BLD AUTO: 1 K/UL (ref 0.3–1)
MONOCYTES NFR BLD: 6.7 % (ref 4–15)
NEUTROPHILS # BLD AUTO: 11.6 K/UL (ref 1.8–7.7)
NEUTROPHILS NFR BLD: 75.4 % (ref 38–73)
NITRITE UR QL STRIP: POSITIVE
NRBC BLD-RTO: 0 /100 WBC
PH UR STRIP: 5 [PH] (ref 5–8)
PLATELET # BLD AUTO: 308 K/UL (ref 150–450)
PMV BLD AUTO: 10.4 FL (ref 9.2–12.9)
POC IONIZED CALCIUM: 1.33 MMOL/L (ref 1.06–1.42)
POC TCO2 (MEASURED): 24 MMOL/L (ref 23–29)
POTASSIUM BLD-SCNC: 3.7 MMOL/L (ref 3.5–5.1)
POTASSIUM SERPL-SCNC: 3.7 MMOL/L (ref 3.5–5.1)
PROT SERPL-MCNC: 7.4 G/DL (ref 6–8.4)
PROT UR QL STRIP: ABNORMAL
RBC # BLD AUTO: 3.98 M/UL (ref 4–5.4)
RBC #/AREA URNS AUTO: 2 /HPF (ref 0–4)
SAMPLE: ABNORMAL
SARS-COV-2 RDRP RESP QL NAA+PROBE: NEGATIVE
SODIUM BLD-SCNC: 145 MMOL/L (ref 136–145)
SODIUM SERPL-SCNC: 140 MMOL/L (ref 136–145)
SP GR UR STRIP: 1.02 (ref 1–1.03)
URN SPEC COLLECT METH UR: ABNORMAL
WBC # BLD AUTO: 15.44 K/UL (ref 3.9–12.7)
WBC #/AREA URNS AUTO: 19 /HPF (ref 0–5)

## 2021-12-27 PROCEDURE — 85025 COMPLETE CBC W/AUTO DIFF WBC: CPT | Mod: HCNC | Performed by: EMERGENCY MEDICINE

## 2021-12-27 PROCEDURE — 63600175 PHARM REV CODE 636 W HCPCS: Mod: HCNC | Performed by: EMERGENCY MEDICINE

## 2021-12-27 PROCEDURE — 63600175 PHARM REV CODE 636 W HCPCS: Mod: HCNC

## 2021-12-27 PROCEDURE — 83605 ASSAY OF LACTIC ACID: CPT | Mod: HCNC | Performed by: EMERGENCY MEDICINE

## 2021-12-27 PROCEDURE — 96365 THER/PROPH/DIAG IV INF INIT: CPT | Mod: HCNC

## 2021-12-27 PROCEDURE — 99220 PR INITIAL OBSERVATION CARE,LEVL III: ICD-10-PCS | Mod: HCNC,,, | Performed by: HOSPITALIST

## 2021-12-27 PROCEDURE — U0002 COVID-19 LAB TEST NON-CDC: HCPCS | Mod: HCNC | Performed by: EMERGENCY MEDICINE

## 2021-12-27 PROCEDURE — 87186 SC STD MICRODIL/AGAR DIL: CPT | Mod: HCNC | Performed by: EMERGENCY MEDICINE

## 2021-12-27 PROCEDURE — 80047 BASIC METABLC PNL IONIZED CA: CPT | Mod: HCNC

## 2021-12-27 PROCEDURE — 25500020 PHARM REV CODE 255: Mod: HCNC | Performed by: INTERNAL MEDICINE

## 2021-12-27 PROCEDURE — 80053 COMPREHEN METABOLIC PANEL: CPT | Mod: HCNC | Performed by: EMERGENCY MEDICINE

## 2021-12-27 PROCEDURE — 87205 SMEAR GRAM STAIN: CPT | Mod: HCNC | Performed by: EMERGENCY MEDICINE

## 2021-12-27 PROCEDURE — 96366 THER/PROPH/DIAG IV INF ADDON: CPT

## 2021-12-27 PROCEDURE — G0378 HOSPITAL OBSERVATION PER HR: HCPCS | Mod: HCNC

## 2021-12-27 PROCEDURE — 96375 TX/PRO/DX INJ NEW DRUG ADDON: CPT | Mod: HCNC

## 2021-12-27 PROCEDURE — 87077 CULTURE AEROBIC IDENTIFY: CPT | Mod: HCNC | Performed by: EMERGENCY MEDICINE

## 2021-12-27 PROCEDURE — 99220 PR INITIAL OBSERVATION CARE,LEVL III: CPT | Mod: HCNC,,, | Performed by: HOSPITALIST

## 2021-12-27 PROCEDURE — 99285 EMERGENCY DEPT VISIT HI MDM: CPT | Mod: 25,HCNC

## 2021-12-27 PROCEDURE — 25000003 PHARM REV CODE 250: Mod: HCNC | Performed by: HOSPITALIST

## 2021-12-27 PROCEDURE — 99284 PR EMERGENCY DEPT VISIT,LEVEL IV: ICD-10-PCS | Mod: HCNC,CS,, | Performed by: EMERGENCY MEDICINE

## 2021-12-27 PROCEDURE — 87088 URINE BACTERIA CULTURE: CPT | Mod: HCNC | Performed by: EMERGENCY MEDICINE

## 2021-12-27 PROCEDURE — 96367 TX/PROPH/DG ADDL SEQ IV INF: CPT

## 2021-12-27 PROCEDURE — 81001 URINALYSIS AUTO W/SCOPE: CPT | Mod: HCNC | Performed by: EMERGENCY MEDICINE

## 2021-12-27 PROCEDURE — 63600175 PHARM REV CODE 636 W HCPCS: Mod: HCNC | Performed by: HOSPITALIST

## 2021-12-27 PROCEDURE — 96376 TX/PRO/DX INJ SAME DRUG ADON: CPT

## 2021-12-27 PROCEDURE — 96372 THER/PROPH/DIAG INJ SC/IM: CPT | Mod: 59

## 2021-12-27 PROCEDURE — 87086 URINE CULTURE/COLONY COUNT: CPT | Mod: HCNC | Performed by: EMERGENCY MEDICINE

## 2021-12-27 PROCEDURE — 83690 ASSAY OF LIPASE: CPT | Mod: HCNC | Performed by: EMERGENCY MEDICINE

## 2021-12-27 PROCEDURE — 99284 EMERGENCY DEPT VISIT MOD MDM: CPT | Mod: HCNC,CS,, | Performed by: EMERGENCY MEDICINE

## 2021-12-27 RX ORDER — ONDANSETRON 2 MG/ML
4 INJECTION INTRAMUSCULAR; INTRAVENOUS
Status: COMPLETED | OUTPATIENT
Start: 2021-12-27 | End: 2021-12-27

## 2021-12-27 RX ORDER — POLYETHYLENE GLYCOL 3350 17 G/17G
17 POWDER, FOR SOLUTION ORAL DAILY PRN
Status: DISCONTINUED | OUTPATIENT
Start: 2021-12-27 | End: 2021-12-29 | Stop reason: HOSPADM

## 2021-12-27 RX ORDER — BENZTROPINE MESYLATE 0.5 MG/1
1 TABLET ORAL 2 TIMES DAILY
Status: DISCONTINUED | OUTPATIENT
Start: 2021-12-27 | End: 2021-12-29 | Stop reason: HOSPADM

## 2021-12-27 RX ORDER — ACETAMINOPHEN 500 MG
1000 TABLET ORAL EVERY 8 HOURS PRN
Status: DISCONTINUED | OUTPATIENT
Start: 2021-12-27 | End: 2021-12-29 | Stop reason: HOSPADM

## 2021-12-27 RX ORDER — ACETAMINOPHEN 325 MG/1
650 TABLET ORAL EVERY 4 HOURS PRN
Status: DISCONTINUED | OUTPATIENT
Start: 2021-12-27 | End: 2021-12-29 | Stop reason: HOSPADM

## 2021-12-27 RX ORDER — ATORVASTATIN CALCIUM 40 MG/1
40 TABLET, FILM COATED ORAL DAILY
Status: DISCONTINUED | OUTPATIENT
Start: 2021-12-28 | End: 2021-12-29 | Stop reason: HOSPADM

## 2021-12-27 RX ORDER — SODIUM CHLORIDE 0.9 % (FLUSH) 0.9 %
5 SYRINGE (ML) INJECTION
Status: DISCONTINUED | OUTPATIENT
Start: 2021-12-27 | End: 2021-12-29 | Stop reason: HOSPADM

## 2021-12-27 RX ORDER — CEPHALEXIN 500 MG/1
500 CAPSULE ORAL EVERY 12 HOURS
Qty: 10 CAPSULE | Refills: 0 | OUTPATIENT
Start: 2021-12-27 | End: 2021-12-29 | Stop reason: HOSPADM

## 2021-12-27 RX ORDER — QUETIAPINE FUMARATE 200 MG/1
200 TABLET, FILM COATED ORAL NIGHTLY
Status: DISCONTINUED | OUTPATIENT
Start: 2021-12-27 | End: 2021-12-29 | Stop reason: HOSPADM

## 2021-12-27 RX ORDER — TALC
6 POWDER (GRAM) TOPICAL NIGHTLY PRN
Status: DISCONTINUED | OUTPATIENT
Start: 2021-12-27 | End: 2021-12-29 | Stop reason: HOSPADM

## 2021-12-27 RX ORDER — ENOXAPARIN SODIUM 100 MG/ML
40 INJECTION SUBCUTANEOUS EVERY 24 HOURS
Status: DISCONTINUED | OUTPATIENT
Start: 2021-12-27 | End: 2021-12-29 | Stop reason: HOSPADM

## 2021-12-27 RX ORDER — DONEPEZIL HYDROCHLORIDE 5 MG/1
5 TABLET, FILM COATED ORAL EVERY MORNING
Status: DISCONTINUED | OUTPATIENT
Start: 2021-12-28 | End: 2021-12-29 | Stop reason: HOSPADM

## 2021-12-27 RX ORDER — ONDANSETRON 2 MG/ML
INJECTION INTRAMUSCULAR; INTRAVENOUS
Status: COMPLETED
Start: 2021-12-27 | End: 2021-12-27

## 2021-12-27 RX ORDER — LISINOPRIL 20 MG/1
20 TABLET ORAL DAILY
Status: DISCONTINUED | OUTPATIENT
Start: 2021-12-28 | End: 2021-12-29 | Stop reason: HOSPADM

## 2021-12-27 RX ORDER — DULOXETIN HYDROCHLORIDE 30 MG/1
30 CAPSULE, DELAYED RELEASE ORAL DAILY
Status: DISCONTINUED | OUTPATIENT
Start: 2021-12-28 | End: 2021-12-29 | Stop reason: HOSPADM

## 2021-12-27 RX ORDER — MEMANTINE HYDROCHLORIDE 5 MG/1
5 TABLET ORAL DAILY
Status: DISCONTINUED | OUTPATIENT
Start: 2021-12-28 | End: 2021-12-29 | Stop reason: HOSPADM

## 2021-12-27 RX ORDER — ONDANSETRON 2 MG/ML
8 INJECTION INTRAMUSCULAR; INTRAVENOUS EVERY 6 HOURS PRN
Status: DISCONTINUED | OUTPATIENT
Start: 2021-12-27 | End: 2021-12-29 | Stop reason: HOSPADM

## 2021-12-27 RX ADMIN — ONDANSETRON 8 MG: 2 INJECTION INTRAMUSCULAR; INTRAVENOUS at 10:12

## 2021-12-27 RX ADMIN — PIPERACILLIN SODIUM AND TAZOBACTAM SODIUM 4.5 G: 4; .5 INJECTION, POWDER, FOR SOLUTION INTRAVENOUS at 10:12

## 2021-12-27 RX ADMIN — CEFTRIAXONE 1 G: 1 INJECTION, POWDER, FOR SOLUTION INTRAMUSCULAR; INTRAVENOUS at 06:12

## 2021-12-27 RX ADMIN — ACETAMINOPHEN 1000 MG: 500 TABLET ORAL at 10:12

## 2021-12-27 RX ADMIN — QUETIAPINE FUMARATE 200 MG: 200 TABLET ORAL at 10:12

## 2021-12-27 RX ADMIN — ENOXAPARIN SODIUM 40 MG: 100 INJECTION SUBCUTANEOUS at 10:12

## 2021-12-27 RX ADMIN — BENZTROPINE MESYLATE 1 MG: 0.5 TABLET ORAL at 11:12

## 2021-12-27 RX ADMIN — ONDANSETRON 4 MG: 2 INJECTION INTRAMUSCULAR; INTRAVENOUS at 06:12

## 2021-12-27 RX ADMIN — Medication 6 MG: at 10:12

## 2021-12-27 RX ADMIN — IOHEXOL 75 ML: 350 INJECTION, SOLUTION INTRAVENOUS at 08:12

## 2021-12-27 NOTE — ED PROVIDER NOTES
Encounter Date: 12/27/2021       History     Chief Complaint   Patient presents with    Abdominal Pain     From [facility], patient mostly nonverbal, says simple words, hearing loss ,patient has been holding abdomen and complaining of abdominal pain, diarrhea, and nausea since this am      76-year-old female with dementia, congenital deafness, mild mental retardation, schizoaffective disorder, NIDDM, CHF, hypertension, presenting from [facility] for 1 day of abdominal pain and diarrhea and nausea.  Patient's sister is at bedside, states that they were with patient over [place] holidays without any issues, however she was told today that patient was complaining of abdominal pain and incontinent of urine, and while in the ambulance had several episodes of dry heaving, for which she was given Zofran.  Patient endorses periumbilical abdominal pain and nausea.  No diarrhea, sister reports 2 normal bowel movements.        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 50-70) 7/13/2012    Neck pain 7/13/2012    Paranoid schizophrenia     Schizoaffective disorder, chronic condition 7/13/2012     No past surgical history on file.  Family History   Problem Relation Age of Onset    Depression Sister     Depression Brother     Suicide Brother     ADD / ADHD Neg Hx     Alcohol abuse Neg Hx     Anxiety disorder Neg Hx     Bipolar disorder Neg Hx     Dementia Neg Hx     Drug abuse Neg Hx     OCD Neg Hx     Paranoid behavior Neg Hx     Physical abuse Neg Hx     Schizophrenia Neg Hx     Seizures Neg Hx     Self injury Neg Hx     Sexual abuse Neg Hx       Social History     Tobacco Use    Smoking status: Never Smoker    Smokeless tobacco: Never Used   Substance Use Topics    Alcohol use: No    Drug use: No     Review of Systems   Unable to perform ROS: Patient nonverbal   Constitutional: Negative for fever.   HENT: Negative for congestion.    Respiratory: Negative for cough and shortness of breath.    Gastrointestinal: Positive for abdominal pain, diarrhea, nausea and vomiting.       Physical Exam     Initial Vitals [12/27/21 1410]   BP Pulse Resp Temp SpO2   (!) 168/74 80 18 98.3 °F (36.8 °C) 98 %      MAP       --         Physical Exam    Nursing note and vitals reviewed.  Constitutional: She appears well-developed and well-nourished. She is not diaphoretic. No distress.   HENT:   Head: Normocephalic and atraumatic.   Nose: Nose normal.   Hard of hearing bilaterally   Eyes: EOM are normal. Pupils are equal, round, and reactive to light.   Neck: Neck supple.   Normal range of motion.  Cardiovascular: Normal rate, regular rhythm and intact distal pulses.   Pulmonary/Chest: Breath sounds normal. No stridor. No respiratory distress. She has no wheezes. She has no rhonchi. She has no rales. She exhibits no tenderness.   Abdominal: Abdomen is soft. Bowel sounds are normal. She exhibits no distension. There is abdominal tenderness (Moderate periumbilical). There is no rebound and no guarding.   Musculoskeletal:         General: No tenderness. Normal range of motion.      Cervical back: Normal range of motion and neck supple.     Neurological: She is alert and oriented to person, place, and time. She has normal strength.   Skin: Skin is warm and dry. Capillary refill takes less than 2 seconds. No rash noted. No pallor.   Psychiatric: She has a normal mood and affect. Her behavior is normal. Thought content normal.         ED Course   Procedures  Labs Reviewed   CBC W/ AUTO DIFFERENTIAL - Abnormal; Notable for the following components:       Result Value    WBC  15.44 (*)     RBC 3.98 (*)     Hemoglobin 10.9 (*)     Hematocrit 35.6 (*)     MCHC 30.6 (*)     Gran # (ANC) 11.6 (*)     Immature Grans (Abs) 0.06 (*)     Gran % 75.4 (*)     Lymph % 16.8 (*)     All other components within normal limits   COMPREHENSIVE METABOLIC PANEL - Abnormal; Notable for the following components:    Glucose 139 (*)     Anion Gap 7 (*)     All other components within normal limits   URINALYSIS, REFLEX TO URINE CULTURE - Abnormal; Notable for the following components:    Appearance, UA Hazy (*)     Protein, UA 1+ (*)     Nitrite, UA Positive (*)     Leukocytes, UA Trace (*)     All other components within normal limits    Narrative:     Specimen Source->Urine   URINALYSIS MICROSCOPIC - Abnormal; Notable for the following components:    WBC, UA 19 (*)     Bacteria Many (*)     All other components within normal limits    Narrative:     Specimen Source->Urine   ISTAT PROCEDURE - Abnormal; Notable for the following components:    POC Glucose 141 (*)     POC Hematocrit 34 (*)     All other components within normal limits   GRAM STAIN   LIPASE   LACTIC ACID, PLASMA   SARS-COV-2 RDRP GENE    Narrative:     This test utilizes isothermal nucleic acid amplification   technology to detect the SARS-CoV-2 RdRp nucleic acid segment.   The analytical sensitivity (limit of detection) is 125 genome   equivalents/mL.   A POSITIVE result implies infection with the SARS-CoV-2 virus;   the patient is presumed to be contagious.     A NEGATIVE result means that SARS-CoV-2 nucleic acids are not   present above the limit of detection. A NEGATIVE result should be   treated as presumptive. It does not rule out the possibility of   COVID-19 and should not be the sole basis for treatment decisions.   If COVID-19 is strongly suspected based on clinical and exposure   history, re-testing using an alternate molecular assay should be   considered.   This test is only for use under the Food and Drug   Administration s  Emergency Use Authorization (EUA).   Commercial kits are provided by [employer].   Performance characteristics of the EUA have been independently   verified by [facility] Department of   Pathology and Laboratory Medicine.   _________________________________________________________________   The authorized Fact Sheet for Healthcare Providers and the authorized Fact   Sheet for Patients of the ID NOW COVID-19 are available on the FDA   website:     https://www.fda.gov/media/400895/download  https://www.fda.gov/media/323822/download              Imaging Results           CT Abdomen Pelvis With Contrast (Final result)  Result time 12/27/21 21:30:40    Final result by Ramon Raymond MD (12/27/21 21:30:40)                 Impression:      Findings indicating what appears to be enteritis involving primarily the duodenal C loop in the proximal aspect of the jejunum.    The remaining portion of the examination is essentially unremarkable.    This report was flagged in Epic as abnormal.      Electronically signed by: Ramon Raymond  Date:    12/27/2021  Time:    21:30             Narrative:    EXAMINATION:  CT ABDOMEN PELVIS WITH CONTRAST    CLINICAL HISTORY:  Nausea/vomiting;Abdominal pain, acute, nonlocalized;    TECHNIQUE:  Low dose axial images, sagittal and coronal reformations were obtained from the lung bases to the pubic symphysis following the IV administration of 75 mL of Omnipaque 350    COMPARISON:  None.    FINDINGS:  The inferior aspect of the thorax reveals the lungs are well aerated.  The heart is normal in size and contour.  There is a small hiatal hernia present.    Imaging below the diaphragm reveals that the liver, gallbladder, adrenal glands, kidneys pancreas and spleen all appear to be within normal limits.    No indication of free air free fluid within the peritoneal cavity.  The mesentery and the retroperitoneal adipose tissue all appear to be within normal limits with no  obvious evidence of lymphadenopathy.    Note is made of inflammation involving the distal aspect of the stomach the duodenal C loop as well as the proximal most aspect of the jejunum.  There does not appear to be any obvious additional inflammatory changes involving the ileum or the large bowel.  The large bowel has a small amount of stool within the lumen.  The appendix appears normal.    Within the pelvic region the bladder uterus and rectum are within normal limits.                                 Medications   cefTRIAXone (ROCEPHIN) 1 g/50 mL D5W IVPB (0 g Intravenous Stopped 12/27/21 1910)   ondansetron injection 4 mg (4 mg Intravenous Given 12/27/21 1835)   iohexoL (OMNIPAQUE 350) injection 75 mL (75 mLs Intravenous Given 12/27/21 2043)     Medical Decision Making:   Initial Assessment:   Patient is alert and friendly, no distress, but does report nausea and grimaces with palpation to periumbilical and suprapubic region.  Differential Diagnosis:   COVID, gastroenteritis, UTI, bowel obstruction, colitis  Clinical Tests:   Lab Tests: Ordered and Reviewed  Radiological Study: Ordered and Reviewed  ED Management:  UA positive for infection, ceftriaxone given, however given reported vomiting and tenderness to palpation will obtain CT abdomen/pelvis to evaluate for intestinal pathology although concern for surgical requirement low and possibly 2/2 UTI.  Patient admitted to medicine service per discussion with sister at bedside.             ED Course as of 01/28/22 1241   Mon Dec 27, 2021   1603 WBC(!): 15.44 [JR]   1603 POC Glucose(!): 141 [JR]   1603 Hemoglobin(!): 10.9 [JR]   1630 Comp. Metabolic Panel(!) [JR]   1630 Sodium: 140 [JR]   1630 Lipase: 13 [JR]   1630 Alkaline Phosphatase: 94 [JR]   1630 AST: 12 [JR]   1630 ALT: 12 [JR]   1808 NITRITE UA(!): Positive [JR]   1808 Bacteria, UA(!): Many [JR]   1808 WBC, UA(!): 19 [JR]      ED Course User Index  [JR] Holly Bragg MD             Clinical Impression:    Final diagnoses:  [N39.0] Urinary tract infection without hematuria, site unspecified          ED Disposition Condition    Observation               Holly Bragg MD  01/28/22 3892

## 2021-12-28 PROBLEM — K52.9 ENTERITIS: Status: ACTIVE | Noted: 2021-12-28

## 2021-12-28 LAB
BASOPHILS # BLD AUTO: 0.05 K/UL (ref 0–0.2)
BASOPHILS NFR BLD: 0.4 % (ref 0–1.9)
DIFFERENTIAL METHOD: ABNORMAL
EOSINOPHIL # BLD AUTO: 0.2 K/UL (ref 0–0.5)
EOSINOPHIL NFR BLD: 1.2 % (ref 0–8)
ERYTHROCYTE [DISTWIDTH] IN BLOOD BY AUTOMATED COUNT: 14 % (ref 11.5–14.5)
GRAM STN SPEC: NORMAL
GRAM STN SPEC: NORMAL
HCT VFR BLD AUTO: 33.4 % (ref 37–48.5)
HGB BLD-MCNC: 10.2 G/DL (ref 12–16)
IMM GRANULOCYTES # BLD AUTO: 0.06 K/UL (ref 0–0.04)
IMM GRANULOCYTES NFR BLD AUTO: 0.5 % (ref 0–0.5)
LYMPHOCYTES # BLD AUTO: 4 K/UL (ref 1–4.8)
LYMPHOCYTES NFR BLD: 31 % (ref 18–48)
MCH RBC QN AUTO: 26.8 PG (ref 27–31)
MCHC RBC AUTO-ENTMCNC: 30.5 G/DL (ref 32–36)
MCV RBC AUTO: 88 FL (ref 82–98)
MONOCYTES # BLD AUTO: 1.1 K/UL (ref 0.3–1)
MONOCYTES NFR BLD: 8.7 % (ref 4–15)
NEUTROPHILS # BLD AUTO: 7.5 K/UL (ref 1.8–7.7)
NEUTROPHILS NFR BLD: 58.2 % (ref 38–73)
NRBC BLD-RTO: 0 /100 WBC
PLATELET # BLD AUTO: 279 K/UL (ref 150–450)
PMV BLD AUTO: 10.2 FL (ref 9.2–12.9)
RBC # BLD AUTO: 3.8 M/UL (ref 4–5.4)
WBC # BLD AUTO: 12.81 K/UL (ref 3.9–12.7)

## 2021-12-28 PROCEDURE — 36415 COLL VENOUS BLD VENIPUNCTURE: CPT | Mod: HCNC | Performed by: HOSPITALIST

## 2021-12-28 PROCEDURE — 99226 PR SUBSEQUENT OBSERVATION CARE,LEVEL III: ICD-10-PCS | Mod: HCNC,,, | Performed by: PHYSICIAN ASSISTANT

## 2021-12-28 PROCEDURE — 96366 THER/PROPH/DIAG IV INF ADDON: CPT

## 2021-12-28 PROCEDURE — 96372 THER/PROPH/DIAG INJ SC/IM: CPT

## 2021-12-28 PROCEDURE — 99226 PR SUBSEQUENT OBSERVATION CARE,LEVEL III: CPT | Mod: HCNC,,, | Performed by: PHYSICIAN ASSISTANT

## 2021-12-28 PROCEDURE — 85025 COMPLETE CBC W/AUTO DIFF WBC: CPT | Mod: HCNC | Performed by: HOSPITALIST

## 2021-12-28 PROCEDURE — 25000003 PHARM REV CODE 250: Mod: HCNC | Performed by: HOSPITALIST

## 2021-12-28 PROCEDURE — G0378 HOSPITAL OBSERVATION PER HR: HCPCS | Mod: HCNC

## 2021-12-28 PROCEDURE — 63600175 PHARM REV CODE 636 W HCPCS: Mod: HCNC | Performed by: HOSPITALIST

## 2021-12-28 RX ADMIN — DONEPEZIL HYDROCHLORIDE 5 MG: 5 TABLET, FILM COATED ORAL at 05:12

## 2021-12-28 RX ADMIN — ENOXAPARIN SODIUM 40 MG: 100 INJECTION SUBCUTANEOUS at 04:12

## 2021-12-28 RX ADMIN — PIPERACILLIN SODIUM AND TAZOBACTAM SODIUM 4.5 G: 4; .5 INJECTION, POWDER, FOR SOLUTION INTRAVENOUS at 05:12

## 2021-12-28 RX ADMIN — QUETIAPINE FUMARATE 200 MG: 200 TABLET ORAL at 10:12

## 2021-12-28 RX ADMIN — LISINOPRIL 20 MG: 20 TABLET ORAL at 08:12

## 2021-12-28 RX ADMIN — PIPERACILLIN SODIUM AND TAZOBACTAM SODIUM 4.5 G: 4; .5 INJECTION, POWDER, FOR SOLUTION INTRAVENOUS at 04:12

## 2021-12-28 RX ADMIN — BENZTROPINE MESYLATE 1 MG: 0.5 TABLET ORAL at 10:12

## 2021-12-28 RX ADMIN — Medication 6 MG: at 10:12

## 2021-12-28 RX ADMIN — ATORVASTATIN CALCIUM 40 MG: 40 TABLET, FILM COATED ORAL at 08:12

## 2021-12-28 RX ADMIN — MEMANTINE HYDROCHLORIDE 5 MG: 5 TABLET ORAL at 08:12

## 2021-12-28 RX ADMIN — DULOXETINE 30 MG: 30 CAPSULE, DELAYED RELEASE ORAL at 08:12

## 2021-12-28 NOTE — H&P
[facility] - Emergency Dept  [facility] Medicine  History & Physical    Patient Name: Lay Peres  MRN: [id number]  Patient Class: OP- Observation  Admission Date: 12/27/2021  Attending Physician: Valerio Strong MD   Primary Care Provider: Krista Case MD         Patient information was obtained from relative(s) and ER records.     Subjective:     Principal Problem:Acute cystitis without hematuria    Chief Complaint:   Chief Complaint   Patient presents with    Abdominal Pain     From [facility], patient mostly nonverbal, says simple words, hearing loss ,patient has been holding abdomen and complaining of abdominal pain, diarrhea, and nausea since this am         HPI: Ms. Lay Peres is a 76 y.o. female with mental retardation and congenital deafness who presents to the ER for evaluation of increased urinary frequency and bladder incontinence.  History is obtained from sister who is at bedside as patient is nonverbal at baseline.  She reports that she lives at [facility], and was in her normal state of health on [place] Day.  However, the day after she started to develop urinary incontinence and increased urinary frequency.  She also has been holding her abdomen and groaning in pain.  She denies any fever or chills.  She has had nausea and vomiting.  Of note, she does have a history of E. Coli UTIs.    Upon arrival to the ER, vitals were temp 98.3F, HR 80, /74.  Labs showed WBC 15 and dirty UA.  She was given Ceftriaxone.  She was given a PO challenge and failed.  She was admitted to Hospital Medicine for further management.      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  50-70) 7/13/2012    Neck pain 7/13/2012    Paranoid schizophrenia     Schizoaffective disorder, chronic condition 7/13/2012       No past surgical history on file.    Review of patient's allergies indicates:  No Known Allergies    No current facility-administered medications on file prior to encounter.     Current Outpatient Medications on File Prior to Encounter   Medication Sig    acetaminophen (TYLENOL) 500 MG tablet Take 1,000 mg by mouth 2 (two) times daily.    aspirin (ECOTRIN) 325 MG EC tablet Take 1 tablet (325 mg total) by mouth once daily.    benztropine (COGENTIN) 1 MG tablet Take 1 tablet (1 mg total) by mouth 2 (two) times daily.    blood sugar diagnostic Strp 1 strip by Misc.(Non-Drug; Combo Route) route once daily.    cholecalciferol, vitamin D3, (VITAMIN D3) 25 mcg (1,000 unit) capsule Take 1 capsule by mouth Daily.    cyanocobalamin 1,000 mcg/mL injection One ml IM weekly for 4 weeks then one ml IM monthly.  Dipense #10 25 gague 1 inch needles and #10 one ml syringes    donepeziL (ARICEPT) 5 MG tablet Take 1 tablet (5 mg total) by mouth every morning. To be taken with food    DULoxetine (CYMBALTA) 30 MG capsule Take 1 capsule (30 mg total) by mouth once daily.    famotidine (PEPCID) 40 MG tablet TAKE 1 TABLET BY MOUTH EVERY EVENING AT 8PM    ferrous gluconate (FERGON) 324 MG tablet Take 324 mg by mouth every other day.    gabapentin (NEURONTIN) 300 MG capsule TAKE 1 TABLET (300MG) BY MOUTH AT BEDTIME at 8pm    haloperidol decanoate (HALDOL DECANOATE) 50 mg/mL injection Inject 1 mL (50 mg total) into the muscle every 14 (fourteen) days.    lisinopriL (PRINIVIL,ZESTRIL) 20 MG tablet Take 1 tablet (20 mg total) by mouth once daily.    memantine (NAMENDA XR) 28 mg CSpX Take 1 capsule (28 mg total) by mouth once daily.    metFORMIN (GLUCOPHAGE) 500 MG tablet Take 1 tablet (500 mg total) by mouth every evening. At next cycle fill    multivitamin capsule Take 1 capsule by mouth once  daily.    nystatin (MYCOSTATIN) ointment Apply topically 2 (two) times daily as needed.     QUEtiapine (SEROQUEL) 200 MG Tab Take 1 tablet (200 mg total) by mouth every evening.    rosuvastatin (CRESTOR) 10 MG tablet Take 1 tablet (10 mg total) by mouth once daily.    senna (SENOKOT) 8.6 mg tablet Take 1 tablet by mouth Twice daily.     Family History     Problem Relation (Age of Onset)    Depression Sister, Brother    Suicide Brother        Tobacco Use    Smoking status: Never Smoker    Smokeless tobacco: Never Used   Substance and Sexual Activity    Alcohol use: No    Drug use: No    Sexual activity: Not Currently     Review of Systems   Unable to perform ROS: Patient nonverbal     Objective:     Vital Signs (Most Recent):  Temp: 98.3 °F (36.8 °C) (12/27/21 1410)  Pulse: 71 (12/27/21 1948)  Resp: 18 (12/27/21 1410)  BP: 130/72 (12/27/21 1948)  SpO2: 98 % (12/27/21 1410) Vital Signs (24h Range):  Temp:  [98.3 °F (36.8 °C)] 98.3 °F (36.8 °C)  Pulse:  [71-80] 71  Resp:  [18] 18  SpO2:  [98 %] 98 %  BP: (130-168)/(72-74) 130/72     Weight: 67.4 kg (148 lb 9.4 oz)  Body mass index is 25.51 kg/m².    Physical Exam  Vitals reviewed.   Constitutional:       Appearance: She is well-developed and well-nourished.   HENT:      Head: Normocephalic and atraumatic.      Mouth/Throat:      Mouth: Oropharynx is clear and moist.   Eyes:      General: No scleral icterus.     Extraocular Movements: EOM normal.      Pupils: Pupils are equal, round, and reactive to light.   Cardiovascular:      Rate and Rhythm: Normal rate and regular rhythm.      Heart sounds: No murmur heard.      Pulmonary:      Effort: Pulmonary effort is normal. No respiratory distress.      Breath sounds: Normal breath sounds. No wheezing.   Abdominal:      General: Bowel sounds are normal. There is no distension.      Palpations: Abdomen is soft.      Tenderness: There is abdominal tenderness (Generalized suprapubic/BLQ). There is guarding.    Musculoskeletal:         General: No edema. Normal range of motion.      Cervical back: Normal range of motion and neck supple.   Skin:     General: Skin is warm and dry.   Neurological:      General: No focal deficit present.      Mental Status: She is alert. Mental status is at baseline.   Psychiatric:         Mood and Affect: Mood and affect normal.         Behavior: Behavior normal.           CRANIAL NERVES     CN III, IV, VI   Pupils are equal, round, and reactive to light.  Extraocular motions are normal.        Significant Labs:   All pertinent labs within the past 24 hours have been reviewed.  CBC:   Recent Labs   Lab 12/27/21  1552 12/27/21  1553   WBC 15.44*  --    HGB 10.9*  --    HCT 35.6* 34*     --      CMP:   Recent Labs   Lab 12/27/21  1552      K 3.7      CO2 24   *   BUN 23   CREATININE 0.8   CALCIUM 9.5   PROT 7.4   ALBUMIN 3.5   BILITOT 0.2   ALKPHOS 94   AST 12   ALT 12   ANIONGAP 7*   EGFRNONAA >60.0     Urine Culture: No results for input(s): LABURIN in the last 48 hours.    Significant Imaging: I have reviewed all pertinent imaging results/findings within the past 24 hours.    Assessment/Plan:     * Acute cystitis without hematuria  · WBC 15 and a dirty UA  · UA dirty.  F/u urine gram stain and culture  · Ceftriaxone x 3 days (start date 12/27)  · Given guarding, will check CT abdomen/pelvis to evaluate for pyelo other causes of abdominal pain      Dementia  · Chronic and stable  · Continue Aricept and Memantine      Deafness congenital  · Nonverbal at baseline      Mental retardation  · Chronic issue  · Resides at [facility]      Chronic schizoaffective disorder  · Chronic and stable  · On Haldol IM outpatient  · Continue Cogentin 1mg PO BID  · Continue Seroquel 200mg PO qHS      Hyperlipidemia  · Chronic and stable  · Continue Statin      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  · HbA1c 5.7 in September  · Home DM regimen:  Metformin  · SSI with POCT  accuchecks to achieve blood glucose of 140-180 while hospitalized  · Diabetic diet when tolerating PO, clear liquids for now      Hypertension  · Chronic and stable  · Continue Lisinopril 20mg PO daily        VTE Risk Mitigation (From admission, onward)         Ordered     enoxaparin injection 40 mg  Daily         12/27/21 1918     IP VTE HIGH RISK PATIENT  Once         12/27/21 1918                   Sachi Rodrigez MD  Department of Hospital Medicine   [facility] - Emergency Dept

## 2021-12-28 NOTE — ASSESSMENT & PLAN NOTE
· HbA1c 5.7 in September  · Home DM regimen:  Metformin  · SSI with POCT accuchecks to achieve blood glucose of 140-180 while hospitalized  · Diabetic diet when tolerating PO, clear liquids for now

## 2021-12-28 NOTE — HPI
Ms. Lay Peres is a 76 y.o. female with mental retardation and congenital deafness who presents to the ER for evaluation of increased urinary frequency and bladder incontinence.  History is obtained from sister who is at bedside as patient is nonverbal at baseline.  She reports that she lives at [facility], and was in her normal state of health on [place] Day.  However, the day after she started to develop urinary incontinence and increased urinary frequency.  She also has been holding her abdomen and groaning in pain.  She denies any fever or chills.  She has had nausea and vomiting.  Of note, she does have a history of E. Coli UTIs.    Upon arrival to the ER, vitals were temp 98.3F, HR 80, /74.  Labs showed WBC 15 and dirty UA.  She was given Ceftriaxone.  She was given a PO challenge and failed.  She was admitted to Hospital Medicine for further management.

## 2021-12-28 NOTE — SUBJECTIVE & OBJECTIVE
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 50-70) 7/13/2012    Neck pain 7/13/2012    Paranoid schizophrenia     Schizoaffective disorder, chronic condition 7/13/2012       No past surgical history on file.    Review of patient's allergies indicates:  No Known Allergies    No current facility-administered medications on file prior to encounter.     Current Outpatient Medications on File Prior to Encounter   Medication Sig    acetaminophen (TYLENOL) 500 MG tablet Take 1,000 mg by mouth 2 (two) times daily.    aspirin (ECOTRIN) 325 MG EC tablet Take 1 tablet (325 mg total) by mouth once daily.    benztropine (COGENTIN) 1 MG tablet Take 1 tablet (1 mg total) by mouth 2 (two) times daily.    blood sugar diagnostic Strp 1 strip by Misc.(Non-Drug; Combo Route) route once daily.    cholecalciferol, vitamin D3, (VITAMIN D3) 25 mcg (1,000 unit) capsule Take 1 capsule by mouth Daily.    cyanocobalamin 1,000 mcg/mL injection One ml IM weekly for 4 weeks then one ml IM monthly.  Dipense #10 25 gague 1 inch needles and #10 one ml syringes    donepeziL (ARICEPT) 5 MG tablet Take 1 tablet (5 mg total) by mouth every morning. To be taken with food    DULoxetine (CYMBALTA) 30 MG capsule Take 1 capsule (30 mg total) by mouth once daily.    famotidine (PEPCID) 40 MG tablet TAKE 1 TABLET BY MOUTH EVERY EVENING AT 8PM    ferrous gluconate (FERGON) 324 MG tablet Take 324 mg by mouth every other day.    gabapentin (NEURONTIN) 300 MG capsule TAKE 1 TABLET (300MG) BY MOUTH AT BEDTIME at 8pm    haloperidol decanoate (HALDOL DECANOATE) 50 mg/mL injection Inject 1 mL (50 mg total) into the muscle every 14 (fourteen) days.     lisinopriL (PRINIVIL,ZESTRIL) 20 MG tablet Take 1 tablet (20 mg total) by mouth once daily.    memantine (NAMENDA XR) 28 mg CSpX Take 1 capsule (28 mg total) by mouth once daily.    metFORMIN (GLUCOPHAGE) 500 MG tablet Take 1 tablet (500 mg total) by mouth every evening. At next cycle fill    multivitamin capsule Take 1 capsule by mouth once daily.    nystatin (MYCOSTATIN) ointment Apply topically 2 (two) times daily as needed.     QUEtiapine (SEROQUEL) 200 MG Tab Take 1 tablet (200 mg total) by mouth every evening.    rosuvastatin (CRESTOR) 10 MG tablet Take 1 tablet (10 mg total) by mouth once daily.    senna (SENOKOT) 8.6 mg tablet Take 1 tablet by mouth Twice daily.     Family History     Problem Relation (Age of Onset)    Depression Sister, Brother    Suicide Brother        Tobacco Use    Smoking status: Never Smoker    Smokeless tobacco: Never Used   Substance and Sexual Activity    Alcohol use: No    Drug use: No    Sexual activity: Not Currently     Review of Systems   Unable to perform ROS: Patient nonverbal     Objective:     Vital Signs (Most Recent):  Temp: 98.3 °F (36.8 °C) (12/27/21 1410)  Pulse: 71 (12/27/21 1948)  Resp: 18 (12/27/21 1410)  BP: 130/72 (12/27/21 1948)  SpO2: 98 % (12/27/21 1410) Vital Signs (24h Range):  Temp:  [98.3 °F (36.8 °C)] 98.3 °F (36.8 °C)  Pulse:  [71-80] 71  Resp:  [18] 18  SpO2:  [98 %] 98 %  BP: (130-168)/(72-74) 130/72     Weight: 67.4 kg (148 lb 9.4 oz)  Body mass index is 25.51 kg/m².    Physical Exam  Vitals reviewed.   Constitutional:       Appearance: She is well-developed and well-nourished.   HENT:      Head: Normocephalic and atraumatic.      Mouth/Throat:      Mouth: Oropharynx is clear and moist.   Eyes:      General: No scleral icterus.     Extraocular Movements: EOM normal.      Pupils: Pupils are equal, round, and reactive to light.   Cardiovascular:      Rate and Rhythm: Normal rate and regular rhythm.      Heart sounds: No murmur  heard.      Pulmonary:      Effort: Pulmonary effort is normal. No respiratory distress.      Breath sounds: Normal breath sounds. No wheezing.   Abdominal:      General: Bowel sounds are normal. There is no distension.      Palpations: Abdomen is soft.      Tenderness: There is abdominal tenderness (Generalized suprapubic/BLQ). There is guarding.   Musculoskeletal:         General: No edema. Normal range of motion.      Cervical back: Normal range of motion and neck supple.   Skin:     General: Skin is warm and dry.   Neurological:      General: No focal deficit present.      Mental Status: She is alert. Mental status is at baseline.   Psychiatric:         Mood and Affect: Mood and affect normal.         Behavior: Behavior normal.           CRANIAL NERVES     CN III, IV, VI   Pupils are equal, round, and reactive to light.  Extraocular motions are normal.        Significant Labs:   All pertinent labs within the past 24 hours have been reviewed.  CBC:   Recent Labs   Lab 12/27/21  1552 12/27/21  1553   WBC 15.44*  --    HGB 10.9*  --    HCT 35.6* 34*     --      CMP:   Recent Labs   Lab 12/27/21  1552      K 3.7      CO2 24   *   BUN 23   CREATININE 0.8   CALCIUM 9.5   PROT 7.4   ALBUMIN 3.5   BILITOT 0.2   ALKPHOS 94   AST 12   ALT 12   ANIONGAP 7*   EGFRNONAA >60.0     Urine Culture: No results for input(s): LABURIN in the last 48 hours.    Significant Imaging: I have reviewed all pertinent imaging results/findings within the past 24 hours.

## 2021-12-28 NOTE — ED NOTES
Patient identifiers verified and correct for Lay Reeves Belt    Pt to ED via EMS from [facility] with c/o abd pain, n/v starting this morning. Nausea tx by EMS with IV zofran.  Had two episodes of emesis during transport.     LOC: The patient is awake, alert, and aware of environment. The patient is alert, non verbal.   APPEARANCE: No acute distress noted.    HEENT: WDL, PERRLA  PSYCHOSOCIAL: Patient is calm and cooperative. Denies SI/HI.  SKIN: The skin is warm, dry, color consistent with ethnicity. No breakdown or brusing visible.  RESPIRATORY: Airway is open and patent. Bilateral chest rise and fall. Respiratory rate even and unlabored.  No accessory muscle use noted.  CARDIAC: Patient has a normal rate and rhythm.  ABDOMEN/GI: Soft, non tender. No distention noted.  URINARY:  Voids independently without difficulty. Family reports increased urination.  NEUROLOGIC: Eyes open spontaneously.  Able to follow commands, demonstrating ability to actively and appropriately communicate within context of current conversation. Symmetrical facial muscles. Movement is purposeful. Denies dizziness/lightheadedness.  MUSCULOSKELETAL: No obvious deformities noted. Full ROM in all extremities.  PERIPHERAL VASCULAR: Cap refill <3 secs bilaterally. No peripheral edema noted. Denies numbness and tingling in extremities

## 2021-12-28 NOTE — ED NOTES
Pt given water and crackers for PO challenge, did not tolerate. Began heaving and had episode of emesis. MD at bedside, verbal order rec'd for zofran 4mg IV x1.

## 2021-12-28 NOTE — PLAN OF CARE
Problem: Adult Inpatient Plan of Care  Goal: Plan of Care Review  Outcome: Ongoing, Progressing  Goal: Patient-Specific Goal (Individualized)  Outcome: Ongoing, Progressing  Goal: Absence of Hospital-Acquired Illness or Injury  Outcome: Ongoing, Progressing  Goal: Optimal Comfort and Wellbeing  Outcome: Ongoing, Progressing     Problem: Skin Injury Risk Increased  Goal: Skin Health and Integrity  Outcome: Ongoing, Progressing     POC reviewed with patient and sister. All questions and concerns reviewed with sister. Fall/safety precautions implemented and maintained. Pain management provided. IV abx administered. No acute events noted this shift. Please see flowsheet for full assessment and vitals. Bed locked in lowest position. Call bell within reach. Will continue to monitor.

## 2021-12-28 NOTE — ASSESSMENT & PLAN NOTE
· Chronic and stable  · On Haldol IM outpatient  · Continue Cogentin 1mg PO BID  · Continue Seroquel 200mg PO qHS

## 2021-12-28 NOTE — PROGRESS NOTES
[facility] - Telemetry Stepdown ([place] [address])  [facility] Medicine  Progress Note    Patient Name: Lay Peres  MRN: [id number]  Patient Class: OP- Observation   Admission Date: 12/27/2021  Length of Stay: 0 days  Attending Physician: Valerio Strong MD  Primary Care Provider: Krista Case MD        Subjective:     Principal Problem:Acute cystitis without hematuria        HPI:  Ms. Lay Peres is a 76 y.o. female with mental retardation and congenital deafness who presents to the ER for evaluation of increased urinary frequency and bladder incontinence.  History is obtained from sister who is at bedside as patient is nonverbal at baseline.  She reports that she lives at [facility], and was in her normal state of health on [place] Day.  However, the day after she started to develop urinary incontinence and increased urinary frequency.  She also has been holding her abdomen and groaning in pain.  She denies any fever or chills.  She has had nausea and vomiting.  Of note, she does have a history of E. Coli UTIs.    Upon arrival to the ER, vitals were temp 98.3F, HR 80, /74.  Labs showed WBC 15 and dirty UA.  She was given Ceftriaxone.  She was given a PO challenge and failed.  She was admitted to Hospital Medicine for further management.      Overview/Hospital Course:  76 y.o. who was admitted to hospital medicine for management of a urinary tract infection. Empiric antibiotics therapy was initiated on admission.       Interval History: NAEON. Remains afebrile with resolving leukocytosis. Patient sleeping upon arrival to room with sister at the bedside. We discussed care plan, all questions and concerns were addressed. Patient in no acute distress.     Review of Systems   Unable to perform ROS: Patient nonverbal     Objective:     Vital Signs (Most Recent):  Temp: 97.1 °F (36.2 °C) (12/28/21 1137)  Pulse: 70 (12/28/21 1137)  Resp: 18 (12/28/21 1137)  BP: (!) 141/64 (12/28/21  1137)  SpO2: (!) 94 % (12/28/21 1137) Vital Signs (24h Range):  Temp:  [97.1 °F (36.2 °C)-98 °F (36.7 °C)] 97.1 °F (36.2 °C)  Pulse:  [59-73] 70  Resp:  [16-18] 18  SpO2:  [94 %-99 %] 94 %  BP: (130-166)/(63-72) 141/64     Weight: 67.7 kg (149 lb 4 oz)  Body mass index is 25.62 kg/m².    Intake/Output Summary (Last 24 hours) at 12/28/2021 1604  Last data filed at 12/28/2021 0252  Gross per 24 hour   Intake 340 ml   Output --   Net 340 ml      Physical Exam  Vitals and nursing note reviewed.   Constitutional:       Appearance: She is well-developed.   HENT:      Head: Normocephalic and atraumatic.   Eyes:      General: No scleral icterus.     Pupils: Pupils are equal, round, and reactive to light.   Cardiovascular:      Rate and Rhythm: Normal rate and regular rhythm.      Heart sounds: No murmur heard.      Pulmonary:      Effort: Pulmonary effort is normal. No respiratory distress.      Breath sounds: Normal breath sounds. No wheezing.   Abdominal:      General: Bowel sounds are normal. There is no distension.      Palpations: Abdomen is soft.      Tenderness: There is abdominal tenderness (Generalized suprapubic/BLQ). There is guarding.   Musculoskeletal:         General: Normal range of motion.      Cervical back: Normal range of motion and neck supple.   Skin:     General: Skin is warm and dry.   Neurological:      General: No focal deficit present.      Mental Status: She is alert. Mental status is at baseline.   Psychiatric:         Behavior: Behavior normal.         Significant Labs: All pertinent labs within the past 24 hours have been reviewed.    Significant Imaging: I have reviewed all pertinent imaging results/findings within the past 24 hours.      Assessment/Plan:      * Acute cystitis without hematuria  · WBC 15 and a dirty UA  · UA dirty.  F/u urine gram stain and culture  · Ceftriaxone x 3 days (start date 12/27)  · Given guarding, will check CT abdomen/pelvis to evaluate for pyelo other causes of  abdominal pain      Enteritis  - noted on CT abdomen   - continue zosyn  - tolerated CLD, ADAT    Dementia  · Chronic and stable  · Continue Aricept and Memantine    Deafness congenital  · Nonverbal at baseline    Mental retardation  · Chronic issue  · Resides at [facility]      Chronic schizoaffective disorder  · Chronic and stable  · On Haldol IM outpatient  · Continue Cogentin 1mg PO BID  · Continue Seroquel 200mg PO qHS    Hyperlipidemia  · Chronic and stable  · Continue Statin      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  · HbA1c 5.7 in September  · Home DM regimen:  Metformin  · SSI with POCT accuchecks to achieve blood glucose of 140-180 while hospitalized  · Diabetic diet when tolerating PO, clear liquids for now      Hypertension  · Chronic and stable  · Continue Lisinopril 20mg PO daily      VTE Risk Mitigation (From admission, onward)         Ordered     enoxaparin injection 40 mg  Daily         12/27/21 1918     IP VTE HIGH RISK PATIENT  Once         12/27/21 1918                Discharge Planning   AGUILAR: 12/29/2021     Code Status: Full Code   Is the patient medically ready for discharge?: No    Reason for patient still in hospital (select all that apply): Laboratory test and Treatment  Discharge Plan A: Group Home                  Jeremiah Canada PA-C  Department of Hospital Medicine   [facility] - Telemetry Stepdown ([place])

## 2021-12-28 NOTE — ASSESSMENT & PLAN NOTE
· WBC 15 and a dirty UA  · UA dirty.  F/u urine gram stain and culture  · Ceftriaxone x 3 days (start date 12/27)  · Given guarding, will check CT abdomen/pelvis to evaluate for pyelo other causes of abdominal pain

## 2021-12-28 NOTE — SUBJECTIVE & OBJECTIVE
Interval History: NAEON. Remains afebrile with resolving leukocytosis. Patient sleeping upon arrival to room with sister at the bedside. We discussed care plan, all questions and concerns were addressed. Patient in no acute distress.     Review of Systems   Unable to perform ROS: Patient nonverbal     Objective:     Vital Signs (Most Recent):  Temp: 97.1 °F (36.2 °C) (12/28/21 1137)  Pulse: 70 (12/28/21 1137)  Resp: 18 (12/28/21 1137)  BP: (!) 141/64 (12/28/21 1137)  SpO2: (!) 94 % (12/28/21 1137) Vital Signs (24h Range):  Temp:  [97.1 °F (36.2 °C)-98 °F (36.7 °C)] 97.1 °F (36.2 °C)  Pulse:  [59-73] 70  Resp:  [16-18] 18  SpO2:  [94 %-99 %] 94 %  BP: (130-166)/(63-72) 141/64     Weight: 67.7 kg (149 lb 4 oz)  Body mass index is 25.62 kg/m².    Intake/Output Summary (Last 24 hours) at 12/28/2021 1604  Last data filed at 12/28/2021 0252  Gross per 24 hour   Intake 340 ml   Output --   Net 340 ml      Physical Exam  Vitals and nursing note reviewed.   Constitutional:       Appearance: She is well-developed.   HENT:      Head: Normocephalic and atraumatic.   Eyes:      General: No scleral icterus.     Pupils: Pupils are equal, round, and reactive to light.   Cardiovascular:      Rate and Rhythm: Normal rate and regular rhythm.      Heart sounds: No murmur heard.      Pulmonary:      Effort: Pulmonary effort is normal. No respiratory distress.      Breath sounds: Normal breath sounds. No wheezing.   Abdominal:      General: Bowel sounds are normal. There is no distension.      Palpations: Abdomen is soft.      Tenderness: There is abdominal tenderness (Generalized suprapubic/BLQ). There is guarding.   Musculoskeletal:         General: Normal range of motion.      Cervical back: Normal range of motion and neck supple.   Skin:     General: Skin is warm and dry.   Neurological:      General: No focal deficit present.      Mental Status: She is alert. Mental status is at baseline.   Psychiatric:         Behavior: Behavior  normal.         Significant Labs: All pertinent labs within the past 24 hours have been reviewed.    Significant Imaging: I have reviewed all pertinent imaging results/findings within the past 24 hours.

## 2021-12-28 NOTE — PLAN OF CARE
[facility] - Telemetry Stepdown ([facility])  Discharge Assessment    Primary Care Provider: Krista Case MD     Discharge Assessment (most recent)     BRIEF DISCHARGE ASSESSMENT - 12/28/21 1156        Discharge Planning    Assessment Type Discharge Planning Brief Assessment     Resource/Environmental Concerns none     Support Systems Family members     Equipment Currently Used at Home rollator;wheelchair     Current Living Arrangements residential facility     Care Facility Name [facility]     Patient/Family Anticipates Transition to home     Patient/Family Anticipated Services at Transition none     DME Needed Upon Discharge  none     Discharge Plan A Group Home     Discharge Plan B Group home               Information for this assessment was obtained from pt's sister at bedside. Pt is a 76 y.o. female admitted with Acute Cystitis w/o Hematuria. She is a resident at [facility] where she requires assistance with her IADLs and is dependent with her ADLs. She will be returning to [facility] and her sister at bedside states she can transport. [facility] Discharge Packet given to patient and/or family with understanding verbalized.   name and number and estimated discharge date written on white board in patient's room with request to call for any questions or concerns.  Will continue to follow for needs.  Dg Mina RN,BSN

## 2021-12-28 NOTE — HOSPITAL COURSE
76 y.o. who was admitted to hospital medicine for management of a urinary tract infection. CT abdomen was also obtained which demonstrated findings concerning for enteritis. Empiric antibiotics therapy was initiated on admission with improvement of the patient clinical status. She tolerated PO intake without difficulty and was discharged back to her home facility.

## 2021-12-29 VITALS
OXYGEN SATURATION: 94 % | BODY MASS INDEX: 25.48 KG/M2 | HEART RATE: 73 BPM | HEIGHT: 64 IN | WEIGHT: 149.25 LBS | DIASTOLIC BLOOD PRESSURE: 73 MMHG | RESPIRATION RATE: 16 BRPM | SYSTOLIC BLOOD PRESSURE: 133 MMHG | TEMPERATURE: 98 F

## 2021-12-29 LAB
ANION GAP SERPL CALC-SCNC: 9 MMOL/L (ref 8–16)
BACTERIA UR CULT: ABNORMAL
BASOPHILS # BLD AUTO: 0.07 K/UL (ref 0–0.2)
BASOPHILS NFR BLD: 0.8 % (ref 0–1.9)
BUN SERPL-MCNC: 13 MG/DL (ref 8–23)
CALCIUM SERPL-MCNC: 9.2 MG/DL (ref 8.7–10.5)
CHLORIDE SERPL-SCNC: 108 MMOL/L (ref 95–110)
CO2 SERPL-SCNC: 24 MMOL/L (ref 23–29)
CREAT SERPL-MCNC: 0.8 MG/DL (ref 0.5–1.4)
DIFFERENTIAL METHOD: ABNORMAL
EOSINOPHIL # BLD AUTO: 0.3 K/UL (ref 0–0.5)
EOSINOPHIL NFR BLD: 3.2 % (ref 0–8)
ERYTHROCYTE [DISTWIDTH] IN BLOOD BY AUTOMATED COUNT: 14.1 % (ref 11.5–14.5)
EST. GFR  (AFRICAN AMERICAN): >60 ML/MIN/1.73 M^2
EST. GFR  (NON AFRICAN AMERICAN): >60 ML/MIN/1.73 M^2
GLUCOSE SERPL-MCNC: 144 MG/DL (ref 70–110)
HCT VFR BLD AUTO: 35.6 % (ref 37–48.5)
HGB BLD-MCNC: 10.6 G/DL (ref 12–16)
IMM GRANULOCYTES # BLD AUTO: 0.06 K/UL (ref 0–0.04)
IMM GRANULOCYTES NFR BLD AUTO: 0.7 % (ref 0–0.5)
LYMPHOCYTES # BLD AUTO: 2.3 K/UL (ref 1–4.8)
LYMPHOCYTES NFR BLD: 25.5 % (ref 18–48)
MCH RBC QN AUTO: 27 PG (ref 27–31)
MCHC RBC AUTO-ENTMCNC: 29.8 G/DL (ref 32–36)
MCV RBC AUTO: 91 FL (ref 82–98)
MONOCYTES # BLD AUTO: 0.8 K/UL (ref 0.3–1)
MONOCYTES NFR BLD: 8.8 % (ref 4–15)
NEUTROPHILS # BLD AUTO: 5.6 K/UL (ref 1.8–7.7)
NEUTROPHILS NFR BLD: 61 % (ref 38–73)
NRBC BLD-RTO: 0 /100 WBC
PLATELET # BLD AUTO: 266 K/UL (ref 150–450)
PMV BLD AUTO: 10.4 FL (ref 9.2–12.9)
POTASSIUM SERPL-SCNC: 3.9 MMOL/L (ref 3.5–5.1)
RBC # BLD AUTO: 3.92 M/UL (ref 4–5.4)
SODIUM SERPL-SCNC: 141 MMOL/L (ref 136–145)
WBC # BLD AUTO: 9.12 K/UL (ref 3.9–12.7)

## 2021-12-29 PROCEDURE — 80048 BASIC METABOLIC PNL TOTAL CA: CPT | Mod: HCNC | Performed by: PHYSICIAN ASSISTANT

## 2021-12-29 PROCEDURE — 99217 PR OBSERVATION CARE DISCHARGE: CPT | Mod: HCNC,,, | Performed by: PHYSICIAN ASSISTANT

## 2021-12-29 PROCEDURE — 36415 COLL VENOUS BLD VENIPUNCTURE: CPT | Mod: HCNC | Performed by: PHYSICIAN ASSISTANT

## 2021-12-29 PROCEDURE — 25000003 PHARM REV CODE 250: Mod: HCNC | Performed by: PHYSICIAN ASSISTANT

## 2021-12-29 PROCEDURE — G0378 HOSPITAL OBSERVATION PER HR: HCPCS | Mod: HCNC

## 2021-12-29 PROCEDURE — 25000003 PHARM REV CODE 250: Mod: HCNC | Performed by: HOSPITALIST

## 2021-12-29 PROCEDURE — 99217 PR OBSERVATION CARE DISCHARGE: ICD-10-PCS | Mod: HCNC,,, | Performed by: PHYSICIAN ASSISTANT

## 2021-12-29 PROCEDURE — 96366 THER/PROPH/DIAG IV INF ADDON: CPT

## 2021-12-29 PROCEDURE — 63600175 PHARM REV CODE 636 W HCPCS: Mod: HCNC | Performed by: HOSPITALIST

## 2021-12-29 PROCEDURE — 85025 COMPLETE CBC W/AUTO DIFF WBC: CPT | Mod: HCNC | Performed by: PHYSICIAN ASSISTANT

## 2021-12-29 RX ORDER — AMOXICILLIN AND CLAVULANATE POTASSIUM 250; 125 MG/1; MG/1
2 TABLET, FILM COATED ORAL EVERY 12 HOURS
Status: DISCONTINUED | OUTPATIENT
Start: 2021-12-29 | End: 2021-12-29

## 2021-12-29 RX ORDER — ONDANSETRON 4 MG/1
4 TABLET, ORALLY DISINTEGRATING ORAL EVERY 8 HOURS PRN
Qty: 15 TABLET | Refills: 0 | Status: SHIPPED | OUTPATIENT
Start: 2021-12-29 | End: 2022-01-03

## 2021-12-29 RX ORDER — AMOXICILLIN AND CLAVULANATE POTASSIUM 875; 125 MG/1; MG/1
1 TABLET, FILM COATED ORAL EVERY 12 HOURS
Status: DISCONTINUED | OUTPATIENT
Start: 2021-12-29 | End: 2021-12-29 | Stop reason: HOSPADM

## 2021-12-29 RX ORDER — AMOXICILLIN AND CLAVULANATE POTASSIUM 875; 125 MG/1; MG/1
1 TABLET, FILM COATED ORAL EVERY 12 HOURS
Qty: 14 TABLET | Refills: 0 | Status: SHIPPED | OUTPATIENT
Start: 2021-12-29 | End: 2021-12-31

## 2021-12-29 RX ADMIN — MEMANTINE HYDROCHLORIDE 5 MG: 5 TABLET ORAL at 08:12

## 2021-12-29 RX ADMIN — DONEPEZIL HYDROCHLORIDE 5 MG: 5 TABLET, FILM COATED ORAL at 08:12

## 2021-12-29 RX ADMIN — BENZTROPINE MESYLATE 1 MG: 0.5 TABLET ORAL at 08:12

## 2021-12-29 RX ADMIN — AMOXICILLIN AND CLAVULANATE POTASSIUM 1 TABLET: 875; 125 TABLET, FILM COATED ORAL at 08:12

## 2021-12-29 RX ADMIN — DULOXETINE 30 MG: 30 CAPSULE, DELAYED RELEASE ORAL at 08:12

## 2021-12-29 RX ADMIN — LISINOPRIL 20 MG: 20 TABLET ORAL at 08:12

## 2021-12-29 RX ADMIN — ATORVASTATIN CALCIUM 40 MG: 40 TABLET, FILM COATED ORAL at 08:12

## 2021-12-29 RX ADMIN — PIPERACILLIN SODIUM AND TAZOBACTAM SODIUM 4.5 G: 4; .5 INJECTION, POWDER, FOR SOLUTION INTRAVENOUS at 12:12

## 2021-12-29 NOTE — PROGRESS NOTES
Pt Dc per MD orders. Dc and prescription instructions given to Pt's sister Jose Francisco. PiV removed catheter tip intact. Vss. Pt transported by  .

## 2021-12-29 NOTE — PLAN OF CARE
[facility] - Telemetry Stepdown ([facility])  Discharge Final Note    Primary Care Provider: Krista Case MD     Future Appointments   Date Time Provider Department Center   12/30/2021  7:00 AM [facility] [facility]   1/13/2022  3:00 PM Serg Cardenas MD [facility] PSYCH [facility] [address]   1/26/2022 10:30 AM Krista Case MD [facility] IM [facility]       Pt discharged back to [facility] with no services.     Dg Mina RN,BSN        Expected Discharge Date: 12/29/2021    Final Discharge Note (most recent)     Final Note - 12/29/21 1246        Final Note    Assessment Type Final Discharge Note     Anticipated Discharge Disposition Home or Self Care     Hospital Resources/Appts/Education Provided Provided patient/caregiver with written discharge plan information;Appointments scheduled and added to AVS        Post-Acute Status    Discharge Delays None known at this time                 Important Message from Medicare

## 2021-12-29 NOTE — PLAN OF CARE
Pt AAO and is x1 assist to the bathroom.  Remained afebrile throughout shift with no falls or injuries. Pt with nonskid footwear on and bed locked and in lowest position with bed rails up x2. Call light is within reach. Sister is at bedside. Will continue to monitor.

## 2021-12-30 ENCOUNTER — LAB VISIT (OUTPATIENT)
Dept: LAB | Facility: OTHER | Age: 76
End: 2021-12-30
Payer: MEDICAID

## 2021-12-30 DIAGNOSIS — Z20.822 ENCOUNTER FOR LABORATORY TESTING FOR COVID-19 VIRUS: ICD-10-CM

## 2021-12-30 PROCEDURE — U0003 INFECTIOUS AGENT DETECTION BY NUCLEIC ACID (DNA OR RNA); SEVERE ACUTE RESPIRATORY SYNDROME CORONAVIRUS 2 (SARS-COV-2) (CORONAVIRUS DISEASE [COVID-19]), AMPLIFIED PROBE TECHNIQUE, MAKING USE OF HIGH THROUGHPUT TECHNOLOGIES AS DESCRIBED BY CMS-2020-01-R: HCPCS | Mod: ST72,HCNC | Performed by: NURSE PRACTITIONER

## 2021-12-30 NOTE — DISCHARGE SUMMARY
[facility] [address])  [facility] Medicine  Discharge Summary      Patient Name: Lay Peres  MRN: [id number]  Patient Class: OP- Observation  Admission Date: 12/27/2021  Hospital Length of Stay: 0 days  Discharge Date and Time: 12/29/2021  2:40 PM  Attending Physician: ROLANDA FIGUEROA   Discharging Provider: Jeremiah Canada PA-C  Primary Care Provider: Krista Case MD      HPI:   Ms. Lay Peres is a 76 y.o. female with mental retardation and congenital deafness who presents to the ER for evaluation of increased urinary frequency and bladder incontinence.  History is obtained from sister who is at bedside as patient is nonverbal at baseline.  She reports that she lives at [facility], and was in her normal state of health on [place] Day.  However, the day after she started to develop urinary incontinence and increased urinary frequency.  She also has been holding her abdomen and groaning in pain.  She denies any fever or chills.  She has had nausea and vomiting.  Of note, she does have a history of E. Coli UTIs.    Upon arrival to the ER, vitals were temp 98.3F, HR 80, /74.  Labs showed WBC 15 and dirty UA.  She was given Ceftriaxone.  She was given a PO challenge and failed.  She was admitted to Hospital Medicine for further management.      * No surgery found *      Hospital Course:   76 y.o. who was admitted to hospital medicine for management of a urinary tract infection. CT abdomen was also obtained which demonstrated findings concerning for enteritis. Empiric antibiotics therapy was initiated on admission with improvement of the patient clinical status. She tolerated PO intake without difficulty and was discharged back to her home facility.       Goals of Care Treatment Preferences:  Code Status: Full Code      Consults:     No new Assessment & Plan notes have been filed under this hospital service since the last note was generated.  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 [E13.9] 07/13/2012 Yes    Hyperlipidemia [E78.5] 07/13/2012 Yes    Hypertension [I10] 07/13/2012 Yes      Problems Resolved During this Admission:       Discharged Condition: good    Disposition: Another Health Care Inst*    Follow Up:    Patient Instructions:      Ambulatory referral/consult to Outpatient Case Management   Referral Priority: Routine Referral Type: Consultation   Referral Reason: Specialty Services Required   Number of Visits Requested: 1     Diet full liquid   Order Comments: Advance as tolerated     Notify your health care provider if you experience any of the following:  temperature >100.4     Notify your health care provider if you experience any of the following:  persistent nausea and vomiting or diarrhea     Notify your health care provider if you experience any of the following:  severe uncontrolled pain     Activity as tolerated       Significant Diagnostic Studies: Labs: All labs within the past 24 hours have been reviewed    Pending Diagnostic Studies:     None         Medications:  Reconciled Home Medications:      Medication List      START taking these medications    amoxicillin-clavulanate 875-125mg 875-125 mg per tablet  Commonly known as: AUGMENTIN  Take 1 tablet by mouth every 12 (twelve) hours. for 7 days     ondansetron 4 MG Tbdl  Commonly known as: ZOFRAN-ODT  Take 1 tablet (4 mg total) by mouth every 8 (eight) hours as needed (nausea).        CONTINUE taking these medications    acetaminophen 500 MG tablet  Commonly known as: TYLENOL  Take 1,000 mg by mouth 2  (two) times daily.     aspirin 325 MG EC tablet  Commonly known as: ECOTRIN  Take 1 tablet (325 mg total) by mouth once daily.     benztropine 1 MG tablet  Commonly known as: COGENTIN  Take 1 tablet (1 mg total) by mouth 2 (two) times daily.     blood sugar diagnostic Strp  1 strip by Misc.(Non-Drug; Combo Route) route once daily.     cholecalciferol (vitamin D3) 25 mcg (1,000 unit) capsule  Commonly known as: VITAMIN D3  Take 1 capsule by mouth Daily.     cyanocobalamin 1,000 mcg/mL injection  One ml IM weekly for 4 weeks then one ml IM monthly.  Dipense #10 25 gague 1 inch needles and #10 one ml syringes     donepeziL 5 MG tablet  Commonly known as: ARICEPT  Take 1 tablet (5 mg total) by mouth every morning. To be taken with food     DULoxetine 30 MG capsule  Commonly known as: CYMBALTA  Take 1 capsule (30 mg total) by mouth once daily.     famotidine 40 MG tablet  Commonly known as: PEPCID  TAKE 1 TABLET BY MOUTH EVERY EVENING AT 8PM     ferrous gluconate 324 MG tablet  Commonly known as: FERGON  Take 324 mg by mouth every other day.     gabapentin 300 MG capsule  Commonly known as: NEURONTIN  TAKE 1 TABLET (300MG) BY MOUTH AT BEDTIME at 8pm     haloperidol decanoate 50 mg/mL injection  Commonly known as: HALDOL DECANOATE  Inject 1 mL (50 mg total) into the muscle every 14 (fourteen) days.     lisinopriL 20 MG tablet  Commonly known as: PRINIVIL,ZESTRIL  Take 1 tablet (20 mg total) by mouth once daily.     memantine 28 mg Cspx  Commonly known as: NAMENDA XR  Take 1 capsule (28 mg total) by mouth once daily.     metFORMIN 500 MG tablet  Commonly known as: GLUCOPHAGE  Take 1 tablet (500 mg total) by mouth every evening. At next cycle fill     multivitamin capsule  Take 1 capsule by mouth once daily.     nystatin ointment  Commonly known as: MYCOSTATIN  Apply topically 2 (two) times daily as needed.     QUEtiapine 200 MG Tab  Commonly known as: SEROQUEL  Take 1 tablet (200 mg total) by mouth every evening.      rosuvastatin 10 MG tablet  Commonly known as: CRESTOR  Take 1 tablet (10 mg total) by mouth once daily.     senna 8.6 mg tablet  Commonly known as: SENOKOT  Take 1 tablet by mouth Twice daily.            Indwelling Lines/Drains at time of discharge:   Lines/Drains/Airways     None                 Time spent on the discharge of patient: 30 minutes         Jeremiah Canada PA-C  Department of Hospital Medicine  [facility] - Telemetry Stepdown ([place]-)

## 2021-12-31 RX ORDER — NITROFURANTOIN 25; 75 MG/1; MG/1
100 CAPSULE ORAL 2 TIMES DAILY
Qty: 7 CAPSULE | Refills: 0 | Status: SHIPPED | OUTPATIENT
Start: 2021-12-31 | End: 2022-04-21

## 2022-01-04 LAB
SARS-COV-2 RNA RESP QL NAA+PROBE: NOT DETECTED
SARS-COV-2- CYCLE NUMBER: NORMAL

## 2022-01-06 ENCOUNTER — LAB VISIT (OUTPATIENT)
Dept: LAB | Facility: OTHER | Age: 77
End: 2022-01-06
Payer: MEDICAID

## 2022-01-06 DIAGNOSIS — Z20.822 ENCOUNTER FOR LABORATORY TESTING FOR COVID-19 VIRUS: ICD-10-CM

## 2022-01-06 PROCEDURE — U0003 INFECTIOUS AGENT DETECTION BY NUCLEIC ACID (DNA OR RNA); SEVERE ACUTE RESPIRATORY SYNDROME CORONAVIRUS 2 (SARS-COV-2) (CORONAVIRUS DISEASE [COVID-19]), AMPLIFIED PROBE TECHNIQUE, MAKING USE OF HIGH THROUGHPUT TECHNOLOGIES AS DESCRIBED BY CMS-2020-01-R: HCPCS | Mod: ST72,HCNC | Performed by: NURSE PRACTITIONER

## 2022-01-10 LAB — SARS-COV-2 RNA RESP QL NAA+PROBE: NOT DETECTED

## 2022-01-13 ENCOUNTER — OFFICE VISIT (OUTPATIENT)
Dept: PSYCHIATRY | Facility: CLINIC | Age: 77
End: 2022-01-13
Payer: MEDICARE

## 2022-01-13 ENCOUNTER — LAB VISIT (OUTPATIENT)
Dept: LAB | Facility: OTHER | Age: 77
End: 2022-01-13
Payer: MEDICAID

## 2022-01-13 VITALS — BODY MASS INDEX: 27.3 KG/M2 | HEIGHT: 62 IN

## 2022-01-13 DIAGNOSIS — Z20.822 ENCOUNTER FOR LABORATORY TESTING FOR COVID-19 VIRUS: ICD-10-CM

## 2022-01-13 DIAGNOSIS — I10 PRIMARY HYPERTENSION: ICD-10-CM

## 2022-01-13 DIAGNOSIS — H90.5 DEAFNESS CONGENITAL: ICD-10-CM

## 2022-01-13 DIAGNOSIS — E13.9 DIABETES MELLITUS DUE TO ABNORMAL INSULIN: ICD-10-CM

## 2022-01-13 DIAGNOSIS — F79 INTELLECTUAL DISABILITY: ICD-10-CM

## 2022-01-13 DIAGNOSIS — Z86.73 HISTORY OF STROKE: ICD-10-CM

## 2022-01-13 DIAGNOSIS — F03.91 DEMENTIA WITH BEHAVIORAL DISTURBANCE, UNSPECIFIED DEMENTIA TYPE: ICD-10-CM

## 2022-01-13 DIAGNOSIS — I50.32 CHRONIC DIASTOLIC HEART FAILURE: ICD-10-CM

## 2022-01-13 DIAGNOSIS — F25.9 CHRONIC SCHIZOAFFECTIVE DISORDER: Primary | ICD-10-CM

## 2022-01-13 DIAGNOSIS — E78.5 HYPERLIPIDEMIA, UNSPECIFIED HYPERLIPIDEMIA TYPE: ICD-10-CM

## 2022-01-13 PROCEDURE — U0003 INFECTIOUS AGENT DETECTION BY NUCLEIC ACID (DNA OR RNA); SEVERE ACUTE RESPIRATORY SYNDROME CORONAVIRUS 2 (SARS-COV-2) (CORONAVIRUS DISEASE [COVID-19]), AMPLIFIED PROBE TECHNIQUE, MAKING USE OF HIGH THROUGHPUT TECHNOLOGIES AS DESCRIBED BY CMS-2020-01-R: HCPCS | Mod: HCNC | Performed by: NURSE PRACTITIONER

## 2022-01-13 PROCEDURE — 99214 OFFICE O/P EST MOD 30 MIN: CPT | Mod: HCNC,95,, | Performed by: PSYCHIATRY & NEUROLOGY

## 2022-01-13 PROCEDURE — 99214 PR OFFICE/OUTPT VISIT, EST, LEVL IV, 30-39 MIN: ICD-10-PCS | Mod: HCNC,95,, | Performed by: PSYCHIATRY & NEUROLOGY

## 2022-01-13 RX ORDER — HALOPERIDOL DECANOATE 50 MG/ML
50 INJECTION INTRAMUSCULAR
Qty: 1 ML | Refills: 12 | Status: SHIPPED | OUTPATIENT
Start: 2022-01-13 | End: 2022-05-26

## 2022-01-13 RX ORDER — BENZTROPINE MESYLATE 1 MG/1
1 TABLET ORAL 2 TIMES DAILY
Qty: 60 TABLET | Refills: 6 | Status: SHIPPED | OUTPATIENT
Start: 2022-01-13 | End: 2022-07-15 | Stop reason: SDUPTHER

## 2022-01-13 RX ORDER — DULOXETIN HYDROCHLORIDE 30 MG/1
30 CAPSULE, DELAYED RELEASE ORAL DAILY
Qty: 30 CAPSULE | Refills: 6 | Status: SHIPPED | OUTPATIENT
Start: 2022-01-13 | End: 2022-07-15 | Stop reason: SDUPTHER

## 2022-01-13 RX ORDER — QUETIAPINE FUMARATE 200 MG/1
200 TABLET, FILM COATED ORAL NIGHTLY
Qty: 30 TABLET | Refills: 6 | Status: SHIPPED | OUTPATIENT
Start: 2022-01-13 | End: 2022-07-15 | Stop reason: SDUPTHER

## 2022-01-13 NOTE — PROGRESS NOTES
The patient location is: at home ([place]) in the [facility].    Visit type: audiovisual    Face to Face time with patient: 15 minutes  Total time spent on the encounter: 25 minutes    Each patient to whom he or she provides medical services by telemedicine is:  (1) informed of the relationship between the physician and patient and the respective role of any other health care provider with respect to management of the patient; and (2) notified that he or she may decline to receive medical services by telemedicine and may withdraw from such care at any time.    For Audio Only Telehealth Visits: the reason for the audio only service rather than synchronous audio and video virtual visit was related to technical difficulties or patient preference/necessity.  This service was not originating from a related E/M service provided within the previous 7 days nor will  to an E/M service or procedure within the next 24 hours or my soonest available appointment.  Prevailing standard of care was able to be met in this audio-only visit. Patient verbally consented to receive this service via voice-only telephone call.        ESTABLISHED OUTPATIENT VISIT     DEPARTMENT:  Psychiatry    EXAMINING PRACTITIONER: Serg Cardenas MD  BOARD CERTIFICATION: Psychiatry & Addiction Medicine      HISTORY:       Patient Name: Lay Peres  YOB: 1945    CHIEF COMPLAINT   Lay Peres is a 76 y.o. female who presents to the clinic for a follow up psychiatric appointment.  See MEDICAL DECISION MAKING below for enumeration and discussion of patient's DIAGNOSES & PROBLEMS      HPI, PSYCHIATRIC ROS & APPLICABLE MEDICAL ROS    Patient with cogenital deafness, possible MR, chronic schizoaffective disorder, and now dementia, followed by me for disease maintenance.  She is seen today for follow up evaluation.      Intersession - Melony called November 2021 - her behavior had deteriorated with  "more aggression and combativeness - this was after we had lowered haldol decanoate at last visit - it was retitrated to 50mg q2 weeks with good results - her behavior has improved and no reports of over medication or noted side effects..    Seen today in conjunction with her , who provides bulk of information.  Overall doing better since medication adjustment.  Aggression has largely calmed down - not gone completely but much less and manageable.  Sleep "okay" at night - does get up to go to the restroom.  Appetite improved - eating has improved.  No tremor, akathisia, or tardive dyskinesia noted.  Some mild decline in memory.  She feeds herself - but needs assistance for showering and toileting.    She states she is okay today.  She can follow commands.        KEY FEATURES OF THE PAST HISTORY (PSYCHIATRIC, MEDICAL, FAMILY, AND/OR SOCIAL)    Lives at [facility] for many years, sister involved - diagnoses of Schizoaffective Disorder, Mental Retardation, Congential Deafness and now Dementia  Initiated treatment with me in the clinic in May 2011  She has had inpt psych hosp and at least one suicide attempt in past - details vague  November 2021 - decompensated (but did not require inpt psych hosp) when haldol decanoate lowered to 25mg q2 weeks.      PAST PSYCHOTROPIC TRIALS    High doses of haldol in past - haldol decanoate 100mg IM q2 week + haldol 5mg prns  Entered treatment with me on the following regimen: Haldol dec 100mg IM q3 weeks, Seroquel 200mg bedtime, Cymbalta 30mg daily, Cogentin 1mg bid      CURRENT PSYCHOTROPIC REGIMEN   Haldol dec 50mg IM q2 weeks  Seroquel 200mg bedtime  Cymbalta 30mg daily  Cogentin 1mg bid    Aricept  Namenda      EXAMINATION:       Vitals:    01/13/22 1518   Height: 5' 2" (1.575 m)       Appearance & Behavior: sitting calm with , adequately groomed  Involuntary Movements: no tics, tremors, akathisia or tardive dyskinesia noted  Speech & Language: " "decreased spontaneity, at times mumbling, answers in brief responses   Mood: "fine"  Affect: bright, euthymic, smiles easily  Thought Process & Associations: difficult to assess due to paucity of content - somewhat perseverative  Thought Content & Perceptions: no overt psychosis noted  Cognition: memory and attention impaired, fund of knowledge limited.    Insight & Judgment: both impaired though does comply with medications per staff      CLINICAL RISK ASSESSMENT:       The following risk parameters were assessed during this evaluation:    Suicidal: No  Homicidal: No  Gravely Disabled: No      MEDICAL DECISION MAKING:       DIAGNOSES & PROBLEMS ADDRESSED DURING THE ENCOUNTER  Problem - Complexity - Management Plan    Note: Complexity is documented with a number using the following system:  1=Self-limited or minor problem  2=Acute, uncomplicated illness or injury  3=Stable chronic illness  4=Chronic illness with exacerbation, progression, or side effects of treatment  5=Chronic illness with severe exacerbation, progression, or side effects of treatment  6=Undiagnosed new problem with uncertain prognosis  7=Acute illness with system symptoms  8=Acute complicated injury  9=Acute or chronic illness or injury that poses a threat to life or bodily function      Schizoaffective disorder - 3 - recent decompensation but now again stabilized upon returning haldol decanoate to 50mg q2 weeks.  Cont current regimen.  We have been searching for minimal effective doses as she ages and dementia worsens, which we appear to have found intersession.  Current regimen appears to be a good balance of decent efficacy with minimal side effects.  I have discussed with sister and  psychopharmacologic approach.  Of note, this is longstanding regimen started prior to seeing me.  We discussed both of the followin. Usually multiple neuroleptics avoided.  In this case she is on two neuroleptics - haldol decanoate and " seroquel.   2. There is risk for cardiovascular events with neuroleptics in dementia.  However patient with comorbid schizoaffective disorder that long predates dementia and is indication for neuroleptic.   Sister understands risks and benefits and would like her to continue neuroleptics - states in past when adjustments made patient has decompensated requiring hospitalization - and we did see a decompensation in fall 2021 when haldol decanoate lowered to 25mg [id number] in the summer of 2021.    Mental retardation - 3 - perhaps mild, IQ unknown.  Cont structured living program - doing well in this setting.    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  Complicates other diagnoses.    Dementia - 4 - follows with neurology.  She is on namenda and aricept.  As dementia progresses, we will likely need to taper down psychotropics.    Metabolic syndrome - 3 - will monitor given she is on atypical neuroleptics.  She is followed by Dr. Case. Labs up to date.      Shared medical decision making with the patient was employed to the extent possible when selecting, or considering but not selecting, proposed management options.      PRESCRIPTION DRUG MANAGEMENT   Prescription drug management was employed during the encounter, as medications were prescribed, or considered but not prescribed.    The risks and benefits of medication were discussed with this patient.   Possible expectable adverse effects of any current or proposed individual psychotropic agents were discussed with this patient.   Counseling was provided on the importance of full compliance with any prescribed medication.   Detailed instructions were provided to the patient regarding the administration of any prescribed medication.    [facility]  Site reviewed -   Prescriptions   Total: 0   Private Pay: 0         Diagnosis or treatment significantly limited by social determinants of health?  Yes - congenital deafness impacts illnesses and  functioning      ESTIMATION OF RISK ASSOCIATED WITH ENCOUNTER: moderate  LEVEL OF MEDICAL DECISION MAKING: moderate  TOTAL TIME FOR E/M SERVICES PERFORMED ON THE DATE OF ENCOUNTER: 25 minutes  CPT CODE JUSTIFICATION (1=Level of MDM, 2=Time, 3=Meets criteria for both): 1      DATA  The electronic medical record was reviewed.  Pertinent recent diagnostic investigations, test results, and/or external documentation are noted below.    Recent Weights/BMI on File:    Wt Readings from Last 5 Encounters:   12/27/21 67.7 kg (149 lb 4 oz)   12/06/21 67.1 kg (148 lb)   11/01/21 67.4 kg (148 lb 9.4 oz)   08/23/21 65.8 kg (145 lb)   07/08/21 70.8 kg (156 lb 1.4 oz)       No data recorded    Current body mass index is 27.3 kg/m².      Most Recent BP/HR on File:    BP Readings from Last 1 Encounters:   12/29/21 133/73       Pulse Readings from Last 1 Encounters:   12/29/21 73         Most Recent EKG on File:    Results for orders placed or performed during the hospital encounter of 02/04/21   EKG 12-lead    Collection Time: 02/04/21 11:41 AM    Narrative    Test Reason : R07.9,    Vent. Rate : 079 BPM     Atrial Rate : 079 BPM     P-R Int : 140 ms          QRS Dur : 068 ms      QT Int : 372 ms       P-R-T Axes : 039 047 062 degrees     QTc Int : 426 ms    Baseline Artifact  Sinus rhythm  Within normal limits  When compared with ECG of 08-AUG-2018 14:11,  No significant change was found  Confirmed by LIZ HERNANDEZ MD ([id number]) on 2/4/2021 12:27:55 PM    Referred By: FAISAL   SELF           Confirmed By:LIZ HERNANDEZ MD           PERTINENT LABORATORY RESULTS    Most Recent Electrolytes on File:  (i.e. Na, K, Ca, Phos, Mg, CO2)    Sodium (mmol/L)   Date Value   12/29/2021 141     Potassium (mmol/L)   Date Value   12/29/2021 3.9     Calcium (mg/dL)   Date Value   12/29/2021 9.2     Phosphorus (mg/dL)   Date Value   08/07/2018 3.4     Magnesium (mg/dL)   Date Value   02/04/2021 1.7     CO2 (mmol/L)   Date Value   12/29/2021 24         Most  Recent Blood Glucose & HgA1c on File:    Glucose (mg/dL)   Date Value   12/29/2021 144 (H)     Hemoglobin A1C (%)   Date Value   09/22/2021 5.7 (H)         Most Recent Renal Function Tests on File:  (i.e. Cr, BUN, GFR, Urine Specific Gravity, Urine Protein)    Creatinine (mg/dL)   Date Value   12/29/2021 0.8     BUN (mg/dL)   Date Value   12/29/2021 13     eGFR if African American (mL/min/1.73 m^2)   Date Value   12/29/2021 >60.0     eGFR if non African American (mL/min/1.73 m^2)   Date Value   12/29/2021 >60.0     Specific [place], UA (no units)   Date Value   12/27/2021 1.025     Protein, UA (no units)   Date Value   12/27/2021 1+ (A)         Most Recent Liver Function Tests on File:  (i.e. AST, ALT, Total Bili, Ammonia, Albumin, INR)    AST (U/L)   Date Value   12/27/2021 12     ALT (U/L)   Date Value   12/27/2021 12     Total Bilirubin (mg/dL)   Date Value   12/27/2021 0.2     Albumin (g/dL)   Date Value   12/27/2021 3.5     INR (no units)   Date Value   07/14/2018 1.0         Most Recent Pancreatic Function Tests on File:  (i.e. Amylase, Lipase)    Lipase (U/L)   Date Value   12/27/2021 13         Most Recent Blood Counts on File:  (i.e. WBC, ANC, RBC, MCV, Platelets)    WBC (K/uL)   Date Value   12/29/2021 9.12     Gran # (ANC) (K/uL)   Date Value   12/29/2021 5.6     Gran % (%)   Date Value   12/29/2021 61.0     RBC (M/uL)   Date Value   12/29/2021 3.92 (L)     MCV (fL)   Date Value   12/29/2021 91     Platelets (K/uL)   Date Value   12/29/2021 266         Most Recent Thyroid/Parathyroid Function Tests on File:  (i.e. TSH, Free T4, Total T4, Free T3, Total T3, PTH)    TSH (uIU/mL)   Date Value   09/22/2021 0.670     T3, Total (ng/dL)   Date Value   05/27/2016 92         Most Recent Lipid Panel Results on File:  (i.e. Cholesterol, Triglycerides, LDH, HDL)    Cholesterol (mg/dL)   Date Value   09/22/2021 93 (L)     Triglycerides (mg/dL)   Date Value   09/22/2021 171 (H)     LDL Cholesterol (mg/dL)   Date Value    09/22/2021 26.8 (L)     HDL (mg/dL)   Date Value   09/22/2021 32 (L)         Most Recent CPK & Prolactin on File:    CPK (U/L)   Date Value   07/14/2018 119         Most Recent Vitamin Levels on File:  (i.e. Vitamin B12, Folate, Vitamin D)    Vitamin B-12 (pg/mL)   Date Value   09/22/2021 396     Vit D, 25-Hydroxy (ng/mL)   Date Value   09/22/2021 51          Most Recent Infection Disease Panel on File:  (i.e. Syphilis, Hepatitis C, Hepatitis B, HIV)     Hepatitis C Ab (no units)   Date Value   02/04/2021 Negative         Pregnancy Screenings:    No results found for: PREGTESTUR, PREGNANCYTES, HCGQUANT      Lithium & Valproate Levels on File:    No results found for: LITHIUM    No results found for: VALPROATE      Most Recent Carbamazepine & Lamotrigine Levels on File:    No results found for: CBMZ, LAMOTRIGINE      Most Recent Clozapine Level on File:    No results found for: CLOZAPINE, NORCLOZAP, CLOZNORCLOZ      Results on File for Alcohol Screens (Blood, Urine):    No results found for: ALCOHOLMEDIC    No results found for: PCDSOALCOHOL      Results on File for Urine Drug Screens (Benzodiazepines, Barbiturates, Methadone, Opiates, Cocaine, Amphetamines, THC, PCP):    No results found for: PCDSOBENZOD  No results found for: BARBITURATES  No results found for: PCDSCOMETHA  No results found for: OPIATESCREEN  No results found for: COCAINEMETAB  No results found for: AMPHETAMINES  No results found for: MARIJUANATHC  No results found for: PCDSOPHENCYN      Most Recent Results for Buprenorphine Testing:    No results found for: BUPRENORPH, NORBUPRENOR      Results on File for Phosphatidylethanol (PETH):    No results found for: PETH      Results on File for Ethyl Glucuronide (EtG) and Ethyl Sulfate (EtS):    No results found for: THEYLGLUCU, ETHYLSULF      Most Recent Results on File for Alcohol Biomarkers (GGT, CDT):    No results found for: GGT, CDT        RELEVANT NEUROLOGIC IMAGING:  (i.e. CT/MRI brain)      CT  HEAD WITHOUT CONTRAST    Results for orders placed during the hospital encounter of 11/04/21    CT Head Without Contrast    Narrative  EXAMINATION:  CT HEAD WITHOUT CONTRAST    CLINICAL HISTORY:  Memory loss; Other amnesia    TECHNIQUE:  Low dose axial CT images obtained throughout the head without intravenous contrast. Sagittal and coronal reconstructions were performed.    COMPARISON:  CT head: 08/23/2021.    FINDINGS:  INTRACRANIAL COMPARTMENT:    Ventricles stable in size.  Mild cerebral volume loss, without evidence of hydrocephalus.    Mild patchy hypoattenuation in the supratentorial white matter, nonspecific but most likely reflecting chronic microvascular ischemic changes. No new parenchymal mass, hemorrhage, edema or major vascular distribution infarct.    No extra-axial blood or fluid collections.    SKULL/EXTRACRANIAL CONTENTS:    Improved left frontal extracranial soft tissue swelling.  No new displaced calvarial fracture.    Mastoid air cells and  paranasal sinuses are essentially clear.    Impression  Mild cerebral volume loss and chronic microvascular ischemic change, stable.  No evidence of acute intracranial pathology.    Electronically signed by resident: Tiffanie Milian  Date:    11/04/2021  Time:    09:55    Electronically signed by: Bruce Barajas MD  Date:    11/04/2021  Time:    10:42      MRI HEAD WITHOUT CONTRAST    No results found for this or any previous visit.      MRI HEAD WITH AND WITHOUT CONTRAST    No results found for this or any previous visit.

## 2022-01-13 NOTE — PATIENT INSTRUCTIONS
AFTER VISIT INSTRUCTIONS     Take medication as prescribed.   If questions or concerns arise, or if experiencing side effects, adverse reactions or worsening symptoms, contact me through the [facility] portal or call [phone] to speak with office staff.   In cases of emergencies, call [phone] or present to the emergency department for immediate assistance.        ADJUSTMENTS TO REGIMEN: None    LAB WORK: None Needed/None Ordered              RESOURCES:    [place] of Mental [employer]: information on mental health medications.   https://www.[facility].nih.gov/health/topics/mental-health-medications/    The National Suicide Prevention Lifeline: 1-168.665.1515.  Provides 24/7, free and confidential support for people in distress, prevention and crisis resources for you or your loved ones, and best practices for professionals.   https://suicidepreventionlifeline.org/         Thank you for allowing me to participate as part of your health care team, and thank you for choosing [facility].    APPLE WILKERSON MD  Board Certified in Psychiatry & Addiction Medicine

## 2022-01-14 LAB
SARS-COV-2 RNA RESP QL NAA+PROBE: NOT DETECTED
SARS-COV-2- CYCLE NUMBER: NORMAL

## 2022-01-20 ENCOUNTER — LAB VISIT (OUTPATIENT)
Dept: LAB | Facility: OTHER | Age: 77
End: 2022-01-20
Payer: MEDICAID

## 2022-01-20 DIAGNOSIS — Z20.822 ENCOUNTER FOR LABORATORY TESTING FOR COVID-19 VIRUS: ICD-10-CM

## 2022-01-20 PROCEDURE — U0003 INFECTIOUS AGENT DETECTION BY NUCLEIC ACID (DNA OR RNA); SEVERE ACUTE RESPIRATORY SYNDROME CORONAVIRUS 2 (SARS-COV-2) (CORONAVIRUS DISEASE [COVID-19]), AMPLIFIED PROBE TECHNIQUE, MAKING USE OF HIGH THROUGHPUT TECHNOLOGIES AS DESCRIBED BY CMS-2020-01-R: HCPCS | Mod: HCNC | Performed by: EMERGENCY MEDICINE

## 2022-01-21 LAB
SARS-COV-2 RNA RESP QL NAA+PROBE: NOT DETECTED
SARS-COV-2- CYCLE NUMBER: NORMAL

## 2022-01-26 ENCOUNTER — OFFICE VISIT (OUTPATIENT)
Dept: INTERNAL MEDICINE | Facility: CLINIC | Age: 77
End: 2022-01-26
Payer: MEDICARE

## 2022-01-26 VITALS
BODY MASS INDEX: 28.03 KG/M2 | DIASTOLIC BLOOD PRESSURE: 80 MMHG | HEIGHT: 62 IN | WEIGHT: 152.31 LBS | SYSTOLIC BLOOD PRESSURE: 130 MMHG

## 2022-01-26 DIAGNOSIS — Z00.00 ROUTINE GENERAL MEDICAL EXAMINATION AT A HEALTH CARE FACILITY: Primary | ICD-10-CM

## 2022-01-26 PROCEDURE — 3079F PR MOST RECENT DIASTOLIC BLOOD PRESSURE 80-89 MM HG: ICD-10-PCS | Mod: HCNC,CPTII,S$GLB, | Performed by: INTERNAL MEDICINE

## 2022-01-26 PROCEDURE — 99999 PR PBB SHADOW E&M-EST. PATIENT-LVL II: CPT | Mod: PBBFAC,HCNC,, | Performed by: INTERNAL MEDICINE

## 2022-01-26 PROCEDURE — 3075F SYST BP GE 130 - 139MM HG: CPT | Mod: HCNC,CPTII,S$GLB, | Performed by: INTERNAL MEDICINE

## 2022-01-26 PROCEDURE — 99999 PR PBB SHADOW E&M-EST. PATIENT-LVL II: ICD-10-PCS | Mod: PBBFAC,HCNC,, | Performed by: INTERNAL MEDICINE

## 2022-01-26 PROCEDURE — 99397 PR PREVENTIVE VISIT,EST,65 & OVER: ICD-10-PCS | Mod: HCNC,S$GLB,, | Performed by: INTERNAL MEDICINE

## 2022-01-26 PROCEDURE — 99397 PER PM REEVAL EST PAT 65+ YR: CPT | Mod: HCNC,S$GLB,, | Performed by: INTERNAL MEDICINE

## 2022-01-26 PROCEDURE — 3075F PR MOST RECENT SYSTOLIC BLOOD PRESS GE 130-139MM HG: ICD-10-PCS | Mod: HCNC,CPTII,S$GLB, | Performed by: INTERNAL MEDICINE

## 2022-01-26 PROCEDURE — 3079F DIAST BP 80-89 MM HG: CPT | Mod: HCNC,CPTII,S$GLB, | Performed by: INTERNAL MEDICINE

## 2022-01-26 RX ORDER — ROSUVASTATIN CALCIUM 5 MG/1
5 TABLET, COATED ORAL DAILY
Qty: 30 TABLET | Refills: 6 | Status: SHIPPED | OUTPATIENT
Start: 2022-01-26 | End: 2023-01-29
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
## 2022-01-27 ENCOUNTER — LAB VISIT (OUTPATIENT)
Dept: LAB | Facility: OTHER | Age: 77
End: 2022-01-27
Payer: MEDICAID

## 2022-01-27 DIAGNOSIS — Z20.822 ENCOUNTER FOR LABORATORY TESTING FOR COVID-19 VIRUS: ICD-10-CM

## 2022-01-27 PROCEDURE — U0003 INFECTIOUS AGENT DETECTION BY NUCLEIC ACID (DNA OR RNA); SEVERE ACUTE RESPIRATORY SYNDROME CORONAVIRUS 2 (SARS-COV-2) (CORONAVIRUS DISEASE [COVID-19]), AMPLIFIED PROBE TECHNIQUE, MAKING USE OF HIGH THROUGHPUT TECHNOLOGIES AS DESCRIBED BY CMS-2020-01-R: HCPCS | Mod: HCNC | Performed by: EMERGENCY MEDICINE

## 2022-01-28 LAB
SARS-COV-2 RNA RESP QL NAA+PROBE: NOT DETECTED
SARS-COV-2- CYCLE NUMBER: NORMAL

## 2022-02-07 ENCOUNTER — LAB VISIT (OUTPATIENT)
Dept: LAB | Facility: OTHER | Age: 77
End: 2022-02-07
Payer: MEDICARE

## 2022-02-07 DIAGNOSIS — Z20.822 ENCOUNTER FOR LABORATORY TESTING FOR COVID-19 VIRUS: ICD-10-CM

## 2022-02-07 PROCEDURE — U0003 INFECTIOUS AGENT DETECTION BY NUCLEIC ACID (DNA OR RNA); SEVERE ACUTE RESPIRATORY SYNDROME CORONAVIRUS 2 (SARS-COV-2) (CORONAVIRUS DISEASE [COVID-19]), AMPLIFIED PROBE TECHNIQUE, MAKING USE OF HIGH THROUGHPUT TECHNOLOGIES AS DESCRIBED BY CMS-2020-01-R: HCPCS | Mod: HCNC | Performed by: EMERGENCY MEDICINE

## 2022-02-08 LAB
SARS-COV-2 RNA RESP QL NAA+PROBE: NOT DETECTED
SARS-COV-2- CYCLE NUMBER: NORMAL

## 2022-02-14 ENCOUNTER — LAB VISIT (OUTPATIENT)
Dept: LAB | Facility: OTHER | Age: 77
End: 2022-02-14
Payer: MEDICARE

## 2022-02-14 DIAGNOSIS — Z20.822 ENCOUNTER FOR LABORATORY TESTING FOR COVID-19 VIRUS: ICD-10-CM

## 2022-02-14 PROCEDURE — U0003 INFECTIOUS AGENT DETECTION BY NUCLEIC ACID (DNA OR RNA); SEVERE ACUTE RESPIRATORY SYNDROME CORONAVIRUS 2 (SARS-COV-2) (CORONAVIRUS DISEASE [COVID-19]), AMPLIFIED PROBE TECHNIQUE, MAKING USE OF HIGH THROUGHPUT TECHNOLOGIES AS DESCRIBED BY CMS-2020-01-R: HCPCS | Mod: HCNC | Performed by: EMERGENCY MEDICINE

## 2022-02-15 LAB
SARS-COV-2 RNA RESP QL NAA+PROBE: NOT DETECTED
SARS-COV-2- CYCLE NUMBER: NORMAL

## 2022-02-17 ENCOUNTER — LAB VISIT (OUTPATIENT)
Dept: LAB | Facility: HOSPITAL | Age: 77
End: 2022-02-17
Attending: INTERNAL MEDICINE
Payer: MEDICARE

## 2022-02-17 DIAGNOSIS — Z79.899 ENCOUNTER FOR LONG-TERM (CURRENT) USE OF OTHER MEDICATIONS: Primary | ICD-10-CM

## 2022-02-17 LAB
ALBUMIN SERPL BCP-MCNC: 3.5 G/DL (ref 3.5–5.2)
ALP SERPL-CCNC: 72 U/L (ref 55–135)
ALT SERPL W/O P-5'-P-CCNC: 11 U/L (ref 10–44)
ANION GAP SERPL CALC-SCNC: 11 MMOL/L (ref 8–16)
AST SERPL-CCNC: 22 U/L (ref 10–40)
BASOPHILS # BLD AUTO: 0.07 K/UL (ref 0–0.2)
BASOPHILS NFR BLD: 0.7 % (ref 0–1.9)
BILIRUB SERPL-MCNC: 0.2 MG/DL (ref 0.1–1)
BUN SERPL-MCNC: 25 MG/DL (ref 8–23)
CALCIUM SERPL-MCNC: 9.7 MG/DL (ref 8.7–10.5)
CHLORIDE SERPL-SCNC: 110 MMOL/L (ref 95–110)
CO2 SERPL-SCNC: 21 MMOL/L (ref 23–29)
CREAT SERPL-MCNC: 0.8 MG/DL (ref 0.5–1.4)
DIFFERENTIAL METHOD: ABNORMAL
EOSINOPHIL # BLD AUTO: 0.5 K/UL (ref 0–0.5)
EOSINOPHIL NFR BLD: 4.8 % (ref 0–8)
ERYTHROCYTE [DISTWIDTH] IN BLOOD BY AUTOMATED COUNT: 14.6 % (ref 11.5–14.5)
EST. GFR  (AFRICAN AMERICAN): >60 ML/MIN/1.73 M^2
EST. GFR  (NON AFRICAN AMERICAN): >60 ML/MIN/1.73 M^2
ESTIMATED AVG GLUCOSE: 114 MG/DL (ref 68–131)
GLUCOSE SERPL-MCNC: 94 MG/DL (ref 70–110)
HBA1C MFR BLD: 5.6 % (ref 4–5.6)
HCT VFR BLD AUTO: 38.3 % (ref 37–48.5)
HGB BLD-MCNC: 11.2 G/DL (ref 12–16)
IMM GRANULOCYTES # BLD AUTO: 0.03 K/UL (ref 0–0.04)
IMM GRANULOCYTES NFR BLD AUTO: 0.3 % (ref 0–0.5)
LYMPHOCYTES # BLD AUTO: 2.6 K/UL (ref 1–4.8)
LYMPHOCYTES NFR BLD: 24.1 % (ref 18–48)
MCH RBC QN AUTO: 27.3 PG (ref 27–31)
MCHC RBC AUTO-ENTMCNC: 29.2 G/DL (ref 32–36)
MCV RBC AUTO: 93 FL (ref 82–98)
MONOCYTES # BLD AUTO: 1 K/UL (ref 0.3–1)
MONOCYTES NFR BLD: 9.4 % (ref 4–15)
NEUTROPHILS # BLD AUTO: 6.5 K/UL (ref 1.8–7.7)
NEUTROPHILS NFR BLD: 60.7 % (ref 38–73)
NRBC BLD-RTO: 0 /100 WBC
PLATELET # BLD AUTO: 291 K/UL (ref 150–450)
PMV BLD AUTO: 10.5 FL (ref 9.2–12.9)
POTASSIUM SERPL-SCNC: 4.3 MMOL/L (ref 3.5–5.1)
PROT SERPL-MCNC: 7.8 G/DL (ref 6–8.4)
RBC # BLD AUTO: 4.11 M/UL (ref 4–5.4)
SODIUM SERPL-SCNC: 142 MMOL/L (ref 136–145)
WBC # BLD AUTO: 10.67 K/UL (ref 3.9–12.7)

## 2022-02-17 PROCEDURE — 83036 HEMOGLOBIN GLYCOSYLATED A1C: CPT | Mod: HCNC | Performed by: INTERNAL MEDICINE

## 2022-02-17 PROCEDURE — 85025 COMPLETE CBC W/AUTO DIFF WBC: CPT | Mod: HCNC | Performed by: INTERNAL MEDICINE

## 2022-02-17 PROCEDURE — 80053 COMPREHEN METABOLIC PANEL: CPT | Mod: HCNC | Performed by: INTERNAL MEDICINE

## 2022-02-17 PROCEDURE — 36415 COLL VENOUS BLD VENIPUNCTURE: CPT | Mod: HCNC | Performed by: INTERNAL MEDICINE

## 2022-02-21 ENCOUNTER — LAB VISIT (OUTPATIENT)
Dept: LAB | Facility: OTHER | Age: 77
End: 2022-02-21
Payer: MEDICARE

## 2022-02-21 DIAGNOSIS — Z20.822 ENCOUNTER FOR LABORATORY TESTING FOR COVID-19 VIRUS: ICD-10-CM

## 2022-02-21 PROCEDURE — U0003 INFECTIOUS AGENT DETECTION BY NUCLEIC ACID (DNA OR RNA); SEVERE ACUTE RESPIRATORY SYNDROME CORONAVIRUS 2 (SARS-COV-2) (CORONAVIRUS DISEASE [COVID-19]), AMPLIFIED PROBE TECHNIQUE, MAKING USE OF HIGH THROUGHPUT TECHNOLOGIES AS DESCRIBED BY CMS-2020-01-R: HCPCS | Mod: HCNC | Performed by: EMERGENCY MEDICINE

## 2022-02-22 LAB
SARS-COV-2 RNA RESP QL NAA+PROBE: NOT DETECTED
SARS-COV-2- CYCLE NUMBER: NORMAL

## 2022-02-28 ENCOUNTER — LAB VISIT (OUTPATIENT)
Dept: LAB | Facility: OTHER | Age: 77
End: 2022-02-28
Payer: MEDICARE

## 2022-02-28 DIAGNOSIS — Z20.822 ENCOUNTER FOR LABORATORY TESTING FOR COVID-19 VIRUS: ICD-10-CM

## 2022-02-28 PROCEDURE — U0003 INFECTIOUS AGENT DETECTION BY NUCLEIC ACID (DNA OR RNA); SEVERE ACUTE RESPIRATORY SYNDROME CORONAVIRUS 2 (SARS-COV-2) (CORONAVIRUS DISEASE [COVID-19]), AMPLIFIED PROBE TECHNIQUE, MAKING USE OF HIGH THROUGHPUT TECHNOLOGIES AS DESCRIBED BY CMS-2020-01-R: HCPCS | Mod: HCNC | Performed by: EMERGENCY MEDICINE

## 2022-03-01 LAB
SARS-COV-2 RNA RESP QL NAA+PROBE: NOT DETECTED
SARS-COV-2- CYCLE NUMBER: NORMAL

## 2022-03-07 ENCOUNTER — LAB VISIT (OUTPATIENT)
Dept: LAB | Facility: OTHER | Age: 77
End: 2022-03-07
Payer: MEDICARE

## 2022-03-07 DIAGNOSIS — Z20.822 ENCOUNTER FOR LABORATORY TESTING FOR COVID-19 VIRUS: ICD-10-CM

## 2022-03-07 PROCEDURE — U0003 INFECTIOUS AGENT DETECTION BY NUCLEIC ACID (DNA OR RNA); SEVERE ACUTE RESPIRATORY SYNDROME CORONAVIRUS 2 (SARS-COV-2) (CORONAVIRUS DISEASE [COVID-19]), AMPLIFIED PROBE TECHNIQUE, MAKING USE OF HIGH THROUGHPUT TECHNOLOGIES AS DESCRIBED BY CMS-2020-01-R: HCPCS | Mod: HCNC | Performed by: EMERGENCY MEDICINE

## 2022-03-08 LAB
SARS-COV-2 RNA RESP QL NAA+PROBE: NOT DETECTED
SARS-COV-2- CYCLE NUMBER: NORMAL

## 2022-03-14 ENCOUNTER — LAB VISIT (OUTPATIENT)
Dept: LAB | Facility: OTHER | Age: 77
End: 2022-03-14
Payer: MEDICARE

## 2022-03-14 DIAGNOSIS — Z20.822 ENCOUNTER FOR LABORATORY TESTING FOR COVID-19 VIRUS: ICD-10-CM

## 2022-03-14 PROCEDURE — U0003 INFECTIOUS AGENT DETECTION BY NUCLEIC ACID (DNA OR RNA); SEVERE ACUTE RESPIRATORY SYNDROME CORONAVIRUS 2 (SARS-COV-2) (CORONAVIRUS DISEASE [COVID-19]), AMPLIFIED PROBE TECHNIQUE, MAKING USE OF HIGH THROUGHPUT TECHNOLOGIES AS DESCRIBED BY CMS-2020-01-R: HCPCS | Mod: HCNC | Performed by: EMERGENCY MEDICINE

## 2022-03-15 LAB
SARS-COV-2 RNA RESP QL NAA+PROBE: NOT DETECTED
SARS-COV-2- CYCLE NUMBER: NORMAL

## 2022-03-21 ENCOUNTER — LAB VISIT (OUTPATIENT)
Dept: LAB | Facility: OTHER | Age: 77
End: 2022-03-21
Payer: MEDICARE

## 2022-03-21 DIAGNOSIS — Z20.822 ENCOUNTER FOR LABORATORY TESTING FOR COVID-19 VIRUS: ICD-10-CM

## 2022-03-21 PROCEDURE — U0003 INFECTIOUS AGENT DETECTION BY NUCLEIC ACID (DNA OR RNA); SEVERE ACUTE RESPIRATORY SYNDROME CORONAVIRUS 2 (SARS-COV-2) (CORONAVIRUS DISEASE [COVID-19]), AMPLIFIED PROBE TECHNIQUE, MAKING USE OF HIGH THROUGHPUT TECHNOLOGIES AS DESCRIBED BY CMS-2020-01-R: HCPCS | Mod: HCNC | Performed by: EMERGENCY MEDICINE

## 2022-03-22 LAB
SARS-COV-2 RNA RESP QL NAA+PROBE: NOT DETECTED
SARS-COV-2- CYCLE NUMBER: NORMAL

## 2022-03-28 ENCOUNTER — LAB VISIT (OUTPATIENT)
Dept: LAB | Facility: OTHER | Age: 77
End: 2022-03-28
Payer: MEDICARE

## 2022-03-28 DIAGNOSIS — Z20.822 ENCOUNTER FOR LABORATORY TESTING FOR COVID-19 VIRUS: ICD-10-CM

## 2022-03-28 PROCEDURE — U0003 INFECTIOUS AGENT DETECTION BY NUCLEIC ACID (DNA OR RNA); SEVERE ACUTE RESPIRATORY SYNDROME CORONAVIRUS 2 (SARS-COV-2) (CORONAVIRUS DISEASE [COVID-19]), AMPLIFIED PROBE TECHNIQUE, MAKING USE OF HIGH THROUGHPUT TECHNOLOGIES AS DESCRIBED BY CMS-2020-01-R: HCPCS | Performed by: EMERGENCY MEDICINE

## 2022-03-29 LAB
SARS-COV-2 RNA RESP QL NAA+PROBE: NOT DETECTED
SARS-COV-2- CYCLE NUMBER: NORMAL

## 2022-03-31 ENCOUNTER — OFFICE VISIT (OUTPATIENT)
Dept: URGENT CARE | Facility: CLINIC | Age: 77
End: 2022-03-31
Payer: MEDICARE

## 2022-03-31 VITALS
TEMPERATURE: 98 F | HEART RATE: 72 BPM | SYSTOLIC BLOOD PRESSURE: 175 MMHG | DIASTOLIC BLOOD PRESSURE: 85 MMHG | RESPIRATION RATE: 18 BRPM | HEIGHT: 62 IN | WEIGHT: 150 LBS | OXYGEN SATURATION: 98 % | BODY MASS INDEX: 27.6 KG/M2

## 2022-03-31 DIAGNOSIS — S69.92XA HAND TRAUMA, LEFT, INITIAL ENCOUNTER: Primary | ICD-10-CM

## 2022-03-31 DIAGNOSIS — I10 ELEVATED BLOOD PRESSURE READING IN OFFICE WITH DIAGNOSIS OF HYPERTENSION: ICD-10-CM

## 2022-03-31 PROCEDURE — 99204 OFFICE O/P NEW MOD 45 MIN: CPT | Mod: S$GLB,,,

## 2022-03-31 PROCEDURE — 3079F DIAST BP 80-89 MM HG: CPT | Mod: CPTII,S$GLB,,

## 2022-03-31 PROCEDURE — 1159F MED LIST DOCD IN RCRD: CPT | Mod: CPTII,S$GLB,,

## 2022-03-31 PROCEDURE — 3077F PR MOST RECENT SYSTOLIC BLOOD PRESSURE >= 140 MM HG: ICD-10-PCS | Mod: CPTII,S$GLB,,

## 2022-03-31 PROCEDURE — 1125F PR PAIN SEVERITY QUANTIFIED, PAIN PRESENT: ICD-10-PCS | Mod: CPTII,S$GLB,,

## 2022-03-31 PROCEDURE — 1125F AMNT PAIN NOTED PAIN PRSNT: CPT | Mod: CPTII,S$GLB,,

## 2022-03-31 PROCEDURE — 3079F PR MOST RECENT DIASTOLIC BLOOD PRESSURE 80-89 MM HG: ICD-10-PCS | Mod: CPTII,S$GLB,,

## 2022-03-31 PROCEDURE — 1160F RVW MEDS BY RX/DR IN RCRD: CPT | Mod: CPTII,S$GLB,,

## 2022-03-31 PROCEDURE — 1160F PR REVIEW ALL MEDS BY PRESCRIBER/CLIN PHARMACIST DOCUMENTED: ICD-10-PCS | Mod: CPTII,S$GLB,,

## 2022-03-31 PROCEDURE — 3077F SYST BP >= 140 MM HG: CPT | Mod: CPTII,S$GLB,,

## 2022-03-31 PROCEDURE — 1159F PR MEDICATION LIST DOCUMENTED IN MEDICAL RECORD: ICD-10-PCS | Mod: CPTII,S$GLB,,

## 2022-03-31 PROCEDURE — 99204 PR OFFICE/OUTPT VISIT, NEW, LEVL IV, 45-59 MIN: ICD-10-PCS | Mod: S$GLB,,,

## 2022-03-31 PROCEDURE — 73130 XR HAND COMPLETE 3 VIEW LEFT: ICD-10-PCS | Mod: FY,LT,S$GLB, | Performed by: RADIOLOGY

## 2022-03-31 PROCEDURE — 73130 X-RAY EXAM OF HAND: CPT | Mod: FY,LT,S$GLB, | Performed by: RADIOLOGY

## 2022-03-31 RX ORDER — NAPROXEN 500 MG/1
250 TABLET ORAL 2 TIMES DAILY WITH MEALS
Qty: 5 TABLET | Refills: 0 | Status: SHIPPED | OUTPATIENT
Start: 2022-03-31 | End: 2022-04-05

## 2022-03-31 NOTE — PATIENT INSTRUCTIONS
PLEASE READ ALL DISCHARGE INSTRUCTIONS      Rest - Rest the injured area, wear splint for the next week or two as needed for comfort    Ice - Apply ice  to affected area for the first 24-48 hours (DO NOT APPLY ICE DIRECTLY TO THE SKIN.  DO NOT LEAVE ON AFFECTED BODY PART FOR MORE THAN 15 MINUTES AT AT TIME TO AVOID INJURY TO SOFT TISSUE)     Compression - Wear ACE wrap or splint provided for compression and comfort to help reduce pain and swelling    Elevate - Elevated affected area higher than your heart to reduce swelling    -  If you were prescribed an anti-inflammatory, please take as directed as needed for pain and inflammation. If you were not prescribed an anti-inflammatory, you can take Tylenol or ibuprofen over the counter as directed for pain unless you have an allergy to them or medical condition such as stomach ulcers, kidney or liver disease or blood thinners etc for which you should not be taking these type of medications.     Follow up with your PCP in 1 week or as needed if no improvement.    General Referral to [facility]   You were referred to [facility] for follow-up care on your condition.    Please call [phone] to schedule your appointment.    Please go to the Emergency Department for any concerns or worsening of condition.  Please follow up with your primary care doctor or specialist in the next 48-72hrs as needed.    If you  smoke, please stop smoking.    Repeat X ray in 1 week if you still have pain.    Elevated Blood Pressure  Your blood pressure was elevated during your visit to the urgent care.  It was not so high that immediate care was needed but it is recommended that you monitor your blood pressure over the next week or two to make sure that it is not staying elevated.  Please have your blood pressure taken 2-3 times daily at different times of the day.  Write all of those blood pressures down and record the time that they were taken.  Keep all that  information and take it with you to see your Primary Care Physician.  If your blood pressure is consistently above 140/90 you will need to follow up with your PCP more quickly.    If your condition worsens or fails to improve we recommend that you receive another evaluation at the ER immediately or contact your PCP to discuss your concerns or return here.     You must understand that you've received an urgent care treatment only and that you may be released before all your medical problems are known or treated.     You the patient will arrange for followup care as instructed.

## 2022-03-31 NOTE — PROGRESS NOTES
"Subjective:       Patient ID: Lay Peres is a 76 y.o. female.    Vitals:  height is 5' 2" (1.575 m) and weight is 68 kg (150 lb). Her oral temperature is 98.2 °F (36.8 °C). Her blood pressure is 175/85 (abnormal) and her pulse is 72. Her respiration is 18 and oxygen saturation is 98%.     Chief Complaint: Hand Injury (Left hand injury/)    Pt's staff explain that she injury her left hand from a fall and she did not take any otc meds but rest and ice her hand.    Provider note starts below:  Patient brought in by transporter from [facility] home after falling on her to her left hand a few days ago was.  He states that she has been complaining of hand pain today wanted her checked.  She has not taken anything the pain but they have been doing rest and ice on her hand.  Patient is congenitally deaf with mental disability and unable to answer questions.    Hand Injury   Her dominant hand is their left hand. The incident occurred 5 to 7 days ago. The incident occurred at home. There was no injury mechanism. The pain is present in the right hand and right fingers. The quality of the pain is described as aching. The pain does not radiate. The pain is at a severity of 4/10. The pain is mild. The pain has been constant since the incident. The symptoms are aggravated by movement. She has tried ice, weight bearing and rest for the symptoms. The treatment provided no relief.       Musculoskeletal: Positive for pain, trauma and joint pain.       Objective:      Physical Exam   Constitutional: She is cooperative.  Non-toxic appearance. She does not appear ill. No distress.   HENT:   Head: Normocephalic and atraumatic.   Ears:   Right Ear: External ear normal.   Left Ear: External ear normal.   Nose: Nose normal.   Eyes: Pupils are equal, round, and reactive to light.      extraocular movement intact   Cardiovascular: Normal rate, regular rhythm, normal heart sounds and normal pulses.   Pulmonary/Chest: Effort " normal and breath sounds normal.   Musculoskeletal:      Right hand: Motor /Testing: The patient has normal right wrist strength.      Left hand: Motor /Testing: The patient has normal left wrist strength (When compared to right hand, patient appears to have normal strength however patient has difficulty following instructions).   Neurological: She is alert and at baseline.      Comments: Patient has mental disability with congenital deafness.  Appears to be at baseline however caretaker not present at visit   Skin: Skin is warm and dry.         Comments: Bruising overlying the dorsal aspect of the left hand at the 1st 2nd 3rd metacarpals   Psychiatric: She experiences Normal attention. Impaired cognitionShe is noncommunicative.      Comments: This appears to be baseline per chart review   Nursing note and vitals reviewed.      XR HAND COMPLETE 3 VIEW LEFT    Result Date: 3/31/2022  EXAMINATION: XR HAND COMPLETE 3 VIEW LEFT CLINICAL HISTORY: . Unspecified injury of left wrist, hand and finger(s), initial encounter TECHNIQUE: PA, lateral, and oblique views of the left hand were performed. COMPARISON: Bilateral hand series 11/27/2018 FINDINGS: Bones are well mineralized. Overall alignment is within normal limits. No displaced fracture, dislocation or destructive osseous process.  Joint spaces appear relatively maintained. No subcutaneous emphysema or radiodense retained foreign body..     No acute displaced fracture-dislocation identified. Electronically signed by: Tom Vidal MD Date:    03/31/2022 Time:    14:55  Assessment:       1. Hand trauma, left, initial encounter    2. Elevated blood pressure reading in office with diagnosis of hypertension          Plan:     imaging reviewed.  Likely wrist strain versus sprain.  No fracture is identified at this time.  Risk for scaphoid fracture low given negative imaging and trauma occurred several days ago. Patient is placed in thumb spica wrist splint for immobilization  and compression.  Instructed transporter to inform caretaker that patient has blood pressure needs to be rechecked and followed up with PCP if it does not go down.  Paper prescription for Naprosyn given for patient's caretakers to review and fill if deemed appropriate for her care.  Otherwise instructed to take Tylenol ibuprofen as needed for pain and continue rice principles.  Referral for hand surgery given if symptoms do not resolve.  Strict ED precautions discussed with the transporter given patient is poor historian and has difficulty following commands be able to elicit appropriate exam responses for neurovascular compromise and stated that if patient starts have immobility of the joint worsening pain and she needs to be re-evaluated emergently.  All questions answered.  Patient discharged in no acute distress.    Hand trauma, left, initial encounter  -     XR HAND COMPLETE 3 VIEW LEFT; Future; Expected date: 03/31/2022  -     SPLINT FOR HOME USE  -     Ambulatory referral/consult to Hand Surgery  -     naproxen (NAPROSYN) 500 MG tablet; Take 0.5 tablets (250 mg total) by mouth 2 (two) times daily with meals. for 5 days  Dispense: 5 tablet; Refill: 0    Elevated blood pressure reading in office with diagnosis of hypertension      Patient Instructions   PLEASE READ ALL DISCHARGE INSTRUCTIONS      Rest - Rest the injured area, wear splint for the next week or two as needed for comfort    Ice - Apply ice  to affected area for the first 24-48 hours (DO NOT APPLY ICE DIRECTLY TO THE SKIN.  DO NOT LEAVE ON AFFECTED BODY PART FOR MORE THAN 15 MINUTES AT AT TIME TO AVOID INJURY TO SOFT TISSUE)     Compression - Wear ACE wrap or splint provided for compression and comfort to help reduce pain and swelling    Elevate - Elevated affected area higher than your heart to reduce swelling    -  If you were prescribed an anti-inflammatory, please take as directed as needed for pain and inflammation. If you were not prescribed  an anti-inflammatory, you can take Tylenol or ibuprofen over the counter as directed for pain unless you have an allergy to them or medical condition such as stomach ulcers, kidney or liver disease or blood thinners etc for which you should not be taking these type of medications.     Follow up with your PCP in 1 week or as needed if no improvement.    General Referral to [facility]   You were referred to [facility] for follow-up care on your condition.    Please call [phone] to schedule your appointment.    Please go to the Emergency Department for any concerns or worsening of condition.  Please follow up with your primary care doctor or specialist in the next 48-72hrs as needed.    If you  smoke, please stop smoking.    Repeat X ray in 1 week if you still have pain.    Elevated Blood Pressure  Your blood pressure was elevated during your visit to the urgent care.  It was not so high that immediate care was needed but it is recommended that you monitor your blood pressure over the next week or two to make sure that it is not staying elevated.  Please have your blood pressure taken 2-3 times daily at different times of the day.  Write all of those blood pressures down and record the time that they were taken.  Keep all that information and take it with you to see your Primary Care Physician.  If your blood pressure is consistently above 140/90 you will need to follow up with your PCP more quickly.    If your condition worsens or fails to improve we recommend that you receive another evaluation at the ER immediately or contact your PCP to discuss your concerns or return here.     You must understand that you've received an urgent care treatment only and that you may be released before all your medical problems are known or treated.     You the patient will arrange for followup care as instructed.

## 2022-04-04 ENCOUNTER — LAB VISIT (OUTPATIENT)
Dept: LAB | Facility: OTHER | Age: 77
End: 2022-04-04
Payer: MEDICARE

## 2022-04-04 DIAGNOSIS — Z20.822 ENCOUNTER FOR LABORATORY TESTING FOR COVID-19 VIRUS: ICD-10-CM

## 2022-04-04 PROCEDURE — U0003 INFECTIOUS AGENT DETECTION BY NUCLEIC ACID (DNA OR RNA); SEVERE ACUTE RESPIRATORY SYNDROME CORONAVIRUS 2 (SARS-COV-2) (CORONAVIRUS DISEASE [COVID-19]), AMPLIFIED PROBE TECHNIQUE, MAKING USE OF HIGH THROUGHPUT TECHNOLOGIES AS DESCRIBED BY CMS-2020-01-R: HCPCS | Performed by: EMERGENCY MEDICINE

## 2022-04-05 LAB
SARS-COV-2 RNA RESP QL NAA+PROBE: NOT DETECTED
SARS-COV-2- CYCLE NUMBER: NORMAL

## 2022-04-11 ENCOUNTER — LAB VISIT (OUTPATIENT)
Dept: LAB | Facility: OTHER | Age: 77
End: 2022-04-11
Payer: MEDICARE

## 2022-04-11 DIAGNOSIS — Z20.822 ENCOUNTER FOR LABORATORY TESTING FOR COVID-19 VIRUS: ICD-10-CM

## 2022-04-11 PROCEDURE — U0003 INFECTIOUS AGENT DETECTION BY NUCLEIC ACID (DNA OR RNA); SEVERE ACUTE RESPIRATORY SYNDROME CORONAVIRUS 2 (SARS-COV-2) (CORONAVIRUS DISEASE [COVID-19]), AMPLIFIED PROBE TECHNIQUE, MAKING USE OF HIGH THROUGHPUT TECHNOLOGIES AS DESCRIBED BY CMS-2020-01-R: HCPCS | Performed by: EMERGENCY MEDICINE

## 2022-04-12 LAB
SARS-COV-2 RNA RESP QL NAA+PROBE: NOT DETECTED
SARS-COV-2- CYCLE NUMBER: NORMAL

## 2022-04-14 ENCOUNTER — PATIENT OUTREACH (OUTPATIENT)
Dept: ADMINISTRATIVE | Facility: OTHER | Age: 77
End: 2022-04-14
Payer: MEDICARE

## 2022-04-14 RX ORDER — VALACYCLOVIR HYDROCHLORIDE 1 G/1
1000 TABLET, FILM COATED ORAL 3 TIMES DAILY
Qty: 30 TABLET | Refills: 0 | Status: SHIPPED | OUTPATIENT
Start: 2022-04-14 | End: 2023-01-25

## 2022-04-15 NOTE — PROGRESS NOTES
Requested updates within Care Everywhere.  Patient's chart was reviewed for overdue AWILDA topics.  Health maintenance:updated  Immunizations:reconciled   Legacy:   Media:reviewed for outside  Eye exam  Orders placed:  Tasked appts:  Labs Linked:  Upcoming appt:

## 2022-04-18 ENCOUNTER — LAB VISIT (OUTPATIENT)
Dept: LAB | Facility: OTHER | Age: 77
End: 2022-04-18
Payer: MEDICARE

## 2022-04-18 DIAGNOSIS — Z20.822 ENCOUNTER FOR LABORATORY TESTING FOR COVID-19 VIRUS: ICD-10-CM

## 2022-04-18 PROCEDURE — U0003 INFECTIOUS AGENT DETECTION BY NUCLEIC ACID (DNA OR RNA); SEVERE ACUTE RESPIRATORY SYNDROME CORONAVIRUS 2 (SARS-COV-2) (CORONAVIRUS DISEASE [COVID-19]), AMPLIFIED PROBE TECHNIQUE, MAKING USE OF HIGH THROUGHPUT TECHNOLOGIES AS DESCRIBED BY CMS-2020-01-R: HCPCS | Performed by: EMERGENCY MEDICINE

## 2022-04-19 DIAGNOSIS — M79.642 HAND PAIN, LEFT: Primary | ICD-10-CM

## 2022-04-19 LAB
SARS-COV-2 RNA RESP QL NAA+PROBE: NOT DETECTED
SARS-COV-2- CYCLE NUMBER: NORMAL

## 2022-04-20 ENCOUNTER — LAB VISIT (OUTPATIENT)
Dept: LAB | Facility: HOSPITAL | Age: 77
End: 2022-04-20
Attending: INTERNAL MEDICINE
Payer: MEDICARE

## 2022-04-20 DIAGNOSIS — Z79.899 ENCOUNTER FOR LONG-TERM (CURRENT) USE OF OTHER MEDICATIONS: Primary | ICD-10-CM

## 2022-04-20 LAB
ALBUMIN SERPL BCP-MCNC: 3.9 G/DL (ref 3.5–5.2)
ALP SERPL-CCNC: 77 U/L (ref 55–135)
ALT SERPL W/O P-5'-P-CCNC: 11 U/L (ref 10–44)
ANION GAP SERPL CALC-SCNC: 12 MMOL/L (ref 8–16)
AST SERPL-CCNC: 16 U/L (ref 10–40)
BASOPHILS # BLD AUTO: 0.06 K/UL (ref 0–0.2)
BASOPHILS NFR BLD: 0.6 % (ref 0–1.9)
BILIRUB SERPL-MCNC: 0.3 MG/DL (ref 0.1–1)
BUN SERPL-MCNC: 15 MG/DL (ref 8–23)
CALCIUM SERPL-MCNC: 10.2 MG/DL (ref 8.7–10.5)
CHLORIDE SERPL-SCNC: 105 MMOL/L (ref 95–110)
CO2 SERPL-SCNC: 24 MMOL/L (ref 23–29)
CREAT SERPL-MCNC: 0.8 MG/DL (ref 0.5–1.4)
DIFFERENTIAL METHOD: ABNORMAL
EOSINOPHIL # BLD AUTO: 0.3 K/UL (ref 0–0.5)
EOSINOPHIL NFR BLD: 2.6 % (ref 0–8)
ERYTHROCYTE [DISTWIDTH] IN BLOOD BY AUTOMATED COUNT: 14.5 % (ref 11.5–14.5)
EST. GFR  (AFRICAN AMERICAN): >60 ML/MIN/1.73 M^2
EST. GFR  (NON AFRICAN AMERICAN): >60 ML/MIN/1.73 M^2
GLUCOSE SERPL-MCNC: 116 MG/DL (ref 70–110)
HCT VFR BLD AUTO: 38 % (ref 37–48.5)
HGB BLD-MCNC: 11.4 G/DL (ref 12–16)
IMM GRANULOCYTES # BLD AUTO: 0.03 K/UL (ref 0–0.04)
IMM GRANULOCYTES NFR BLD AUTO: 0.3 % (ref 0–0.5)
LYMPHOCYTES # BLD AUTO: 3 K/UL (ref 1–4.8)
LYMPHOCYTES NFR BLD: 28.4 % (ref 18–48)
MCH RBC QN AUTO: 27.8 PG (ref 27–31)
MCHC RBC AUTO-ENTMCNC: 30 G/DL (ref 32–36)
MCV RBC AUTO: 93 FL (ref 82–98)
MONOCYTES # BLD AUTO: 0.9 K/UL (ref 0.3–1)
MONOCYTES NFR BLD: 8.5 % (ref 4–15)
NEUTROPHILS # BLD AUTO: 6.4 K/UL (ref 1.8–7.7)
NEUTROPHILS NFR BLD: 59.6 % (ref 38–73)
NRBC BLD-RTO: 0 /100 WBC
PLATELET # BLD AUTO: 298 K/UL (ref 150–450)
PMV BLD AUTO: 10.8 FL (ref 9.2–12.9)
POTASSIUM SERPL-SCNC: 4.2 MMOL/L (ref 3.5–5.1)
PROT SERPL-MCNC: 7.9 G/DL (ref 6–8.4)
RBC # BLD AUTO: 4.1 M/UL (ref 4–5.4)
SODIUM SERPL-SCNC: 141 MMOL/L (ref 136–145)
WBC # BLD AUTO: 10.66 K/UL (ref 3.9–12.7)

## 2022-04-20 PROCEDURE — 85025 COMPLETE CBC W/AUTO DIFF WBC: CPT | Performed by: INTERNAL MEDICINE

## 2022-04-20 PROCEDURE — 80053 COMPREHEN METABOLIC PANEL: CPT | Performed by: INTERNAL MEDICINE

## 2022-04-21 RX ORDER — DOXYCYCLINE HYCLATE 100 MG
100 TABLET ORAL 2 TIMES DAILY
Qty: 20 TABLET | Refills: 0 | Status: SHIPPED | OUTPATIENT
Start: 2022-04-21 | End: 2022-05-01

## 2022-04-25 ENCOUNTER — LAB VISIT (OUTPATIENT)
Dept: LAB | Facility: OTHER | Age: 77
End: 2022-04-25
Payer: MEDICARE

## 2022-04-25 DIAGNOSIS — Z20.822 ENCOUNTER FOR LABORATORY TESTING FOR COVID-19 VIRUS: ICD-10-CM

## 2022-04-25 PROCEDURE — U0003 INFECTIOUS AGENT DETECTION BY NUCLEIC ACID (DNA OR RNA); SEVERE ACUTE RESPIRATORY SYNDROME CORONAVIRUS 2 (SARS-COV-2) (CORONAVIRUS DISEASE [COVID-19]), AMPLIFIED PROBE TECHNIQUE, MAKING USE OF HIGH THROUGHPUT TECHNOLOGIES AS DESCRIBED BY CMS-2020-01-R: HCPCS | Performed by: EMERGENCY MEDICINE

## 2022-04-26 LAB
SARS-COV-2 RNA RESP QL NAA+PROBE: NOT DETECTED
SARS-COV-2- CYCLE NUMBER: NORMAL

## 2022-05-02 ENCOUNTER — LAB VISIT (OUTPATIENT)
Dept: LAB | Facility: OTHER | Age: 77
End: 2022-05-02
Payer: MEDICARE

## 2022-05-02 DIAGNOSIS — Z20.822 ENCOUNTER FOR LABORATORY TESTING FOR COVID-19 VIRUS: ICD-10-CM

## 2022-05-02 PROCEDURE — U0003 INFECTIOUS AGENT DETECTION BY NUCLEIC ACID (DNA OR RNA); SEVERE ACUTE RESPIRATORY SYNDROME CORONAVIRUS 2 (SARS-COV-2) (CORONAVIRUS DISEASE [COVID-19]), AMPLIFIED PROBE TECHNIQUE, MAKING USE OF HIGH THROUGHPUT TECHNOLOGIES AS DESCRIBED BY CMS-2020-01-R: HCPCS | Performed by: EMERGENCY MEDICINE

## 2022-05-03 DIAGNOSIS — U07.1 COVID-19 VIRUS DETECTED: ICD-10-CM

## 2022-05-03 LAB
SARS-COV-2 RNA RESP QL NAA+PROBE: DETECTED
SARS-COV-2- CYCLE NUMBER: 21

## 2022-05-04 ENCOUNTER — TELEPHONE (OUTPATIENT)
Dept: INFECTIOUS DISEASES | Facility: HOSPITAL | Age: 77
End: 2022-05-04
Payer: MEDICARE

## 2022-05-04 ENCOUNTER — PATIENT OUTREACH (OUTPATIENT)
Dept: PRIMARY CARE CLINIC | Facility: CLINIC | Age: 77
End: 2022-05-04
Payer: MEDICARE

## 2022-05-04 ENCOUNTER — TELEPHONE (OUTPATIENT)
Dept: INTERNAL MEDICINE | Facility: CLINIC | Age: 77
End: 2022-05-04
Payer: MEDICARE

## 2022-05-04 DIAGNOSIS — U07.1 COVID: Primary | ICD-10-CM

## 2022-05-04 NOTE — TELEPHONE ENCOUNTER
Nursing notified. Spoke with Christina, she is unsure if the pt has symptoms as she was sleep this morning. Will speak with PCP

## 2022-05-04 NOTE — TELEPHONE ENCOUNTER
Carmella called, the Infusion Center is closed tomorrow, she would like to make sure it is ok for pt to wait until Friday for infusion. Spoke with Dr. Lea who states as long as the pt is not in distress she can wait until Friday. If pt has any urgent symptoms Melony will bring pt to the ER.

## 2022-05-04 NOTE — TELEPHONE ENCOUNTER
Please call patient's family  Covid 19 test is positive  Is she symptomatic or picked up during routine screening?    Covid risk score is 5

## 2022-05-04 NOTE — TELEPHONE ENCOUNTER
Called Carmella garcia at [place] to notify that pt meets criteria for infusion if they would want to receive to discuss with medical director will send epic message as well.

## 2022-05-04 NOTE — TELEPHONE ENCOUNTER
----- Message from Boris Ulloa MD sent at 2/20/2020  8:56 AM CST -----  See note   Spoke with pcp, she has spoken with [place].

## 2022-05-06 ENCOUNTER — INFUSION (OUTPATIENT)
Dept: INFECTIOUS DISEASES | Facility: HOSPITAL | Age: 77
End: 2022-05-06
Attending: INTERNAL MEDICINE
Payer: MEDICARE

## 2022-05-06 VITALS
OXYGEN SATURATION: 96 % | TEMPERATURE: 98 F | DIASTOLIC BLOOD PRESSURE: 94 MMHG | SYSTOLIC BLOOD PRESSURE: 143 MMHG | WEIGHT: 150 LBS | HEART RATE: 64 BPM | RESPIRATION RATE: 16 BRPM | HEIGHT: 62 IN | BODY MASS INDEX: 27.6 KG/M2

## 2022-05-06 DIAGNOSIS — U07.1 COVID-19: Primary | ICD-10-CM

## 2022-05-06 DIAGNOSIS — U07.1 COVID: ICD-10-CM

## 2022-05-06 PROCEDURE — 63600175 PHARM REV CODE 636 W HCPCS: Performed by: INTERNAL MEDICINE

## 2022-05-06 PROCEDURE — M0222 HC IV INJECTION, BEBTELOVIMAB, INCL POST ADMIN MONIT: HCPCS | Performed by: INTERNAL MEDICINE

## 2022-05-06 RX ORDER — ALBUTEROL SULFATE 90 UG/1
2 AEROSOL, METERED RESPIRATORY (INHALATION)
Status: ACTIVE | OUTPATIENT
Start: 2022-05-06 | End: 2022-05-09

## 2022-05-06 RX ORDER — ACETAMINOPHEN 325 MG/1
650 TABLET ORAL
Status: ACTIVE | OUTPATIENT
Start: 2022-05-06 | End: 2022-05-07

## 2022-05-06 RX ORDER — EPINEPHRINE 0.3 MG/.3ML
0.3 INJECTION SUBCUTANEOUS
Status: ACTIVE | OUTPATIENT
Start: 2022-05-06 | End: 2022-05-09

## 2022-05-06 RX ORDER — ONDANSETRON 4 MG/1
4 TABLET, ORALLY DISINTEGRATING ORAL
Status: ACTIVE | OUTPATIENT
Start: 2022-05-06 | End: 2022-05-07

## 2022-05-06 RX ORDER — DIPHENHYDRAMINE HYDROCHLORIDE 50 MG/ML
25 INJECTION INTRAMUSCULAR; INTRAVENOUS
Status: ACTIVE | OUTPATIENT
Start: 2022-05-06 | End: 2022-05-07

## 2022-05-06 RX ORDER — BEBTELOVIMAB 87.5 MG/ML
175 INJECTION, SOLUTION INTRAVENOUS
Status: COMPLETED | OUTPATIENT
Start: 2022-05-06 | End: 2022-05-06

## 2022-05-06 RX ADMIN — BEBTELOVIMAB 175 MG: 87.5 INJECTION, SOLUTION INTRAVENOUS at 10:05

## 2022-05-06 NOTE — PROGRESS NOTES
Patient arrives for Bebtelovimab IV Injection. Ambulatory. Pt AAox3. No distress noted. RR even and unlabored.     Symptoms and onset date:  NA    Tested COVID + on 05/02/22    Patient is MR. Unable to answer questions.

## 2022-05-06 NOTE — PROGRESS NOTES
Patient remains with no signs of complications noted. Patient received Bebtelovimab IV Injection according to FDA recommendations and [facility] without complications noted and left with mask in place. Drug fact sheet provided. Pt discharged home. Ambulatory. Remains AAox3. No distress noted. RR even and unlabored.

## 2022-06-10 ENCOUNTER — HOSPITAL ENCOUNTER (OUTPATIENT)
Dept: RADIOLOGY | Facility: HOSPITAL | Age: 77
Discharge: HOME | End: 2022-06-10
Attending: PHYSICIAN ASSISTANT
Payer: MEDICARE

## 2022-06-10 DIAGNOSIS — M79.642 HAND PAIN, LEFT: ICD-10-CM

## 2022-06-10 PROCEDURE — 73130 X-RAY EXAM OF HAND: CPT | Mod: TC,LT

## 2022-06-10 PROCEDURE — 73130 XR HAND COMPLETE 3 VIEW LEFT: ICD-10-PCS | Mod: 26,LT,, | Performed by: RADIOLOGY

## 2022-06-10 PROCEDURE — 73130 X-RAY EXAM OF HAND: CPT | Mod: 26,LT,, | Performed by: RADIOLOGY

## 2022-07-15 NOTE — PROGRESS NOTES
The patient location is: at home ([place]) in the [facility].    Visit type: audiovisual    Face to Face time with patient: 10 minutes  Total time spent on the encounter: 20 minutes    Each patient to whom he or she provides medical services by telemedicine is:  (1) informed of the relationship between the physician and patient and the respective role of any other health care provider with respect to management of the patient; and (2) notified that he or she may decline to receive medical services by telemedicine and may withdraw from such care at any time.    For Audio Only Telehealth Visits: the reason for the audio only service rather than synchronous audio and video virtual visit was related to technical difficulties or patient preference/necessity.  This service was not originating from a related E/M service provided within the previous 7 days nor will  to an E/M service or procedure within the next 24 hours or my soonest available appointment.  Prevailing standard of care was able to be met in this audio-only visit. Patient verbally consented to receive this service via voice-only telephone call.        ESTABLISHED OUTPATIENT VISIT     DEPARTMENT:  Psychiatry    EXAMINING PRACTITIONER: Serg Cardenas MD  BOARD CERTIFICATION: Psychiatry & Addiction Medicine      HISTORY:       Patient Name: Lay Peres  YOB: 1945    CHIEF COMPLAINT   Lay Peres is a 76 y.o. female who presents to the clinic for a follow up psychiatric appointment.  See MEDICAL DECISION MAKING below for enumeration and discussion of patient's DIAGNOSES & PROBLEMS      HPI, PSYCHIATRIC ROS & APPLICABLE MEDICAL ROS    Patient with cogenital deafness, possible MR, chronic schizoaffective disorder, and now dementia, followed by me for disease maintenance.  She is seen today for follow up evaluation.      Seen today in conjunction with her , who provides bulk of  "information.  Overall stable, not requiring prn medication.  New  Shari - about a month and a half - I updated Shari on her history and current treatment approach.  Mood mostly stable but does have periods of being emotional but can be relatively soothed.  No significant aggression.  No recent paranoia.    Sleeping well at night but does get up to urinate and will fall - staff is trying to manage that.  Memory continues to gradually decline - not learning new things but will recognize people  No tremor, akathisia, or tardive dyskinesia noted - but is fidgety.  She feeds herself - but needs assistance for showering and toileting.  She can follow one step or two step commands.  Sister continues to be involved - not taking her out for overnights recently.  More recently she has seemed "out of it" - a bit more cognitively impaired.      KEY FEATURES OF THE PAST HISTORY (PSYCHIATRIC, MEDICAL, FAMILY, AND/OR SOCIAL)    Lives at [facility] for many years, sister involved - diagnoses of Schizoaffective Disorder, Mental Retardation, Congential Deafness and now Dementia  Initiated treatment with me in the clinic in May 2011  She has had inpt psych hosp and at least one suicide attempt in past - details vague  November 2021 - decompensated (but did not require inpt psych hosp) when haldol decanoate lowered to 25mg q2 weeks.      PAST PSYCHOTROPIC TRIALS    High doses of haldol in past - haldol decanoate 100mg IM q2 week + haldol 5mg prns  Entered treatment with me on the following regimen: Haldol dec 100mg IM q3 weeks, Seroquel 200mg bedtime, Cymbalta 30mg daily, Cogentin 1mg bid      CURRENT PSYCHOTROPIC REGIMEN   Haldol dec 50mg IM q2 weeks  Seroquel 200mg bedtime  Cymbalta 30mg daily  Cogentin 1mg bid    Aricept  Namenda      EXAMINATION:       There were no vitals filed for this visit.    Appearance & Behavior: sitting calm with , adequately groomed  Involuntary Movements: no tics, tremors, " "akathisia or tardive dyskinesia noted  Speech & Language: decreased spontaneity, at times mumbling, answers in brief responses   Mood: "okay"  Affect: friendly, euthymic, smiles easily  Thought Process & Associations: difficult to assess due to paucity of content - somewhat perseverative  Thought Content & Perceptions: no overt psychosis noted  Cognition: memory and attention impaired, fund of knowledge limited.    Insight & Judgment: both impaired though does comply with medications per staff      CLINICAL RISK ASSESSMENT:       The following risk parameters were assessed during this evaluation:    Suicidal: No  Homicidal: No  Gravely Disabled: No      MEDICAL DECISION MAKING:       DIAGNOSES & PROBLEMS ADDRESSED DURING THE ENCOUNTER  Problem - Complexity - Management Plan    Note: Complexity is documented with a number using the following system:  1=Self-limited or minor problem  2=Acute, uncomplicated illness or injury  3=Stable chronic illness  4=Chronic illness with exacerbation, progression, or side effects of treatment  5=Chronic illness with severe exacerbation, progression, or side effects of treatment  6=Undiagnosed new problem with uncertain prognosis  7=Acute illness with system symptoms  8=Acute complicated injury  9=Acute or chronic illness or injury that poses a threat to life or bodily function      Schizoaffective disorder - 3 - recent decompensation fall  but now again stabilized upon returning haldol decanoate to 50mg q2 weeks.  Cont current regimen.  We have been searching for minimal effective doses as she ages and dementia worsens, which we appear to have found at this time.  Current regimen appears to be a good balance of decent efficacy with minimal side effects.  I have discussed with sister and  psychopharmacologic approach.  Of note, this is longstanding regimen started prior to seeing me.  We discussed both of the followin. Usually multiple neuroleptics avoided.  In " this case she is on two neuroleptics - haldol decanoate and seroquel.   2. There is risk for cardiovascular events with neuroleptics in dementia.  However patient with comorbid schizoaffective disorder that long predates dementia and is indication for neuroleptic.   Sister understands risks and benefits and would like her to continue neuroleptics - states in past when adjustments made patient has decompensated requiring hospitalization - and we did see a decompensation in fall 2021 when haldol decanoate lowered to 25mg [id number] in the summer of 2021.    Mental retardation - 3 - perhaps mild, IQ unknown.  Cont structured living program - doing well in this setting.    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  Complicates other diagnoses.    Dementia - 4 - follows with neurology.  She is on namenda and aricept.  As dementia progresses, we will may need to taper down psychotropics.    Metabolic syndrome - 3 - will monitor given she is on atypical neuroleptics.  She is followed by Dr. Case. Labs up to date.      Shared medical decision making with the patient was employed to the extent possible when selecting, or considering but not selecting, proposed management options.      PRESCRIPTION DRUG MANAGEMENT   Prescription drug management was employed during the encounter, as medications were prescribed, or considered but not prescribed.    The risks and benefits of medication were discussed with this patient.   Possible expectable adverse effects of any current or proposed individual psychotropic agents were discussed with this patient.   Counseling was provided on the importance of full compliance with any prescribed medication.   Detailed instructions were provided to the patient regarding the administration of any prescribed medication.    LA   Site reviewed -   Prescriptions   Total: 0   Private Pay: 0         Diagnosis or treatment significantly limited by social determinants of health?  Yes -  congenital deafness impacts illnesses and functioning      ESTIMATION OF RISK ASSOCIATED WITH ENCOUNTER: moderate  LEVEL OF MEDICAL DECISION MAKING: moderate  TOTAL TIME FOR E/M SERVICES PERFORMED ON THE DATE OF ENCOUNTER: 20 minutes  CPT CODE JUSTIFICATION (1=Level of MDM, 2=Time, 3=Meets criteria for both): 1      DATA  The electronic medical record was reviewed.  Pertinent recent diagnostic investigations, test results, and/or external documentation are noted below.    Recent Weights/BMI on File:    Wt Readings from Last 5 Encounters:   05/06/22 68 kg (150 lb)   03/31/22 68 kg (150 lb)   01/26/22 69.1 kg (152 lb 5.4 oz)   12/27/21 67.7 kg (149 lb 4 oz)   12/06/21 67.1 kg (148 lb)       No data recorded    Current body mass index is unknown because there is no height or weight on file.      Most Recent BP/HR on File:    BP Readings from Last 1 Encounters:   05/06/22 (!) 143/94       Pulse Readings from Last 1 Encounters:   05/06/22 64         Most Recent EKG on File:    Results for orders placed or performed during the hospital encounter of 02/04/21   EKG 12-lead    Collection Time: 02/04/21 11:41 AM    Narrative    Test Reason : R07.9,    Vent. Rate : 079 BPM     Atrial Rate : 079 BPM     P-R Int : 140 ms          QRS Dur : 068 ms      QT Int : 372 ms       P-R-T Axes : 039 047 062 degrees     QTc Int : 426 ms    Baseline Artifact  Sinus rhythm  Within normal limits  When compared with ECG of 08-AUG-2018 14:11,  No significant change was found  Confirmed by LIZ HERNANDEZ MD ([id number]) on 2/4/2021 12:27:55 PM    Referred By: [email or web address]   SELF           Confirmed By:LIZ HERNANDEZ MD           PERTINENT LABORATORY RESULTS    Most Recent Electrolytes on File:  (i.e. Na, K, Ca, Phos, Mg, CO2)    Sodium (mmol/L)   Date Value   04/20/2022 141     Potassium (mmol/L)   Date Value   04/20/2022 4.2     Calcium (mg/dL)   Date Value   04/20/2022 10.2     Phosphorus (mg/dL)   Date Value   08/07/2018 3.4     Magnesium (mg/dL)   Date Value    02/04/2021 1.7     CO2 (mmol/L)   Date Value   04/20/2022 24         Most Recent Blood Glucose & HgA1c on File:    Glucose (mg/dL)   Date Value   04/20/2022 116 (H)     Hemoglobin A1C (%)   Date Value   02/17/2022 5.6         Most Recent Renal Function Tests on File:  (i.e. Cr, BUN, GFR, Urine Specific Gravity, Urine Protein)    Creatinine (mg/dL)   Date Value   04/20/2022 0.8     BUN (mg/dL)   Date Value   04/20/2022 15     eGFR if African American (mL/min/1.73 m^2)   Date Value   04/20/2022 >60.0     eGFR if non African American (mL/min/1.73 m^2)   Date Value   04/20/2022 >60.0     Specific [place], UA (no units)   Date Value   12/27/2021 1.025     Protein, UA (no units)   Date Value   12/27/2021 1+ (A)         Most Recent Liver Function Tests on File:  (i.e. AST, ALT, Total Bili, Ammonia, Albumin, INR)    AST (U/L)   Date Value   04/20/2022 16     ALT (U/L)   Date Value   04/20/2022 11     Total Bilirubin (mg/dL)   Date Value   04/20/2022 0.3     Albumin (g/dL)   Date Value   04/20/2022 3.9     INR (no units)   Date Value   07/14/2018 1.0         Most Recent Pancreatic Function Tests on File:  (i.e. Amylase, Lipase)    Lipase (U/L)   Date Value   12/27/2021 13         Most Recent Blood Counts on File:  (i.e. WBC, ANC, RBC, MCV, Platelets)    WBC (K/uL)   Date Value   04/20/2022 10.66     Gran # (ANC) (K/uL)   Date Value   04/20/2022 6.4     Gran % (%)   Date Value   04/20/2022 59.6     RBC (M/uL)   Date Value   04/20/2022 4.10     MCV (fL)   Date Value   04/20/2022 93     Platelets (K/uL)   Date Value   04/20/2022 298         Most Recent Thyroid/Parathyroid Function Tests on File:  (i.e. TSH, Free T4, Total T4, Free T3, Total T3, PTH)    TSH (uIU/mL)   Date Value   09/22/2021 0.670     T3, Total (ng/dL)   Date Value   05/27/2016 92         Most Recent Lipid Panel Results on File:  (i.e. Cholesterol, Triglycerides, LDH, HDL)    Cholesterol (mg/dL)   Date Value   09/22/2021 93 (L)     Triglycerides (mg/dL)   Date  Value   09/22/2021 171 (H)     LDL Cholesterol (mg/dL)   Date Value   09/22/2021 26.8 (L)     HDL (mg/dL)   Date Value   09/22/2021 32 (L)         Most Recent CPK & Prolactin on File:    CPK (U/L)   Date Value   07/14/2018 119         Most Recent Vitamin Levels on File:  (i.e. Vitamin B12, Folate, Vitamin D)    Vitamin B-12 (pg/mL)   Date Value   09/22/2021 396     Vit D, 25-Hydroxy (ng/mL)   Date Value   09/22/2021 51          Most Recent Infection Disease Panel on File:  (i.e. Syphilis, Hepatitis C, Hepatitis B, HIV)     Hepatitis C Ab (no units)   Date Value   02/04/2021 Negative         Pregnancy Screenings:    No results found for: PREGTESTUR, PREGNANCYTES, HCGQUANT      Lithium & Valproate Levels on File:    No results found for: LITHIUM    No results found for: VALPROATE      Most Recent Carbamazepine & Lamotrigine Levels on File:    No results found for: CBMZ, LAMOTRIGINE      Most Recent Clozapine Level on File:    No results found for: CLOZAPINE, NORCLOZAP, CLOZNORCLOZ      Results on File for Alcohol Screens (Blood, Urine):    No results found for: ALCOHOLMEDIC    No results found for: PCDSOALCOHOL      Results on File for Urine Drug Screens (Benzodiazepines, Barbiturates, Methadone, Opiates, Cocaine, Amphetamines, THC, PCP):    No results found for: PCDSOBENZOD  No results found for: BARBITURATES  No results found for: PCDSCOMETHA  No results found for: OPIATESCREEN  No results found for: COCAINEMETAB  No results found for: AMPHETAMINES  No results found for: MARIJUANATHC  No results found for: PCDSOPHENCYN      Most Recent Results for Buprenorphine Testing:    No results found for: BUPRENORPH, NORBUPRENOR      Results on File for Phosphatidylethanol (PETH):    No results found for: PETH      Results on File for Ethyl Glucuronide (EtG) and Ethyl Sulfate (EtS):    No results found for: THEYLGLUCU, ETHYLSULF      Most Recent Results on File for Alcohol Biomarkers (GGT, CDT):    No results found for: GGT,  CDT        RELEVANT NEUROLOGIC IMAGING:  (i.e. CT/MRI brain)      CT HEAD WITHOUT CONTRAST    Results for orders placed during the hospital encounter of 11/04/21    CT Head Without Contrast    Narrative  EXAMINATION:  CT HEAD WITHOUT CONTRAST    CLINICAL HISTORY:  Memory loss; Other amnesia    TECHNIQUE:  Low dose axial CT images obtained throughout the head without intravenous contrast. Sagittal and coronal reconstructions were performed.    COMPARISON:  CT head: 08/23/2021.    FINDINGS:  INTRACRANIAL COMPARTMENT:    Ventricles stable in size.  Mild cerebral volume loss, without evidence of hydrocephalus.    Mild patchy hypoattenuation in the supratentorial white matter, nonspecific but most likely reflecting chronic microvascular ischemic changes. No new parenchymal mass, hemorrhage, edema or major vascular distribution infarct.    No extra-axial blood or fluid collections.    SKULL/EXTRACRANIAL CONTENTS:    Improved left frontal extracranial soft tissue swelling.  No new displaced calvarial fracture.    Mastoid air cells and  paranasal sinuses are essentially clear.    Impression  Mild cerebral volume loss and chronic microvascular ischemic change, stable.  No evidence of acute intracranial pathology.    Electronically signed by resident: Tiffanie Milian  Date:    11/04/2021  Time:    09:55    Electronically signed by: Bruce Barajas MD  Date:    11/04/2021  Time:    10:42      MRI HEAD WITHOUT CONTRAST    No results found for this or any previous visit.      MRI HEAD WITH AND WITHOUT CONTRAST    No results found for this or any previous visit.

## 2022-07-19 ENCOUNTER — PATIENT MESSAGE (OUTPATIENT)
Dept: RESEARCH | Facility: CLINIC | Age: 77
End: 2022-07-19
Payer: MEDICARE

## 2022-07-20 ENCOUNTER — OFFICE VISIT (OUTPATIENT)
Dept: PSYCHIATRY | Facility: CLINIC | Age: 77
End: 2022-07-20
Payer: MEDICARE

## 2022-07-20 DIAGNOSIS — H90.5 DEAFNESS CONGENITAL: ICD-10-CM

## 2022-07-20 DIAGNOSIS — E78.5 HYPERLIPIDEMIA, UNSPECIFIED HYPERLIPIDEMIA TYPE: ICD-10-CM

## 2022-07-20 DIAGNOSIS — F03.91 DEMENTIA WITH BEHAVIORAL DISTURBANCE, UNSPECIFIED DEMENTIA TYPE: ICD-10-CM

## 2022-07-20 DIAGNOSIS — F79 INTELLECTUAL DISABILITY: ICD-10-CM

## 2022-07-20 DIAGNOSIS — F25.9 CHRONIC SCHIZOAFFECTIVE DISORDER: Primary | ICD-10-CM

## 2022-07-20 DIAGNOSIS — I10 PRIMARY HYPERTENSION: ICD-10-CM

## 2022-07-20 DIAGNOSIS — Z86.73 HISTORY OF STROKE: ICD-10-CM

## 2022-07-20 PROCEDURE — 1159F MED LIST DOCD IN RCRD: CPT | Mod: CPTII,95,, | Performed by: PSYCHIATRY & NEUROLOGY

## 2022-07-20 PROCEDURE — 1159F PR MEDICATION LIST DOCUMENTED IN MEDICAL RECORD: ICD-10-PCS | Mod: CPTII,95,, | Performed by: PSYCHIATRY & NEUROLOGY

## 2022-07-20 PROCEDURE — 99214 OFFICE O/P EST MOD 30 MIN: CPT | Mod: 95,,, | Performed by: PSYCHIATRY & NEUROLOGY

## 2022-07-20 PROCEDURE — 99214 PR OFFICE/OUTPT VISIT, EST, LEVL IV, 30-39 MIN: ICD-10-PCS | Mod: 95,,, | Performed by: PSYCHIATRY & NEUROLOGY

## 2022-07-20 RX ORDER — DULOXETIN HYDROCHLORIDE 30 MG/1
30 CAPSULE, DELAYED RELEASE ORAL DAILY
Qty: 30 CAPSULE | Refills: 6 | Status: SHIPPED | OUTPATIENT
Start: 2022-07-20 | End: 2023-01-19

## 2022-07-20 RX ORDER — QUETIAPINE FUMARATE 200 MG/1
200 TABLET, FILM COATED ORAL NIGHTLY
Qty: 30 TABLET | Refills: 6 | Status: SHIPPED | OUTPATIENT
Start: 2022-07-20 | End: 2023-01-19

## 2022-07-20 RX ORDER — BENZTROPINE MESYLATE 1 MG/1
1 TABLET ORAL 2 TIMES DAILY
Qty: 60 TABLET | Refills: 6 | Status: SHIPPED | OUTPATIENT
Start: 2022-07-20 | End: 2023-01-19

## 2022-07-20 NOTE — PATIENT INSTRUCTIONS
AFTER VISIT INSTRUCTIONS    Take medication as prescribed.  If questions or concerns arise, or if experiencing side effects, adverse reactions or worsening symptoms, contact me through the [facility] portal or call [phone] to speak with office staff.  In cases of emergencies, call [phone] or present to the emergency department for immediate assistance.        ADJUSTMENTS TO REGIMEN: None    LAB WORK: None Needed/None Ordered              RESOURCES:    National [place] of Mental Health: information on mental health medications.  https://www.[facility].nih.gov/health/topics/mental-health-medications/    The National Suicide Prevention Lifeline: 1-525.939.9777.  Provides 24/7, free and confidential support for people in distress, prevention and crisis resources for you or your loved ones, and best practices for professionals.  https://suicidepreventionlifeline.org/         Thank you for allowing me to participate as part of your health care team, and thank you for choosing [facility].    APPLE WILKERSON MD  Board Certified in Psychiatry & Addiction Medicine

## 2022-08-18 ENCOUNTER — HOSPITAL ENCOUNTER (OUTPATIENT)
Facility: HOSPITAL | Age: 77
Discharge: HOME | End: 2022-08-19
Attending: EMERGENCY MEDICINE | Admitting: EMERGENCY MEDICINE
Payer: MEDICARE

## 2022-08-18 DIAGNOSIS — R07.9 CHEST PAIN: Primary | ICD-10-CM

## 2022-08-18 DIAGNOSIS — R10.13 EPIGASTRIC ABDOMINAL PAIN: ICD-10-CM

## 2022-08-18 PROBLEM — K21.9 GERD (GASTROESOPHAGEAL REFLUX DISEASE): Status: ACTIVE | Noted: 2022-08-18

## 2022-08-18 LAB
ALBUMIN SERPL BCP-MCNC: 3.8 G/DL (ref 3.5–5.2)
ALP SERPL-CCNC: 82 U/L (ref 55–135)
ALT SERPL W/O P-5'-P-CCNC: 14 U/L (ref 10–44)
ANION GAP SERPL CALC-SCNC: 9 MMOL/L (ref 8–16)
ANISOCYTOSIS BLD QL SMEAR: SLIGHT
ASCENDING AORTA: 2.51 CM
AST SERPL-CCNC: 21 U/L (ref 10–40)
AV INDEX (PROSTH): 0.69
AV MEAN GRADIENT: 3 MMHG
AV PEAK GRADIENT: 4 MMHG
AV VALVE AREA: 2.19 CM2
AV VELOCITY RATIO: 0.69
BASOPHILS # BLD AUTO: 0.05 K/UL (ref 0–0.2)
BASOPHILS NFR BLD: 0.6 % (ref 0–1.9)
BILIRUB SERPL-MCNC: 0.3 MG/DL (ref 0.1–1)
BILIRUB UR QL STRIP: NEGATIVE
BNP SERPL-MCNC: 27 PG/ML (ref 0–99)
BSA FOR ECHO PROCEDURE: 1.72 M2
BUN SERPL-MCNC: 21 MG/DL (ref 8–23)
CALCIUM SERPL-MCNC: 9.9 MG/DL (ref 8.7–10.5)
CHLORIDE SERPL-SCNC: 108 MMOL/L (ref 95–110)
CLARITY UR REFRACT.AUTO: CLEAR
CO2 SERPL-SCNC: 25 MMOL/L (ref 23–29)
COLOR UR AUTO: YELLOW
CREAT SERPL-MCNC: 0.8 MG/DL (ref 0.5–1.4)
CV ECHO LV RWT: 0.3 CM
DIFFERENTIAL METHOD: ABNORMAL
DOP CALC AO PEAK VEL: 1.02 M/S
DOP CALC AO VTI: 24.14 CM
DOP CALC LVOT AREA: 3.2 CM2
DOP CALC LVOT DIAMETER: 2.01 CM
DOP CALC LVOT PEAK VEL: 0.7 M/S
DOP CALC LVOT STROKE VOLUME: 52.93 CM3
DOP CALCLVOT PEAK VEL VTI: 16.69 CM
E WAVE DECELERATION TIME: 341.91 MSEC
E/A RATIO: 0.48
E/E' RATIO: 10.22 M/S
ECHO LV POSTERIOR WALL: 0.72 CM (ref 0.6–1.1)
EJECTION FRACTION: 50 %
EOSINOPHIL # BLD AUTO: 0.4 K/UL (ref 0–0.5)
EOSINOPHIL NFR BLD: 4 % (ref 0–8)
ERYTHROCYTE [DISTWIDTH] IN BLOOD BY AUTOMATED COUNT: 13.8 % (ref 11.5–14.5)
EST. GFR  (NO RACE VARIABLE): >60 ML/MIN/1.73 M^2
FRACTIONAL SHORTENING: 34 % (ref 28–44)
GLUCOSE SERPL-MCNC: 105 MG/DL (ref 70–110)
GLUCOSE UR QL STRIP: NEGATIVE
HCT VFR BLD AUTO: 36.1 % (ref 37–48.5)
HGB BLD-MCNC: 11.3 G/DL (ref 12–16)
HGB UR QL STRIP: NEGATIVE
IMM GRANULOCYTES # BLD AUTO: 0.02 K/UL (ref 0–0.04)
IMM GRANULOCYTES NFR BLD AUTO: 0.2 % (ref 0–0.5)
INR PPP: 1 (ref 0.8–1.2)
INTERVENTRICULAR SEPTUM: 0.65 CM (ref 0.6–1.1)
KETONES UR QL STRIP: NEGATIVE
LA MAJOR: 4.25 CM
LA MINOR: 4.32 CM
LA WIDTH: 3.97 CM
LEFT ATRIUM SIZE: 3.6 CM
LEFT ATRIUM VOLUME INDEX MOD: 33.6 ML/M2
LEFT ATRIUM VOLUME INDEX: 30.8 ML/M2
LEFT ATRIUM VOLUME MOD: 56.77 CM3
LEFT ATRIUM VOLUME: 52.05 CM3
LEFT INTERNAL DIMENSION IN SYSTOLE: 3.18 CM (ref 2.1–4)
LEFT VENTRICLE DIASTOLIC VOLUME INDEX: 63.83 ML/M2
LEFT VENTRICLE DIASTOLIC VOLUME: 107.88 ML
LEFT VENTRICLE MASS INDEX: 62 G/M2
LEFT VENTRICLE SYSTOLIC VOLUME INDEX: 23.8 ML/M2
LEFT VENTRICLE SYSTOLIC VOLUME: 40.27 ML
LEFT VENTRICULAR INTERNAL DIMENSION IN DIASTOLE: 4.81 CM (ref 3.5–6)
LEFT VENTRICULAR MASS: 104.39 G
LEUKOCYTE ESTERASE UR QL STRIP: NEGATIVE
LIPASE SERPL-CCNC: 16 U/L (ref 4–60)
LV LATERAL E/E' RATIO: 7.67 M/S
LV SEPTAL E/E' RATIO: 15.33 M/S
LYMPHOCYTES # BLD AUTO: 2.7 K/UL (ref 1–4.8)
LYMPHOCYTES NFR BLD: 31.2 % (ref 18–48)
MCH RBC QN AUTO: 28.8 PG (ref 27–31)
MCHC RBC AUTO-ENTMCNC: 31.3 G/DL (ref 32–36)
MCV RBC AUTO: 92 FL (ref 82–98)
MONOCYTES # BLD AUTO: 1 K/UL (ref 0.3–1)
MONOCYTES NFR BLD: 11.2 % (ref 4–15)
MV PEAK A VEL: 0.95 M/S
MV PEAK E VEL: 0.46 M/S
MV STENOSIS PRESSURE HALF TIME: 99.15 MS
MV VALVE AREA P 1/2 METHOD: 2.22 CM2
NEUTROPHILS # BLD AUTO: 4.6 K/UL (ref 1.8–7.7)
NEUTROPHILS NFR BLD: 52.8 % (ref 38–73)
NITRITE UR QL STRIP: NEGATIVE
NRBC BLD-RTO: 0 /100 WBC
OVALOCYTES BLD QL SMEAR: ABNORMAL
PH UR STRIP: 7 [PH] (ref 5–8)
PISA TR MAX VEL: 2.5 M/S
PLATELET # BLD AUTO: 241 K/UL (ref 150–450)
PMV BLD AUTO: 10.9 FL (ref 9.2–12.9)
POCT GLUCOSE: 95 MG/DL (ref 70–110)
POIKILOCYTOSIS BLD QL SMEAR: SLIGHT
POLYCHROMASIA BLD QL SMEAR: ABNORMAL
POTASSIUM SERPL-SCNC: 4.4 MMOL/L (ref 3.5–5.1)
PROT SERPL-MCNC: 7.8 G/DL (ref 6–8.4)
PROT UR QL STRIP: NEGATIVE
PROTHROMBIN TIME: 10.6 SEC (ref 9–12.5)
RA MAJOR: 4.56 CM
RA WIDTH: 2.63 CM
RBC # BLD AUTO: 3.93 M/UL (ref 4–5.4)
RIGHT VENTRICULAR END-DIASTOLIC DIMENSION: 3.33 CM
RV TISSUE DOPPLER FREE WALL SYSTOLIC VELOCITY 1 (APICAL 4 CHAMBER VIEW): 18.89 CM/S
SINUS: 2.81 CM
SODIUM SERPL-SCNC: 142 MMOL/L (ref 136–145)
SP GR UR STRIP: 1.01 (ref 1–1.03)
STJ: 2.09 CM
TDI LATERAL: 0.06 M/S
TDI SEPTAL: 0.03 M/S
TDI: 0.05 M/S
TR MAX PG: 25 MMHG
TRICUSPID ANNULAR PLANE SYSTOLIC EXCURSION: 1.13 CM
TROPONIN I SERPL DL<=0.01 NG/ML-MCNC: <0.006 NG/ML (ref 0–0.03)
TROPONIN I SERPL DL<=0.01 NG/ML-MCNC: <0.006 NG/ML (ref 0–0.03)
URN SPEC COLLECT METH UR: NORMAL
WBC # BLD AUTO: 8.65 K/UL (ref 3.9–12.7)

## 2022-08-18 PROCEDURE — 86803 HEPATITIS C AB TEST: CPT | Performed by: PHYSICIAN ASSISTANT

## 2022-08-18 PROCEDURE — G0378 HOSPITAL OBSERVATION PER HR: HCPCS

## 2022-08-18 PROCEDURE — 25000003 PHARM REV CODE 250: Performed by: PHYSICIAN ASSISTANT

## 2022-08-18 PROCEDURE — 85610 PROTHROMBIN TIME: CPT | Performed by: EMERGENCY MEDICINE

## 2022-08-18 PROCEDURE — 93010 EKG 12-LEAD: ICD-10-PCS | Mod: ,,, | Performed by: INTERNAL MEDICINE

## 2022-08-18 PROCEDURE — 84484 ASSAY OF TROPONIN QUANT: CPT | Mod: 91 | Performed by: PHYSICIAN ASSISTANT

## 2022-08-18 PROCEDURE — 99220 PR INITIAL OBSERVATION CARE,LEVL III: ICD-10-PCS | Mod: ,,, | Performed by: PHYSICIAN ASSISTANT

## 2022-08-18 PROCEDURE — 99284 EMERGENCY DEPT VISIT MOD MDM: CPT | Mod: ,,, | Performed by: PHYSICIAN ASSISTANT

## 2022-08-18 PROCEDURE — 81003 URINALYSIS AUTO W/O SCOPE: CPT | Performed by: PHYSICIAN ASSISTANT

## 2022-08-18 PROCEDURE — 99285 EMERGENCY DEPT VISIT HI MDM: CPT | Mod: 25

## 2022-08-18 PROCEDURE — 80053 COMPREHEN METABOLIC PANEL: CPT | Performed by: EMERGENCY MEDICINE

## 2022-08-18 PROCEDURE — 93010 ELECTROCARDIOGRAM REPORT: CPT | Mod: ,,, | Performed by: INTERNAL MEDICINE

## 2022-08-18 PROCEDURE — 85025 COMPLETE CBC W/AUTO DIFF WBC: CPT | Performed by: EMERGENCY MEDICINE

## 2022-08-18 PROCEDURE — 99220 PR INITIAL OBSERVATION CARE,LEVL III: CPT | Mod: ,,, | Performed by: PHYSICIAN ASSISTANT

## 2022-08-18 PROCEDURE — 83690 ASSAY OF LIPASE: CPT | Performed by: EMERGENCY MEDICINE

## 2022-08-18 PROCEDURE — 84484 ASSAY OF TROPONIN QUANT: CPT | Performed by: EMERGENCY MEDICINE

## 2022-08-18 PROCEDURE — P9612 CATHETERIZE FOR URINE SPEC: HCPCS

## 2022-08-18 PROCEDURE — 83880 ASSAY OF NATRIURETIC PEPTIDE: CPT | Performed by: EMERGENCY MEDICINE

## 2022-08-18 PROCEDURE — 25500020 PHARM REV CODE 255: Performed by: INTERNAL MEDICINE

## 2022-08-18 PROCEDURE — 99284 PR EMERGENCY DEPT VISIT,LEVEL IV: ICD-10-PCS | Mod: ,,, | Performed by: PHYSICIAN ASSISTANT

## 2022-08-18 PROCEDURE — 25000003 PHARM REV CODE 250

## 2022-08-18 PROCEDURE — 93005 ELECTROCARDIOGRAM TRACING: CPT

## 2022-08-18 PROCEDURE — 96372 THER/PROPH/DIAG INJ SC/IM: CPT

## 2022-08-18 PROCEDURE — 63600175 PHARM REV CODE 636 W HCPCS

## 2022-08-18 RX ORDER — SIMETHICONE 80 MG
1 TABLET,CHEWABLE ORAL 4 TIMES DAILY PRN
Status: DISCONTINUED | OUTPATIENT
Start: 2022-08-18 | End: 2022-08-19 | Stop reason: HOSPADM

## 2022-08-18 RX ORDER — TALC
6 POWDER (GRAM) TOPICAL NIGHTLY PRN
Status: DISCONTINUED | OUTPATIENT
Start: 2022-08-18 | End: 2022-08-19 | Stop reason: HOSPADM

## 2022-08-18 RX ORDER — DULOXETIN HYDROCHLORIDE 30 MG/1
30 CAPSULE, DELAYED RELEASE ORAL DAILY
Status: CANCELLED | OUTPATIENT
Start: 2022-08-18

## 2022-08-18 RX ORDER — MAG HYDROX/ALUMINUM HYD/SIMETH 200-200-20
30 SUSPENSION, ORAL (FINAL DOSE FORM) ORAL 4 TIMES DAILY PRN
Status: DISCONTINUED | OUTPATIENT
Start: 2022-08-18 | End: 2022-08-18 | Stop reason: SDUPTHER

## 2022-08-18 RX ORDER — SODIUM CHLORIDE 0.9 % (FLUSH) 0.9 %
10 SYRINGE (ML) INJECTION EVERY 12 HOURS PRN
Status: DISCONTINUED | OUTPATIENT
Start: 2022-08-18 | End: 2022-08-19 | Stop reason: HOSPADM

## 2022-08-18 RX ORDER — IPRATROPIUM BROMIDE AND ALBUTEROL SULFATE 2.5; .5 MG/3ML; MG/3ML
3 SOLUTION RESPIRATORY (INHALATION) EVERY 6 HOURS PRN
Status: DISCONTINUED | OUTPATIENT
Start: 2022-08-18 | End: 2022-08-19 | Stop reason: HOSPADM

## 2022-08-18 RX ORDER — ACETAMINOPHEN 325 MG/1
650 TABLET ORAL EVERY 4 HOURS PRN
Status: DISCONTINUED | OUTPATIENT
Start: 2022-08-18 | End: 2022-08-19 | Stop reason: HOSPADM

## 2022-08-18 RX ORDER — NALOXONE HCL 0.4 MG/ML
0.02 VIAL (ML) INJECTION
Status: DISCONTINUED | OUTPATIENT
Start: 2022-08-18 | End: 2022-08-19 | Stop reason: HOSPADM

## 2022-08-18 RX ORDER — MAG HYDROX/ALUMINUM HYD/SIMETH 200-200-20
30 SUSPENSION, ORAL (FINAL DOSE FORM) ORAL ONCE
Status: COMPLETED | OUTPATIENT
Start: 2022-08-18 | End: 2022-08-18

## 2022-08-18 RX ORDER — LIDOCAINE HYDROCHLORIDE 20 MG/ML
15 SOLUTION OROPHARYNGEAL ONCE
Status: COMPLETED | OUTPATIENT
Start: 2022-08-18 | End: 2022-08-18

## 2022-08-18 RX ORDER — ONDANSETRON 2 MG/ML
4 INJECTION INTRAMUSCULAR; INTRAVENOUS EVERY 8 HOURS PRN
Status: DISCONTINUED | OUTPATIENT
Start: 2022-08-18 | End: 2022-08-19 | Stop reason: HOSPADM

## 2022-08-18 RX ORDER — ASPIRIN 325 MG
325 TABLET, DELAYED RELEASE (ENTERIC COATED) ORAL DAILY
Status: DISCONTINUED | OUTPATIENT
Start: 2022-08-18 | End: 2022-08-19 | Stop reason: HOSPADM

## 2022-08-18 RX ORDER — FAMOTIDINE 20 MG/1
40 TABLET, FILM COATED ORAL NIGHTLY
Status: DISCONTINUED | OUTPATIENT
Start: 2022-08-18 | End: 2022-08-19 | Stop reason: HOSPADM

## 2022-08-18 RX ORDER — INSULIN ASPART 100 [IU]/ML
0-5 INJECTION, SOLUTION INTRAVENOUS; SUBCUTANEOUS
Status: DISCONTINUED | OUTPATIENT
Start: 2022-08-18 | End: 2022-08-19 | Stop reason: HOSPADM

## 2022-08-18 RX ORDER — SODIUM CHLORIDE 0.9 % (FLUSH) 0.9 %
10 SYRINGE (ML) INJECTION
Status: DISCONTINUED | OUTPATIENT
Start: 2022-08-18 | End: 2022-08-19 | Stop reason: HOSPADM

## 2022-08-18 RX ORDER — GLUCAGON 1 MG
1 KIT INJECTION
Status: DISCONTINUED | OUTPATIENT
Start: 2022-08-18 | End: 2022-08-19 | Stop reason: HOSPADM

## 2022-08-18 RX ORDER — LIDOCAINE HYDROCHLORIDE 20 MG/ML
15 SOLUTION OROPHARYNGEAL EVERY 4 HOURS PRN
Status: DISCONTINUED | OUTPATIENT
Start: 2022-08-18 | End: 2022-08-19 | Stop reason: HOSPADM

## 2022-08-18 RX ORDER — ENOXAPARIN SODIUM 100 MG/ML
40 INJECTION SUBCUTANEOUS EVERY 24 HOURS
Status: DISCONTINUED | OUTPATIENT
Start: 2022-08-18 | End: 2022-08-19 | Stop reason: HOSPADM

## 2022-08-18 RX ORDER — GABAPENTIN 300 MG/1
300 CAPSULE ORAL NIGHTLY
Status: DISCONTINUED | OUTPATIENT
Start: 2022-08-18 | End: 2022-08-19 | Stop reason: HOSPADM

## 2022-08-18 RX ORDER — ACETAMINOPHEN 500 MG
500 TABLET ORAL 2 TIMES DAILY
Status: DISCONTINUED | OUTPATIENT
Start: 2022-08-18 | End: 2022-08-19 | Stop reason: HOSPADM

## 2022-08-18 RX ORDER — DONEPEZIL HYDROCHLORIDE 5 MG/1
5 TABLET, FILM COATED ORAL EVERY MORNING
Status: DISCONTINUED | OUTPATIENT
Start: 2022-08-19 | End: 2022-08-19 | Stop reason: HOSPADM

## 2022-08-18 RX ORDER — BENZTROPINE MESYLATE 0.5 MG/1
1 TABLET ORAL 2 TIMES DAILY
Status: DISCONTINUED | OUTPATIENT
Start: 2022-08-18 | End: 2022-08-19 | Stop reason: HOSPADM

## 2022-08-18 RX ORDER — GABAPENTIN 300 MG/1
300 CAPSULE ORAL NIGHTLY
Status: CANCELLED | OUTPATIENT
Start: 2022-08-18

## 2022-08-18 RX ORDER — LISINOPRIL 20 MG/1
20 TABLET ORAL DAILY
Status: DISCONTINUED | OUTPATIENT
Start: 2022-08-18 | End: 2022-08-19 | Stop reason: HOSPADM

## 2022-08-18 RX ORDER — IBUPROFEN 200 MG
16 TABLET ORAL
Status: DISCONTINUED | OUTPATIENT
Start: 2022-08-18 | End: 2022-08-19 | Stop reason: HOSPADM

## 2022-08-18 RX ORDER — QUETIAPINE FUMARATE 200 MG/1
200 TABLET, FILM COATED ORAL NIGHTLY
Status: DISCONTINUED | OUTPATIENT
Start: 2022-08-18 | End: 2022-08-19 | Stop reason: HOSPADM

## 2022-08-18 RX ORDER — DULOXETIN HYDROCHLORIDE 30 MG/1
30 CAPSULE, DELAYED RELEASE ORAL DAILY
Status: DISCONTINUED | OUTPATIENT
Start: 2022-08-18 | End: 2022-08-19 | Stop reason: HOSPADM

## 2022-08-18 RX ORDER — QUETIAPINE FUMARATE 200 MG/1
200 TABLET, FILM COATED ORAL NIGHTLY
Status: CANCELLED | OUTPATIENT
Start: 2022-08-18

## 2022-08-18 RX ORDER — ONDANSETRON 8 MG/1
8 TABLET, ORALLY DISINTEGRATING ORAL EVERY 8 HOURS PRN
Status: DISCONTINUED | OUTPATIENT
Start: 2022-08-18 | End: 2022-08-19 | Stop reason: HOSPADM

## 2022-08-18 RX ORDER — MAG HYDROX/ALUMINUM HYD/SIMETH 200-200-20
30 SUSPENSION, ORAL (FINAL DOSE FORM) ORAL EVERY 4 HOURS PRN
Status: DISCONTINUED | OUTPATIENT
Start: 2022-08-18 | End: 2022-08-19 | Stop reason: HOSPADM

## 2022-08-18 RX ORDER — HALOPERIDOL 5 MG/1
5 TABLET ORAL DAILY PRN
Status: DISCONTINUED | OUTPATIENT
Start: 2022-08-18 | End: 2022-08-19 | Stop reason: HOSPADM

## 2022-08-18 RX ORDER — ATORVASTATIN CALCIUM 20 MG/1
20 TABLET, FILM COATED ORAL DAILY
Refills: 6 | Status: DISCONTINUED | OUTPATIENT
Start: 2022-08-18 | End: 2022-08-19 | Stop reason: HOSPADM

## 2022-08-18 RX ORDER — POLYETHYLENE GLYCOL 3350 17 G/17G
17 POWDER, FOR SOLUTION ORAL 2 TIMES DAILY PRN
Status: DISCONTINUED | OUTPATIENT
Start: 2022-08-18 | End: 2022-08-19 | Stop reason: HOSPADM

## 2022-08-18 RX ORDER — IBUPROFEN 200 MG
24 TABLET ORAL
Status: DISCONTINUED | OUTPATIENT
Start: 2022-08-18 | End: 2022-08-19 | Stop reason: HOSPADM

## 2022-08-18 RX ORDER — AMOXICILLIN 250 MG
1 CAPSULE ORAL 2 TIMES DAILY
Status: DISCONTINUED | OUTPATIENT
Start: 2022-08-18 | End: 2022-08-19 | Stop reason: HOSPADM

## 2022-08-18 RX ORDER — MEMANTINE HYDROCHLORIDE 10 MG/1
10 TABLET ORAL 2 TIMES DAILY
Status: DISCONTINUED | OUTPATIENT
Start: 2022-08-18 | End: 2022-08-19 | Stop reason: HOSPADM

## 2022-08-18 RX ADMIN — BENZTROPINE MESYLATE 1 MG: 0.5 TABLET ORAL at 08:08

## 2022-08-18 RX ADMIN — MEMANTINE 10 MG: 10 TABLET ORAL at 08:08

## 2022-08-18 RX ADMIN — ACETAMINOPHEN 500 MG: 500 TABLET ORAL at 08:08

## 2022-08-18 RX ADMIN — LISINOPRIL 20 MG: 20 TABLET ORAL at 07:08

## 2022-08-18 RX ADMIN — ATORVASTATIN CALCIUM 20 MG: 20 TABLET, FILM COATED ORAL at 07:08

## 2022-08-18 RX ADMIN — FAMOTIDINE 40 MG: 20 TABLET ORAL at 08:08

## 2022-08-18 RX ADMIN — GABAPENTIN 300 MG: 300 CAPSULE ORAL at 08:08

## 2022-08-18 RX ADMIN — SENNOSIDES AND DOCUSATE SODIUM 1 TABLET: 50; 8.6 TABLET ORAL at 08:08

## 2022-08-18 RX ADMIN — QUETIAPINE FUMARATE 200 MG: 200 TABLET ORAL at 08:08

## 2022-08-18 RX ADMIN — DULOXETINE 30 MG: 30 CAPSULE, DELAYED RELEASE ORAL at 07:08

## 2022-08-18 RX ADMIN — HUMAN ALBUMIN MICROSPHERES AND PERFLUTREN 0.11 MG: 10; .22 INJECTION, SOLUTION INTRAVENOUS at 05:08

## 2022-08-18 RX ADMIN — ENOXAPARIN SODIUM 40 MG: 100 INJECTION SUBCUTANEOUS at 07:08

## 2022-08-18 RX ADMIN — LIDOCAINE HYDROCHLORIDE 15 ML: 20 SOLUTION ORAL; TOPICAL at 12:08

## 2022-08-18 RX ADMIN — ASPIRIN 325 MG: 325 TABLET, COATED ORAL at 07:08

## 2022-08-18 RX ADMIN — ALUMINUM HYDROXIDE, MAGNESIUM HYDROXIDE, AND SIMETHICONE 30 ML: 200; 200; 20 SUSPENSION ORAL at 12:08

## 2022-08-18 NOTE — ASSESSMENT & PLAN NOTE
- Chronic and stable   - Continue home acetaminophen 500 mg BID  - Continue home gabapentin 300 mg nightly

## 2022-08-18 NOTE — ED PROVIDER NOTES
"Encounter Date: 8/18/2022       History     Chief Complaint   Patient presents with    Chest Pain     From [facility]. Nonverbal at baseline. Staff states patient had hand on her chest and were concerned for chest pain     77 y/o F with history of HTN, DM, hyperlipidemia, schizoaffective disorder, deafness, mild MR presents to the ED with her sister from [facility].  Sister reports they called her around 10a and stated the patient was rubbing her chest and saying "pain".  At that time, the nurse took her BP and noted that it was "low".  She did have a fall 1 week ago with head trauma, no medical attention needed. To sisters knowledge - no vomiting, cough. Patient does get frequent UTIs.    The history is provided by a relative (sister). The history is limited by a developmental delay.     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 No pertinent surgical history.  Family History   Problem Relation Age of Onset    Depression Sister     Depression Brother     Suicide Brother     ADD / ADHD Neg Hx     Alcohol abuse Neg Hx     Anxiety disorder Neg Hx     Bipolar disorder Neg Hx     Dementia Neg Hx     Drug abuse Neg Hx     OCD Neg Hx     Paranoid behavior Neg Hx     Physical abuse Neg Hx     Schizophrenia Neg Hx     Seizures Neg Hx     Self injury Neg Hx     Sexual abuse Neg Hx      Social History     Tobacco Use    Smoking status: Never Smoker    Smokeless tobacco: Never Used   Substance " Use Topics    Alcohol use: No    Drug use: No     Review of Systems   Unable to perform ROS: Patient nonverbal       Physical Exam     Initial Vitals [08/18/22 0957]   BP Pulse Resp Temp SpO2   (!) 150/70 68 18 98.4 °F (36.9 °C) 98 %      MAP       --         Physical Exam    Nursing note and vitals reviewed.  Constitutional: She appears well-developed and well-nourished. She is not diaphoretic. No distress.   HENT:   Head: Normocephalic.   Old bruise noted to the L forehead.    Neck: Neck supple.   Normal range of motion.  Cardiovascular: Normal rate, regular rhythm and normal heart sounds. Exam reveals no gallop and no friction rub.    No murmur heard.  Pulmonary/Chest: Breath sounds normal. She has no wheezes. She has no rhonchi. She has no rales. She exhibits no tenderness.   Abdominal: Abdomen is soft. Bowel sounds are normal. There is abdominal tenderness in the epigastric area. There is no rebound and no guarding.   Musculoskeletal:         General: Normal range of motion.      Cervical back: Normal range of motion and neck supple.     Neurological:   Awake, alert.   Skin: Skin is warm and dry. No rash noted. No erythema.   Psychiatric: She has a normal mood and affect.         ED Course   Procedures  Labs Reviewed   CBC W/ AUTO DIFFERENTIAL - Abnormal; Notable for the following components:       Result Value    RBC 3.93 (*)     Hemoglobin 11.3 (*)     Hematocrit 36.1 (*)     MCHC 31.3 (*)     All other components within normal limits    Narrative:     Release to patient->Immediate   COMPREHENSIVE METABOLIC PANEL    Narrative:     Release to patient->Immediate   B-TYPE NATRIURETIC PEPTIDE    Narrative:     Release to patient->Immediate   TROPONIN I    Narrative:     Release to patient->Immediate   PROTIME-INR    Narrative:     Release to patient->Immediate   URINALYSIS, REFLEX TO URINE CULTURE    Narrative:     Specimen Source->Urine   LIPASE   LIPASE    Narrative:     Release to patient->Immediate  ADD ON  LIPASE PER RHIANNON FITZGERALD PA-C ORDER# [id number] @  08/18/2022    11:56    TROPONIN I   HEPATITIS C ANTIBODY   POCT GLUCOSE, HAND-HELD DEVICE        ECG Results          EKG 12-lead (Final result)  Result time 08/18/22 13:49:36    Final result by Interface, Lab In [facility] (08/18/22 13:49:36)                 Narrative:    Test Reason : R07.9,    Vent. Rate : 065 BPM     Atrial Rate : 065 BPM     P-R Int : 148 ms          QRS Dur : 076 ms      QT Int : 404 ms       P-R-T Axes : 058 041 064 degrees     QTc Int : 420 ms    Normal sinus rhythm  Normal ECG  When compared with ECG of 04-FEB-2021 11:41,  No significant change was found  Confirmed by SOSA CARRILLO MD ([id number]) on 8/18/2022 1:49:29 PM    Referred By:             Confirmed By:SOSA CARRILLO MD                            Imaging Results          X-Ray Chest AP Portable (Final result)  Result time 08/18/22 13:06:33    Final result by Wayne Peck MD (08/18/22 13:06:33)                 Impression:      No significant interval change      Electronically signed by: Wayne Peck MD  Date:    08/18/2022  Time:    13:06             Narrative:    EXAMINATION:  XR CHEST AP PORTABLE    CLINICAL HISTORY:  Chest pain, unspecified    TECHNIQUE:  Single frontal view of the chest was performed.    COMPARISON:  02/24/2021    FINDINGS:  Poor inspiration and portable AP technique accentuate the cardiac silhouette.  The heart appears to be at the upper limits of normal size and unchanged.  Tortuous thoracic aorta.  Pulmonary vascularity is prominent but stable.  Overall volume loss of both lungs.  Mild accentuation of the interstitial pattern.  No definite airspace consolidation or pleural effusion.  No pneumothorax.  Skeletal structures appear intact.  Degenerative changes at both shoulders.                               CT Head Without Contrast (Final result)  Result time 08/18/22 12:09:53    Final result by Bruce Barajas MD (08/18/22 12:09:53)                 Impression:       Stable exam as above.  No evidence of acute intracranial hemorrhage.      Electronically signed by: Bruce Barajas MD  Date:    08/18/2022  Time:    12:09             Narrative:    EXAMINATION:  CT HEAD WITHOUT CONTRAST    CLINICAL HISTORY:  Head trauma, minor (Age >= 65y);    TECHNIQUE:  Low dose axial CT images obtained throughout the head without the use of intravenous contrast.  Axial, sagittal and coronal reconstructions were performed.    COMPARISON:  11/04/2021.    FINDINGS:  Intracranial compartment:    Ventricles and sulci are stable in size, without evidence of hydrocephalus.    Mild patchy hypoattenuation in the supratentorial white matter, nonspecific but most likely reflecting chronic small vessel ischemic changes. No new major vascular distribution infarct. No acute hemorrhage.  No mass effect or midline shift.    No extra-axial blood or fluid collections.    Skull/extracranial contents (limited evaluation):    No displaced calvarial fracture.    The mastoid air cells and visualized paranasal sinuses are essentially clear.                                 Medications   sodium chloride 0.9% flush 10 mL (has no administration in time range)   melatonin tablet 6 mg (has no administration in time range)   famotidine tablet 40 mg (40 mg Oral Given 8/18/22 2005)   gabapentin capsule 300 mg (300 mg Oral Given 8/18/22 2005)   QUEtiapine tablet 200 mg (200 mg Oral Given 8/18/22 2005)   aspirin EC tablet 325 mg (325 mg Oral Given 8/18/22 1923)   atorvastatin tablet 20 mg (20 mg Oral Given 8/18/22 1947)   DULoxetine DR capsule 30 mg (30 mg Oral Given 8/18/22 1934)   acetaminophen tablet 500 mg (500 mg Oral Given 8/18/22 2005)   benztropine tablet 1 mg (1 mg Oral Given 8/18/22 2004)   senna-docusate 8.6-50 mg per tablet 1 tablet (1 tablet Oral Given 8/18/22 2005)   donepeziL tablet 5 mg (has no administration in time range)   memantine tablet 10 mg (10 mg Oral Given 8/18/22 2005)   lisinopriL tablet 20 mg ( Oral  Canceled Entry 8/18/22 1934)   haloperidoL tablet 5 mg (has no administration in time range)   sodium chloride 0.9% flush 10 mL (has no administration in time range)   albuterol-ipratropium 2.5 mg-0.5 mg/3 mL nebulizer solution 3 mL (has no administration in time range)   ondansetron disintegrating tablet 8 mg (has no administration in time range)   ondansetron injection 4 mg (has no administration in time range)   polyethylene glycol packet 17 g (has no administration in time range)   simethicone chewable tablet 80 mg (has no administration in time range)   acetaminophen tablet 650 mg ( Oral Canceled Entry 8/18/22 2004)   naloxone 0.4 mg/mL injection 0.02 mg (has no administration in time range)   glucose chewable tablet 16 g (has no administration in time range)   glucose chewable tablet 24 g (has no administration in time range)   dextrose 10% bolus 125 mL (has no administration in time range)   dextrose 10% bolus 250 mL (has no administration in time range)   glucagon (human recombinant) injection 1 mg (has no administration in time range)   insulin aspart U-100 pen 0-5 Units (has no administration in time range)   enoxaparin injection 40 mg (40 mg Subcutaneous Given 8/18/22 1923)   aluminum-magnesium hydroxide-simethicone 200-200-20 mg/5 mL suspension 30 mL (has no administration in time range)     And   LIDOcaine HCl 2% oral solution 15 mL (has no administration in time range)   aluminum-magnesium hydroxide-simethicone 200-200-20 mg/5 mL suspension 30 mL (30 mLs Oral Given 8/18/22 1201)     And   LIDOcaine HCl 2% oral solution 15 mL (15 mLs Oral Given 8/18/22 1201)   perflutren protein-A microsphr 0.22 mg/mL IV susp (0.11 mg Intravenous Given 8/18/22 1700)     Medical Decision Making:   History:   Old Medical Records: I decided to obtain old medical records.  Old Records Summarized: records from clinic visits and records from previous admission(s).       <> Summary of Records: Stress echo in August 2018 - no  signs of ischemia, EF 55%  Clinical Tests:   Lab Tests: Ordered and Reviewed  Radiological Study: Ordered and Reviewed  Medical Tests: Reviewed and Ordered  Other:   I have discussed this case with another health care provider.       <> Summary of the Discussion: Hospital medicine       APC / Resident Notes:   77 y/o F with history of HTN, DM, hyperlipidemia, schizoaffective disorder, deafness, mild MR presents to the ED with her sister from [facility]. VSS. Well appearing. RRR. Abdomen soft, tender in the epigastric region. No chest wall tenderness. Old yellowing bruise noted to the L forehead. DDx includes but is not limited to ACS, pancreatitis, gastritis, UTI, pneumonia, ICH. Will get labs, CXR, CT head.     Troponin negative. UA with no infection. Mild anemia. CMP unremarkable. BNP normal. Lipase normal.     CXR with no acute abnormalities.   CT head with no ICH.    Placed in observation to  for ACS rule out.                 Clinical Impression:   Final diagnoses:  [R07.9] Chest pain (Primary)  [R10.13] Epigastric abdominal pain          ED Disposition Condition    Observation               Ondina Brown PA-C  08/18/22 2019

## 2022-08-18 NOTE — ASSESSMENT & PLAN NOTE
- Continue home senna-docusate 8.6-50 mg PO BID  - GI cocktail PRN flatulence, indigestion, constipation

## 2022-08-18 NOTE — ED TRIAGE NOTES
Pt present to ED via EMS from [facility]. Per EMS, pt was stating she was having chest pain this am, rubbing chest. Pt nonverbal at baseline, pt sister at bedside. Pt hx HTN. Pt also has bruising noted to R side of face above eyebrow, pt sister states the nursing home told her pt fell a week ago.

## 2022-08-18 NOTE — NURSING
Pt received from the ER per stretcher accompanied by sister.  Pt born deaf and has schizoaffective d/o.  Lives at [facility] [address] for developmentally disable adults.  Generally continent but had episode of incontinece due to being unable to make it to restroom. Sister reports she was told pt started rubbing her chest area this morning.  Pt here for observation to r/o MI.

## 2022-08-18 NOTE — H&P
[facility] - Telemetry [facility] ([address] Medicine  History & Physical    Patient Name: Lay Peres  MRN: [id number]  Patient Class: OP- Observation  Admission Date: 8/18/2022  Attending Physician: Valerio Strong MD   Primary Care Provider: Krista Case MD         Patient information was obtained from relative(s), past medical records and ER records.     Subjective:     Principal Problem:Chest pain    Chief Complaint:   Chief Complaint   Patient presents with    Chest Pain     From [facility]. Nonverbal at baseline. Staff states patient had hand on her chest and were concerned for chest pain        HPI: Lay Peres is a 76 y.o. F with PMHx of intellectual disability, HTN, HLD, DM, chronic constipation, and congenital deafness presented to the ED via EMS from [facility]. Patient is nonverbal at baseline, so history is entirely obtained by chart review and family. Per the patient's sister, Jose Francisco, patient was seen rubbing her upper stomach/lower chest by nursing staff at [place]. Pt with a history of chronic constipation/GI issues, and sister believes her pain resolved following GI cocktail in the ED. Otherwise, patient's sister not aware of recent changes in bowel habits, episodes of CP, SOB, diaphoresis, N/V/D, or fever/chills. Patient also noted to have healing bruise over her eye; per sister, pt had a fall ~ 1 week ago.      In the ED, systolic BP in 150s otherwise VSSAF. CBC, CMP, BNP, UA all WNL. EKG in NSR. CXR without acute process. Troponin x 1 negative. CT head without evidence of intracranial bleed.       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  50-70) 7/13/2012    Neck pain 7/13/2012    Paranoid schizophrenia     Schizoaffective disorder, chronic condition 7/13/2012       History reviewed. No pertinent surgical history.    Review of patient's allergies indicates:  No Known Allergies    No current facility-administered medications on file prior to encounter.     Current Outpatient Medications on File Prior to Encounter   Medication Sig    acetaminophen (TYLENOL) 500 MG tablet Take 1,000 mg by mouth 2 (two) times daily.    aspirin (ECOTRIN) 325 MG EC tablet TAKE 1 TABLET BY MOUTH ONCE daily    benztropine (COGENTIN) 1 MG tablet Take 1 tablet (1 mg total) by mouth 2 (two) times daily.    blood sugar diagnostic Strp 1 strip by Misc.(Non-Drug; Combo Route) route once daily.    cholecalciferol, vitamin D3, (VITAMIN D3) 25 mcg (1,000 unit) capsule Take 1 capsule by mouth Daily.    cyanocobalamin 1,000 mcg/mL injection One ml IM weekly for 4 weeks then one ml IM monthly.  Dipense #10 25 gague 1 inch needles and #10 one ml syringes    donepeziL (ARICEPT) 5 MG tablet Take 1 tablet (5 mg total) by mouth every morning. To be taken with food    DULoxetine (CYMBALTA) 30 MG capsule Take 1 capsule (30 mg total) by mouth once daily.    famotidine (PEPCID) 40 MG tablet TAKE 1 TABLET BY MOUTH EVERY EVENING AT 8PM    ferrous gluconate (FERGON) 324 MG tablet Take 324 mg by mouth every other day.    gabapentin (NEURONTIN) 300 MG capsule TAKE 1 TABLET (300MG) BY MOUTH AT BEDTIME at 8pm    haloperidol decanoate (HALDOL DECANOATE) 50 mg/mL injection Inject 1 mL (50 mg total) into the muscle every 14 (fourteen) days.    lisinopriL (PRINIVIL,ZESTRIL) 20 MG tablet Take 1 tablet (20 mg total) by mouth once daily.    memantine (NAMENDA XR) 28 mg CSpX Take 1 capsule (28 mg total) by mouth once daily.    metFORMIN (GLUCOPHAGE) 500 MG tablet Take 1 tablet (500 mg total) by mouth every evening. At next cycle fill    multivitamin capsule Take 1 capsule by mouth once  daily.    nystatin (MYCOSTATIN) ointment Apply topically 2 (two) times daily as needed.     QUEtiapine (SEROQUEL) 200 MG Tab Take 1 tablet (200 mg total) by mouth every evening.    rosuvastatin (CRESTOR) 5 MG tablet Take 1 tablet (5 mg total) by mouth once daily.    senna (SENOKOT) 8.6 mg tablet Take 1 tablet by mouth Twice daily.    valACYclovir (VALTREX) 1000 MG tablet Take 1 tablet (1,000 mg total) by mouth 3 (three) times daily. for 10 days     Family History       Problem Relation (Age of Onset)    Depression Sister, Brother    Suicide Brother          Tobacco Use    Smoking status: Never Smoker    Smokeless tobacco: Never Used   Substance and Sexual Activity    Alcohol use: No    Drug use: No    Sexual activity: Not Currently     Review of Systems   Unable to perform ROS: Patient nonverbal   Objective:     Vital Signs (Most Recent):  Temp: 98.4 °F (36.9 °C) (08/18/22 0957)  Pulse: 64 (08/18/22 1257)  Resp: 20 (08/18/22 1117)  BP: (!) 151/66 (08/18/22 1257)  SpO2: 100 % (08/18/22 1257)   Vital Signs (24h Range):  Temp:  [98.4 °F (36.9 °C)] 98.4 °F (36.9 °C)  Pulse:  [64-68] 64  Resp:  [18-20] 20  SpO2:  [98 %-100 %] 100 %  BP: (150-170)/() 151/66     Weight: 68 kg (149 lb 14.6 oz)  Body mass index is 27.42 kg/m².    Physical Exam  Vitals and nursing note reviewed.   Constitutional:       General: She is not in acute distress.     Appearance: She is well-developed.   HENT:      Head: Normocephalic and atraumatic.      Mouth/Throat:      Mouth: Mucous membranes are moist.   Eyes:      Conjunctiva/sclera: Conjunctivae normal.      Pupils: Pupils are equal, round, and reactive to light.   Cardiovascular:      Rate and Rhythm: Normal rate and regular rhythm.      Heart sounds: Normal heart sounds.   Pulmonary:      Effort: Pulmonary effort is normal. No respiratory distress.      Breath sounds: Normal breath sounds. No wheezing.   Abdominal:      General: Bowel sounds are normal. There is no  distension.      Palpations: Abdomen is soft.      Tenderness: There is no abdominal tenderness.   Musculoskeletal:         General: No tenderness. Normal range of motion.      Cervical back: Normal range of motion and neck supple.   Skin:     General: Skin is warm and dry.      Capillary Refill: Capillary refill takes less than 2 seconds.      Findings: No rash.   Neurological:      Mental Status: She is alert. Mental status is at baseline.      Comments: Pleasantly demented, at baseline   Psychiatric:         Behavior: Behavior normal.         Thought Content: Thought content normal.         Judgment: Judgment normal.         CRANIAL NERVES     CN III, IV, VI   Pupils are equal, round, and reactive to light.     Significant Labs: All pertinent labs within the past 24 hours have been reviewed.  BMP:   Recent Labs   Lab 08/18/22  1043         K 4.4      CO2 25   BUN 21   CREATININE 0.8   CALCIUM 9.9     CBC:   Recent Labs   Lab 08/18/22  1043   WBC 8.65   HGB 11.3*   HCT 36.1*        Cardiac Markers:   Recent Labs   Lab 08/18/22  1043   BNP 27     Urine Studies:   Recent Labs   Lab 08/18/22  1247   COLORU Yellow   APPEARANCEUA Clear   PHUR 7.0   SPECGRAV 1.010   PROTEINUA Negative   GLUCUA Negative   KETONESU Negative   BILIRUBINUA Negative   OCCULTUA Negative   NITRITE Negative   LEUKOCYTESUR Negative       Significant Imaging: I have reviewed all pertinent imaging results/findings within the past 24 hours.    X-Ray Chest AP Portable  Narrative: EXAMINATION:  XR CHEST AP PORTABLE    CLINICAL HISTORY:  Chest pain, unspecified    TECHNIQUE:  Single frontal view of the chest was performed.    COMPARISON:  02/24/2021    FINDINGS:  Poor inspiration and portable AP technique accentuate the cardiac silhouette.  The heart appears to be at the upper limits of normal size and unchanged.  Tortuous thoracic aorta.  Pulmonary vascularity is prominent but stable.  Overall volume loss of both lungs.   Mild accentuation of the interstitial pattern.  No definite airspace consolidation or pleural effusion.  No pneumothorax.  Skeletal structures appear intact.  Degenerative changes at both shoulders.  Impression: No significant interval change    Electronically signed by: Wayne Peck MD  Date:    08/18/2022  Time:    13:06  CT Head Without Contrast  Narrative: EXAMINATION:  CT HEAD WITHOUT CONTRAST    CLINICAL HISTORY:  Head trauma, minor (Age >= 65y);    TECHNIQUE:  Low dose axial CT images obtained throughout the head without the use of intravenous contrast.  Axial, sagittal and coronal reconstructions were performed.    COMPARISON:  11/04/2021.    FINDINGS:  Intracranial compartment:    Ventricles and sulci are stable in size, without evidence of hydrocephalus.    Mild patchy hypoattenuation in the supratentorial white matter, nonspecific but most likely reflecting chronic small vessel ischemic changes. No new major vascular distribution infarct. No acute hemorrhage.  No mass effect or midline shift.    No extra-axial blood or fluid collections.    Skull/extracranial contents (limited evaluation):    No displaced calvarial fracture.    The mastoid air cells and visualized paranasal sinuses are essentially clear.  Impression: Stable exam as above.  No evidence of acute intracranial hemorrhage.    Electronically signed by: Bruce Barajas MD  Date:    08/18/2022  Time:    12:09       Assessment/Plan:     * Chest pain  76 y.o. who presents with suspected chest pain after nursing staff witnessed her rubbing her epigastrum/lower chest this morning (pt nonverbal at baseline). Per pt's sister, CP resolved with GI cocktail given in the ED.  - Likely 2/2 esophageal reflux vs. constipation vs. flatulence   - EKG without ST-segment elevation or depression, HR 64  - Initial troponin negative, second trop pending  -  administered this am as pt's home regimen  - Continue ASA, statin   - CXR without acute cardiopulmonary  process, BNP 27   - CBC, CMP (goal Mag>2, K>4) daily  - Monitor telemetry  - Last echo (7/13/18): EF 60-65%; pulmonary HTN (PA systolic pressure > 47 mmHg)  - Repeat echo ordered  - Risk factors: age >65, HTN, HLD, diabetes mellitus  - ALEKSANDRA score 4, HEART score 4  - If troponin rises, start ACS protocol w/ heparin gtt and consult interventional cardiology  - Nitro PRN chest pain   - GI cocktail ordered PRN epigastric pain     GERD (gastroesophageal reflux disease)  - Chronic and stable   - Continue home famotidine 40 mg PO nightly     Dementia  - Chronic and stable, at baseline  - Continue home donepezil 5 mg PO qam & memantine 10 mg PO BID    Deafness congenital  - Chronic and stable, at baseline      Intellectual disability  - Chronic and stable, at baseline  - No concern for acute changes in mental status at this time    Chronic schizoaffective disorder  - Chronic and stable, at baseline  - Continue home quetiapine 200 mg PO nightly, duloxetine 30 mg PO daily, benztropine 1 mg PO BID, haloperidol 5 mg tablet PO daily PRN agitation     Back pain  - Chronic and stable   - Continue home acetaminophen 500 mg BID  - Continue home gabapentin 300 mg nightly     Chronic constipation  - Continue home senna-docusate 8.6-50 mg PO BID  - GI cocktail PRN flatulence, indigestion, constipation      Hyperlipidemia  - Substitute home rosuvastatin 5mg (non-formulary) for atorvastatin 20 mg PO daily    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  - Controlled   Lab Results   Component Value Date    HGBA1C 5.6 02/17/2022   - Home regimen: metformin   - Inpatient regimen: Low dose SSI  - ACHS acchuchecks  - Diabetic, bland diet 2000 calories  - Monitor for hypoglycemia    Hypertension  - Controlled   - Latest BP and vitals reviewed  - Home meds for HTN:   Hypertension Medications             lisinopriL (PRINIVIL,ZESTRIL) 20 MG tablet Take 1 tablet (20 mg total) by mouth once daily.        - Hospital antihypertensives:   - Goal SBP  120-140. Utilize p.r.n. antihypertensives only if patient's BP >180/110 & develop symptoms of worsening CP or SOB.     VTE Risk Mitigation (From admission, onward)         Ordered     enoxaparin injection 40 mg  Daily         08/18/22 1502     IP VTE HIGH RISK PATIENT  Once         08/18/22 1502     Place sequential compression device  Until discontinued         08/18/22 1502                   Jeremiah Canada PA-C  Department of Hospital Medicine   [facility] - Telemetry Stepdown ([place]-)

## 2022-08-18 NOTE — SUBJECTIVE & OBJECTIVE
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 No pertinent surgical history.    Review of patient's allergies indicates:  No Known Allergies    No current facility-administered medications on file prior to encounter.     Current Outpatient Medications on File Prior to Encounter   Medication Sig    acetaminophen (TYLENOL) 500 MG tablet Take 1,000 mg by mouth 2 (two) times daily.    aspirin (ECOTRIN) 325 MG EC tablet TAKE 1 TABLET BY MOUTH ONCE daily    benztropine (COGENTIN) 1 MG tablet Take 1 tablet (1 mg total) by mouth 2 (two) times daily.    blood sugar diagnostic Strp 1 strip by Misc.(Non-Drug; Combo Route) route once daily.    cholecalciferol, vitamin D3, (VITAMIN D3) 25 mcg (1,000 unit) capsule Take 1 capsule by mouth Daily.    cyanocobalamin 1,000 mcg/mL injection One ml IM weekly for 4 weeks then one ml IM monthly.  Dipense #10 25 gague 1 inch needles and #10 one ml syringes    donepeziL (ARICEPT) 5 MG tablet Take 1 tablet (5 mg total) by mouth every morning. To be taken with food    DULoxetine (CYMBALTA) 30 MG capsule Take 1 capsule (30 mg total) by mouth once daily.    famotidine (PEPCID) 40 MG tablet TAKE 1 TABLET BY MOUTH EVERY EVENING AT 8PM    ferrous gluconate (FERGON) 324 MG tablet Take 324 mg by mouth every other day.    gabapentin (NEURONTIN) 300 MG capsule TAKE 1 TABLET (300MG) BY MOUTH AT BEDTIME at 8pm    haloperidol decanoate (HALDOL DECANOATE) 50 mg/mL injection Inject 1 mL (50 mg total) into the muscle every 14 (fourteen) days.    lisinopriL (PRINIVIL,ZESTRIL) 20  MG tablet Take 1 tablet (20 mg total) by mouth once daily.    memantine (NAMENDA XR) 28 mg CSpX Take 1 capsule (28 mg total) by mouth once daily.    metFORMIN (GLUCOPHAGE) 500 MG tablet Take 1 tablet (500 mg total) by mouth every evening. At next cycle fill    multivitamin capsule Take 1 capsule by mouth once daily.    nystatin (MYCOSTATIN) ointment Apply topically 2 (two) times daily as needed.     QUEtiapine (SEROQUEL) 200 MG Tab Take 1 tablet (200 mg total) by mouth every evening.    rosuvastatin (CRESTOR) 5 MG tablet Take 1 tablet (5 mg total) by mouth once daily.    senna (SENOKOT) 8.6 mg tablet Take 1 tablet by mouth Twice daily.    valACYclovir (VALTREX) 1000 MG tablet Take 1 tablet (1,000 mg total) by mouth 3 (three) times daily. for 10 days     Family History       Problem Relation (Age of Onset)    Depression Sister, Brother    Suicide Brother          Tobacco Use    Smoking status: Never Smoker    Smokeless tobacco: Never Used   Substance and Sexual Activity    Alcohol use: No    Drug use: No    Sexual activity: Not Currently     Review of Systems   Unable to perform ROS: Patient nonverbal   Objective:     Vital Signs (Most Recent):  Temp: 98.4 °F (36.9 °C) (08/18/22 0957)  Pulse: 64 (08/18/22 1257)  Resp: 20 (08/18/22 1117)  BP: (!) 151/66 (08/18/22 1257)  SpO2: 100 % (08/18/22 1257)   Vital Signs (24h Range):  Temp:  [98.4 °F (36.9 °C)] 98.4 °F (36.9 °C)  Pulse:  [64-68] 64  Resp:  [18-20] 20  SpO2:  [98 %-100 %] 100 %  BP: (150-170)/() 151/66     Weight: 68 kg (149 lb 14.6 oz)  Body mass index is 27.42 kg/m².    Physical Exam  Vitals and nursing note reviewed.   Constitutional:       General: She is not in acute distress.     Appearance: She is well-developed.   HENT:      Head: Normocephalic and atraumatic.      Mouth/Throat:      Mouth: Mucous membranes are moist.   Eyes:      Conjunctiva/sclera: Conjunctivae normal.      Pupils: Pupils are equal, round, and reactive to light.   Cardiovascular:       Rate and Rhythm: Normal rate and regular rhythm.      Heart sounds: Normal heart sounds.   Pulmonary:      Effort: Pulmonary effort is normal. No respiratory distress.      Breath sounds: Normal breath sounds. No wheezing.   Abdominal:      General: Bowel sounds are normal. There is no distension.      Palpations: Abdomen is soft.      Tenderness: There is no abdominal tenderness.   Musculoskeletal:         General: No tenderness. Normal range of motion.      Cervical back: Normal range of motion and neck supple.   Skin:     General: Skin is warm and dry.      Capillary Refill: Capillary refill takes less than 2 seconds.      Findings: No rash.   Neurological:      Mental Status: She is alert. Mental status is at baseline.      Comments: Pleasantly demented, at baseline   Psychiatric:         Behavior: Behavior normal.         Thought Content: Thought content normal.         Judgment: Judgment normal.         CRANIAL NERVES     CN III, IV, VI   Pupils are equal, round, and reactive to light.     Significant Labs: All pertinent labs within the past 24 hours have been reviewed.  BMP:   Recent Labs   Lab 08/18/22  1043         K 4.4      CO2 25   BUN 21   CREATININE 0.8   CALCIUM 9.9     CBC:   Recent Labs   Lab 08/18/22  1043   WBC 8.65   HGB 11.3*   HCT 36.1*        Cardiac Markers:   Recent Labs   Lab 08/18/22  1043   BNP 27     Urine Studies:   Recent Labs   Lab 08/18/22  1247   COLORU Yellow   APPEARANCEUA Clear   PHUR 7.0   SPECGRAV 1.010   PROTEINUA Negative   GLUCUA Negative   KETONESU Negative   BILIRUBINUA Negative   OCCULTUA Negative   NITRITE Negative   LEUKOCYTESUR Negative       Significant Imaging: I have reviewed all pertinent imaging results/findings within the past 24 hours.    X-Ray Chest AP Portable  Narrative: EXAMINATION:  XR CHEST AP PORTABLE    CLINICAL HISTORY:  Chest pain, unspecified    TECHNIQUE:  Single frontal view of the chest was  performed.    COMPARISON:  02/24/2021    FINDINGS:  Poor inspiration and portable AP technique accentuate the cardiac silhouette.  The heart appears to be at the upper limits of normal size and unchanged.  Tortuous thoracic aorta.  Pulmonary vascularity is prominent but stable.  Overall volume loss of both lungs.  Mild accentuation of the interstitial pattern.  No definite airspace consolidation or pleural effusion.  No pneumothorax.  Skeletal structures appear intact.  Degenerative changes at both shoulders.  Impression: No significant interval change    Electronically signed by: Wayne Peck MD  Date:    08/18/2022  Time:    13:06  CT Head Without Contrast  Narrative: EXAMINATION:  CT HEAD WITHOUT CONTRAST    CLINICAL HISTORY:  Head trauma, minor (Age >= 65y);    TECHNIQUE:  Low dose axial CT images obtained throughout the head without the use of intravenous contrast.  Axial, sagittal and coronal reconstructions were performed.    COMPARISON:  11/04/2021.    FINDINGS:  Intracranial compartment:    Ventricles and sulci are stable in size, without evidence of hydrocephalus.    Mild patchy hypoattenuation in the supratentorial white matter, nonspecific but most likely reflecting chronic small vessel ischemic changes. No new major vascular distribution infarct. No acute hemorrhage.  No mass effect or midline shift.    No extra-axial blood or fluid collections.    Skull/extracranial contents (limited evaluation):    No displaced calvarial fracture.    The mastoid air cells and visualized paranasal sinuses are essentially clear.  Impression: Stable exam as above.  No evidence of acute intracranial hemorrhage.    Electronically signed by: Bruce Barajas MD  Date:    08/18/2022  Time:    12:09

## 2022-08-18 NOTE — ASSESSMENT & PLAN NOTE
76 y.o. who presents with suspected chest pain after nursing staff witnessed her rubbing her epigastrum/lower chest this morning (pt nonverbal at baseline). Per pt's sister, CP resolved with GI cocktail given in the ED.  - Likely 2/2 esophageal reflux vs. constipation vs. flatulence   - EKG without ST-segment elevation or depression, HR 64  - Initial troponin negative, second trop pending  -  administered this am as pt's home regimen  - Continue ASA, statin   - CXR without acute cardiopulmonary process, BNP 27   - CBC, CMP (goal Mag>2, K>4) daily  - Monitor telemetry  - Last echo (7/13/18): EF 60-65%; pulmonary HTN (PA systolic pressure > 47 mmHg)  - Repeat echo ordered  - Risk factors: age >65, HTN, HLD, diabetes mellitus  - ALEKSANDRA score 4, HEART score 4  - If troponin rises, start ACS protocol w/ heparin gtt and consult interventional cardiology  - Nitro PRN chest pain   - GI cocktail ordered PRN epigastric pain

## 2022-08-18 NOTE — HPI
Lay Peres is a 76 y.o. F with PMHx of intellectual disability, HTN, HLD, DM, chronic constipation, and congenital deafness presented to the ED via EMS from [facility]. Patient is nonverbal at baseline, so history is entirely obtained by chart review and family. Per the patient's sister, Jose Francisco, patient was seen rubbing her upper stomach/lower chest by nursing staff at [place]. Pt with a history of chronic constipation/GI issues, and sister believes her pain resolved following GI cocktail in the ED. Otherwise, patient's sister not aware of recent changes in bowel habits, episodes of CP, SOB, diaphoresis, N/V/D, or fever/chills. Patient also noted to have healing bruise over her eye; per sister, pt had a fall ~ 1 week ago.      In the ED, systolic BP in 150s otherwise VSSAF. CBC, CMP, BNP, UA all WNL. EKG in NSR. CXR without acute process. Troponin x 1 negative. CT head without evidence of intracranial bleed.

## 2022-08-18 NOTE — ASSESSMENT & PLAN NOTE
- Chronic and stable, at baseline  - Continue home quetiapine 200 mg PO nightly, duloxetine 30 mg PO daily, benztropine 1 mg PO BID, haloperidol 5 mg tablet PO daily PRN agitation

## 2022-08-18 NOTE — ASSESSMENT & PLAN NOTE
- Controlled   Lab Results   Component Value Date    HGBA1C 5.6 02/17/2022   - Home regimen: metformin   - Inpatient regimen: Low dose SSI  - ACHS acchuchecks  - Diabetic, bland diet 2000 calories  - Monitor for hypoglycemia

## 2022-08-19 VITALS
HEART RATE: 75 BPM | BODY MASS INDEX: 27.42 KG/M2 | TEMPERATURE: 98 F | DIASTOLIC BLOOD PRESSURE: 81 MMHG | OXYGEN SATURATION: 97 % | WEIGHT: 149 LBS | RESPIRATION RATE: 17 BRPM | SYSTOLIC BLOOD PRESSURE: 143 MMHG | HEIGHT: 62 IN

## 2022-08-19 LAB
ANION GAP SERPL CALC-SCNC: 13 MMOL/L (ref 8–16)
BASOPHILS # BLD AUTO: 0.05 K/UL (ref 0–0.2)
BASOPHILS NFR BLD: 0.6 % (ref 0–1.9)
BUN SERPL-MCNC: 22 MG/DL (ref 8–23)
CALCIUM SERPL-MCNC: 9.7 MG/DL (ref 8.7–10.5)
CHLORIDE SERPL-SCNC: 108 MMOL/L (ref 95–110)
CO2 SERPL-SCNC: 22 MMOL/L (ref 23–29)
CREAT SERPL-MCNC: 0.8 MG/DL (ref 0.5–1.4)
DIFFERENTIAL METHOD: ABNORMAL
EOSINOPHIL # BLD AUTO: 0.5 K/UL (ref 0–0.5)
EOSINOPHIL NFR BLD: 5.5 % (ref 0–8)
ERYTHROCYTE [DISTWIDTH] IN BLOOD BY AUTOMATED COUNT: 13.8 % (ref 11.5–14.5)
EST. GFR  (NO RACE VARIABLE): >60 ML/MIN/1.73 M^2
GLUCOSE SERPL-MCNC: 97 MG/DL (ref 70–110)
HCT VFR BLD AUTO: 33.9 % (ref 37–48.5)
HCV AB SERPL QL IA: NEGATIVE
HGB BLD-MCNC: 10.8 G/DL (ref 12–16)
IMM GRANULOCYTES # BLD AUTO: 0.02 K/UL (ref 0–0.04)
IMM GRANULOCYTES NFR BLD AUTO: 0.2 % (ref 0–0.5)
LYMPHOCYTES # BLD AUTO: 3.2 K/UL (ref 1–4.8)
LYMPHOCYTES NFR BLD: 38 % (ref 18–48)
MAGNESIUM SERPL-MCNC: 1.8 MG/DL (ref 1.6–2.6)
MCH RBC QN AUTO: 29.3 PG (ref 27–31)
MCHC RBC AUTO-ENTMCNC: 31.9 G/DL (ref 32–36)
MCV RBC AUTO: 92 FL (ref 82–98)
MONOCYTES # BLD AUTO: 1.1 K/UL (ref 0.3–1)
MONOCYTES NFR BLD: 13.6 % (ref 4–15)
NEUTROPHILS # BLD AUTO: 3.5 K/UL (ref 1.8–7.7)
NEUTROPHILS NFR BLD: 42.1 % (ref 38–73)
NRBC BLD-RTO: 0 /100 WBC
PHOSPHATE SERPL-MCNC: 4.1 MG/DL (ref 2.7–4.5)
PLATELET # BLD AUTO: 243 K/UL (ref 150–450)
PMV BLD AUTO: 11 FL (ref 9.2–12.9)
POCT GLUCOSE: 105 MG/DL (ref 70–110)
POCT GLUCOSE: 122 MG/DL (ref 70–110)
POTASSIUM SERPL-SCNC: 4 MMOL/L (ref 3.5–5.1)
RBC # BLD AUTO: 3.68 M/UL (ref 4–5.4)
SODIUM SERPL-SCNC: 143 MMOL/L (ref 136–145)
WBC # BLD AUTO: 8.37 K/UL (ref 3.9–12.7)

## 2022-08-19 PROCEDURE — G0378 HOSPITAL OBSERVATION PER HR: HCPCS

## 2022-08-19 PROCEDURE — 84100 ASSAY OF PHOSPHORUS: CPT

## 2022-08-19 PROCEDURE — 80048 BASIC METABOLIC PNL TOTAL CA: CPT

## 2022-08-19 PROCEDURE — 85025 COMPLETE CBC W/AUTO DIFF WBC: CPT

## 2022-08-19 PROCEDURE — 36415 COLL VENOUS BLD VENIPUNCTURE: CPT

## 2022-08-19 PROCEDURE — 99217 PR OBSERVATION CARE DISCHARGE: ICD-10-PCS | Mod: ,,, | Performed by: PHYSICIAN ASSISTANT

## 2022-08-19 PROCEDURE — 25000003 PHARM REV CODE 250

## 2022-08-19 PROCEDURE — 99217 PR OBSERVATION CARE DISCHARGE: CPT | Mod: ,,, | Performed by: PHYSICIAN ASSISTANT

## 2022-08-19 PROCEDURE — 83735 ASSAY OF MAGNESIUM: CPT

## 2022-08-19 RX ADMIN — DONEPEZIL HYDROCHLORIDE 5 MG: 5 TABLET, FILM COATED ORAL at 08:08

## 2022-08-19 RX ADMIN — ASPIRIN 325 MG: 325 TABLET, COATED ORAL at 08:08

## 2022-08-19 RX ADMIN — MEMANTINE 10 MG: 10 TABLET ORAL at 09:08

## 2022-08-19 RX ADMIN — LISINOPRIL 20 MG: 20 TABLET ORAL at 08:08

## 2022-08-19 RX ADMIN — ACETAMINOPHEN 500 MG: 500 TABLET ORAL at 09:08

## 2022-08-19 RX ADMIN — BENZTROPINE MESYLATE 1 MG: 0.5 TABLET ORAL at 08:08

## 2022-08-19 RX ADMIN — DULOXETINE 30 MG: 30 CAPSULE, DELAYED RELEASE ORAL at 08:08

## 2022-08-19 RX ADMIN — SENNOSIDES AND DOCUSATE SODIUM 1 TABLET: 50; 8.6 TABLET ORAL at 08:08

## 2022-08-19 RX ADMIN — ATORVASTATIN CALCIUM 20 MG: 20 TABLET, FILM COATED ORAL at 08:08

## 2022-08-19 NOTE — PLAN OF CARE
[facility] - Telemetry Stepdown ([facility])  Discharge Assessment    Primary Care Provider: Krista Case MD     Discharge Assessment (most recent)     BRIEF DISCHARGE ASSESSMENT - 08/19/22 1119        Discharge Planning    Assessment Type Discharge Planning Assessment     Resource/Environmental Concerns none     Support Systems Family members;Home care staff     Current Living Arrangements other (see comments)     Care Facility Name [facility]     Patient/Family Anticipates Transition to home     Patient/Family Anticipated Services at Transition none     DME Needed Upon Discharge  none     Discharge Plan A Home     Discharge Plan B Home                 Pt is a 76 y.o. female admitted with chest pain and has a PMH of HTN and HLD. She is a resident of [facility] where she receives assistance with all ADLs. She will return and her sister will give her a ride. [facility] Discharge Packet given to patient and/or family with understanding verbalized.   name and number and estimated discharge date written on white board in patient's room with request to call for any questions or concerns.  Will continue to follow for needs.  Dg Mina RN,BSN

## 2022-08-19 NOTE — ASSESSMENT & PLAN NOTE
76 y.o. who presents with suspected chest pain after nursing staff witnessed her rubbing her epigastrum/lower chest this morning (pt nonverbal at baseline). Per pt's sister, CP resolved with GI cocktail given in the ED.    - Likely 2/2 esophageal reflux vs. constipation vs. flatulence   - EKG without ST-segment elevation or depression, HR 64  - Troponins x 2 negative   - CXR without acute cardiopulmonary process, BNP 27   - Last echo (7/13/18): EF 60-65%; pulmonary HTN (PA systolic pressure > 47 mmHg)  - Repeat echo: EF 50%; grade I LV diastolic dysfunction, normal RV size, mild LA enlargement   - Risk factors: age >65, HTN, HLD, diabetes mellitus  - ALEKSANDRA score 4, HEART score 4  - Outpatient referral to cardiology placed

## 2022-08-19 NOTE — PLAN OF CARE
[facility] - Telemetry Stepdown ([facility]-)  Discharge Final Note    Primary Care Provider: Krista Case MD    Expected Discharge Date: 8/19/2022     Future Appointments   Date Time Provider Department Center   8/25/2022 10:30 AM MEME Barrera II, MD [facility] IM [facility]   10/13/2022  1:30 PM Brooke Reyez NP [facility] GASTR[facility]   1/25/2023 10:30 AM Krista Case MD [facility] IM [facility]     Pt discharged home with no services.  Dg Mina RN,BSN        Final Discharge Note (most recent)     Final Note - 08/19/22 1258        Final Note    Assessment Type Final Discharge Note     Anticipated Discharge Disposition Home or Self Care     Hospital Resources/Appts/Education Provided Provided patient/caregiver with written discharge plan information;Appointments scheduled and added to AVS        Post-Acute Status    Discharge Delays None known at this time                 Important Message from Medicare

## 2022-08-19 NOTE — ASSESSMENT & PLAN NOTE
- Substituted home rosuvastatin 5mg (non-formulary) for atorvastatin 20 mg PO daily while at the hospital  - Restart home rosuvastatin 5 mg

## 2022-08-19 NOTE — NURSING
Notified MARIANNE Montero that the pt's HR is repeating going into sinus bachy as low as 44 on the monitor. Pt is asymptotic. V/S stable. Pt's HR is stable when she is awake and dropping while she is sleeping. MARIANNE Montero recommended no invention at this time and to continue to monitor the pt. Will continue to monitor.

## 2022-08-19 NOTE — HOSPITAL COURSE
76 y.o. F admitted to hospital medicine for concern for chest pain, which appeared to resolve completely with administration of a GI cocktail. VSSAF. CBC, CMP, BNP, UA all WNL. EKG in NSR. CXR without acute cardiopulmonary process. Troponins x 2 negative. Patient has remained comfortable and at her baseline since admission with no reports of pain or apparent discomfort on exam. All questions answered. Patient's family acknowledges understanding of discharge instructions and feels safe to discharge back to [facility]. Patient discharged 8/19/2022 in stable condition.

## 2022-08-19 NOTE — ASSESSMENT & PLAN NOTE
- Chronic and stable, at baseline  - Continue home quetiapine 200 mg PO nightly, duloxetine 30 mg PO daily, benztropine 1 mg PO BID, and haloperidol 5 mg tablet PO daily PRN agitation

## 2022-08-19 NOTE — ASSESSMENT & PLAN NOTE
- Controlled   Lab Results   Component Value Date    HGBA1C 5.6 02/17/2022     - Continue home metformin   - Diabetic, bland diet 2000 calories

## 2022-08-19 NOTE — DISCHARGE SUMMARY
[facility] - Telemetry Stepdown ([place] [address])  [facility] Medicine  Discharge Summary      Patient Name: Lay Peres  MRN: [id number]  Patient Class: OP- Observation  Admission Date: 8/18/2022  Hospital Length of Stay: 0 days  Discharge Date and Time: 8/19/2022  1:05 PM  Attending Physician: Kiara att. providers found   Discharging Provider: Tiffany Frazier PA-C  Primary Care Provider: Krista Case MD  [facility] Medicine Team: [facility] [place] HOSP MED Y Tiffany Frazier PA-C    HPI:   Lay Peres is a 76 y.o. F with PMHx of intellectual disability, HTN, HLD, DM, chronic constipation, and congenital deafness presented to the ED via EMS from [facility]. Patient is nonverbal at baseline, so history is entirely obtained by chart review and family. Per the patient's sister, Jose Francisco, patient was seen rubbing her upper stomach/lower chest by nursing staff at [facility]. Pt with a history of chronic constipation/GI issues, and sister believes her pain resolved following GI cocktail in the ED. Otherwise, patient's sister not aware of recent changes in bowel habits, episodes of CP, SOB, diaphoresis, N/V/D, or fever/chills. Patient also noted to have healing bruise over her eye; per sister, pt had a fall ~ 1 week ago.      In the ED, systolic BP in 150s otherwise VSSAF. CBC, CMP, BNP, UA all WNL. EKG in NSR. CXR without acute process. Troponin x 1 negative. CT head without evidence of intracranial bleed.       * No surgery found *      Hospital Course:   76 y.o. F admitted to hospital medicine for concern for chest pain, which appeared to resolve completely with administration of a GI cocktail. VSSAF. CBC, CMP, BNP, UA all WNL. EKG in NSR. CXR without acute cardiopulmonary process. Troponins x 2 negative. Patient has remained comfortable and at her baseline since admission with no reports of pain or apparent discomfort on exam. All questions answered. Patient's family acknowledges understanding of discharge  instructions and feels safe to discharge back to [facility]. Patient discharged 8/19/2022 in stable condition.        Goals of Care Treatment Preferences:  Code Status: Full Code      Consults:     * Chest pain  76 y.o. who presents with suspected chest pain after nursing staff witnessed her rubbing her epigastrum/lower chest this morning (pt nonverbal at baseline). Per pt's sister, CP resolved with GI cocktail given in the ED.    - Likely 2/2 esophageal reflux vs. constipation vs. flatulence   - EKG without ST-segment elevation or depression, HR 64  - Troponins x 2 negative   - CXR without acute cardiopulmonary process, BNP 27   - Last echo (7/13/18): EF 60-65%; pulmonary HTN (PA systolic pressure > 47 mmHg)  - Repeat echo: EF 50%; grade I LV diastolic dysfunction, normal RV size, mild LA enlargement   - Risk factors: age >65, HTN, HLD, diabetes mellitus  - ALEKSANDRA score 4, HEART score 4  - Outpatient referral to cardiology placed    GERD (gastroesophageal reflux disease)  - Chronic and stable   - Continue home famotidine 40 mg PO nightly     Dementia  - Chronic and stable, at baseline  - Continue home donepezil 5 mg PO qam & memantine 10 mg PO BID    Deafness congenital  - Chronic and stable, at baseline      Intellectual disability  - Chronic and stable, at baseline  - No concern for acute changes in mental status at this time    Chronic schizoaffective disorder  - Chronic and stable, at baseline  - Continue home quetiapine 200 mg PO nightly, duloxetine 30 mg PO daily, benztropine 1 mg PO BID, and haloperidol 5 mg tablet PO daily PRN agitation     Back pain  - Chronic and stable   - Continue home acetaminophen 500 mg BID  - Continue home gabapentin 300 mg nightly     Chronic constipation  - Continue home senna-docusate 8.6-50 mg PO BID      Hyperlipidemia  - Substituted home rosuvastatin 5mg (non-formulary) for atorvastatin 20 mg PO daily while at the hospital  - Restart home rosuvastatin 5 mg     Diabetes  mellitus due to abnormal insulin  - Controlled   Lab Results   Component Value Date    HGBA1C 5.6 02/17/2022     - Continue home metformin   - Diabetic, bland diet 2000 calories    Hypertension  - Controlled and chronic   - Latest BP and vitals reviewed  - Continue home lisinopril       Final Active Diagnoses:    Diagnosis Date Noted POA    PRINCIPAL PROBLEM:  Chest pain [R07.9] 08/07/2018 Yes    GERD (gastroesophageal reflux disease) [K21.9] 08/18/2022 Yes    Dementia [F03.90] 01/20/2015 Yes    Deafness congenital [H90.5] 09/14/2012 Yes    Intellectual disability [F79] 09/14/2012 Yes    Chronic schizoaffective disorder [F25.9] 09/14/2012 Yes    Hypertension [I10] 07/13/2012 Yes    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 [E13.9] 07/13/2012 Yes    Hyperlipidemia [E78.5] 07/13/2012 Yes    Chronic constipation [K59.09] 07/13/2012 Yes    Back pain [M54.9] 07/13/2012 Yes      Problems Resolved During this Admission:       Discharged Condition: good    Disposition: Home or Self Care    Follow Up:    Patient Instructions:      Ambulatory referral/consult to Outpatient Case Management   Referral Priority: Routine Referral Type: Consultation   Referral Reason: Specialty Services Required   Number of Visits Requested: 1     Ambulatory referral/consult to Gastroenterology   Standing Status: Future   Referral Priority: Routine Referral Type: Consultation   Referral Reason: Specialty Services Required   Requested Specialty: Gastroenterology   Number of Visits Requested: 1     Notify your health care provider if you experience any of the following:  temperature >100.4     Notify your health care provider if you experience any of the following:  persistent nausea and vomiting or diarrhea     Notify your health care provider if you experience any of the following:  severe uncontrolled pain     Activity as tolerated       Significant Diagnostic Studies: Labs:   CMP   Recent Labs   Lab 08/18/22  1043 08/19/22  0358   NA  142 143   K 4.4 4.0    108   CO2 25 22*    97   BUN 21 22   CREATININE 0.8 0.8   CALCIUM 9.9 9.7   PROT 7.8  --    ALBUMIN 3.8  --    BILITOT 0.3  --    ALKPHOS 82  --    AST 21  --    ALT 14  --    ANIONGAP 9 13   , CBC   Recent Labs   Lab 08/18/22  1043 08/19/22  0358   WBC 8.65 8.37   HGB 11.3* 10.8*   HCT 36.1* 33.9*    243    and Troponin   Recent Labs   Lab 08/18/22  1043 08/18/22  1456   TROPONINI <0.006 <0.006     Cardiac Graphics: Echocardiogram:   2D echo with color flow doppler:   Results for orders placed or performed during the hospital encounter of 07/13/18   Echo doppler color flow   Result Value Ref Range    EF + QEF 60 55 - 65    Diastolic Dysfunction Yes (A)     Est. PA Systolic Pressure 50.06 (A)     Tricuspid Valve Regurgitation MILD     Narrative    Date of Procedure: 07/13/2018        TEST DESCRIPTION   Technical Quality: This study was performed in conjunction with a 3ml intravenous injection of Optison contrast agent.     Aorta: The aortic root is normal in size, measuring 2.6 cm at sinotubular junction and 2.8 cm at Sinuses of Valsalva. The proximal ascending aorta is normal in size, measuring 2.8 cm across.     Left Atrium: The left atrial volume index is moderately enlarged, measuring 44.52 cc/m2.     Left Ventricle: The left ventricle is normal in size, with an end-diastolic diameter of 4.6 cm, and an end-systolic diameter of 3.1 cm. LV wall thickness is normal, with the septum measuring 0.6 cm and the posterior wall measuring 0.7 cm across. Relative   wall thickness was normal at 0.30, and the LV mass index was 58.8 g/m2 consistent with normal left ventricular mass. There are no regional wall motion abnormalities. Left ventricular systolic function appears normal. Visually estimated ejection fraction   is 60-65%. The LV Doppler derived stroke volume equals 91.0 ccs.     Diastolic indices: E wave velocity 0.8 m/s, E/A ratio 0.5,  msec., E/e' ratio(avg) 10. There  is pseudonormalization of mitral inflow pattern consistent with significant diastolic dysfunction.     Right Atrium: The right atrium is normal in size, measuring 3.2 cm in length and 3.3 cm in width in the apical view.     Right Ventricle: The right ventricle is normal in size. Global right ventricular systolic function appears normal. Tricuspid annular plane systolic excursion (TAPSE) is 3.3 cm. The estimated PA systolic pressure is 50 mmHg.     Aortic Valve:  The aortic valve is tri-leaflet in structure. Aortic valve is normal in structure with normal leaflet mobility.     Mitral Valve:  Mitral valve is normal in structure with normal leaflet mobility.     Tricuspid Valve:  There is mild tricuspid regurgitation. Tricuspid valve is normal in structure with normal leaflet mobility.     Pulmonary Valve:  Pulmonary valve is normal in structure with normal leaflet mobility.     IVC: IVC is small and collapses > 50% with a sniff, suggesting low right atrial pressure of 3 mmHg.     Shunt: This study was performed in conjunction with intravenous bubble contrast.     Intracavitary: There is no evidence of pericardial effusion, intracavity mass, thrombi, or vegetation.         CONCLUSIONS     1 - Normal left ventricular systolic function (EF 60-65%).     2 - Normal right ventricular systolic function .     3 - Impaired LV relaxation, elevated LAP (grade 2 diastolic dysfunction).     4 - Moderate left atrial enlargement.     5 - Mild tricuspid regurgitation.     6 - Pulmonary hypertension. The estimated PA systolic pressure is greater than 47 mmHg.     7 - Low central venous pressure.             This document has been electronically    SIGNED BY: Ramon Leon MD On: 07/13/2018 16:15       Pending Diagnostic Studies:     None         Medications:  Reconciled Home Medications:      Medication List      CONTINUE taking these medications    acetaminophen 500 MG tablet  Commonly known as: TYLENOL  Take 1,000 mg by mouth 2  (two) times daily.     aspirin 325 MG EC tablet  Commonly known as: ECOTRIN  TAKE 1 TABLET BY MOUTH ONCE daily     benztropine 1 MG tablet  Commonly known as: COGENTIN  Take 1 tablet (1 mg total) by mouth 2 (two) times daily.     blood sugar diagnostic Strp  1 strip by Misc.(Non-Drug; Combo Route) route once daily.     cholecalciferol (vitamin D3) 25 mcg (1,000 unit) capsule  Commonly known as: VITAMIN D3  Take 1 capsule by mouth Daily.     cyanocobalamin 1,000 mcg/mL injection  One ml IM weekly for 4 weeks then one ml IM monthly.  Dipense #10 25 gague 1 inch needles and #10 one ml syringes     donepeziL 5 MG tablet  Commonly known as: ARICEPT  Take 1 tablet (5 mg total) by mouth every morning. To be taken with food     DULoxetine 30 MG capsule  Commonly known as: CYMBALTA  Take 1 capsule (30 mg total) by mouth once daily.     famotidine 40 MG tablet  Commonly known as: PEPCID  TAKE 1 TABLET BY MOUTH EVERY EVENING AT 8PM     ferrous gluconate 324 MG tablet  Commonly known as: FERGON  Take 324 mg by mouth every other day.     gabapentin 300 MG capsule  Commonly known as: NEURONTIN  TAKE 1 TABLET (300MG) BY MOUTH AT BEDTIME at 8pm     haloperidol decanoate 50 mg/mL injection  Commonly known as: HALDOL DECANOATE  Inject 1 mL (50 mg total) into the muscle every 14 (fourteen) days.     lisinopriL 20 MG tablet  Commonly known as: PRINIVIL,ZESTRIL  Take 1 tablet (20 mg total) by mouth once daily.     memantine 28 mg Cspx  Commonly known as: NAMENDA XR  Take 1 capsule (28 mg total) by mouth once daily.     metFORMIN 500 MG tablet  Commonly known as: GLUCOPHAGE  Take 1 tablet (500 mg total) by mouth every evening. At next cycle fill     multivitamin capsule  Take 1 capsule by mouth once daily.     nystatin ointment  Commonly known as: MYCOSTATIN  Apply topically 2 (two) times daily as needed.     QUEtiapine 200 MG Tab  Commonly known as: SEROQUEL  Take 1 tablet (200 mg total) by mouth every evening.     rosuvastatin 5 MG  tablet  Commonly known as: CRESTOR  Take 1 tablet (5 mg total) by mouth once daily.     senna 8.6 mg tablet  Commonly known as: SENOKOT  Take 1 tablet by mouth Twice daily.     valACYclovir 1000 MG tablet  Commonly known as: VALTREX  Take 1 tablet (1,000 mg total) by mouth 3 (three) times daily. for 10 days            Indwelling Lines/Drains at time of discharge:   Lines/Drains/Airways     None                 Time spent on the discharge of patient: 36 minutes         Tiffany Frazier PA-C  Department of Hospital Medicine  [facility] - Telemetry Stepdown ([place]-)

## 2022-09-22 ENCOUNTER — OUTPATIENT CASE MANAGEMENT (OUTPATIENT)
Dept: ADMINISTRATIVE | Facility: OTHER | Age: 77
End: 2022-09-22
Payer: MEDICARE

## 2022-09-22 NOTE — PROGRESS NOTES
Outpatient Care Management  Patient Not Qualified    Patient: Lay Peres  MRN:  [id number]  Date of Service:  9/22/2022  Completed by:  Aye Rodriguez RN    Chief Complaint   Patient presents with    OPCM Chart Review     9/22/2022  Close referral as needs met by [facility]       Patient Summary     Program:  OPCM High Risk  Qualification Status:  No  Reason Not Qualified:  Needs met by others  [facility] Resident

## 2022-10-25 ENCOUNTER — IMMUNIZATION (OUTPATIENT)
Dept: INTERNAL MEDICINE | Facility: CLINIC | Age: 77
End: 2022-10-25
Payer: MEDICARE

## 2022-10-25 PROCEDURE — G0008 ADMIN INFLUENZA VIRUS VAC: HCPCS | Mod: S$GLB,,, | Performed by: INTERNAL MEDICINE

## 2022-10-25 PROCEDURE — G0008 FLU VACCINE - QUADRIVALENT - ADJUVANTED: ICD-10-PCS | Mod: S$GLB,,, | Performed by: INTERNAL MEDICINE

## 2022-10-25 PROCEDURE — 90694 VACC AIIV4 NO PRSRV 0.5ML IM: CPT | Mod: S$GLB,,, | Performed by: INTERNAL MEDICINE

## 2022-10-25 PROCEDURE — 90694 FLU VACCINE - QUADRIVALENT - ADJUVANTED: ICD-10-PCS | Mod: S$GLB,,, | Performed by: INTERNAL MEDICINE

## 2022-11-29 DIAGNOSIS — F03.918 DEMENTIA WITH OTHER BEHAVIORAL DISTURBANCE, UNSPECIFIED DEMENTIA SEVERITY, UNSPECIFIED DEMENTIA TYPE: Primary | ICD-10-CM

## 2022-12-05 ENCOUNTER — PES CALL (OUTPATIENT)
Dept: ADMINISTRATIVE | Facility: CLINIC | Age: 77
End: 2022-12-05
Payer: MEDICARE

## 2022-12-06 ENCOUNTER — TELEPHONE (OUTPATIENT)
Dept: INTERNAL MEDICINE | Facility: CLINIC | Age: 77
End: 2022-12-06
Payer: MEDICARE

## 2022-12-06 NOTE — TELEPHONE ENCOUNTER
Spoke to pt sister and she is trying to see why she is getting Palliative care. Stated they came to the home yesterday and will be back next week and she said they said something about Hospice. The sister stated she is not ready for hospice. She wants to know what's going on....

## 2022-12-06 NOTE — TELEPHONE ENCOUNTER
----- Message from Lindsay Ashton sent at 12/6/2022 10:09 AM CST -----  Contact: Carol [phone]  Pt's sister requesting a call from the office in regards to patient health,. Please call and advise, thanks

## 2022-12-08 ENCOUNTER — CLINICAL SUPPORT (OUTPATIENT)
Dept: AUDIOLOGY | Facility: CLINIC | Age: 77
End: 2022-12-08
Payer: MEDICARE

## 2022-12-08 ENCOUNTER — OFFICE VISIT (OUTPATIENT)
Dept: OTOLARYNGOLOGY | Facility: CLINIC | Age: 77
End: 2022-12-08
Payer: MEDICARE

## 2022-12-08 VITALS — HEART RATE: 87 BPM | SYSTOLIC BLOOD PRESSURE: 150 MMHG | DIASTOLIC BLOOD PRESSURE: 62 MMHG

## 2022-12-08 DIAGNOSIS — Z01.10 NORMAL EAR EXAM: Primary | ICD-10-CM

## 2022-12-08 DIAGNOSIS — H91.8X3 OTHER SPECIFIED HEARING LOSS OF BOTH EARS: ICD-10-CM

## 2022-12-08 DIAGNOSIS — H91.93 BILATERAL HEARING LOSS, UNSPECIFIED HEARING LOSS TYPE: Primary | ICD-10-CM

## 2022-12-08 PROCEDURE — 3288F FALL RISK ASSESSMENT DOCD: CPT | Mod: CPTII,S$GLB,, | Performed by: NURSE PRACTITIONER

## 2022-12-08 PROCEDURE — 1101F PR PT FALLS ASSESS DOC 0-1 FALLS W/OUT INJ PAST YR: ICD-10-PCS | Mod: CPTII,S$GLB,, | Performed by: NURSE PRACTITIONER

## 2022-12-08 PROCEDURE — 92552 PR PURE TONE AUDIOMETRY, AIR: ICD-10-PCS | Mod: 52,S$GLB,,

## 2022-12-08 PROCEDURE — 1159F MED LIST DOCD IN RCRD: CPT | Mod: CPTII,S$GLB,, | Performed by: NURSE PRACTITIONER

## 2022-12-08 PROCEDURE — 99214 PR OFFICE/OUTPT VISIT, EST, LEVL IV, 30-39 MIN: ICD-10-PCS | Mod: S$GLB,,, | Performed by: NURSE PRACTITIONER

## 2022-12-08 PROCEDURE — 99214 OFFICE O/P EST MOD 30 MIN: CPT | Mod: S$GLB,,, | Performed by: NURSE PRACTITIONER

## 2022-12-08 PROCEDURE — 3288F PR FALLS RISK ASSESSMENT DOCUMENTED: ICD-10-PCS | Mod: CPTII,S$GLB,, | Performed by: NURSE PRACTITIONER

## 2022-12-08 PROCEDURE — 3077F PR MOST RECENT SYSTOLIC BLOOD PRESSURE >= 140 MM HG: ICD-10-PCS | Mod: CPTII,S$GLB,, | Performed by: NURSE PRACTITIONER

## 2022-12-08 PROCEDURE — 92550 TYMPANOMETRY & REFLEX THRESH: CPT | Mod: S$GLB,,,

## 2022-12-08 PROCEDURE — 92550 PR TYMPANOMETRY AND REFLEX THRESHOLD MEASUREMENTS: ICD-10-PCS | Mod: S$GLB,,,

## 2022-12-08 PROCEDURE — 3078F PR MOST RECENT DIASTOLIC BLOOD PRESSURE < 80 MM HG: ICD-10-PCS | Mod: CPTII,S$GLB,, | Performed by: NURSE PRACTITIONER

## 2022-12-08 PROCEDURE — 99999 PR PBB SHADOW E&M-EST. PATIENT-LVL III: CPT | Mod: PBBFAC,,, | Performed by: NURSE PRACTITIONER

## 2022-12-08 PROCEDURE — 1159F PR MEDICATION LIST DOCUMENTED IN MEDICAL RECORD: ICD-10-PCS | Mod: CPTII,S$GLB,, | Performed by: NURSE PRACTITIONER

## 2022-12-08 PROCEDURE — 99999 PR PBB SHADOW E&M-EST. PATIENT-LVL III: ICD-10-PCS | Mod: PBBFAC,,, | Performed by: NURSE PRACTITIONER

## 2022-12-08 PROCEDURE — 3077F SYST BP >= 140 MM HG: CPT | Mod: CPTII,S$GLB,, | Performed by: NURSE PRACTITIONER

## 2022-12-08 PROCEDURE — 92552 PURE TONE AUDIOMETRY AIR: CPT | Mod: 52,S$GLB,,

## 2022-12-08 PROCEDURE — 99999 PR PBB SHADOW E&M-EST. PATIENT-LVL I: ICD-10-PCS | Mod: PBBFAC,,,

## 2022-12-08 PROCEDURE — 3078F DIAST BP <80 MM HG: CPT | Mod: CPTII,S$GLB,, | Performed by: NURSE PRACTITIONER

## 2022-12-08 PROCEDURE — 92588 EVOKED AUDITORY TST COMPLETE: CPT | Mod: S$GLB,,,

## 2022-12-08 PROCEDURE — 92588 EVOKED AUDITORY TST COMPLETE: ICD-10-PCS | Mod: S$GLB,,,

## 2022-12-08 PROCEDURE — 1101F PT FALLS ASSESS-DOCD LE1/YR: CPT | Mod: CPTII,S$GLB,, | Performed by: NURSE PRACTITIONER

## 2022-12-08 PROCEDURE — 99999 PR PBB SHADOW E&M-EST. PATIENT-LVL I: CPT | Mod: PBBFAC,,,

## 2022-12-08 NOTE — PROGRESS NOTES
Subjective:   Lay is a 77 y.o. female who comes for follow-up.    Lay Peres presents for annual ear check.  Her last visit was May 2021.  She does not show any evidence of ear complaints today, however she has mental retardation and is non verbal.  She is a resident of the [facility] and presents with her caregiver, [place].      A ROS was obtained (as best a possible) with pertinent positives as per the above HPI, and otherwise negative.   Objective:   BP (!) 150/62   Pulse 87      Constitutional:   She appears well-developed and well-nourished. She appears alert. She is cooperative.  Non-toxic appearance. She does not have a sickly appearance. She does not appear ill.     Head:  Normocephalic and atraumatic. Not macrocephalic and not microcephalic. Head is without raccoon's eyes, without Ramirez's sign, without abrasion, without laceration, without right periorbital erythema, without left periorbital erythema and without TMJ tenderness.     Ears:    Right Ear: No lacerations. No drainage, swelling or tenderness. No foreign bodies. No mastoid tenderness. Tympanic membrane is not injected, not scarred, not perforated, not erythematous, not retracted and not bulging. No middle ear effusion. No hemotympanum.   Left Ear: No lacerations. No drainage, swelling or tenderness. No foreign bodies. No mastoid tenderness. Tympanic membrane is not injected, not scarred, not perforated, not erythematous, not retracted and not bulging.  No middle ear effusion. No hemotympanum.     Pulmonary/Chest:   Effort normal.     Neurological:   She is alert. Gait (wheelchair) normal.   Procedure  None    Data Reviewed  WBC (K/uL)   Date Value   08/19/2022 8.37     Eosinophil % (%)   Date Value   08/19/2022 5.5     Eos # (K/uL)   Date Value   08/19/2022 0.5     Platelets (K/uL)   Date Value   08/19/2022 243     Glucose (mg/dL)   Date Value   08/19/2022 97     No results found for: IGE  Audiogram        I  independently reviewed the tracings of the complete audiometric evaluation performed today.  I reviewed the audiogram with the patient as well.      Assessment:     1. Normal ear exam    2. Other specified hearing loss of both ears      Plan:   I had a long discussion with the patient regarding her condition and the further workup and management options.     Normal ear exam  Otoscopic exam benign- no cerumen, infection or fluid noted.  At her last visit a cerumen impaction was noted, however today her ears were clear.  Can follow-up in 2 years for ear check or sooner if needed.   Per Audiology, today's testing is suggestive of a hearing loss. However, the exact type and degree of hearing loss cannot be determined due to the inability to obtain  behavioral testing.  It was advised that Melony reach out to Audiology to discuss hearing aid options.

## 2022-12-08 NOTE — PROGRESS NOTES
Lay Peres was seen today in the clinic for an audiologic evaluation. Ms. Peres is a resident at the [facility], who presented with a member of the [facility] staff today. She has significant medial history including diabetes, chronic schizoaffective disorder, dementia, history of stroke, and is unable to speak. According to chart review, there was mention in 2014 that Ms. Peres had hearing aids at some point but did not wear them.    Behavioral pure tone audiometry was attempted, however could not be obtained as the patient had difficulty conditioning to the task.     Tympanometry revealed Type As in the right ear and Type As in the left ear.   Acoustic reflex thresholds were absent across all measures both ipsilaterally and contralaterally, bilaterally.    Distortion product otoacoustic emissions (DPOAEs) from 1500-20956 Hz were absent in the right ear and absent in the left ear. Absent DPOAEs are indicative of abnormal cochlear function to at least the level of the outer hair cells.     Based on today's testing, absent DPOAEs and acoustic reflexes suggest the presence of a hearing loss. However, the exact type and degree of hearing loss cannot be determined due to the inability to obtain  behavioral testing.    Today's results were discussed with the [facility] staff member. Typically in the presence of a hearing loss, hearing aids would be recommended bilaterally. However, since the degree of hearing loss cannot be determined, it would be difficult to ensure that hearing aids were programmed appropriately. The family and/or the [facility] should consider the use of a pocket talker to improve Ms. Peres's access to communication and improve her quality of life.    Recommendations:  Otologic evaluation  Use of a pocket talker to improve communication  Annual audiogram or sooner if change in hearing suspected  Hearing protection in noise

## 2022-12-09 ENCOUNTER — TELEPHONE (OUTPATIENT)
Dept: INTERNAL MEDICINE | Facility: CLINIC | Age: 77
End: 2022-12-09
Payer: MEDICARE

## 2022-12-09 NOTE — TELEPHONE ENCOUNTER
----- Message from Timothy Nunn sent at 12/9/2022 10:29 AM CST -----  Contact: Pt's sister Jose Francisco   Pt's sister called in regards to speaking with Dr Case only she states she has some concerns please advise

## 2022-12-20 DIAGNOSIS — Z12.31 SCREENING MAMMOGRAM FOR BREAST CANCER: Primary | ICD-10-CM

## 2023-01-25 ENCOUNTER — OFFICE VISIT (OUTPATIENT)
Dept: INTERNAL MEDICINE | Facility: CLINIC | Age: 78
End: 2023-01-25
Payer: MEDICARE

## 2023-01-25 ENCOUNTER — LAB VISIT (OUTPATIENT)
Dept: LAB | Facility: HOSPITAL | Age: 78
End: 2023-01-25
Attending: INTERNAL MEDICINE
Payer: MEDICARE

## 2023-01-25 VITALS
SYSTOLIC BLOOD PRESSURE: 130 MMHG | WEIGHT: 137.69 LBS | HEART RATE: 52 BPM | OXYGEN SATURATION: 98 % | DIASTOLIC BLOOD PRESSURE: 70 MMHG | HEIGHT: 62 IN | BODY MASS INDEX: 25.34 KG/M2

## 2023-01-25 DIAGNOSIS — I50.32 CHRONIC DIASTOLIC HEART FAILURE: ICD-10-CM

## 2023-01-25 DIAGNOSIS — E55.9 VITAMIN D DEFICIENCY DISEASE: ICD-10-CM

## 2023-01-25 DIAGNOSIS — F03.918 DEMENTIA WITH BEHAVIORAL DISTURBANCE: ICD-10-CM

## 2023-01-25 DIAGNOSIS — Z00.00 ROUTINE GENERAL MEDICAL EXAMINATION AT A HEALTH CARE FACILITY: Primary | ICD-10-CM

## 2023-01-25 DIAGNOSIS — E53.8 VITAMIN B12 DEFICIENCY: ICD-10-CM

## 2023-01-25 DIAGNOSIS — F25.9 CHRONIC SCHIZOAFFECTIVE DISORDER: ICD-10-CM

## 2023-01-25 DIAGNOSIS — E13.9 DIABETES MELLITUS DUE TO ABNORMAL INSULIN: ICD-10-CM

## 2023-01-25 DIAGNOSIS — F03.90 DEMENTIA, UNSPECIFIED DEMENTIA SEVERITY, UNSPECIFIED DEMENTIA TYPE, UNSPECIFIED WHETHER BEHAVIORAL, PSYCHOTIC, OR MOOD DISTURBANCE OR ANXIETY: ICD-10-CM

## 2023-01-25 LAB
ALBUMIN SERPL BCP-MCNC: 3.9 G/DL (ref 3.5–5.2)
ALP SERPL-CCNC: 73 U/L (ref 55–135)
ALT SERPL W/O P-5'-P-CCNC: 14 U/L (ref 10–44)
ANION GAP SERPL CALC-SCNC: 12 MMOL/L (ref 8–16)
AST SERPL-CCNC: 14 U/L (ref 10–40)
BASOPHILS # BLD AUTO: 0.06 K/UL (ref 0–0.2)
BASOPHILS NFR BLD: 0.7 % (ref 0–1.9)
BILIRUB SERPL-MCNC: 0.2 MG/DL (ref 0.1–1)
BUN SERPL-MCNC: 21 MG/DL (ref 8–23)
CALCIUM SERPL-MCNC: 9.8 MG/DL (ref 8.7–10.5)
CHLORIDE SERPL-SCNC: 110 MMOL/L (ref 95–110)
CHOLEST SERPL-MCNC: 95 MG/DL (ref 120–199)
CHOLEST/HDLC SERPL: 3 {RATIO} (ref 2–5)
CO2 SERPL-SCNC: 22 MMOL/L (ref 23–29)
CREAT SERPL-MCNC: 0.9 MG/DL (ref 0.5–1.4)
DIFFERENTIAL METHOD: ABNORMAL
EOSINOPHIL # BLD AUTO: 0.3 K/UL (ref 0–0.5)
EOSINOPHIL NFR BLD: 3.2 % (ref 0–8)
ERYTHROCYTE [DISTWIDTH] IN BLOOD BY AUTOMATED COUNT: 13.7 % (ref 11.5–14.5)
EST. GFR  (NO RACE VARIABLE): >60 ML/MIN/1.73 M^2
ESTIMATED AVG GLUCOSE: 108 MG/DL (ref 68–131)
GLUCOSE SERPL-MCNC: 113 MG/DL (ref 70–110)
HBA1C MFR BLD: 5.4 % (ref 4–5.6)
HCT VFR BLD AUTO: 36.6 % (ref 37–48.5)
HDLC SERPL-MCNC: 32 MG/DL (ref 40–75)
HDLC SERPL: 33.7 % (ref 20–50)
HGB BLD-MCNC: 11 G/DL (ref 12–16)
IMM GRANULOCYTES # BLD AUTO: 0.01 K/UL (ref 0–0.04)
IMM GRANULOCYTES NFR BLD AUTO: 0.1 % (ref 0–0.5)
LDLC SERPL CALC-MCNC: 35.6 MG/DL (ref 63–159)
LYMPHOCYTES # BLD AUTO: 2.8 K/UL (ref 1–4.8)
LYMPHOCYTES NFR BLD: 30.7 % (ref 18–48)
MCH RBC QN AUTO: 27.8 PG (ref 27–31)
MCHC RBC AUTO-ENTMCNC: 30.1 G/DL (ref 32–36)
MCV RBC AUTO: 93 FL (ref 82–98)
MONOCYTES # BLD AUTO: 0.8 K/UL (ref 0.3–1)
MONOCYTES NFR BLD: 8.6 % (ref 4–15)
NEUTROPHILS # BLD AUTO: 5.1 K/UL (ref 1.8–7.7)
NEUTROPHILS NFR BLD: 56.7 % (ref 38–73)
NONHDLC SERPL-MCNC: 63 MG/DL
NRBC BLD-RTO: 0 /100 WBC
PLATELET # BLD AUTO: 257 K/UL (ref 150–450)
PMV BLD AUTO: 11 FL (ref 9.2–12.9)
POTASSIUM SERPL-SCNC: 4.4 MMOL/L (ref 3.5–5.1)
PROT SERPL-MCNC: 7.2 G/DL (ref 6–8.4)
RBC # BLD AUTO: 3.95 M/UL (ref 4–5.4)
SODIUM SERPL-SCNC: 144 MMOL/L (ref 136–145)
TRIGL SERPL-MCNC: 137 MG/DL (ref 30–150)
TSH SERPL DL<=0.005 MIU/L-ACNC: 0.44 UIU/ML (ref 0.4–4)
WBC # BLD AUTO: 9.05 K/UL (ref 3.9–12.7)

## 2023-01-25 PROCEDURE — 80061 LIPID PANEL: CPT | Performed by: INTERNAL MEDICINE

## 2023-01-25 PROCEDURE — 1159F PR MEDICATION LIST DOCUMENTED IN MEDICAL RECORD: ICD-10-PCS | Mod: CPTII,S$GLB,, | Performed by: INTERNAL MEDICINE

## 2023-01-25 PROCEDURE — 3078F DIAST BP <80 MM HG: CPT | Mod: CPTII,S$GLB,, | Performed by: INTERNAL MEDICINE

## 2023-01-25 PROCEDURE — 99499 RISK ADDL DX/OHS AUDIT: ICD-10-PCS | Mod: HCNC,S$GLB,, | Performed by: INTERNAL MEDICINE

## 2023-01-25 PROCEDURE — 85025 COMPLETE CBC W/AUTO DIFF WBC: CPT | Performed by: INTERNAL MEDICINE

## 2023-01-25 PROCEDURE — 83036 HEMOGLOBIN GLYCOSYLATED A1C: CPT | Performed by: INTERNAL MEDICINE

## 2023-01-25 PROCEDURE — 1100F PR PT FALLS ASSESS DOC 2+ FALLS/FALL W/INJURY/YR: ICD-10-PCS | Mod: CPTII,S$GLB,, | Performed by: INTERNAL MEDICINE

## 2023-01-25 PROCEDURE — 3078F PR MOST RECENT DIASTOLIC BLOOD PRESSURE < 80 MM HG: ICD-10-PCS | Mod: CPTII,S$GLB,, | Performed by: INTERNAL MEDICINE

## 2023-01-25 PROCEDURE — 1159F MED LIST DOCD IN RCRD: CPT | Mod: CPTII,S$GLB,, | Performed by: INTERNAL MEDICINE

## 2023-01-25 PROCEDURE — 80053 COMPREHEN METABOLIC PANEL: CPT | Performed by: INTERNAL MEDICINE

## 2023-01-25 PROCEDURE — 99499 UNLISTED E&M SERVICE: CPT | Mod: HCNC,S$GLB,, | Performed by: INTERNAL MEDICINE

## 2023-01-25 PROCEDURE — 82306 VITAMIN D 25 HYDROXY: CPT | Performed by: INTERNAL MEDICINE

## 2023-01-25 PROCEDURE — 99397 PR PREVENTIVE VISIT,EST,65 & OVER: ICD-10-PCS | Mod: S$GLB,,, | Performed by: INTERNAL MEDICINE

## 2023-01-25 PROCEDURE — 82607 VITAMIN B-12: CPT | Performed by: INTERNAL MEDICINE

## 2023-01-25 PROCEDURE — 99999 PR PBB SHADOW E&M-EST. PATIENT-LVL III: CPT | Mod: PBBFAC,,, | Performed by: INTERNAL MEDICINE

## 2023-01-25 PROCEDURE — 3075F PR MOST RECENT SYSTOLIC BLOOD PRESS GE 130-139MM HG: ICD-10-PCS | Mod: CPTII,S$GLB,, | Performed by: INTERNAL MEDICINE

## 2023-01-25 PROCEDURE — 36415 COLL VENOUS BLD VENIPUNCTURE: CPT | Performed by: INTERNAL MEDICINE

## 2023-01-25 PROCEDURE — 1126F AMNT PAIN NOTED NONE PRSNT: CPT | Mod: CPTII,S$GLB,, | Performed by: INTERNAL MEDICINE

## 2023-01-25 PROCEDURE — 99397 PER PM REEVAL EST PAT 65+ YR: CPT | Mod: S$GLB,,, | Performed by: INTERNAL MEDICINE

## 2023-01-25 PROCEDURE — 1126F PR PAIN SEVERITY QUANTIFIED, NO PAIN PRESENT: ICD-10-PCS | Mod: CPTII,S$GLB,, | Performed by: INTERNAL MEDICINE

## 2023-01-25 PROCEDURE — 84443 ASSAY THYROID STIM HORMONE: CPT | Performed by: INTERNAL MEDICINE

## 2023-01-25 PROCEDURE — 99999 PR PBB SHADOW E&M-EST. PATIENT-LVL III: ICD-10-PCS | Mod: PBBFAC,,, | Performed by: INTERNAL MEDICINE

## 2023-01-25 PROCEDURE — 3075F SYST BP GE 130 - 139MM HG: CPT | Mod: CPTII,S$GLB,, | Performed by: INTERNAL MEDICINE

## 2023-01-25 PROCEDURE — 3288F FALL RISK ASSESSMENT DOCD: CPT | Mod: CPTII,S$GLB,, | Performed by: INTERNAL MEDICINE

## 2023-01-25 PROCEDURE — 1100F PTFALLS ASSESS-DOCD GE2>/YR: CPT | Mod: CPTII,S$GLB,, | Performed by: INTERNAL MEDICINE

## 2023-01-25 PROCEDURE — 3288F PR FALLS RISK ASSESSMENT DOCUMENTED: ICD-10-PCS | Mod: CPTII,S$GLB,, | Performed by: INTERNAL MEDICINE
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
## 2023-01-26 LAB
25(OH)D3+25(OH)D2 SERPL-MCNC: 45 NG/ML (ref 30–96)
VIT B12 SERPL-MCNC: 493 PG/ML (ref 210–950)

## 2023-01-29 ENCOUNTER — PATIENT MESSAGE (OUTPATIENT)
Dept: INTERNAL MEDICINE | Facility: CLINIC | Age: 78
End: 2023-01-29
Payer: MEDICARE

## 2023-02-07 DIAGNOSIS — Z00.00 ENCOUNTER FOR MEDICARE ANNUAL WELLNESS EXAM: ICD-10-CM

## 2023-02-09 DIAGNOSIS — Z00.00 ENCOUNTER FOR MEDICARE ANNUAL WELLNESS EXAM: ICD-10-CM

## 2023-02-10 ENCOUNTER — PATIENT MESSAGE (OUTPATIENT)
Dept: RADIOLOGY | Facility: HOSPITAL | Age: 78
End: 2023-02-10
Payer: MEDICARE

## 2023-03-03 ENCOUNTER — HOSPITAL ENCOUNTER (OUTPATIENT)
Dept: RADIOLOGY | Facility: HOSPITAL | Age: 78
Discharge: HOME | End: 2023-03-03
Attending: INTERNAL MEDICINE
Payer: MEDICARE

## 2023-03-03 DIAGNOSIS — Z12.31 SCREENING MAMMOGRAM FOR BREAST CANCER: ICD-10-CM

## 2023-03-03 PROCEDURE — 77063 BREAST TOMOSYNTHESIS BI: CPT | Mod: 26,,, | Performed by: INTERNAL MEDICINE

## 2023-03-03 PROCEDURE — 77067 SCR MAMMO BI INCL CAD: CPT | Mod: TC

## 2023-03-03 PROCEDURE — 77067 SCR MAMMO BI INCL CAD: CPT | Mod: 26,,, | Performed by: INTERNAL MEDICINE

## 2023-03-03 PROCEDURE — 77067 MAMMO DIGITAL SCREENING BILAT WITH TOMO: ICD-10-PCS | Mod: 26,,, | Performed by: INTERNAL MEDICINE

## 2023-03-03 PROCEDURE — 77063 MAMMO DIGITAL SCREENING BILAT WITH TOMO: ICD-10-PCS | Mod: 26,,, | Performed by: INTERNAL MEDICINE

## 2023-03-24 DIAGNOSIS — F25.9 CHRONIC SCHIZOAFFECTIVE DISORDER: ICD-10-CM

## 2023-03-24 RX ORDER — QUETIAPINE FUMARATE 200 MG/1
200 TABLET, FILM COATED ORAL NIGHTLY
Qty: 30 TABLET | Refills: 0 | Status: SHIPPED | OUTPATIENT
Start: 2023-03-24 | End: 2023-04-10 | Stop reason: SDUPTHER

## 2023-03-24 RX ORDER — BENZTROPINE MESYLATE 1 MG/1
TABLET ORAL
Qty: 60 TABLET | Refills: 0 | Status: SHIPPED | OUTPATIENT
Start: 2023-03-24 | End: 2023-04-10 | Stop reason: SDUPTHER

## 2023-03-24 RX ORDER — DULOXETIN HYDROCHLORIDE 30 MG/1
30 CAPSULE, DELAYED RELEASE ORAL DAILY
Qty: 30 CAPSULE | Refills: 0 | Status: SHIPPED | OUTPATIENT
Start: 2023-03-24 | End: 2023-04-10 | Stop reason: SDUPTHER

## 2023-04-10 NOTE — PROGRESS NOTES
ESTABLISHED VISIT: PSYCHIATRY     ASSESSMENT AND PLAN:     DIAGNOSES & PROBLEMS:  1. Chronic schizoaffective disorder    2. Dementia, unspecified dementia severity, unspecified dementia type, unspecified whether behavioral, psychotic, or mood disturbance or anxiety    3. Intellectual disability    4. Deafness congenital    5. Hypertension, unspecified type    6. 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 Hyperlipidemia, unspecified hyperlipidemia type        Condition:  [] Stable (i.e., At/Near Treatment Goal, At/Near Baseline, Uncomplicated)    [x] Mild/Moderate Exacerbation, Progression, Complications, and/or Side Effects Noted  [] Severe Exacerbation, Progression, Complications, and/or Side Effects Noted    Current risk is judged to be:   I[x]I Low    I[]I Moderate   I[]I High    [] Y  [x] N  I[]I U  I[]I N/A  Danger to Self:   [] Y  [x] N  I[]I U  I[]I N/A  Danger to Others:   [] Y  [x] N  I[]I U  I[]I N/A  Grave Disability:      In Summary:  Per , mild progression in past month.  I am hesitant to titrate medications further given she is on two neuroleptics already.  Spoke with  about sleep hygiene as first step.  Follows with Dr. Case.  Follows with neurology for dementia.  Cont current psychotropic regimen - may need to make adjustments up and down in future based on progression of dementia.     Haldol decanoate 50mg IM q 2weeks  Seroquel 200mg bedtime  Cymbalta 30mg daily  Cogentin 1mg bid    Aricept  Namenda  Gabapentin    NOTE: The patient does not currently meet the criteria for psychiatric admission and can be safely and effectively managed in a less restrictive level of care.     - no evidence of tardive dyskinesia noted  - monitor metabolic parameters  - labs up to date      INTERVAL HISTORY:     Global Subjective Rating: about the same as last session (per staff)  Global Objective Rating: mild progression noted    [x] Y  [] N  sleep  disturbance: **    [] Y  [x] N  appetite/weight change: **    [] Y  [x] N  fatigue/anergia: **    [] Y  [x] N  impairment in focus/concentration: **       [x] Y  [] N  depression: *appears sad at times*     [] Y  [x] N  anxiety/worry: **     [x] Y  [] N  dysregulated mood/behavior: **  Positive for: moodiness, irritability   [] Y  [x] N  manic symptomatology: **     [x] Y  [] N  psychosis: **  Positive for: paranoia     [x] Y  [] N  pain: *intermittent back pain*    [] Y  [x] N  abnormal movements: **   Negative for: tremor, akathisia, tardive dyskinesia         - seen with  who provides information   - overall okay  - more bad days recently - about the past month  - has had some moodiness  - she will have some trouble sleeping at night  - sleeps a couple hours in her bed at night - then out in the living room for another couple hours - then napping during the day  - will lash out at staff or other clients - when reoriented usually snaps out of it (but not always)  - can communicate her wishes but not conversing in an understandable manner most of the time  - unstable on feet, has had some falls  - still seeing sister for visits    PERTINENT PAST HISTORY:     Lives at [facility] for many years, sister involved - diagnoses of Schizoaffective Disorder, Mental Retardation, Congential Deafness and now Dementia  Initiated treatment with me in the clinic in May 2011  She has had inpt psych hosp and at least one suicide attempt in past - details vague  November 2021 - decompensated (but did not require inpt psych hosp) when haldol decanoate lowered to 25mg q2 weeks.        Past Psychotropic Trials:  High doses of haldol in past - haldol decanoate 100mg IM q2 week + haldol 5mg prns  Entered treatment with me on the following regimen: Haldol dec 100mg IM q3 weeks, Seroquel 200mg bedtime, Cymbalta 30mg daily, Cogentin 1mg bid      EXAMINATION:     Vitals:  There were no  vitals taken for this visit.    MENTAL STATUS EXAMINATION:  General Appearance & Behavior: casually dressed, adequate groomed  Involuntary Movements and Motor Activity: no tics, tremors, akathisia, or tardive dyskinesia noted  Speech & Language: rambles unintelligibly   Mood: unknown  Affect: euthymic, happy  Thought Process & Associations: unknown - unable to assess  Thought Content & Perceptions: +paranoid ideation.   Sensorium and Cognition: alert with clear sensorium.  Significant memory and attention deficits consistent with dementia  Insight & Judgment: both impaired       RISK MANAGEMENT:     The following risk parameters were assessed during this evaluation:    I[]I Y  I[x]I N  I[]I U  I[]I A  Suicidal Ideation/Behavior: **   I[]I Y  I[x]I N  I[]I U  I[]I A  Homicidal Ideation/Behavior: **  I[]I Y  I[x]I N  I[]I U  I[]I A  Violence: **  I[]I Y  I[x]I N  I[]I U  I[]I A  Self-Injurious Behavior: **         Serg Cardenas MD  Department of Psychiatry, [facility] Board Certified, Psychiatry and Addiction Medicine       MEDICAL DECISION MAKING AND TIME:     Complexity (level) of medical decision making employed in the encounter: MODERATE    The total time spent performing E/M services on the date of the encounter: 25 minutes      TELE-HEALTH / VIRTUAL VISIT:     The patient location is: at home in the [facility].    Visit type: audiovisual    Face to Face time with patient: 12 minutes    Each patient who is provided medical services by telemedicine is: (1) informed of the relationship between the physician and patient and the respective role of any other health care provider with respect to management of the patient; and (2) notified that he or she may decline to receive medical services by telemedicine and may withdraw from such care at any time.    For Audio Only Telehealth Visits: the reason for the audio only service rather than synchronous audio and video virtual visit was related to technical  difficulties or patient preference/necessity.  This service was not originating from a related E/M service provided within the previous 7 days nor will  to an E/M service or procedure within the next 24 hours or my soonest available appointment.  Prevailing standard of care was able to be met in this audio-only visit. Patient verbally consented to receive this service via voice-only telephone call.     KEY:     I[]I Y = Yes / Present / Endorses  I[]I N = No / Absent / Denies  I[]I U = Unknown / Unable to Assess / Unwilling to Participate  I[]I A = Ambiguity Exists / Accuracy Uncertain  I[]I D = Denial or Minimization is Suspected/Evident  I[]I N/A = Non-Applicable    CHART REVIEW:     Available documentation has been reviewed, and pertinent elements of the chart have been incorporated into this evaluation where appropriate.    The patient's last Epic encounter with me was on: 7/20/2022  The patient's last Epic encounter in the psychiatry department was on: Visit date not found  The patient's first Epic encounter in the psychiatry department was on:      LA/MS  AWARE  Site reviewed - No recent entries are noted.      ADVICE AND COUNSELING:     [x] Diet/exercise counseled, encouragement provided, continue to monitor weight.  [x] Advised not to operate a motor vehicle or heavy machinery if effects of medications or underlying symptoms/condition impair the ability to safely do so.  [x] Advised to comply with medication fully as prescribed/instructed.  [x] Advised to follow with primary care provider for routine health maintenance and management of medical co-morbidities.  [x] In cases of emergencies, advised to call 911 or [phone], or present to the emergency department for immediate assistance.    Alcohol, Tobacco, and Drug Counseling, as well as resources, has been provided, as warranted.     Shared medical decision making and informed consent are the hallmark and bedrock of good clinical care, and as such have  been employed and obtained, respectively, to the degree possible.      Risk Mitigation Strategies, Harm Reduction Techniques, and Safety Netting are important interventions that can reduce acute and chronic risk, and as such have been employed to the degree possible.    Prescription Drug Management entails the review, recommendation, or consideration without recommendation of medications, and as such was employed during the encounter.    Additional Psychoeducation has been provided, as warranted.    Discussed, to the extent possible, diagnosis, risks and benefits of proposed treatment vs alternative treatments vs no treatment, potential side effects of these treatments and the inherent unpredictability of treatment. The patient's ability to understand, participate and engage in a conversation surrounding this was deemed to be: negligible.     Written material has been provided to supplement, augment, and reinforce any discussions and interventions, via the AVS or other pre-printed handouts.       DIAGNOSTIC TESTING:     The chart was reviewed for recent diagnostic procedures and investigations, and pertinent results are noted below.    Wt Readings from Last 2 Encounters:   01/25/23 62.5 kg (137 lb 10.8 oz)   08/18/22 67.6 kg (149 lb)     BP Readings from Last 1 Encounters:   01/25/23 130/70     Pulse Readings from Last 1 Encounters:   01/25/23 (!) 52        Blood Counts, Electrolytes & Glucose: (i.e. WBC, ANC, Hemoglobin, Hematocrit, MCV, Platelets)  Lab Results   Component Value Date    WBC 9.05 01/25/2023    GRAN 5.1 01/25/2023    GRAN 56.7 01/25/2023    HGB 11.0 (L) 01/25/2023    HCT 36.6 (L) 01/25/2023    MCV 93 01/25/2023     01/25/2023     01/25/2023    K 4.4 01/25/2023    CALCIUM 9.8 01/25/2023    PHOS 4.1 08/19/2022    MG 1.8 08/19/2022    CO2 22 (L) 01/25/2023    ANIONGAP 12 01/25/2023     (H) 01/25/2023    HGBA1C 5.4 01/25/2023       Renal, Liver, Pancreas, Thyroid, Parathyroid,  Prolactin, CPK, Lipids & Vitamin Levels: (i.e. Cr, BUN, Anion Gap, GFR, Urine Specific Gravity, Urine Protein, Microalburnin, AST, ALT, GGT, Alk Phos,Total Bili, Total Protein, Albumin, Ammonia, INR, Amylase, Lipase, TSH, Total T3, Total T4, Free T4 PTH, Prolactin, CPK, Cholesterol, Triglycerides, LDH, HDL, Vitamin B12, Folate, Vitamin D)  Lab Results   Component Value Date    CREATININE 0.9 01/25/2023    BUN 21 01/25/2023    EGFRNORACEVR >60.0 01/25/2023    SPECGRAV 1.010 08/18/2022    PROTEINUA Negative 08/18/2022    AST 14 01/25/2023    ALT 14 01/25/2023    ALKPHOS 73 01/25/2023    BILITOT 0.2 01/25/2023    LABPROT 10.6 08/18/2022    ALBUMIN 3.9 01/25/2023    INR 1.0 08/18/2022    LIPASE 16 08/18/2022    TSH 0.443 01/25/2023    [id number] 92 05/27/2016     07/14/2018    CHOL 95 (L) 01/25/2023    TRIG 137 01/25/2023    LDLCALC 35.6 (L) 01/25/2023    HDL 32 (L) 01/25/2023    [id number] 493 01/25/2023    [id number] 45 01/25/2023       Infection Diseases, Pregnancy Screenings & Drug Levels: (i.e. Hepatitis Panel, HIV, Syphilis, Urine & Blood Pregnancy Screens, beta hCG, Lithium, Valproic Acid, Carbamazepine, Lamotrigine, Phenytoin, Phenobarbital, Clozapine, Norclozapine, Clozapine + Norclozapine)   Lab Results   Component Value Date    HEPCAB Negative 08/18/2022       Addiction: (i.e. Urine Toxicology, Blood Alcohol, PETH, EtG, EtS, CDT, Buprenorphine, Norbuprenorphine)  No results found for: PCDSOALCOHOL, PCDSOBENZOD, BARBITURATES, PCDSCOMETHA, OPIATESCREEN, COCAINEMETAB, AMPHETAMINES, MARIJUANATHC, PCDSOPHENCYN, PCDSUBUPRE, PCDSUFENTANY, PCDSUOXYCOD, PCDSUTRAMA, [id number], PETH, ALCOHOLMEDIC, THEYLGLUCU, ETHYLSULF, CDT, BUPRENORPH, NORBUPRENOR    Results for orders placed or performed during the hospital encounter of 08/18/22   EKG 12-lead    Collection Time: 08/18/22 10:10 AM    Narrative    Test Reason : R07.9,    Vent. Rate : 065 BPM     Atrial Rate : 065 BPM     P-R Int : 148 ms          QRS Dur : 076  ms      QT Int : 404 ms       P-R-T Axes : 058 041 064 degrees     QTc Int : 420 ms    Normal sinus rhythm  Normal ECG  When compared with ECG of 04-FEB-2021 11:41,  No significant change was found  Confirmed by SOSA CARRILLO MD ([id number]) on 8/18/2022 1:49:29 PM    Referred By:             Confirmed By:SOSA CARRILLO MD

## 2023-04-12 ENCOUNTER — OFFICE VISIT (OUTPATIENT)
Dept: PSYCHIATRY | Facility: CLINIC | Age: 78
End: 2023-04-12
Payer: MEDICARE

## 2023-04-12 DIAGNOSIS — E13.9 DIABETES MELLITUS DUE TO ABNORMAL INSULIN: ICD-10-CM

## 2023-04-12 DIAGNOSIS — F79 INTELLECTUAL DISABILITY: ICD-10-CM

## 2023-04-12 DIAGNOSIS — F03.90 DEMENTIA, UNSPECIFIED DEMENTIA SEVERITY, UNSPECIFIED DEMENTIA TYPE, UNSPECIFIED WHETHER BEHAVIORAL, PSYCHOTIC, OR MOOD DISTURBANCE OR ANXIETY: ICD-10-CM

## 2023-04-12 DIAGNOSIS — E78.5 HYPERLIPIDEMIA, UNSPECIFIED HYPERLIPIDEMIA TYPE: ICD-10-CM

## 2023-04-12 DIAGNOSIS — F25.9 CHRONIC SCHIZOAFFECTIVE DISORDER: Primary | ICD-10-CM

## 2023-04-12 DIAGNOSIS — I10 HYPERTENSION, UNSPECIFIED TYPE: ICD-10-CM

## 2023-04-12 DIAGNOSIS — H90.5 DEAFNESS CONGENITAL: ICD-10-CM

## 2023-04-12 PROCEDURE — 1159F MED LIST DOCD IN RCRD: CPT | Mod: HCNC,CPTII,95, | Performed by: PSYCHIATRY & NEUROLOGY

## 2023-04-12 PROCEDURE — 1159F PR MEDICATION LIST DOCUMENTED IN MEDICAL RECORD: ICD-10-PCS | Mod: HCNC,CPTII,95, | Performed by: PSYCHIATRY & NEUROLOGY

## 2023-04-12 PROCEDURE — 99214 PR OFFICE/OUTPT VISIT, EST, LEVL IV, 30-39 MIN: ICD-10-PCS | Mod: HCNC,95,, | Performed by: PSYCHIATRY & NEUROLOGY

## 2023-04-12 PROCEDURE — 1160F PR REVIEW ALL MEDS BY PRESCRIBER/CLIN PHARMACIST DOCUMENTED: ICD-10-PCS | Mod: HCNC,CPTII,95, | Performed by: PSYCHIATRY & NEUROLOGY

## 2023-04-12 PROCEDURE — 99214 OFFICE O/P EST MOD 30 MIN: CPT | Mod: HCNC,95,, | Performed by: PSYCHIATRY & NEUROLOGY

## 2023-04-12 PROCEDURE — 1160F RVW MEDS BY RX/DR IN RCRD: CPT | Mod: HCNC,CPTII,95, | Performed by: PSYCHIATRY & NEUROLOGY

## 2023-04-12 RX ORDER — BENZTROPINE MESYLATE 1 MG/1
TABLET ORAL
Qty: 180 TABLET | Refills: 1 | Status: SHIPPED | OUTPATIENT
Start: 2023-04-12 | End: 2023-10-12

## 2023-04-12 RX ORDER — QUETIAPINE FUMARATE 200 MG/1
200 TABLET, FILM COATED ORAL NIGHTLY
Qty: 90 TABLET | Refills: 1 | Status: SHIPPED | OUTPATIENT
Start: 2023-04-12 | End: 2023-11-28

## 2023-04-12 RX ORDER — DULOXETIN HYDROCHLORIDE 30 MG/1
30 CAPSULE, DELAYED RELEASE ORAL DAILY
Qty: 90 CAPSULE | Refills: 1 | Status: SHIPPED | OUTPATIENT
Start: 2023-04-12 | End: 2023-10-12

## 2023-04-12 NOTE — PATIENT INSTRUCTIONS
Thank you for allowing me to participate as part of your health care team, and thank you for choosing [facility].    APPLE WILKERSON MD  Board Certified in Psychiatry & Addiction Medicine      IN CASE OF SUICIDAL THINKING, call the [place] Suicide Hotline Number: 988    988 Suicide & Crisis Lifeline: 988 , 0-459-572-TALK (8255)  [email or web address].sonarDesign           AFTER VISIT INSTRUCTIONS:     [x] Take all medication, from all providers, as prescribed.  [x] If questions or concerns arise, or if experiencing side effects, adverse reactions or worsening symptoms, contact me or the appropriate provider through the [facility] portal at [email or web address], or call [phone] to speak with my office staff or [phone] to reach the [facility] main line.  [x] In cases of emergencies, call [phone], or present directly to the emergency department for immediate assistance.      ADJUSTMENTS TO YOUR REGIMEN:    - no adjustments to your medication regimen were made at this encounter  - continue your medication regimen as prescribed    LAB WORK:    - none ordered by me today  - lab work is up to date      Information on mental health medications:    National Crane of Mental Health:   https://www.nimh.nih.gov/health/topics/mental-health-medications     Web MD:   [email or web address]       RESOURCES:     IN CASE OF SUICIDAL THINKING, call the [facility] Suicide Hotline Number: 988    988 Suicide & Crisis Lifeline: 988 , 0-924-306-TALK (8255)  Provides 24/7, free and confidential support for people in distress, prevention and crisis resources for you or your loved ones, and best practices for professionals.    Call, text or chat.  https://GoodRx.sonarDesign     National Action [place] for Suicide Prevention: the National Action [place] for Suicide Prevention (Action [place]) is the nations public-private partnership for suicide prevention, working with more than 250 national partners.    https://theactionalliance.org     National Strategy for Suicide Prevention & Risk Mitigation:  https://theactionalliance.org/our-strategy/national-strategy-suicide-prevention     [x] Fact Sheet:   https://www.hhs.gov/sites/default/files/national-strategy-for-suicide-prevention-factsheet.pdf     [x] Report:   https://www.ncbi.nlm.nih.gov/books/ZOY853229/pdf/Bookshelf_NBK109917.pdf     Suicide Prevention Resource Center: The Suicide Prevention Resource Center (SPR) is the only federally supported resource center devoted to advancing the implementation of the National Strategy for Suicide Prevention. [facility] is funded by the U.S. Department of Health and Human Services' Substance Abuse and Mental Health Services Administration ([facility]).  https://www.[facility].org     [x] Safety Plan:   https://OpenHomes/wp-content/uploads/2021/08/Dell-Safety-Plan-8-6-21.pdf     [x] Suicide Risk Curve:  https://OpenHomes/wp-content/uploads/2021/08/Evzgsaj-wbup-ivxnt-8-6-21.pdf     [place] Mental Health Advocacy Service: the state agency tasked with protecting the legal rights of people with behavioral health diagnoses.  https://mhas.[place].[facility]     Alcoholics Anonymous (AA): find a meeting near you.  https://www.aa.org     SMI Adviser: resources for individuals and families with serious mental illness.  https://smiadviser.org     National [place] for the Mentally Ill (JONATHAN): the nation's largest grassroots organization dedicated to building better lives for individuals with mental illness.  https://www.jonathan.org/Home     U.S. Department of Health and Human Services (HHS): the mission of HHS is to enhance the health and well-being of all Americans, by providing for effective health and human services and by fostering sound, sustained advances in the sciences underlying medicine, public health, and .   https://www.hhs.gov     Substance Abuse and Mental Health Services Administration  ([facility]): [facility] is the agency within [facility] that leads public health efforts to advance the behavioral health of the nation. [facility]'s mission is to reduce the impact of substance abuse and mental illness on [place] communities.   https://www.[facility].gov     National Institutes of Health ([facility]): a part of [facility], [facility] is the largest biomedical research agency in the world.   https://www.nih.gov     National [place] on Drug Abuse (CORAL): sponsored by the NIH, the mission of CORAL is to advance science on drug use and addiction and to apply that knowledge to improve individual and public health.  https://coral.nih.gov     National [place] on Alcohol Abuse and Alcoholism (NIAAA): sponsored by the NIH, the mission of NIAA is to generate and disseminate fundamental knowledge about the effects of alcohol on health and well-being, and apply that knowledge to improve diagnosis, prevention, and treatment of alcohol-related problems, including alcohol use disorder, across the lifespan.   https://www.niaaa.nih.gov     National Harm Reduction Coalition: resources for harm reduction, including techniques, strategies, policy, and advocacy.  https://harmreduction.org     The SHARE Approach - A Model for Shared Decision Making:  [x] Fact Sheet  https://www.ahrq.gov/sites/default/files/publications/files/share-approach_factsheet.pdf     AMA Principles of Medical Ethics - Informed Consent & Shared Decision Making:  [x] Chapter  https://www.ama-assn.org/system/files/2019-06/code-of-medical-oywkoz-lxsqaxs-3.pdf     Safety Netting for Primary Care:  [x] Article  https://www.ncbi.nlm.nih.gov/pmc/articles/OUP1742172/pdf/qjalmaw-6950--e70.pdf       MEDICATION MANAGEMENT:     [x] In addition to the potential beneficial effects, the use of any medication or drug (prescribed, over the counter or otherwise) carries with it the risk of potential adverse effects.  Each has a set of typical adverse effects - some common, some rare - but  idiosyncratic and unanticipated reactions unique to you are always possible.      [x] It is important to remember that untreated illness can also pose a risk, which must be taken into account when weighing the pros and cons of a medication trial.    [x] Medications and drugs can sometimes interact with each other in the body, leading to adverse effects - it is important that all your providers know all the medications and drugs you take - prescribed, over the counter, or otherwise.  Keep me and all your practitioners up to date with any changes.  It's always a good idea to keep an up-to-date list in an easily accessible location.    [x] There is an inherent unpredictability to all treatment, including the use of medication.  Unexpected outcomes can occur - keep me up to date with any difficulties you encounter.    [x] It is important to take medication as directed, and to comply fully with the instructions.  Check with me or the appropriate provider first before adjusting or stopping your medication on your own.    If you have any further questions or concerns about your care, please contact me or your other providers through the [facility] portal at [email or web address], or call [phone] or [phone].  We would be happy to provide you additional information pertaining to your diagnosis, intended outcomes, target symptoms for treatment, and possible benefits and risks of medication - you can also access this information through the provided resources.  Possible alternatives to the current treatment plan (including no treatment) can also be reviewed.      GENERAL HEALTH & WELLNESS:     [x] Establish and follow regularly with a primary care physician for routine health maintenance and management of any medical comorbidities.  [x] Follow a healthy diet, exercise routinely, and monitor weight and metabolic parameters.  [x] Allow adequate time for sleep and practice good sleep hygiene.  [x] Do not operate a motor  vehicle or heavy machinery if the effects of medications or the symptoms underlying your condition impair the ability for you to do so safely.    Dietary Guidelines for Americans, 3520-8762:  U.S. Department of Agriculture (USDA)  https://www.dietaryguidelines.gov/sites/default/files/2020-12/Dietary_Guidelines_for_Americans_2020-2025.pdf#page=31     The Nutrition Source:  [facility]  https://www.[facility].[place].[facility]/nutritionsource       SLEEP HYGIENE:     Follow these tips to establish healthy sleep habits:  [x] Keep a consistent sleep schedule. Get up at the same time every day, even on weekends or during vacations.  [x] Set a bedtime that is early enough for you to get at least 7-8 hours of sleep.  [x] Don't go to bed unless you are sleepy.  [x] If you don't fall asleep after 20 minutes, get out of bed. Go do a quiet activity without a lot of light exposure. It is especially important to not get on electronics.  [x] Establish a relaxing bedtime routine.  [x] Use your bed only for sleep and sex.  [x] Make your bedroom quiet and relaxing. Keep the room at a comfortable, cool temperature.  [x] Limit exposure to bright light in the evenings.  [x] Turn off electronic devices at least 30 minutes before bedtime.  [x] Don't eat a large meal before bedtime. If you are hungry at night, eat a light, healthy snack.  [x] Exercise regularly and maintain a healthy diet.  [x] Avoid consuming caffeine in the afternoon or evening.  [x] Avoid consuming alcohol before bedtime.  [x] Reduce your fluid intake before bedtime.    QUICK TIPS FOR BETTER SLEEP  Reduce smartphone usage Create and maintain a nightly ritual Avoid caffeine 4-6 hours before sleeping Don't eat or drink too much at bedtime Sleep at the same time every night        American Academy of Sleep Medicine - Healthy Sleep Habits:  https://sleepeducation.org/healthy-sleep/healthy-sleep-habits     American Academy of Sleep Medicine - Bedtime  Calculator:  https://sleepeducation.org/healthy-sleep/bedtime-calculator     American Academy of Sleep Medicine - Cognitive Behavioral Therapy for Insomnia (CBT-I):  https://sleepeducation.org/patients/cognitive-behavioral-therapy     American Academy of Sleep Medicine - Insomnia:  https://sleepeducation.org/sleep-disorders/insomnia       ALCOHOL & DRUG USE COUNSELING:     - abstain from alcohol and illicit drug use      Preventing Excessive Alcohol Use (CDC):  https://www.cdc.gov/alcohol/fact-sheets/moderate-drinking.htm#:~:text=To%20reduce%20the%20risk%20of,days%20when%20alcohol%20is%20consumed.     [x] Alcohol consumption is associated with a variety of short- and long-term health risks, including motor vehicle crashes, violence, sexual risk behaviors, high blood pressure, and various cancers (e.g., breast cancer).  [x] The risk of these harms increases with the amount of alcohol you drink. For some conditions, like some cancers, the risk increases even at very low levels of alcohol consumption (less than 1 drink).  [x] To reduce the risk of alcohol-related harms, the 5773-2452 Dietary Guidelines for Americans recommends that adults of legal drinking age can choose not to drink, or to drink in moderation by limiting intake to 2 drinks or less in a day for men or 1 drink or less in a day for women, on days when alcohol is consumed.  [x] The Guidelines also do not recommend that individuals who do not drink alcohol start drinking for any reason and that if adults of legal drinking age choose to drink alcoholic beverages, drinking less is better for health than drinking more.  [x] The Guidelines note that some people should not drink alcohol at all, such as:  - If they are pregnant or might be pregnant.  - If they are younger than age 21.  - If they have certain medical conditions or are taking certain medications that can interact with alcohol.  - If they are recovering from an alcohol use disorder or if they are  "unable to control the amount they drink.  [x] The Guidelines also note that not drinking alcohol is the safest option for women who are lactating. Generally, moderate consumption of alcoholic beverages by a woman who is lactating (up to 1 standard drink in a day) is not known to be harmful to the infant, especially if the woman waits at least 2 hours after a single drink before nursing or expressing breast milk. Women considering consuming alcohol during lactation should talk to their healthcare provider.  [x] The Guidelines note, Emerging evidence suggests that even drinking within the recommended limits may increase the overall risk of death from various causes, such as from several types of cancer and some forms of cardiovascular disease. Alcohol has been found to increase risk for cancer, and for some types of cancer, the risk increases even at low levels of alcohol consumption (less than 1 drink in a day).  [x] Although past studies have indicated that moderate alcohol consumption has protective health benefits (e.g., reducing risk of heart disease), recent studies show this may not be true.  [x] Its important to focus on the amount people drink on the days that they drink. Even if women consume an average of 1 drink per day or men consume an average of 2 drinks per day, binge drinking increases the risk of experiencing alcohol-related harm in the short-term and in the future.    Drinking Levels Defined (NIAAA):  https://www.niaaa.nih.gov/alcohol-health/overview-alcohol-consumption/moderate-binge-drinking     Drinking in Moderation:  According to the "Dietary Guidelines for Americans 9431-2642, U.S. Department of Health and Human Services and U.S. Department of Agriculture, adults of legal drinking age can choose not to drink or to drink in moderation by limiting intake to 2 drinks or less in a day for men and 1 drink or less in a day for women, when alcohol is consumed. Drinking less is better for health " than drinking more.    Binge Drinking:  NIAAA defines binge drinking as a pattern of drinking alcohol that brings blood alcohol concentration (ANN MARIE) to 0.08 percent - or 0.08 grams of alcohol per deciliter - or higher.  For a typical adult, this pattern corresponds to consuming 5 or more drinks (male), or 4 or more drinks (female), in about 2 hours.    The Substance Abuse and Mental Health Services Administration (SAMHSA), which conducts the annual National Survey on Drug Use and Health (NSDUH), defines binge drinking as 5 or more alcoholic drinks for males or 4 or more alcoholic drinks for females on the same occasion (i.e., at the same time or within a couple of hours of each other) on at least 1 day in the past month.    Heavy Alcohol Use:  NIAAA defines heavy drinking as follows:  - For men, consuming more than 4 drinks on any day or more than 14 drinks per week.  - For women, consuming more than 3 drinks on any day or more than 7 drinks per week.     [facility] defines heavy alcohol use as binge drinking on 5 or more days in the past month.    Patterns of Drinking Associated with Alcohol Use Disorder:  Binge drinking and heavy alcohol use can increase an individual's risk of alcohol use disorder.    Certain people should avoid alcohol completely, including those who:  - Plan to drive or operate machinery, or participate in activities that require skill, coordination, and alertness.  - Take certain over-the-counter or prescription medications.  - Have certain medical conditions.  - Are recovering from alcohol use disorder or are unable to control the amount that they drink.  - Are younger than age 21.  - Are pregnant or may become pregnant.    U.S. Standard Drink  12 oz beer   (5% ABV) 8 oz malt liquor   (7% ABV) 5 oz wine   (12% ABV) 1.5 oz 80-proof distilled spirit  (40% ABV)        Heroin use harm reduction:  1. Carry naloxone. When using heroin, make sure you have at least one dose of naloxone - the overdose  reversal drug - and have it in plain view. Understand how to give it.  2. Try a small dose first. It is best to first try a small amount of the heroin to check the effect.  3. Dont use heroin alone. Always use heroin with someone else and take turns while using.    It is possible to overdose with heroin whether you are snorting, injecting or using it in another form.    Signs of an overdose or emergency:   - The person is awake but unable to talk.  - Their body is limp.  - Their breathing is shallow or slow or stopped.  - Their skin is pale, ashen or clammy/sweaty.  - They are unconscious.    In case of emergency, give naloxone. If you suspect the heroin may contain fentanyl, administer more than one dose. Seek medical help even if naloxone has been given. Call 911 for help.      SHARED DECISION MAKING & INFORMED CONSENT:     Shared medical decision making and informed consent are the hallmark and bedrock of excellent clinical care.  During the encounter, shared medical decision making was employed and informed consent was obtained, to the degree possible, whenever feasible, appropriate and relevant. Those interventions are supplemented here with written materials, detailing the topics in more depth.       PSYCHOEDUCATION:     Psychoeducation pertaining to the following -     Diagnosis Etiology Disease Processes Natural Progression   Treatment Options Time Course Safety Netting Informed Consent   Intended Benefits of Medication Expectable Adverse Effects Target Symptoms for Treatment Alternatives to Current Treatment   Shared   Decision Making Risk Mitigation Strategies Harm Reduction Techniques Associated Bio-Med Complications     - can be further discussed and reviewed (you can also access additional information through the provided resources in this document).    Effective communication is essential in order to engage in shared medical decision making.  If you had difficulty understanding anything during your  encounter or in this supplementary document, please contact your providers through the [facility] portal at [email or web address] or call [phone].     [place] Dictionary  https://dictionary.urbano.org/us     It can be easy to miss, forget, or misremember important important information that was discussed during the session - especially when you're stressed, upset, or don't feel well.  If you or a representative have any additional questions, concerns, or topics to discuss - please contact me or your other providers through the [facility] portal at [email or web address], or call [phone] or [phone].      Memory Loss  https://www.Outbox.Smokazon.com/brain/memory-loss    Causes of Memory Loss  https://www.[facility]/what-causes-memory-loss-7291189    Memory loss: When to seek help  https://www.[facility].org/diseases-conditions/alzheimers-disease/in-depth/memory-loss/art-65702537    Memory, Forgetfulness, and Aging: What's Normal and What's Not?  https://www.serge.nih.gov/health/memory-forgetfulness-and-aging-whats-normal-and-whats-not    Depression and Memory Loss  https://www.Mind Candy/health/depression/depression-and-memory-loss    The Relationship Between Anxiety and Memory Loss  https://www.[facility]/academics/blog-posts/the-relationship-between-anxiety-and-memory-loss     PRESCRIPTION DRUG MANAGEMENT:     Prescription Drug Management entails the following:  [x] The review, recommendation, or consideration without recommendation of medications during the encounter.  [x] Discussion (to the extent possible) with the patient and/or other interested parties of the diagnosis, target symptoms, intended outcomes, and possible benefits and risks of medication, as well as alternatives (including no treatment), if not otherwise known or stated prior.  [x] Discussion (to the extent possible) with the patient and/or other interested parties of possible expectable adverse effects of any proposed individual  psychotropic agents, as well as the inherent unpredictability of treatment, if not otherwise known or stated prior.  [x] Informed consent is sought from the patient (and/or guardian/designated decision maker, if applicable) after a thorough discussion (to the extent possible) of the aforementioned points outlined above.  [x] The provision of counseling (to the extent possible) to the patient and/or other interested parties on the importance of full compliance with any prescribed medication, if not otherwise known or stated prior.    Information on psychotropic medication can be found at:   National [place] of Mental Health: Information on Mental Health Medications      RISK MITIGATION, HARM REDUCTION & SAFETY NETTING:     Risk Mitigation Strategies, Harm Reduction Techniques, and Safety Netting are important interventions that can reduce acute and chronic risk.  As such, opportunities were sought to incorporate psychoeducation and practical advice pertaining to these topics into the encounter, to the degree possible, whenever feasible, appropriate and relevant.  Those interventions are supplemented here with written materials, detailing the topics in more depth.       RISK MITIGATION STRATEGIES:     Risk mitigation strategies are used to reduce the likelihood of future episodes of suicide, homicide, violence, and/or other problematic behaviors (e.g. self-injurious, risky, addictive, compulsive, impulsive). The following are examples of risk mitigation strategies which you can employ in order to reduce your overall burden of risk.     [x] Treatment of underlying psychopathology driving acute and chronic risk to the extent possible.  [x] Use of self administered rating scales and journaling to assist in risk tracking.  [x] Exploration of protective factors to potentially counterbalance risk.  [x] Identification and avoidance of triggers and situations that increase risk, including excessive alcohol and drug  use.  [x] Timely follow up and ongoing treatment of mental health issues moving forward.  [x] Full compliance with medication regimen.  [x] A good working knowledge of your medication regimen, including specific instructions on the administration of the medications.  [x] Consultation with an appropriate medical provider prior to altering or deviating from these instructions on your own.  [x] Active involvement and participation of family and natural support wherever feasible and possible.  [x] Development and review of coping strategies that can be immediately deployed in times of acute crisis.  [x] Implementation of home safety practices and the removal/reduction of access to lethal means (including, but not limited to, firearms, certain types and quantities of medication, poisons, or other methods you may have contemplated or identified).  [x] Collaborative development of a written safety plan with your treatment team and loved ones that can be immediately referred to in times of acute crisis.  [x] Utilization of a safety contract to engage your treatment team and further assess/manage risk.  [x] A good working knowledge of how to access emergency treatment in times of acute crisis.  [x] Utilization of suicide hotlines number (988) and resources in times of crisis.    If you require further information pertaining to the issues outlined above, please reach out to me or your other providers through the [facility] portal at [email or web address], or call [phone] or [phone] to discuss.  See resource list for additional material.      SAFETY NETTING:     In healthcare, safety netting refers to the provision of information to help patients or carers identify the need to consult a health care professional if a health concern arises or changes.  The relevance of this advice is most obvious with chronic mental illnesses, as their dynamic nature, with symptoms and signs emerging at different times and in different  combinations, makes safety netting particularly important.  Specific safety net advice for you includes the following:    [x] The existence of uncertainty. Mental health diagnoses and conditions contain at least some degree of uncertainty - knowing this, you should feel empowered to reconsult if necessary.  [x] What exactly to look out for. Given the recognised risk of possible deterioration or the development of complications, you should become familiar with the specific clinical features (including red flags) to look out for.    [x] How exactly to seek further help. You should know how and where to seek further help if needed.  Make a plan in advance and keep it handy.  It's also a good idea to share the plan with your treatment providers and loved ones.  [x] What to expect about time course. Mental health diagnoses and conditions often have an expected time course, which is important information for you to know.  However, if your difficulties do not conform to this time line and concerns arise, do not delay seeking further medical advice.    If you require further information pertaining to the issues outlined above, please reach out to me or your other providers through the [facility] portal at [email or web address], or call [phone] or [phone] to discuss.  See resource list for additional material.      HARM REDUCTION:     Harm Reduction techniques are used in an effort to reduce negative consequences associated with risky and maladaptive behaviors, until cessation of the problematic behaviors can be established.  Harm reduction is best thought of as a journey and not a destination; it is not an endorsement of problematic behavior, but an acknowledgement and recognition of the step-by-step nature of recovery.      Although commonly employed in working with people who suffer with drug addiction, harm reduction can be more broadly applied to any problematic behavior.    Harm Reduction and Substance  Abuse:  [x] Incorporates a spectrum of strategies that includes safer use, managed use, abstinence, meeting people who use drugs where theyre at, and addressing conditions of use along with the use itself.  [x] Accepts, for better or worse, that licit and illicit drug use is part of our world and chooses to work to minimize its harmful effects rather than simply ignore or condemn them.  [x] Understands drug use as a complex, multi-faceted phenomenon that encompasses a continuum of behaviors from severe use to total abstinence, and acknowledges that some ways of using drugs are clearly safer than others.  [x] Calls for the non-judgmental, non-coercive provision of services and resources to people who use drugs and the communities in which they live in order to assist them in reducing attendant harm.  [x] Affirms people who use drugs themselves as the primary agents of reducing the harms of their drug use and seeks to empower them to share information and support each other in strategies which meet their actual conditions of use.  [x] Does not attempt to minimize or ignore the real and tragic harm and danger that can be associated with illicit drug use.  [x] Meets people where they are, but seeks to not leave them there.  [x] Examples of specific interventions include, but are not limited to, narcan (naloxone), medication assisted treatment, syringe access, overdose prevention, and safer drug use techniques.    Key Harm Reduction Strategies: Opioid Use Disorder  [x] Safe Injection Sites & Equipment  [x] Managed Use  [x] Syringe Exchange Programs  [x] Fentanyl Test Strips  [x] Pharmacotherapy/Medication Assisted Treatment  [x] Narcan  [x] Good [employer] Laws  [x] Treatment Instead of custodial  [x] Diversion Programs  [x] Overdose Education  [x] Abstinence    Whether or not you struggle with substance abuse, any and all opportunities to employ harm reduction techniques to address difficult to change problematic  "behaviors should be sought and implemented - whenever and wherever feasible, relevant and applicable. Additionally, harm reduction techniques can be applied broadly, and are relevant for a multitude of situations - even those that do not involve problematic or maladaptive behaviors.     EXAMPLES OF HARM REDUCTION IN OTHER AREAS  SUN SCREEN SEAT BELTS SPEED LIMITS BIRTH CONTROL        If you require further information pertaining to the issues outlined above, please reach out to me through the [facility] portal or call [phone] to discuss.  See resource list for additional material.      FIREARM SAFETY:     THE SIX BASIC GUN SAFETY RULES  There are six basic gun safety rules for gun owners to understand and practice at all times:  Treat all guns as if they are loaded. Always assume that a gun is loaded even if you think it is unloaded. Every time a gun is handled for any reason, check to see that it is unloaded. If you are unable to check a gun to see if it is unloaded, leave it alone and seek help from someone more knowledgeable about guns.  Keep the gun pointed in the safest possible direction. Always be aware of where a gun is pointing. A "safe direction" is one where an accidental discharge of the gun will not cause injury or damage. Only point a gun at an object you intend to shoot. Never point a gun toward yourself or another person.  Keep your finger off the trigger until you are ready to shoot. Always keep your finger off the trigger and outside the trigger guard until you are ready to shoot. Even though it may be comfortable to rest your finger on the trigger, it also is unsafe. If you are moving around with your finger on the trigger and stumble or fall, you could inadvertently pull the trigger. Sudden loud noises or movements can result in an accidental discharge because there is a natural tendency to tighten the muscles when startled. The trigger is for firing and the handle is for handling.  Know your " target, its surroundings and beyond. Check that the areas in front of and behind your target are safe before shooting. Be aware that if the bullet misses or completely passes through the target, it could strike a person or object. Identify the target and make sure it is what you intend to shoot. If you are in doubt, DON'T SHOOT! Never fire at a target that is only a movement, color, sound or unidentifiable shape. Be aware of all the people around you before you shoot.  Know how to properly operate your gun. It is important to become thoroughly familiar with your gun. You should know its mechanical characteristics including how to properly load, unload and clear a malfunction from your gun. Obviously, not all guns are mechanically the same. Never assume that what applies to one make or model is exactly applicable to another. You should direct questions regarding the operation of your gun to your firearms dealer, or contact the  directly.  Store your gun safely and securely to prevent unauthorized use. Guns and ammunition should be stored separately. When the gun is not in your hands, you must still think of safety. Use an approved firearms safety device on the gun, such as a trigger lock or cable lock, so it cannot be fired. Store it unloaded in a locked container, such as an approved lock box or a gun safe. Store your gun in a different location than the ammunition. For maximum safety you should use both a locking device and a storage container.    ADDITIONAL SAFETY POINTS  The six basic safety rules are the foundational rules for gun safety. However, there are additional safety points that must not be overlooked.  [x] Never handle a gun when you are in an emotional state such as anger or depression. Your judgment may be impaired. If you have acute or chronic suicidal ideation, a suicide plan, or suicidal intent, have firearms removed and your access restricted by a trusted loved one or other responsible  "individual or agency.  [x] Never shoot a gun in celebration (the Fourth of July or New Year's [place], for example). Not only is this unsafe, but it is generally illegal. A bullet fired into the air will return to the ground with enough speed to cause injury or death.  [x] Do not shoot at water, flat or hard surfaces. The bullet can ricochet and hit someone or something other than the target.  [x] Hand your gun to someone only after you verify that it is unloaded and the cylinder or action is open. Take a gun from someone only after you verify that it is unloaded and the cylinder or action is open.  [x] Guns, alcohol and drugs don't mix. Alcohol and drugs can negatively affect judgment as well as physical coordination. Alcohol and any other substance likely to impair normal mental or physical functions should not be used before or while handling guns. Avoid handling and using your gun when you are taking medications that cause drowsiness or include a warning to not operate machinery while taking this drug.   [x] The loud noise from a fired gun can cause hearing damage, and the debris and hot gas that is often emitted can result in eye injury. Always wear ear and eye protection when shooting a gun.      GUNS AND CHILDREN - FIREARM OWNER RESPONSIBILITIES    You Cannot Be Too Careful with Children and Guns  [x] There is no such thing as being too careful with children and guns. Never assume that simply because a toddler may lack finger strength, they can't pull the trigger. A child's thumb has twice the strength of the other fingers. When a toddler's thumb "pushes" against a trigger, invariably the barrel of the gun is pointing directly at the child's face. NEVER leave a firearm lying around the house.  [x] Child safety precautions still apply even if you have no children or if your children have grown to adulthood and left home. A nephew, niece, neighbor's child or a grandchild may come to visit. Practice gun safety at " "all times.  [x] To prevent injury or death caused by improper storage of guns in a home where children are likely to be present, you should store all guns unloaded, lock them with a firearms safety device and store them in a locked container. Ammunition should be stored in a location separate from the gun.    Talking to Children About Guns  [x] Children are naturally curious about things they don't know about or think are "forbidden." When a child asks questions or begins to act out "gun play," you may want to address his or her curiosity by answering the questions as honestly and openly as possible. This will remove the mystery and reduce the natural curiosity. Also, it is important to remember to talk to children in a manner they can relate to and understand. This is very important, especially when teaching children about the difference between "real" and "make-believe." Let children know that, even though they may look the same, real guns are very different than toy guns. A real gun will hurt or kill someone who is shot.    Instill a Mind Set of Safety and Responsibility  [x] The American Academy of Pediatrics reports that adolescence is a highly vulnerable stage in life for teenagers struggling to develop traits of identity, independence and autonomy. Children, of course, are both naturally curious and innocently unaware of many dangers around them. Thus, adolescents as well as children may not be sufficiently safeguarded by cautionary words, however frequent. Contrary actions can completely undermine good advice. A "Do as I say and not as I do" approach to gun safety is both irresponsible and dangerous.  [x] Remember that actions speak louder than words. Children learn most by observing the adults around them. By practicing safe conduct you will also be teaching safe conduct.    Safety and Storage Devices  [x] If you decide to keep a firearm in your home you must consider the issue of how to store the firearm " in a safe and secure manner. There are a variety of safety and storage devices currently available to the public in a wide range of prices. Some devices are locking mechanisms designed to keep the firearm from being loaded or fired, but don't prevent the firearm from being handled or stolen. There are also locking storage containers that hold the firearm out of sight. For maximum safety you should use both a firearm safety device and a locking storage container to store your unloaded firearm.   Two of the most common locking mechanisms are trigger locks and cable locks. Trigger locks are typically two-piece devices that fit around the trigger and trigger guard to prevent access to the trigger. One side has a post that fits into a hole in the other side. They are locked by a key or combination locking mechanism. Cable locks typically work by looping a strong steel cable through the action of the firearm to block the firearm's operation and prevent accidental firing. However, neither trigger locks nor cable locks are designed to prevent access to the firearm.   [x] Smaller lock boxes and larger gun safes are two of the most common types of locking storage containers. One advantage of lock boxes and gun safes is that they are designed to completely prevent unintended handling and removal of a firearm. Lock boxes are generally constructed of sturdy, high-grade metal opened by either a key or combination lock. Gun safes are quite heavy, usually weighing at least 50 pounds. While gun safes are typically the most expensive firearm storage devices, they are generally more reliable and secure.     Remember: Safety and storage devices are only as secure as the precautions you take to protect the key or combination to the lock.    RULES FOR KIDS  Adults should be aware that a child could discover a gun when a parent or another adult is not present. This could happen in the child's own home; the home of a neighbor, friend or  relative; or in a public place such as a school or park. If this should happen, a child should know the following rules and be taught to practice them.   Stop  The first rule for a child to follow if he/she finds or sees a gun is to stop what he/she is doing.  Don't Touch!  The second rule is for a child not to touch a gun he/she finds or sees. A child may think the best thing to do if he/she finds a gun is to pick it up and take it to an adult. A child needs to know he/she should NEVER touch a gun he/she may find or see.  Leave the Area  The third rule is to immediately leave the area. This would include never taking a gun away from another child or trying to stop someone from using gun.  Tell an Adult  The last rule is for a child to tell an adult about the gun he/she has seen. This includes times when other kids are playing with or shooting a gun.     METHODS OF CHILDPROOFING YOUR FIREARM  As a responsible handgun owner, you must recognize the need and be aware of the methods of childproofing your handgun, whether or not you have children.  Whenever children could be around, whether your own, or a friend's, relative's or neighbor's, additional safety steps should be taken when storing firearms and ammunition in your home.  [x] Always store your firearm unloaded.  [x] Use a firearms safety device AND store the firearm in a locked container.  [x] Store the ammunition separately in a locked container.  Always storing your firearm securely is the best method of childproofing your firearm; however, your choice of a storage place can add another element of safety. Carefully choose the storage place in your home especially if children may be around.  [x] Do not store your firearm where it is visible.  [x] Do not store your firearm in a bedside table, under your mattress or pillow, or on a closet shelf.  [x] Do not store your firearm among your valuables (such as jewelry or cameras) unless it is locked in a secure  container.  [x] Consider storing firearms not possessed for self-defense in a safe and secure manner away from the home.    [employer] for Gun Safety:  https://www.everytown.org       Gun Violence: Prediction, Prevention and Policy  American Psychological Association Panel of Experts Report  https://www.apa.org/pubs/reports/gun-violence-report.pdf       If you require further information pertaining to any of the issues outlined above, please reach out to me or your other providers through the [facility] portal at [email or web address], or call [phone] or [phone] to discuss.  See resource list for additional material.      IN CASE OF SUICIDAL THINKING, call the National Suicide Hotline Number: 988    988 Suicide & Crisis Lifeline: 986 , 0-080-268-FTDC (4800)  Provides 24/7, free and confidential support for people in distress, prevention and crisis resources for you or your loved ones, and best practices for professionals.    Call, text or chat.  https://[facility].org

## 2023-04-24 ENCOUNTER — HOSPITAL ENCOUNTER (EMERGENCY)
Facility: HOSPITAL | Age: 78
Discharge: NURSING, REHAB OR LONG-TERM CARE | End: 2023-04-24
Attending: EMERGENCY MEDICINE
Payer: MEDICARE

## 2023-04-24 VITALS
WEIGHT: 137.69 LBS | RESPIRATION RATE: 17 BRPM | HEART RATE: 55 BPM | BODY MASS INDEX: 25.18 KG/M2 | TEMPERATURE: 98 F | SYSTOLIC BLOOD PRESSURE: 193 MMHG | OXYGEN SATURATION: 98 % | DIASTOLIC BLOOD PRESSURE: 81 MMHG

## 2023-04-24 DIAGNOSIS — S09.90XA INJURY OF HEAD, INITIAL ENCOUNTER: Primary | ICD-10-CM
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
## 2023-04-24 DIAGNOSIS — S80.11XA CONTUSION OF RIGHT LOWER LEG, INITIAL ENCOUNTER: ICD-10-CM

## 2023-04-24 PROCEDURE — 99284 EMERGENCY DEPT VISIT MOD MDM: CPT | Mod: HCNC,,, | Performed by: EMERGENCY MEDICINE

## 2023-04-24 PROCEDURE — 99284 PR EMERGENCY DEPT VISIT,LEVEL IV: ICD-10-PCS | Mod: HCNC,,, | Performed by: EMERGENCY MEDICINE

## 2023-04-24 PROCEDURE — 99284 EMERGENCY DEPT VISIT MOD MDM: CPT | Mod: 25,HCNC

## 2023-04-24 NOTE — ED PROVIDER NOTES
Chief Complaint   Fall (From [facility]. Unwitnessed fall. Unknown head trauma. No blood thinner usage. Left leg pain. )      History Of Present Illness   Lay Peres is a 77 y.o. female presenting with right knee bruising and left forehead bruising after a fall.  The patient was attempting to get into her wheelchair, reached down for something on her pants and rolled onto her right side.  The bruises on the left side of her forehead, however.  Nursing home personnel deny loss of consciousness.  The patient at one point complained of some pain in her right leg near the bruise.  The nursing note about the left leg is incorrect per the caregiver.    History obtained from:  Caregiver    Review of patient's allergies indicates:  No Known Allergies    No current facility-administered medications on file prior to encounter.     Current Outpatient Medications on File Prior to Encounter   Medication Sig Dispense Refill    acetaminophen (TYLENOL) 500 MG tablet Take 1,000 mg by mouth 2 (two) times daily.      aspirin (ECOTRIN) 325 MG EC tablet TAKE 1 TABLET BY MOUTH ONCE daily 30 tablet 3    benztropine (COGENTIN) 1 MG tablet TAKE 1 TABLET (1mg) BY MOUTH twice a day 180 tablet 1    blood sugar diagnostic Strp 1 strip by Misc.(Non-Drug; Combo Route) route once daily. 100 strip 4    cholecalciferol, vitamin D3, (VITAMIN D3) 25 mcg (1,000 unit) capsule Take 1 capsule by mouth Daily.      cyanocobalamin 1,000 mcg/mL injection One ml IM weekly for 4 weeks then one ml IM monthly.  Dipense #10 25 gague 1 inch needles and #10 one ml syringes 10 mL 3    donepeziL (ARICEPT) 5 MG tablet Take 1 tablet (5 mg total) by mouth every morning. To be taken with food 90 tablet 3    DULoxetine (CYMBALTA) 30 MG capsule Take 1 capsule (30 mg total) by mouth once daily. 90 capsule 1    famotidine (PEPCID) 40 MG tablet TAKE 1 TABLET BY MOUTH EVERY EVENING AT 8PM 30 tablet 6    ferrous gluconate (FERGON) 324 MG tablet  Take 324 mg by mouth every other day.      gabapentin (NEURONTIN) 300 MG capsule TAKE 1 TABLET (300MG) BY MOUTH AT BEDTIME at 8pm 30 capsule 6    haloperidol decanoate (HALDOL DECANOATE) 50 mg/mL injection Inject 1 mL (50 mg total) into the muscle every 14 (fourteen) days. 6 mL 12    lisinopriL (PRINIVIL,ZESTRIL) 20 MG tablet Take 1 tablet (20 mg total) by mouth once daily. 30 tablet 11    memantine (NAMENDA XR) 28 mg CSpX Take 1 capsule (28 mg total) by mouth once daily. 90 capsule 3    multivitamin capsule Take 1 capsule by mouth once daily.      nystatin (MYCOSTATIN) ointment Apply topically 2 (two) times daily as needed.       QUEtiapine (SEROQUEL) 200 MG Tab Take 1 tablet (200 mg total) by mouth every evening. 90 tablet 1    senna (SENOKOT) 8.6 mg tablet Take 1 tablet by mouth Twice daily.         Past History   As per HPI and below:  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 No pertinent surgical history.    Social History     Socioeconomic History    Marital status:    Tobacco Use    Smoking status: Never    Smokeless tobacco: Never   Substance and Sexual Activity    Alcohol use: No    Drug use: No    Sexual activity: Not Currently       Family History   Problem Relation Age of Onset    Depression Sister     Depression Brother     Suicide Brother     ADD / ADHD Neg Hx     Alcohol abuse Neg Hx     Anxiety disorder Neg Hx     Bipolar disorder Neg Hx     Dementia Neg Hx     Drug abuse Neg Hx     OCD Neg Hx     Paranoid behavior Neg Hx     Physical abuse Neg Hx      Schizophrenia Neg Hx     Seizures Neg Hx     Self injury Neg Hx     Sexual abuse Neg Hx        Physical Exam     Vitals:    04/24/23 1033 04/24/23 1148   BP: 111/79 (!) 193/81   BP Location: Right arm    Patient Position: Sitting    Pulse: (!) 57 (!) 55   Resp: 16 17   Temp: 97.5 °F (36.4 °C)    TempSrc: Oral    SpO2: 99% 98%   Weight: 62.5 kg (137 lb 10.9 oz)      Appearance: No acute distress.  Head:  Bruise and very small hematoma to left forehead.  Neck: No cervical spine tenderness.  Full range of motion.  No soft tissue tenderness.  Back: No thoracic, lumbar or sacral spine tenderness.  No soft tissue tenderness.  Chest: No chest wall tenderness.  Breath sounds are equal bilaterally.  No wheezes.  No rhonchi.  No rales.  Cardiovascular: Regular rate and rhythm.  No murmurs.  No gallops.  No rubs.  Abdomen: Soft. Nontender.   No distention.  No guarding. No rebound.  No ecchymoses.  Integument: No ecchymoses or other signs of trauma.  Musculoskeletal: Good range of motion of all joints including both hips and both knees.  Ecchymosis and slight tenderness to the right proximal tibia on the anterior aspect  Neurologic:  Follows some commands, no obvious focal deficits.  Mental status:  Baseline per caregiver.    Initial MDM   Right knee ecchymosis and tenderness along with left forehead hematoma.  The patient's leg exam is otherwise fairly benign and she has excellent range of motion of the hip and knee.  I am going to obtain x-rays of an abundance of caution.  I will also CT her head.  I anticipate discharge.    Medications Given   Medications - No data to display    Results and Course     Labs Reviewed   HIV 1 / 2 ANTIBODY   HEPATITIS C ANTIBODY       Imaging Results              CT Head Without Contrast (Final result)  Result time 04/24/23 12:23:08      Final result by Tom Vidal MD (04/24/23 12:23:08)                   Impression:      Stable exam as above.  No acute intracranial abnormality  identified.      Electronically signed by: Tom Vidal MD  Date:    04/24/2023  Time:    12:23               Narrative:    EXAMINATION:  CT HEAD WITHOUT CONTRAST    CLINICAL HISTORY:  Head trauma, minor (Age >= 65y);    TECHNIQUE:  Low dose axial CT images obtained throughout the head without intravenous contrast. Sagittal and coronal reconstructions were performed.    COMPARISON:  Head CT most recent 08/18/2022, MRI brain 07/13/2018    FINDINGS:  Intracranial compartment:    Age-related generalized cerebral volume loss.  Ventricles are midline and stable in size and configuration without distortion by mass effect or acute hydrocephalus.  No extra-axial blood or fluid collections.    Grossly stable patchy hypoattenuation of the supratentorial white matter consistent with chronic microvascular ischemic change.  No definite new focal areas of abnormal parenchymal attenuation.  Skull base atherosclerotic vascular calcifications noted.  No parenchymal mass, hemorrhage, edema or major vascular distribution infarct.    Skull/extracranial contents (limited evaluation): Hyperostosis frontalis interna.  No fracture. Mastoid air cells and paranasal sinuses are essentially clear.                                       X-Ray Knee 3 View Right (Final result)  Result time 04/24/23 12:34:07      Final result by Selvin Price MD (04/24/23 12:34:07)                   Impression:      No convincing evidence of acute fracture or dislocation.      Electronically signed by: Selvin Price  Date:    04/24/2023  Time:    12:34               Narrative:    EXAMINATION:  XR KNEE 3 VIEW RIGHT    CLINICAL HISTORY:  Unspecified fall, initial encounter    TECHNIQUE:  AP, lateral, and Merchant views of the right knee were performed.    COMPARISON:  None    FINDINGS:  Examination limited due to motion and question osseous demineralization.  Noting this, no definite evidence of acute displaced fracture or dislocation.  No large knee joint  effusion.  No evidence of radiopaque foreign body.                                       X-Ray Femur Ap/Lat Right (Final result)  Result time 04/24/23 12:30:09      Final result by Selvin Price MD (04/24/23 12:30:09)                   Impression:      No convincing evidence of acute fracture or dislocation.      Electronically signed by: Selvin Price  Date:    04/24/2023  Time:    12:30               Narrative:    EXAMINATION:  XR FEMUR 2 VIEW RIGHT    CLINICAL HISTORY:  Unspecified fall, initial encounter    TECHNIQUE:  AP and lateral views of the right femur were performed.    COMPARISON:  None    FINDINGS:  No definite evidence of acute displaced fracture or dislocation.    DJD of the right hip and knee joints.    No definite radiopaque foreign body.                                      ED Course as of 04/24/23 1308   Mon Apr 24, 2023   1227 X-Ray Femur Ap/Lat Right  No fracture seen per my independent interpretation.     [DC]      ED Course User Index  [DC] Serg Reyes MD           MDM, Impression and Plan   77 y.o. female with contusions to the forehead and leg after a fall.  Imaging is all negative.  Full range of motion of the neck with no pain or tenderness.  Okay to discharge, Tylenol as needed.  Informed caregiver of results and follow-up steps.         Final diagnoses:  [W19.XXXA] Fall  [S09.90XA] Injury of head, initial encounter (Primary)  [S80.11XA] Contusion of right lower leg, initial encounter        ED Disposition Condition    Discharge Stable          ED Prescriptions    None       Follow-up Information       Follow up With Specialties Details Why Contact Info    Krista Case MD Internal Medicine Call   [address]  [place] [address] [phone]                 Serg Reyes MD  04/24/23 3692

## 2023-04-25 DIAGNOSIS — R29.6 FREQUENT FALLS: ICD-10-CM

## 2023-04-25 DIAGNOSIS — F03.918 DEMENTIA WITH BEHAVIORAL DISTURBANCE: Primary | ICD-10-CM

## 2023-05-05 ENCOUNTER — CLINICAL SUPPORT (OUTPATIENT)
Dept: REHABILITATION | Facility: HOSPITAL | Age: 78
End: 2023-05-05
Attending: INTERNAL MEDICINE
Payer: MEDICARE

## 2023-05-05 DIAGNOSIS — R29.6 FREQUENT FALLS: ICD-10-CM

## 2023-05-05 DIAGNOSIS — F03.918 DEMENTIA WITH BEHAVIORAL DISTURBANCE: ICD-10-CM

## 2023-05-05 PROCEDURE — 97162 PT EVAL MOD COMPLEX 30 MIN: CPT | Mod: HCNC,PO

## 2023-05-05 NOTE — PLAN OF CARE
[facility] OUTPATIENT THERAPY AND WELLNESS  Physical Therapy Neurological Rehabilitation Initial Evaluation     Name: Lay Reeves Juncos  Clinic Number: [id number]    Therapy Diagnosis:   Encounter Diagnoses   Name Primary?    Dementia with behavioral disturbance     Frequent falls      Physician: Krista Case MD    Physician Orders: PT Eval and Treat Functional assessment for specific instructions for direct care staff for care for activities of daily living.  Medical Diagnosis from Referral:   F03.918 (ICD-10-CM) - Dementia with behavioral disturbance   R29.6 (ICD-10-CM) - Frequent falls     Evaluation Date: 5/5/2023  Authorization Period Expiration: 4/24/24  Plan of Care Expiration: 7/5/23  Progress Note Due: 6/5/23  Visit # / Visits authorized: 1/ 1  FOTO: NA    Precautions: Standard, Fall, and dementia    Time In: 08:30  Time Out: 09:10  Total Billable Time: 40 minutes    Subjective      Date of onset: April 2023    History of current condition - Caregiver provides all the history : Lay had a fall out of her wheelchair on 4/24/23.  Went to the ED and found to have no fractures.  Then had another fall this past Sunday, she was alone at night and got up on her own.  Feel and hit the left side of her face.   She has 24 hour caregiver, someone watches her at night.  She is accompanied by Belgica who is the house mom.  She is primarily in the wheelchair, but uses a rolling walker to walk.  She has been more jumpy when moving around.       Imaging, CT scan films head 4/2023: Stable exam as above.  No acute intracranial abnormality identified.    Prior Therapy: none  Social History: Lives in [facility], has 24 hr caregiver  Falls: last Sunday night    DME: manual wheelchair, rolling walker  Home Environment: [facility], all adaptive.    Exercise Routine / History: none   Family Present at time of Eval: Caregiver Belgica present   Occupation: none  Prior Level of Function: Requires assist for mobility and  ADLs for safety and cognition  Current Level of Function: able to stand up on her own. Assistance of 1 person for transfer     Pain:  Patient grimaces occasional with movement  Unable to give number for pain due to cognitive deficits.     Patient's goals: To help with transfers and maneuvers.      Medical History:   Past Medical History:   Diagnosis Date    Back pain 7/13/2012    Chronic constipation 7/13/2012    Congenital deafness 7/13/2012    Deaf     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 7/13/2012    Hyperlipidemia 7/13/2012    Hypertension 7/13/2012    Macular degeneration     Major depression 7/13/2012    Mild mental retardation (I.Q. 50-70) 7/13/2012    Neck pain 7/13/2012    Paranoid schizophrenia     Schizoaffective disorder, chronic condition 7/13/2012       Surgical History:   Lay Peres  has no past surgical history on file.    Medications:   Lay has a current medication list which includes the following prescription(s): acetaminophen, aspirin, benztropine, blood sugar diagnostic, cholecalciferol (vitamin d3), cyanocobalamin, donepezil, duloxetine, famotidine, ferrous gluconate, gabapentin, haloperidol decanoate, lisinopril, memantine, multivitamin, nystatin, quetiapine, and senna.    Allergies:   Review of patient's allergies indicates:  No Known Allergies     Objective        Objective   - Follows commands: 75% of time of 1 step commands  - Speech: unintelligable.      Mental status: alert, oriented to person, pleasant  Appearance: left facial bruising   Behavior:  agreeable    Dominant hand:  right     Sensation: Light Touch: unable to assess secondary to cognitive deficits         Tone: 0 - No increase in muscle tone      ROM:   UPPER EXTREMITY--AROM/PROM  (R) UE: WFLs  (L) UE: WFLs           RANGE OF MOTION--LOWER EXTREMITIES  (R) LE Hip: normal   Knee: normal   Ankle: normal    (L) LE: Hip: normal   Knee: normal   Ankle: normal    Strength: manual muscle test grades below      Lower Extremity Strength  Right LE  Left LE    Hip Flexion: 5/5 Hip Flexion: 5/5   Hip Extension:  Grossly >3+/5  Able to bridge x 10 Hip Extension: Grossly >3+/5   Able to bridge x 10   Hip Abduction: 5/5 Hip Abduction: 5/5   Hip Adduction: NT Hip Adduction NT   Knee Extension: 5/5 Knee Extension: 5/5   Knee Flexion: 5/5 Knee Flexion: 5/5   Ankle Dorsiflexion: 5/5 Ankle Dorsiflexion: 5/5   Ankle Plantarflexion: NT Ankle Plantarflexion: NT       Functional Mobility (Bed mobility, transfers)  Bed mobility: Mod Independent requires upper extremity assist   Supine to sit: Mod Independent for safety  Sit to supine: Mod Independent for safety   Transfers to bed: Min Assist for safety  Sit to stand:  Mod Independent   Wheelchair mobility: caregiver pushes wheelchair    BALANCE:   Evaluation   Single Limb Stance R LE unable  (<10 sec = HIGH FALL RISK)   Single Limb Stance L LE Unable   (<10 sec = HIGH FALL RISK)   30 second Chair Rise Not tested   5 times sit-stand 13 seconds, upper extremity assist      Postural control:  1. Eyes Open/feet together/Firm: 30 seconds  2. Eyes Closed/feet together/Firm: 30 seconds      Gait Assessment:   - AD used: rolling walker   - Assistance: contact guard assist for safety and guidance  - Distance: 100 feet  around clinic gym with rolling walker and contact guard assist and 100 feet with minimal hand held assistance.     GAIT DEVIATIONS:  Lay displays the following deviations with ambulation: requires assistance for navigation of rolling walker, cues for negotiation of obstacles,     Impairments contributing to deviations: impaired motor control, cognitive impairment    Endurance Deficit: fair        Patient Education and Home Exercises     Education provided:   - Physical Therapy Plan of Care, role of Physical Therapy, discharge plan, walking program at home, safety due to cognitive impairments    Written Home Exercises Provided: educated on walking program .  Exercises were  reviewed and Lay was able to demonstrate them prior to the end of the session.  Lay demonstrated poor understanding of the education provided.     Assessment     Lay is a 77 y.o. female referred to outpatient Physical Therapy with a medical diagnosis of falls. Patient presents with her caregiver who helps to provide history secondary to Lay's cognitive deficits.  Caregiver notes that patient has had 2 falls when she has been without her normal 24 hour assist, last fall at night when patient got up on her own.  Lay is moderate independent with bed mobility and transfers but requires stand by assist for safety due to impaired insight and decreased safety awareness.  She is able to ambulate with a rolling walker and with hand held assist for greater than 100 feet, again requires assist for impulsivity and decreased safety awareness.  Discussed with patient and caregiver a walking program at home to maintain mobility and strength.  Also discussed safety with transfers and during ambulation, caregiver verbalizes understanding.  She has no other Physical Therapy needs at this time, will discharge Physical Therapy.       Plan of care discussed with patient and caregiver: Yes  Patient's spiritual, cultural and educational needs considered and patient is agreeable to the plan of care and goals as stated below:     Plan     Discharge Physical Therapy at this time.     Laverne Gaines, PT

## 2023-07-24 RX ORDER — ASPIRIN 325 MG
TABLET, DELAYED RELEASE (ENTERIC COATED) ORAL
Qty: 30 TABLET | Refills: 0 | OUTPATIENT
Start: 2023-07-24

## 2023-07-24 NOTE — TELEPHONE ENCOUNTER
Name from pharmacy: Aspirin 325 MG Tablet delayed release        Will file in chart as: aspirin (ECOTRIN) 325 MG EC tablet   Sig: TAKE 1 TABLET BY MOUTH ONCE DAILY   Disp:  30 tablet (Pharmacy requested: 30 each)    Refills:  0   Start: 7/20/2023   Class: Normal   Last ordered: 2 months ago by Nancy Davis MD Last refill: 6/27/2023   Rx #: [id number]     To be filled at: [facility] - [address]

## 2023-10-10 ENCOUNTER — IMMUNIZATION (OUTPATIENT)
Dept: INTERNAL MEDICINE | Facility: CLINIC | Age: 78
End: 2023-10-10
Payer: MEDICARE

## 2023-10-10 PROCEDURE — 90694 VACC AIIV4 NO PRSRV 0.5ML IM: CPT | Mod: HCNC,S$GLB,, | Performed by: INTERNAL MEDICINE

## 2023-10-10 PROCEDURE — 90694 FLU VACCINE - QUADRIVALENT - ADJUVANTED: ICD-10-PCS | Mod: HCNC,S$GLB,, | Performed by: INTERNAL MEDICINE

## 2023-10-10 PROCEDURE — G0008 ADMIN INFLUENZA VIRUS VAC: HCPCS | Mod: HCNC,S$GLB,, | Performed by: INTERNAL MEDICINE

## 2023-10-10 PROCEDURE — G0008 FLU VACCINE - QUADRIVALENT - ADJUVANTED: ICD-10-PCS | Mod: HCNC,S$GLB,, | Performed by: INTERNAL MEDICINE

## 2023-10-12 DIAGNOSIS — F25.9 CHRONIC SCHIZOAFFECTIVE DISORDER: ICD-10-CM

## 2023-10-12 RX ORDER — DULOXETIN HYDROCHLORIDE 30 MG/1
CAPSULE, DELAYED RELEASE ORAL
Qty: 30 CAPSULE | Refills: 3 | Status: ON HOLD | OUTPATIENT
Start: 2023-10-12 | End: 2024-01-19

## 2023-10-12 RX ORDER — BENZTROPINE MESYLATE 1 MG/1
TABLET ORAL
Qty: 60 TABLET | Refills: 3 | Status: ON HOLD | OUTPATIENT
Start: 2023-10-12 | End: 2024-01-19

## 2023-10-23 ENCOUNTER — OFFICE VISIT (OUTPATIENT)
Dept: DERMATOLOGY | Facility: CLINIC | Age: 78
End: 2023-10-23
Payer: MEDICARE

## 2023-10-23 VITALS — WEIGHT: 137 LBS | BODY MASS INDEX: 25.06 KG/M2

## 2023-10-23 DIAGNOSIS — L90.5 SCAR: ICD-10-CM

## 2023-10-23 DIAGNOSIS — L81.4 LENTIGINES: Primary | ICD-10-CM

## 2023-10-23 PROCEDURE — 99203 OFFICE O/P NEW LOW 30 MIN: CPT | Mod: HCNC,S$GLB,, | Performed by: DERMATOLOGY

## 2023-10-23 PROCEDURE — 99203 PR OFFICE/OUTPT VISIT, NEW, LEVL III, 30-44 MIN: ICD-10-PCS | Mod: HCNC,S$GLB,, | Performed by: DERMATOLOGY

## 2023-10-23 PROCEDURE — 3288F FALL RISK ASSESSMENT DOCD: CPT | Mod: HCNC,CPTII,S$GLB, | Performed by: DERMATOLOGY

## 2023-10-23 PROCEDURE — 99999 PR PBB SHADOW E&M-EST. PATIENT-LVL III: ICD-10-PCS | Mod: PBBFAC,HCNC,, | Performed by: DERMATOLOGY

## 2023-10-23 PROCEDURE — 99999 PR PBB SHADOW E&M-EST. PATIENT-LVL III: CPT | Mod: PBBFAC,HCNC,, | Performed by: DERMATOLOGY

## 2023-10-23 PROCEDURE — 1159F PR MEDICATION LIST DOCUMENTED IN MEDICAL RECORD: ICD-10-PCS | Mod: HCNC,CPTII,S$GLB, | Performed by: DERMATOLOGY

## 2023-10-23 PROCEDURE — 1160F PR REVIEW ALL MEDS BY PRESCRIBER/CLIN PHARMACIST DOCUMENTED: ICD-10-PCS | Mod: HCNC,CPTII,S$GLB, | Performed by: DERMATOLOGY

## 2023-10-23 PROCEDURE — 1101F PT FALLS ASSESS-DOCD LE1/YR: CPT | Mod: HCNC,CPTII,S$GLB, | Performed by: DERMATOLOGY

## 2023-10-23 PROCEDURE — 1101F PR PT FALLS ASSESS DOC 0-1 FALLS W/OUT INJ PAST YR: ICD-10-PCS | Mod: HCNC,CPTII,S$GLB, | Performed by: DERMATOLOGY

## 2023-10-23 PROCEDURE — 1126F PR PAIN SEVERITY QUANTIFIED, NO PAIN PRESENT: ICD-10-PCS | Mod: HCNC,CPTII,S$GLB, | Performed by: DERMATOLOGY

## 2023-10-23 PROCEDURE — 1159F MED LIST DOCD IN RCRD: CPT | Mod: HCNC,CPTII,S$GLB, | Performed by: DERMATOLOGY

## 2023-10-23 PROCEDURE — 1160F RVW MEDS BY RX/DR IN RCRD: CPT | Mod: HCNC,CPTII,S$GLB, | Performed by: DERMATOLOGY

## 2023-10-23 PROCEDURE — 3288F PR FALLS RISK ASSESSMENT DOCUMENTED: ICD-10-PCS | Mod: HCNC,CPTII,S$GLB, | Performed by: DERMATOLOGY

## 2023-10-23 PROCEDURE — 1126F AMNT PAIN NOTED NONE PRSNT: CPT | Mod: HCNC,CPTII,S$GLB, | Performed by: DERMATOLOGY

## 2023-10-23 NOTE — PROGRESS NOTES
Subjective:      Patient ID:  Lay Peres is a 78 y.o. female who presents for   Chief Complaint   Patient presents with    Skin Check     UBSE     Here with attendant for skin check no known lesions of concern.   Patient Active Problem List:     Hypertension     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     Hyperlipidemia     Chronic constipation     Back pain     Chronic schizoaffective disorder     Intellectual disability     Deafness congenital     Dementia, unspecified dementia severity, unspecified dementia type, unspecified whether behavioral, psychotic, or mood disturbance or anxiety     History of stroke     Chest pain     Chronic diastolic heart failure     Acute cystitis without hematuria     Enteritis     COVID     GERD (gastroesophageal reflux disease)     Frequent falls          Review of Systems   Constitutional:  Negative for fever, chills, weight loss, weight gain, fatigue, night sweats and malaise.   Skin:  Negative for daily sunscreen use, activity-related sunscreen use and wears hat.   Hematologic/Lymphatic: Bruises/bleeds easily.       Objective:   Physical Exam   Constitutional: She appears well-developed and well-nourished.   Neurological: She is alert and oriented to person, place, and time.   Psychiatric: She has a normal mood and affect.   Skin:   Areas Examined (abnormalities noted in diagram):   Head / Face Inspection Performed  RUE Inspected  LUE Inspection Performed            Diagram Legend     Erythematous scaling macule/papule c/w actinic keratosis       Vascular papule c/w angioma      Pigmented verrucoid papule/plaque c/w seborrheic keratosis      Yellow umbilicated papule c/w sebaceous hyperplasia      Irregularly shaped tan macule c/w lentigo     1-2 mm smooth white papules consistent with Milia      Movable subcutaneous cyst with punctum c/w epidermal inclusion cyst      Subcutaneous movable cyst c/w pilar cyst      Firm pink to brown papule c/w dermatofibroma       Pedunculated fleshy papule(s) c/w skin tag(s)      Evenly pigmented macule c/w junctional nevus     Mildly variegated pigmented, slightly irregular-bordered macule c/w mildly atypical nevus      Flesh colored to evenly pigmented papule c/w intradermal nevus       Pink pearly papule/plaque c/w basal cell carcinoma      Erythematous hyperkeratotic cursted plaque c/w SCC      Surgical scar with no sign of skin cancer recurrence      Open and closed comedones      Inflammatory papules and pustules      Verrucoid papule consistent consistent with wart     Erythematous eczematous patches and plaques     Dystrophic onycholytic nail with subungual debris c/w onychomycosis     Umbilicated papule    Erythematous-base heme-crusted tan verrucoid plaque consistent with inflamed seborrheic keratosis     Erythematous Silvery Scaling Plaque c/w Psoriasis     See annotation      Assessment / Plan:        Lentigines  reassurance      Scar  reassurance               Follow up in about 2 years (around 10/23/2025).

## 2023-10-24 ENCOUNTER — PATIENT MESSAGE (OUTPATIENT)
Dept: ADMINISTRATIVE | Facility: HOSPITAL | Age: 78
End: 2023-10-24
Payer: MEDICARE

## 2023-11-28 DIAGNOSIS — F25.9 CHRONIC SCHIZOAFFECTIVE DISORDER: ICD-10-CM

## 2023-11-28 RX ORDER — QUETIAPINE FUMARATE 200 MG/1
TABLET, FILM COATED ORAL
Qty: 90 TABLET | Refills: 0 | Status: ON HOLD | OUTPATIENT
Start: 2023-11-28 | End: 2024-01-19

## 2023-12-05 ENCOUNTER — DOCUMENTATION ONLY (OUTPATIENT)
Dept: PSYCHIATRY | Facility: CLINIC | Age: 78
End: 2023-12-05
Payer: MEDICARE

## 2023-12-05 NOTE — PROGRESS NOTES
[facility] 12/5/23  Intake    Plan at last appointment on 4/12/23 (Dr. Cardenas):  Per , mild progression in past month.  I am hesitant to titrate medications further given she is on two neuroleptics already.  Spoke with  about sleep hygiene as first step.  Follows with Dr. Case.  Follows with neurology for dementia.  Cont current psychotropic regimen - may need to make adjustments up and down in future based on progression of dementia.     Met with patient, Paulina, and Dr. Hernandez in person at [facility].  She was previously followed by Dr. Evangelista at [facility].  Patient has history of schizoaffective disorder, dementia, and congenital deafness.  She is currently taking Haldol dec 50mg every 14 days, Seroquel 200mg qHS, Cymbalta 30mg daily, and Cogentin 1mg BID.  She is taking Namenda and Aricept for her dementia.      Per report, patient has had worsening behavior over the last few months.  Unclear if it is related to her dementia or schizoaffective disorder or both.  Some days she will be happy, smiling, calm and wanting to hug and kiss your hand.  Other days she will get a look in her eye like she doesn't know who you are and will lash out.  She is overall sleeping well.  She does usually wake up in the night and want to go into the common room and then falls back asleep in the recliner.  Staff is in her room during the night to make sure she doesn't fall out of bed.  On interview today, patient is smiling and enjoying working on a puzzle.  She is interactive with team, frequently looking to see who is watching.  She smiles and kisses everyone's hands when they interact with her.  She is in wheelchair but can walk around with assistance.    I will touch base with Dr. Cardenas since he has followed patient for a long time.  Unclear if she has ever been on higher dose of Cymbalta.  I am also hesitant to increase psychotropics given that she is already unsteady on her feet and a fall risk.   She was on Haldol dec 100mg every 3 weeks until 2020.    Continue Haldol dec 50mg every 14 days, Seroquel 200mg qHS, Cymbalta 30mg daily, and Cogentin 1mg BID.  Follow up in 2 months in person.

## 2023-12-17 RX ORDER — NITROFURANTOIN 25; 75 MG/1; MG/1
100 CAPSULE ORAL 2 TIMES DAILY
Qty: 14 CAPSULE | Refills: 0 | Status: SHIPPED | OUTPATIENT
Start: 2023-12-17 | End: 2023-12-24

## 2023-12-26 RX ORDER — HALOPERIDOL 5 MG/1
5 TABLET ORAL DAILY PRN
Qty: 30 TABLET | Refills: 1 | Status: ON HOLD | OUTPATIENT
Start: 2023-12-26 | End: 2024-01-18

## 2024-01-14 ENCOUNTER — HOSPITAL ENCOUNTER (INPATIENT)
Facility: HOSPITAL | Age: 79
LOS: 6 days | Discharge: HOSPICE | DRG: 884 | End: 2024-01-22
Attending: EMERGENCY MEDICINE | Admitting: STUDENT IN AN ORGANIZED HEALTH CARE EDUCATION/TRAINING PROGRAM
Payer: MEDICARE

## 2024-01-14 DIAGNOSIS — F03.918 DEMENTIA WITH BEHAVIORAL DISTURBANCE: ICD-10-CM

## 2024-01-14 DIAGNOSIS — G93.40 ACUTE ENCEPHALOPATHY: Primary | ICD-10-CM

## 2024-01-14 DIAGNOSIS — R07.9 CHEST PAIN: ICD-10-CM

## 2024-01-14 DIAGNOSIS — F20.9 SCHIZOPHRENIA, UNSPECIFIED TYPE: ICD-10-CM

## 2024-01-14 DIAGNOSIS — F02.818 DEMENTIA ASSOCIATED WITH OTHER UNDERLYING DISEASE WITH BEHAVIORAL DISTURBANCE: ICD-10-CM

## 2024-01-14 DIAGNOSIS — R53.81 DEBILITY: ICD-10-CM

## 2024-01-14 DIAGNOSIS — Z66 DNR (DO NOT RESUSCITATE): ICD-10-CM
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
## 2024-01-14 DIAGNOSIS — F03.90 DEMENTIA, UNSPECIFIED DEMENTIA SEVERITY, UNSPECIFIED DEMENTIA TYPE, UNSPECIFIED WHETHER BEHAVIORAL, PSYCHOTIC, OR MOOD DISTURBANCE OR ANXIETY: ICD-10-CM

## 2024-01-14 DIAGNOSIS — Z51.5 PALLIATIVE CARE ENCOUNTER: ICD-10-CM

## 2024-01-14 DIAGNOSIS — R41.82 ALTERED MENTAL STATUS: ICD-10-CM

## 2024-01-14 DIAGNOSIS — F25.9 CHRONIC SCHIZOAFFECTIVE DISORDER: ICD-10-CM

## 2024-01-14 LAB
ALBUMIN SERPL BCP-MCNC: 3.5 G/DL (ref 3.5–5.2)
ALLENS TEST: ABNORMAL
ALP SERPL-CCNC: 85 U/L (ref 55–135)
ALT SERPL W/O P-5'-P-CCNC: 11 U/L (ref 10–44)
AMMONIA PLAS-SCNC: 25 UMOL/L (ref 10–50)
ANION GAP SERPL CALC-SCNC: 11 MMOL/L (ref 8–16)
AST SERPL-CCNC: 15 U/L (ref 10–40)
BASOPHILS # BLD AUTO: 0.05 K/UL (ref 0–0.2)
BASOPHILS NFR BLD: 0.5 % (ref 0–1.9)
BILIRUB SERPL-MCNC: 0.4 MG/DL (ref 0.1–1)
BILIRUB UR QL STRIP: NEGATIVE
BUN SERPL-MCNC: 21 MG/DL (ref 8–23)
CALCIUM SERPL-MCNC: 10.4 MG/DL (ref 8.7–10.5)
CHLORIDE SERPL-SCNC: 110 MMOL/L (ref 95–110)
CLARITY UR REFRACT.AUTO: ABNORMAL
CO2 SERPL-SCNC: 22 MMOL/L (ref 23–29)
COLOR UR AUTO: YELLOW
CREAT SERPL-MCNC: 1 MG/DL (ref 0.5–1.4)
DIFFERENTIAL METHOD BLD: ABNORMAL
EOSINOPHIL # BLD AUTO: 0.4 K/UL (ref 0–0.5)
EOSINOPHIL NFR BLD: 3.7 % (ref 0–8)
ERYTHROCYTE [DISTWIDTH] IN BLOOD BY AUTOMATED COUNT: 12.8 % (ref 11.5–14.5)
EST. GFR  (NO RACE VARIABLE): 57 ML/MIN/1.73 M^2
ETHANOL SERPL-MCNC: <10 MG/DL
GLUCOSE SERPL-MCNC: 127 MG/DL (ref 70–110)
GLUCOSE UR QL STRIP: NEGATIVE
HCO3 UR-SCNC: 26.7 MMOL/L (ref 24–28)
HCT VFR BLD AUTO: 37.3 % (ref 37–48.5)
HCV AB SERPL QL IA: NORMAL
HGB BLD-MCNC: 11.8 G/DL (ref 12–16)
HGB UR QL STRIP: NEGATIVE
HIV 1+2 AB+HIV1 P24 AG SERPL QL IA: NORMAL
IMM GRANULOCYTES # BLD AUTO: 0.02 K/UL (ref 0–0.04)
IMM GRANULOCYTES NFR BLD AUTO: 0.2 % (ref 0–0.5)
KETONES UR QL STRIP: NEGATIVE
LEUKOCYTE ESTERASE UR QL STRIP: NEGATIVE
LYMPHOCYTES # BLD AUTO: 2 K/UL (ref 1–4.8)
LYMPHOCYTES NFR BLD: 19.8 % (ref 18–48)
MCH RBC QN AUTO: 29.3 PG (ref 27–31)
MCHC RBC AUTO-ENTMCNC: 31.6 G/DL (ref 32–36)
MCV RBC AUTO: 93 FL (ref 82–98)
MONOCYTES # BLD AUTO: 1 K/UL (ref 0.3–1)
MONOCYTES NFR BLD: 9.9 % (ref 4–15)
NEUTROPHILS # BLD AUTO: 6.8 K/UL (ref 1.8–7.7)
NEUTROPHILS NFR BLD: 65.9 % (ref 38–73)
NITRITE UR QL STRIP: NEGATIVE
NRBC BLD-RTO: 0 /100 WBC
PCO2 BLDA: 52.3 MMHG (ref 35–45)
PH SMN: 7.32 [PH] (ref 7.35–7.45)
PH UR STRIP: 7 [PH] (ref 5–8)
PLATELET # BLD AUTO: 273 K/UL (ref 150–450)
PMV BLD AUTO: 10 FL (ref 9.2–12.9)
PO2 BLDA: 27 MMHG (ref 40–60)
POC BE: 1 MMOL/L
POC SATURATED O2: 44 % (ref 95–100)
POC TCO2: 28 MMOL/L (ref 24–29)
POTASSIUM SERPL-SCNC: 4.5 MMOL/L (ref 3.5–5.1)
PROT SERPL-MCNC: 7.3 G/DL (ref 6–8.4)
PROT UR QL STRIP: ABNORMAL
RBC # BLD AUTO: 4.03 M/UL (ref 4–5.4)
SAMPLE: ABNORMAL
SITE: ABNORMAL
SODIUM SERPL-SCNC: 143 MMOL/L (ref 136–145)
SP GR UR STRIP: 1.03 (ref 1–1.03)
TROPONIN I SERPL DL<=0.01 NG/ML-MCNC: <0.006 NG/ML (ref 0–0.03)
TSH SERPL DL<=0.005 MIU/L-ACNC: 0.35 UIU/ML (ref 0.4–4)
URN SPEC COLLECT METH UR: ABNORMAL
WBC # BLD AUTO: 10.29 K/UL (ref 3.9–12.7)

## 2024-01-14 PROCEDURE — 84484 ASSAY OF TROPONIN QUANT: CPT | Mod: HCNC | Performed by: EMERGENCY MEDICINE

## 2024-01-14 PROCEDURE — 96374 THER/PROPH/DIAG INJ IV PUSH: CPT | Mod: HCNC

## 2024-01-14 PROCEDURE — 82803 BLOOD GASES ANY COMBINATION: CPT | Mod: HCNC

## 2024-01-14 PROCEDURE — 93005 ELECTROCARDIOGRAM TRACING: CPT | Mod: HCNC

## 2024-01-14 PROCEDURE — 87389 HIV-1 AG W/HIV-1&-2 AB AG IA: CPT | Mod: HCNC | Performed by: PHYSICIAN ASSISTANT

## 2024-01-14 PROCEDURE — 81003 URINALYSIS AUTO W/O SCOPE: CPT | Mod: HCNC | Performed by: EMERGENCY MEDICINE

## 2024-01-14 PROCEDURE — 93010 ELECTROCARDIOGRAM REPORT: CPT | Mod: HCNC,,, | Performed by: INTERNAL MEDICINE

## 2024-01-14 PROCEDURE — 85025 COMPLETE CBC W/AUTO DIFF WBC: CPT | Mod: HCNC | Performed by: EMERGENCY MEDICINE

## 2024-01-14 PROCEDURE — 84443 ASSAY THYROID STIM HORMONE: CPT | Mod: HCNC | Performed by: EMERGENCY MEDICINE

## 2024-01-14 PROCEDURE — 82077 ASSAY SPEC XCP UR&BREATH IA: CPT | Mod: HCNC | Performed by: EMERGENCY MEDICINE

## 2024-01-14 PROCEDURE — 84439 ASSAY OF FREE THYROXINE: CPT | Mod: HCNC | Performed by: EMERGENCY MEDICINE

## 2024-01-14 PROCEDURE — 99900035 HC TECH TIME PER 15 MIN (STAT): Mod: HCNC

## 2024-01-14 PROCEDURE — 80053 COMPREHEN METABOLIC PANEL: CPT | Mod: HCNC | Performed by: EMERGENCY MEDICINE

## 2024-01-14 PROCEDURE — 63600175 PHARM REV CODE 636 W HCPCS: Mod: HCNC | Performed by: EMERGENCY MEDICINE

## 2024-01-14 PROCEDURE — 86803 HEPATITIS C AB TEST: CPT | Mod: HCNC | Performed by: PHYSICIAN ASSISTANT

## 2024-01-14 PROCEDURE — 82140 ASSAY OF AMMONIA: CPT | Mod: HCNC | Performed by: EMERGENCY MEDICINE

## 2024-01-14 PROCEDURE — 82800 BLOOD PH: CPT | Mod: HCNC

## 2024-01-14 PROCEDURE — 99285 EMERGENCY DEPT VISIT HI MDM: CPT | Mod: 25,HCNC

## 2024-01-14 RX ORDER — HALOPERIDOL 5 MG/ML
2.5 INJECTION INTRAMUSCULAR
Status: COMPLETED | OUTPATIENT
Start: 2024-01-14 | End: 2024-01-14

## 2024-01-14 RX ADMIN — HALOPERIDOL LACTATE 2.5 MG: 5 INJECTION, SOLUTION INTRAMUSCULAR at 10:01

## 2024-01-15 PROBLEM — Z66 DNR (DO NOT RESUSCITATE): Status: ACTIVE | Noted: 2024-01-15

## 2024-01-15 PROBLEM — G93.40 ACUTE ENCEPHALOPATHY: Status: ACTIVE | Noted: 2024-01-15

## 2024-01-15 LAB
ALBUMIN SERPL BCP-MCNC: 3.3 G/DL (ref 3.5–5.2)
ALLENS TEST: ABNORMAL
ALP SERPL-CCNC: 81 U/L (ref 55–135)
ALT SERPL W/O P-5'-P-CCNC: 10 U/L (ref 10–44)
ANION GAP SERPL CALC-SCNC: 9 MMOL/L (ref 8–16)
AST SERPL-CCNC: 21 U/L (ref 10–40)
BASOPHILS # BLD AUTO: 0.09 K/UL (ref 0–0.2)
BASOPHILS NFR BLD: 0.9 % (ref 0–1.9)
BILIRUB SERPL-MCNC: 0.4 MG/DL (ref 0.1–1)
BUN SERPL-MCNC: 21 MG/DL (ref 8–23)
CALCIUM SERPL-MCNC: 9.8 MG/DL (ref 8.7–10.5)
CHLORIDE SERPL-SCNC: 114 MMOL/L (ref 95–110)
CO2 SERPL-SCNC: 18 MMOL/L (ref 23–29)
CREAT SERPL-MCNC: 0.8 MG/DL (ref 0.5–1.4)
DIFFERENTIAL METHOD BLD: ABNORMAL
EOSINOPHIL # BLD AUTO: 0.4 K/UL (ref 0–0.5)
EOSINOPHIL NFR BLD: 4.3 % (ref 0–8)
ERYTHROCYTE [DISTWIDTH] IN BLOOD BY AUTOMATED COUNT: 12.7 % (ref 11.5–14.5)
EST. GFR  (NO RACE VARIABLE): >60 ML/MIN/1.73 M^2
FOLATE SERPL-MCNC: 16.3 NG/ML (ref 4–24)
GLUCOSE SERPL-MCNC: 110 MG/DL (ref 70–110)
HCO3 UR-SCNC: 26.7 MMOL/L (ref 24–28)
HCT VFR BLD AUTO: 38.3 % (ref 37–48.5)
HGB BLD-MCNC: 11.5 G/DL (ref 12–16)
IMM GRANULOCYTES # BLD AUTO: 0.03 K/UL (ref 0–0.04)
IMM GRANULOCYTES NFR BLD AUTO: 0.3 % (ref 0–0.5)
LYMPHOCYTES # BLD AUTO: 2.3 K/UL (ref 1–4.8)
LYMPHOCYTES NFR BLD: 23.1 % (ref 18–48)
MAGNESIUM SERPL-MCNC: 1.7 MG/DL (ref 1.6–2.6)
MCH RBC QN AUTO: 28.7 PG (ref 27–31)
MCHC RBC AUTO-ENTMCNC: 30 G/DL (ref 32–36)
MCV RBC AUTO: 96 FL (ref 82–98)
MONOCYTES # BLD AUTO: 1 K/UL (ref 0.3–1)
MONOCYTES NFR BLD: 10.3 % (ref 4–15)
NEUTROPHILS # BLD AUTO: 6.1 K/UL (ref 1.8–7.7)
NEUTROPHILS NFR BLD: 61.1 % (ref 38–73)
NRBC BLD-RTO: 0 /100 WBC
PCO2 BLDA: 46.6 MMHG (ref 35–45)
PH SMN: 7.37 [PH] (ref 7.35–7.45)
PHOSPHATE SERPL-MCNC: 3.2 MG/DL (ref 2.7–4.5)
PLATELET # BLD AUTO: 224 K/UL (ref 150–450)
PMV BLD AUTO: 10.2 FL (ref 9.2–12.9)
PO2 BLDA: 35 MMHG (ref 40–60)
POC BE: 1 MMOL/L
POC SATURATED O2: 65 % (ref 95–100)
POC TCO2: 28 MMOL/L (ref 24–29)
POCT GLUCOSE: 110 MG/DL (ref 70–110)
POCT GLUCOSE: 122 MG/DL (ref 70–110)
POCT GLUCOSE: 96 MG/DL (ref 70–110)
POTASSIUM SERPL-SCNC: 4.6 MMOL/L (ref 3.5–5.1)
PROT SERPL-MCNC: 7 G/DL (ref 6–8.4)
RBC # BLD AUTO: 4.01 M/UL (ref 4–5.4)
SAMPLE: ABNORMAL
SITE: ABNORMAL
SODIUM SERPL-SCNC: 141 MMOL/L (ref 136–145)
T4 FREE SERPL-MCNC: 0.78 NG/DL (ref 0.71–1.51)
VIT B12 SERPL-MCNC: 1454 PG/ML (ref 210–950)
WBC # BLD AUTO: 9.93 K/UL (ref 3.9–12.7)

## 2024-01-15 PROCEDURE — 25000003 PHARM REV CODE 250: Mod: HCNC

## 2024-01-15 PROCEDURE — 85025 COMPLETE CBC W/AUTO DIFF WBC: CPT | Mod: HCNC

## 2024-01-15 PROCEDURE — 63600175 PHARM REV CODE 636 W HCPCS: Mod: HCNC

## 2024-01-15 PROCEDURE — 82803 BLOOD GASES ANY COMBINATION: CPT | Mod: HCNC

## 2024-01-15 PROCEDURE — 96372 THER/PROPH/DIAG INJ SC/IM: CPT | Performed by: STUDENT IN AN ORGANIZED HEALTH CARE EDUCATION/TRAINING PROGRAM

## 2024-01-15 PROCEDURE — 99900035 HC TECH TIME PER 15 MIN (STAT): Mod: HCNC

## 2024-01-15 PROCEDURE — 80053 COMPREHEN METABOLIC PANEL: CPT | Mod: HCNC

## 2024-01-15 PROCEDURE — 83735 ASSAY OF MAGNESIUM: CPT | Mod: HCNC

## 2024-01-15 PROCEDURE — 63600175 PHARM REV CODE 636 W HCPCS: Mod: HCNC | Performed by: EMERGENCY MEDICINE

## 2024-01-15 PROCEDURE — 96372 THER/PROPH/DIAG INJ SC/IM: CPT

## 2024-01-15 PROCEDURE — 63600175 PHARM REV CODE 636 W HCPCS: Mod: JZ,JG,HCNC | Performed by: STUDENT IN AN ORGANIZED HEALTH CARE EDUCATION/TRAINING PROGRAM

## 2024-01-15 PROCEDURE — 25000003 PHARM REV CODE 250: Mod: HCNC | Performed by: STUDENT IN AN ORGANIZED HEALTH CARE EDUCATION/TRAINING PROGRAM

## 2024-01-15 PROCEDURE — 84100 ASSAY OF PHOSPHORUS: CPT | Mod: HCNC

## 2024-01-15 PROCEDURE — G0378 HOSPITAL OBSERVATION PER HR: HCPCS | Mod: HCNC

## 2024-01-15 PROCEDURE — 82607 VITAMIN B-12: CPT | Mod: HCNC

## 2024-01-15 PROCEDURE — 82746 ASSAY OF FOLIC ACID SERUM: CPT | Mod: HCNC

## 2024-01-15 PROCEDURE — 84425 ASSAY OF VITAMIN B-1: CPT | Mod: HCNC

## 2024-01-15 RX ORDER — IBUPROFEN 200 MG
24 TABLET ORAL
Status: DISCONTINUED | OUTPATIENT
Start: 2024-01-15 | End: 2024-01-22 | Stop reason: HOSPADM

## 2024-01-15 RX ORDER — ONDANSETRON 4 MG/1
4 TABLET, ORALLY DISINTEGRATING ORAL EVERY 8 HOURS PRN
Status: ON HOLD | COMMUNITY
End: 2024-01-19

## 2024-01-15 RX ORDER — IPRATROPIUM BROMIDE AND ALBUTEROL SULFATE 2.5; .5 MG/3ML; MG/3ML
3 SOLUTION RESPIRATORY (INHALATION) EVERY 4 HOURS PRN
Status: DISCONTINUED | OUTPATIENT
Start: 2024-01-15 | End: 2024-01-22 | Stop reason: HOSPADM

## 2024-01-15 RX ORDER — SODIUM CHLORIDE 0.9 % (FLUSH) 0.9 %
10 SYRINGE (ML) INJECTION
Status: DISCONTINUED | OUTPATIENT
Start: 2024-01-15 | End: 2024-01-22 | Stop reason: HOSPADM

## 2024-01-15 RX ORDER — OLANZAPINE 10 MG/2ML
5 INJECTION, POWDER, FOR SOLUTION INTRAMUSCULAR EVERY 6 HOURS PRN
Status: DISCONTINUED | OUTPATIENT
Start: 2024-01-15 | End: 2024-01-18

## 2024-01-15 RX ORDER — MULTIVITAMIN
1 TABLET ORAL DAILY
COMMUNITY

## 2024-01-15 RX ORDER — CALCIUM CARBONATE 500(1250)
TABLET,CHEWABLE ORAL
Status: ON HOLD | COMMUNITY
End: 2024-01-18 | Stop reason: HOSPADM

## 2024-01-15 RX ORDER — IBUPROFEN 200 MG
16 TABLET ORAL
Status: DISCONTINUED | OUTPATIENT
Start: 2024-01-15 | End: 2024-01-22 | Stop reason: HOSPADM

## 2024-01-15 RX ORDER — DULOXETIN HYDROCHLORIDE 30 MG/1
30 CAPSULE, DELAYED RELEASE ORAL DAILY
Status: DISCONTINUED | OUTPATIENT
Start: 2024-01-15 | End: 2024-01-22 | Stop reason: HOSPADM

## 2024-01-15 RX ORDER — MEMANTINE HYDROCHLORIDE 5 MG/1
20 TABLET ORAL DAILY
Status: DISCONTINUED | OUTPATIENT
Start: 2024-01-15 | End: 2024-01-22 | Stop reason: HOSPADM

## 2024-01-15 RX ORDER — LISINOPRIL 20 MG/1
20 TABLET ORAL DAILY
Status: DISCONTINUED | OUTPATIENT
Start: 2024-01-15 | End: 2024-01-22 | Stop reason: HOSPADM

## 2024-01-15 RX ORDER — ENOXAPARIN SODIUM 100 MG/ML
40 INJECTION SUBCUTANEOUS EVERY 24 HOURS
Status: DISCONTINUED | OUTPATIENT
Start: 2024-01-15 | End: 2024-01-22 | Stop reason: HOSPADM

## 2024-01-15 RX ORDER — BISACODYL 10 MG/1
10 SUPPOSITORY RECTAL DAILY PRN
Status: DISCONTINUED | OUTPATIENT
Start: 2024-01-15 | End: 2024-01-22 | Stop reason: HOSPADM

## 2024-01-15 RX ORDER — GLUCAGON 1 MG
1 KIT INJECTION
Status: DISCONTINUED | OUTPATIENT
Start: 2024-01-15 | End: 2024-01-22 | Stop reason: HOSPADM

## 2024-01-15 RX ORDER — PROMETHAZINE HYDROCHLORIDE 25 MG/1
25 TABLET ORAL EVERY 6 HOURS PRN
Status: DISCONTINUED | OUTPATIENT
Start: 2024-01-15 | End: 2024-01-22 | Stop reason: HOSPADM

## 2024-01-15 RX ORDER — ONDANSETRON 8 MG/1
8 TABLET, ORALLY DISINTEGRATING ORAL EVERY 8 HOURS PRN
Status: DISCONTINUED | OUTPATIENT
Start: 2024-01-15 | End: 2024-01-22 | Stop reason: HOSPADM

## 2024-01-15 RX ORDER — QUETIAPINE FUMARATE 200 MG/1
200 TABLET, FILM COATED ORAL
Status: DISCONTINUED | OUTPATIENT
Start: 2024-01-15 | End: 2024-01-15

## 2024-01-15 RX ORDER — ACETAMINOPHEN 325 MG/1
650 TABLET ORAL EVERY 4 HOURS PRN
Status: DISCONTINUED | OUTPATIENT
Start: 2024-01-15 | End: 2024-01-22 | Stop reason: HOSPADM

## 2024-01-15 RX ORDER — HALOPERIDOL 5 MG/ML
2.5 INJECTION INTRAMUSCULAR ONCE AS NEEDED
Status: COMPLETED | OUTPATIENT
Start: 2024-01-15 | End: 2024-01-15

## 2024-01-15 RX ORDER — BENZTROPINE MESYLATE 1 MG/1
1 TABLET ORAL 2 TIMES DAILY
Status: DISCONTINUED | OUTPATIENT
Start: 2024-01-15 | End: 2024-01-22 | Stop reason: HOSPADM

## 2024-01-15 RX ORDER — TALC
6 POWDER (GRAM) TOPICAL NIGHTLY PRN
Status: DISCONTINUED | OUTPATIENT
Start: 2024-01-15 | End: 2024-01-22 | Stop reason: HOSPADM

## 2024-01-15 RX ORDER — POLYETHYLENE GLYCOL 3350 17 G/17G
17 POWDER, FOR SOLUTION ORAL DAILY PRN
Status: DISCONTINUED | OUTPATIENT
Start: 2024-01-15 | End: 2024-01-22 | Stop reason: HOSPADM

## 2024-01-15 RX ORDER — QUETIAPINE FUMARATE 200 MG/1
200 TABLET, FILM COATED ORAL 2 TIMES DAILY
Status: DISCONTINUED | OUTPATIENT
Start: 2024-01-15 | End: 2024-01-18

## 2024-01-15 RX ORDER — GABAPENTIN 300 MG/1
300 CAPSULE ORAL NIGHTLY
Status: DISCONTINUED | OUTPATIENT
Start: 2024-01-15 | End: 2024-01-22 | Stop reason: HOSPADM

## 2024-01-15 RX ORDER — DONEPEZIL HYDROCHLORIDE 5 MG/1
5 TABLET, FILM COATED ORAL EVERY MORNING
Status: DISCONTINUED | OUTPATIENT
Start: 2024-01-15 | End: 2024-01-22 | Stop reason: HOSPADM

## 2024-01-15 RX ADMIN — DEXTROSE 500 MG: 50 INJECTION, SOLUTION INTRAVENOUS at 01:01

## 2024-01-15 RX ADMIN — QUETIAPINE FUMARATE 200 MG: 200 TABLET ORAL at 09:01

## 2024-01-15 RX ADMIN — DULOXETINE HYDROCHLORIDE 30 MG: 30 CAPSULE, DELAYED RELEASE ORAL at 09:01

## 2024-01-15 RX ADMIN — BENZTROPINE MESYLATE 1 MG: 1 TABLET ORAL at 10:01

## 2024-01-15 RX ADMIN — OLANZAPINE 5 MG: 10 INJECTION, POWDER, FOR SOLUTION INTRAMUSCULAR at 11:01

## 2024-01-15 RX ADMIN — MEMANTINE HYDROCHLORIDE 20 MG: 5 TABLET ORAL at 09:01

## 2024-01-15 RX ADMIN — DONEPEZIL HYDROCHLORIDE 5 MG: 5 TABLET, FILM COATED ORAL at 06:01

## 2024-01-15 RX ADMIN — OLANZAPINE 5 MG: 10 INJECTION, POWDER, FOR SOLUTION INTRAMUSCULAR at 07:01

## 2024-01-15 RX ADMIN — LISINOPRIL 20 MG: 20 TABLET ORAL at 09:01

## 2024-01-15 RX ADMIN — GABAPENTIN 300 MG: 300 CAPSULE ORAL at 08:01

## 2024-01-15 RX ADMIN — ENOXAPARIN SODIUM 40 MG: 40 INJECTION SUBCUTANEOUS at 04:01

## 2024-01-15 RX ADMIN — QUETIAPINE FUMARATE 200 MG: 200 TABLET ORAL at 08:01

## 2024-01-15 RX ADMIN — DEXTROSE 500 MG: 50 INJECTION, SOLUTION INTRAVENOUS at 08:01

## 2024-01-15 RX ADMIN — OLANZAPINE 5 MG: 10 INJECTION, POWDER, FOR SOLUTION INTRAMUSCULAR at 04:01

## 2024-01-15 RX ADMIN — HALOPERIDOL LACTATE 2.5 MG: 5 INJECTION, SOLUTION INTRAMUSCULAR at 05:01

## 2024-01-15 NOTE — ED NOTES
"Patient identifiers have been checked and are correct.  Patient in a hospital gown.     LOC: The patient is awake, confused.  APPEARANCE: No acute distress noted.   SKIN: The skin is warm, dry.   RESPIRATORY: Airway is open and patent. Bilateral chest rise and fall. Respirations are spontaneous, even and unlabored. Normal effort and rate noted. No accessory muscle use noted.   CARDIAC: Patient has a normal rate and rhythm on continuous cardiac monitor.   ABDOMEN: Soft and non tender to palpation. No distention noted.  URINARY: Incontinent.  NEUROLOGIC: Eyes open spontaneously. Speech-moaning only noted. Tolerating saliva secretions well. Does not follow commands.  Moving all extremities spontaneously.   MUSCULOSKELETAL: No obvious deformities noted.     Pt moving all around in the bed, needs frequent repositioning and correct replacement in bed. Sister requesting something be give to "calm or knock her out." Admit team made aware- placing new orders.    Side rails up x2. Call light within reach. Sister at bedside- aware of POC, verbalizes understanding.   "

## 2024-01-15 NOTE — NURSING
Pt. Arrived to unit via stetcher from ED. Pt is in 4 pts restraint. Sister at bedside. Telemetry on. Pt has purewick in place. RN will continue to monitor.

## 2024-01-15 NOTE — ED NOTES
"Unable to apply ccollar due to patients aggressiveness when approached or touched by staff members. Per family pt at baseline "kicks/ hits/ scratches and bites". Pt is demonstrating these actions on staff approach. Physician aware  "

## 2024-01-15 NOTE — ED NOTES
Pt back up and awake, constantly moving around in bed, pulling at gown/tele wires/bedding. At high risk of falling out of bed even with side rails up. Sister verbalizing frustration with current POC, reports behavior is not baseline for patient. Dr. Guerra with admit team made aware, awaiting further orders. ED tech placed at bedside to sit with pt.

## 2024-01-15 NOTE — SUBJECTIVE & OBJECTIVE
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 No pertinent surgical history.    Review of patient's allergies indicates:  No Known Allergies    No current facility-administered medications on file prior to encounter.     Current Outpatient Medications on File Prior to Encounter   Medication Sig    acetaminophen (TYLENOL) 500 MG tablet Take 1,000 mg by mouth 2 (two) times daily.    aspirin (ECOTRIN) 325 MG EC tablet TAKE 1 TABLET BY MOUTH ONCE DAILY    benztropine (COGENTIN) 1 MG tablet TAKE 1 TABLET (1MG) BY MOUTH TWICE DAILY    blood sugar diagnostic Strp 1 strip by Misc.(Non-Drug; Combo Route) route once daily.    cholecalciferol, vitamin D3, (VITAMIN D3) 25 mcg (1,000 unit) capsule Take 1 capsule by mouth Daily.    cyanocobalamin 1,000 mcg/mL injection One ml IM weekly for 4 weeks then one ml IM monthly.  Dipense #10 25 gague 1 inch needles and #10 one ml syringes    donepeziL (ARICEPT) 5 MG tablet Take 1 tablet (5 mg total) by mouth every morning. To be taken with food    DULoxetine (CYMBALTA) 30 MG capsule TAKE 1 CAPSULE (30MG) BY MOUTH ONCE DAILY    famotidine (PEPCID) 40 MG tablet TAKE 1 TABLET BY MOUTH EVERY EVENING AT 8PM    ferrous gluconate (FERGON) 324 MG tablet Take 324 mg by mouth every other day.    gabapentin (NEURONTIN) 300 MG capsule TAKE 1 TABLET (300MG) BY MOUTH AT BEDTIME at 8pm    haloperidoL (HALDOL) 5 MG tablet Take 1 tablet (5 mg total) by mouth daily as needed. Once daily as needed for agitation or paranoia    haloperidol decanoate (HALDOL DECANOATE) 50 mg/mL  injection Inject 1 mL (50 mg total) into the muscle every 14 (fourteen) days.    lisinopriL (PRINIVIL,ZESTRIL) 20 MG tablet Take 1 tablet (20 mg total) by mouth once daily.    memantine (NAMENDA XR) 28 mg CSpX Take 1 capsule (28 mg total) by mouth once daily.    multivitamin capsule Take 1 capsule by mouth once daily.    nystatin (MYCOSTATIN) ointment Apply topically 2 (two) times daily as needed.     QUEtiapine (SEROQUEL) 200 MG Tab TAKE 1 TABLET (200MG) BY MOUTH EVERY EVENING    senna (SENOKOT) 8.6 mg tablet Take 1 tablet by mouth Twice daily.     Family History       Problem Relation (Age of Onset)    Depression Sister, Brother    Suicide Brother          Tobacco Use    Smoking status: Never    Smokeless tobacco: Never   Substance and Sexual Activity    Alcohol use: No    Drug use: No    Sexual activity: Not Currently     Review of Systems   Unable to perform ROS: Patient nonverbal (Dementia and Deaf)     Objective:     Vital Signs (Most Recent):  Temp: 98 °F (36.7 °C) (01/14/24 2121)  Pulse: (!) 59 (01/15/24 0145)  Resp: 18 (01/14/24 2121)  BP: 138/64 (01/14/24 2334)  SpO2: 98 % (01/15/24 0145) Vital Signs (24h Range):  Temp:  [98 °F (36.7 °C)] 98 °F (36.7 °C)  Pulse:  [58-77] 59  Resp:  [18] 18  SpO2:  [94 %-100 %] 98 %  BP: (121-154)/(64-72) 138/64     Weight: 72.6 kg (160 lb)  Body mass index is 29.26 kg/m².     Physical Exam  Vitals and nursing note reviewed.   Constitutional:       Appearance: She is well-developed.      Comments: Resting comfortably on exam s/p haldol in ED   Eyes:      Pupils: Pupils are equal, round, and reactive to light.   Cardiovascular:      Rate and Rhythm: Normal rate and regular rhythm.   Pulmonary:      Effort: Pulmonary effort is normal.      Breath sounds: Normal breath sounds.   Abdominal:      Palpations: Abdomen is soft.   Skin:     General: Skin is warm and dry.   Neurological:      Mental Status: She is alert.      Comments: Unable to participate in neuro exam, cannot  hear  Per ED: moving all extremities spontaneously               CRANIAL NERVES     CN III, IV, VI   Pupils are equal, round, and reactive to light.       Significant Labs: All pertinent labs within the past 24 hours have been reviewed.  BMP:   Recent Labs   Lab 01/14/24 2238   *      K 4.5      CO2 22*   BUN 21   CREATININE 1.0   CALCIUM 10.4     CBC:   Recent Labs   Lab 01/14/24 2238   WBC 10.29   HGB 11.8*   HCT 37.3          Significant Imaging: I have reviewed all pertinent imaging results/findings within the past 24 hours.    Imaging Results              CT Cervical Spine Without Contrast (Final result)  Result time 01/15/24 02:12:06      Final result by Akin Booth MD (01/15/24 02:12:06)                   Impression:      Cervical spine CT: Nondiagnostic because of excessive motion artifact.  Correlate clinically.  Consider follow-up scanning if and when the patient is better able to hold still.    If clinically necessary, consider radiographs in the meantime.    PASQUALE Booth MD, first observed the findings on 01/15/2024 at 02:01, and reported the results to Destinee Kapoor RN, via epic secure chat message on 01/15/2024 at 02:10.  Patient name and medical record number were specified/linked in the message. The messaging system provided confirmation that the message was immediately opened and seen by the recipient.    Electronically signed by resident: Philippe Morales  Date:    01/15/2024  Time:    01:19    Electronically signed by: Akin Booth  Date:    01/15/2024  Time:    02:12               Narrative:    EXAMINATION:  CT CERVICAL SPINE WITHOUT CONTRAST    CLINICAL HISTORY:  Neck trauma (Age >= 65y);    TECHNIQUE:  Low dose axial images, sagittal and coronal reformations were performed though the cervical spine.  Contrast was not administered.  Additional images were taken due to severe patient motion artifact.    COMPARISON:  CT C-spine 08/23/2021    FINDINGS:  Severe patient  motion artifact significantly limits evaluation.  A 2nd set of images was acquired but is also markedly degraded by motion artifact.    Consequently, this scan is considered nondiagnostic.                                       X-Ray Chest 1 View (Final result)  Result time 01/14/24 22:44:23      Final result by Dino Mercado MD (01/14/24 22:44:23)                   Impression:      No acute radiographic abnormality.  See above comments.      Electronically signed by: Dino Mercado  Date:    01/14/2024  Time:    22:44               Narrative:    EXAMINATION:  XR CHEST 1 VIEW    CLINICAL HISTORY:  Encephalopathy, unspecified    TECHNIQUE:  Single frontal view of the chest was performed.    COMPARISON:  08/18/2022    FINDINGS:  Suboptimal inspiration.    Mild nonspecific interstitial changes.    Possible mild left basilar retrocardiac atelectasis.    No effusion or pneumothorax.    The cardiac silhouette is normal in size. The hilar and mediastinal contours are unremarkable.    Bones are intact.    No significant change.                                       CT Head Without Contrast (Final result)  Result time 01/14/24 22:21:14      Final result by Dino Mercado MD (01/14/24 22:21:14)                   Impression:      1. No acute intracranial process.  2. Significant motion artifact may diminish the sensitivity.  3. Involutional changes with chronic microvascular ischemic changes.      Electronically signed by: Dino Mercado  Date:    01/14/2024  Time:    22:21               Narrative:    EXAMINATION:  CT HEAD WITHOUT CONTRAST    CLINICAL HISTORY:  Mental status change, unknown cause;    TECHNIQUE:  Low dose axial CT images obtained throughout the head without intravenous contrast. Sagittal and coronal reconstructions were performed.    COMPARISON:  04/24/2023    FINDINGS:  Intracranial compartment:    No midline shift or acute hydrocephalus.  No extra-axial blood or fluid collections.    Moderate involutional  changes and chronic microvascular ischemic changes in the periventricular white matter.    Significant motion artifact may diminish the sensitivity.    No parenchymal mass, hemorrhage, edema or major vascular distribution infarct.    Skull/extracranial contents (limited evaluation): No fracture. Mastoid air cells and paranasal sinuses are essentially clear.

## 2024-01-15 NOTE — HPI
Lay Peres is a 78 y.o. female with a hx of dementia, deaf since birth, CVA, and schizophrenia presents from [facility] for AMS. Patient unable to provide hx or participate in exam. Sister at bedside. States patient was I her usual state of health when she had an unwitnessed fall. Per sister, unclear if she hit her head and unclear how she fell. Per staff and sister, the patient was not herself shortly after. She would not open her eyes all the way, was very combative/ agitated and did not recognize her sister. States she is usually able to ambulate with a walker and can say some short phrases but is unable to have a conversation. Per sister, did not believe patient was in pain and patient was moving all extremities with good strength. No noticeable facial droop, convulsions or unilateral weakness. Patient has a hx of incontinence. Prior to my exam, pt received haldol which sister states put her to sleep and has been the most restful she has been since arrival to the ED.    Confirmed with sister at bedside a code status of DNR. Sister wishes for patient to be comfortable.     ED: AF, VSS. CBC/ CMP largely unremarkable. UA not infectious. VBG reviewed. Mildly hypercapnic. CTH showed no acute abnormality. CT C spine was non diagnostic due to patient moving. Given haldol in ED.

## 2024-01-15 NOTE — PHARMACY MED REC
"Admission Medication History     The home medication history was taken by Jenae Bull.    You may go to "Admission" then "Reconcile Home Medications" tabs to review and/or act upon these items.     The home medication list has been updated by the Pharmacy department.   Please read ALL comments highlighted in yellow.   Please address this information as you see fit.    Feel free to contact us if you have any questions or require assistance.      The medications listed below were removed from the home medication list. Please reorder if appropriate:  Patient reports no longer taking the following medication(s):  FERROUS GLUCONATE 324 MG TABLET    Medications listed below were obtained from: Nursing home  Current Outpatient Medications on File Prior to Encounter   Medication Sig    acetaminophen (TYLENOL) 500 MG tablet   Give 1,000 mg by mouth 2 (two) times daily.    aspirin (ECOTRIN) 325 MG EC tablet   Give 1 tablet by mouth once daily.    benztropine (COGENTIN) 1 MG tablet   Give 1 tablet by mouth twice daily.    calcium carbonate (OS-SAWYER) 500 mg calcium (1,250 mg) chewable tablet   Give 1 to 2 tablets by mouth 30 minutes prior to meals and 1 tablet after eating as needed for heartburn.    cholecalciferol, vitamin D3, (VITAMIN D3) 25 mcg (1,000 unit) capsule   Give 1 capsule by mouth daily.    cyanocobalamin 1,000 mcg/mL injection   Inject 1,000 mcg into the muscle every 30 days.    donepeziL (ARICEPT) 5 MG tablet   Give 1 tablet (5 mg total) by mouth every morning with food.    DULoxetine (CYMBALTA) 30 MG capsule   Give 1 capsule by mouth once daily.    famotidine (PEPCID) 40 MG tablet   Give 40 mg by mouth every evening at 8 pm bedtime.    gabapentin (NEURONTIN) 300 MG capsule   Give 1 capsule by mouth at 8 pm bedtime.    haloperidoL (HALDOL) 5 MG tablet   Give 5 mg by mouth daily as needed for as needed for agitation or paranoia.    haloperidol decanoate (HALDOL DECANOATE) 50 mg/mL injection   Inject 1 mL (50 mg " total) into the muscle every 14 (fourteen) days.    lisinopriL (PRINIVIL,ZESTRIL) 20 MG tablet   Give 1 tablet (20 mg total) by mouth once daily.    memantine (NAMENDA XR) 28 mg CSpX   Give 1 capsule (28 mg total) by mouth once daily.    multivitamin (THERAGRAN) per tablet   Give 1 tablet by mouth once daily.    nystatin (MYCOSTATIN) ointment   Apply to affected area twice daily as needed    ondansetron (ZOFRAN-ODT) 4 MG TbDL   Give 4 mg by mouth every 8 (eight) hours as needed for nausea.    QUEtiapine (SEROQUEL) 200 MG Tab   Give 1 tablet by mouth every evening.    senna (SENOKOT) 8.6 mg tablet   Give 1 tablet by mouth twice daily.    blood sugar diagnostic Strp     1 strip by Misc.(Non-Drug; Combo Route) route once daily.         Jenae Bull  EXT [id number]                  .

## 2024-01-15 NOTE — ASSESSMENT & PLAN NOTE
Patient's FSGs are controlled on current medication regimen.  Last A1c reviewed-   Lab Results   Component Value Date    HGBA1C 5.4 01/25/2023   Hold Oral hypoglycemics while patient is in the hospital.  - POCT checks

## 2024-01-15 NOTE — ED NOTES
Sister at bedside requesting oral medications be held since patient is now sleeping, requesting to let patient rest since she has been up all night.

## 2024-01-15 NOTE — ASSESSMENT & PLAN NOTE
- spoke with patient's sister at bedside who states she is her MPOA  - confirmed DNR status  - would like patient to be comfortable

## 2024-01-15 NOTE — PLAN OF CARE
[facility] - Emergency Dept  Discharge Assessment    Primary Care Provider: Krista Case MD     Pt is a resident of [facility] and has 24/7 care.  Pt sister Jose Francisco was at bedside and stated pt uses a rollator to assist with ambulation.      As per sister, pt to return to [place] when ready for d/c    Discharge Assessment (most recent)       BRIEF DISCHARGE ASSESSMENT - 01/15/24 0731          Discharge Planning    Assessment Type Discharge Planning Brief Assessment (P)      Resource/Environmental Concerns none (P)      Support Systems Family members;Home care staff (P)      Equipment Currently Used at Home rollator (P)      Current Living Arrangements group home (P)      Care Facility Name [facility] (P)      Patient/Family Anticipates Transition to long-term care facility (P)    Return to [facility]    Patient/Family Anticipated Services at Transition none (P)      DME Needed Upon Discharge  none (P)      Discharge Plan A Group Home (P)      Discharge Plan B Group home (P)                      Daylin Dodge CD, MSW, LMSW, RSW   Case Management  [facility]  Email: [email or web address]

## 2024-01-15 NOTE — ASSESSMENT & PLAN NOTE
Chronic, controlled. Latest blood pressure and vitals reviewed-     Temp:  [98 °F (36.7 °C)]   Pulse:  [58-77]   Resp:  [18]   BP: (121-154)/(64-72)   SpO2:  [94 %-100 %] .   Home meds for hypertension were reviewed and noted below.   Hypertension Medications               lisinopriL (PRINIVIL,ZESTRIL) 20 MG tablet Take 1 tablet (20 mg total) by mouth once daily.            While in the hospital, will manage blood pressure as follows; Continue home antihypertensive regimen    Will utilize p.r.n. blood pressure medication only if patient's blood pressure greater than 180/110 and she develops symptoms such as worsening chest pain or shortness of breath.

## 2024-01-15 NOTE — PLAN OF CARE
Hospital Medicine Plan of Care Note    Admission H&P dated earlier this morning reviewed, and agree with assessment and plan as documented. Pt seen and examined this morning on rounds, NAEON.     Now calm s/p zyprexa and seroquel. MRI brain pending. Sister updated at bedside. Further dispo pending clinical course.      Alen Guerra MD  Attending Physician  Medical Director - [facility]  Department of Hospital Medicine  1/15/2024

## 2024-01-15 NOTE — ED NOTES
Pt sleeping in bed. RR spontaneous, even and unlabored. No acute distress noted. Sister remains at bedside. Side rails up x2. Call light within reach.

## 2024-01-15 NOTE — ASSESSMENT & PLAN NOTE
- unclear etiology  - no leukocytosis, UA not infectious  - CTH w/o acute abnormality  - consider MRI Brain if clinically indicated   - checking B12, B1 and folate  - will need to reassess patient once haldol wears off  - neuro check  - delirium precaution  - hx/ PE difficult to obtain from pt 2/2 dementia, deafness, administration of haldol and speech difficulty

## 2024-01-15 NOTE — ED PROVIDER NOTES
"Source of History:  Chart  Sister  Patient unable to provide any history-she is completely deaf, and acutely encephalopathic    Chief complaint:  ams (BIB ems from [facility] for AMS. Gcs 10. Baseline gcs 15.. hx of dementia)      HPI:  Lay Peres is a 78 y.o. female with history of schizoaffective disorder, dementia, hypertension, diabetes, intellectual disability, deafness, prior stroke, heart failure, nursing home resident, presenting to emergency department with complaint of altered mental status.    History provided per patient's sister who is at bedside.  Sister states she last saw the patient on Christmas, and patient was normal at that time.  Patient is deaf but is interactive, will make sounds and communicate.  This evening, sister was called and told that the patient was not acting like herself.  She would apparently fallen earlier in the day.  No other information was really available.    Sister does not believe that the patient seems to be in pain at this time there have been no similar episodes like this in the past.    Per the triage note, patient was bradycardic and received atropine.  This is never happened before.  EMS is not available here anymore, and there is no rhythm strip.  She was not currently bradycardic.    Patient's sister confirms the patient is DNR.  No aggressive measures including cardioversion, vasopressor use, intubation, or CPR    I reviewed the note from psychiatry on 12/05/2023.  There was concern about worsening behavior over the past month.  She would have days where she would lash out, and days where she was calm.  Sleeping well.  Patient was smiling and interactive during her exam.  She was in a wheelchair but could walk with assistance. Per that note, "She is currently taking Haldol dec 50mg every 14 days, Seroquel 200mg qHS, Cymbalta 30mg daily, and Cogentin 1mg BID.  She is taking Namenda and Aricept for her dementia."    Review of patient's " allergies indicates:  No Known Allergies    No current facility-administered medications on file prior to encounter.     Current Outpatient Medications on File Prior to Encounter   Medication Sig Dispense Refill    acetaminophen (TYLENOL) 500 MG tablet Take 1,000 mg by mouth 2 (two) times daily.      aspirin (ECOTRIN) 325 MG EC tablet TAKE 1 TABLET BY MOUTH ONCE DAILY 30 tablet 0    benztropine (COGENTIN) 1 MG tablet TAKE 1 TABLET (1MG) BY MOUTH TWICE DAILY 60 tablet 3    blood sugar diagnostic Strp 1 strip by Misc.(Non-Drug; Combo Route) route once daily. 100 strip 4    cholecalciferol, vitamin D3, (VITAMIN D3) 25 mcg (1,000 unit) capsule Take 1 capsule by mouth Daily.      cyanocobalamin 1,000 mcg/mL injection One ml IM weekly for 4 weeks then one ml IM monthly.  Dipense #10 25 gague 1 inch needles and #10 one ml syringes 10 mL 3    donepeziL (ARICEPT) 5 MG tablet Take 1 tablet (5 mg total) by mouth every morning. To be taken with food 90 tablet 3    DULoxetine (CYMBALTA) 30 MG capsule TAKE 1 CAPSULE (30MG) BY MOUTH ONCE DAILY 30 capsule 3    famotidine (PEPCID) 40 MG tablet TAKE 1 TABLET BY MOUTH EVERY EVENING AT 8PM 30 tablet 6    ferrous gluconate (FERGON) 324 MG tablet Take 324 mg by mouth every other day.      gabapentin (NEURONTIN) 300 MG capsule TAKE 1 TABLET (300MG) BY MOUTH AT BEDTIME at 8pm 30 capsule 6    haloperidoL (HALDOL) 5 MG tablet Take 1 tablet (5 mg total) by mouth daily as needed. Once daily as needed for agitation or paranoia 30 tablet 1    haloperidol decanoate (HALDOL DECANOATE) 50 mg/mL injection Inject 1 mL (50 mg total) into the muscle every 14 (fourteen) days. 6 mL 12    lisinopriL (PRINIVIL,ZESTRIL) 20 MG tablet Take 1 tablet (20 mg total) by mouth once daily. 30 tablet 11    memantine (NAMENDA XR) 28 mg CSpX Take 1 capsule (28 mg total) by mouth once daily. 90 capsule 3    multivitamin capsule Take 1 capsule by mouth once daily.      nystatin (MYCOSTATIN) ointment Apply topically 2  (two) times daily as needed.       QUEtiapine (SEROQUEL) 200 MG Tab TAKE 1 TABLET (200MG) BY MOUTH EVERY EVENING 90 tablet 0    senna (SENOKOT) 8.6 mg tablet Take 1 tablet by mouth Twice daily.         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 No pertinent surgical history.    Social History     Socioeconomic History    Marital status:    Tobacco Use    Smoking status: Never    Smokeless tobacco: Never   Substance and Sexual Activity    Alcohol use: No    Drug use: No    Sexual activity: Not Currently       Family History   Problem Relation Age of Onset    Depression Sister     Depression Brother     Suicide Brother     ADD / ADHD Neg Hx     Alcohol abuse Neg Hx     Anxiety disorder Neg Hx     Bipolar disorder Neg Hx     Dementia Neg Hx     Drug abuse Neg Hx     OCD Neg Hx     Paranoid behavior Neg Hx     Physical abuse Neg Hx     Schizophrenia Neg Hx     Seizures Neg Hx     Self injury Neg Hx     Sexual abuse Neg Hx        Physical Exam:      Vitals:    01/15/24 0020   BP:    Pulse: 61   Resp:    Temp:      Gen:  Chronically ill-appearing.  Pale.  Mental Status:  Eyes are closed.  Unable to follow commands because she can not hear me.  Skin: Warm, dry. No rashes seen.  Eyes: Pupils mid range and equally reactive No conjunctival injection.  Pulm: CTAB. No increased work of breathing.  No significant tachypnea.  No audible stridor or wheezing.   CV: Regular rate. Regular rhythm.   Abd: Soft.  Not distended.  Nontender.   MSK: Good range of motion all  joints.  No deformities.  Neck supple, no meningismus  Neuro:  She is moving all 4 extremities spontaneously.    Laboratory Studies:  Labs Reviewed   CBC W/ AUTO DIFFERENTIAL - Abnormal; Notable for the following components:       Result Value    Hemoglobin 11.8 (*)     MCHC 31.6 (*)     All other components within normal limits    Narrative:     Release to patient->Immediate   COMPREHENSIVE METABOLIC PANEL - Abnormal; Notable for the following components:    CO2 22 (*)     Glucose 127 (*)     eGFR 57.0 (*)     All other components within normal limits    Narrative:     Release to patient->Immediate   URINALYSIS, REFLEX TO URINE CULTURE - Abnormal; Notable for the following components:    Appearance, UA Hazy (*)     Protein, UA Trace (*)     All other components within normal limits    Narrative:     Specimen Source->Urine   TSH - Abnormal; Notable for the following components:    TSH 0.352 (*)     All other components within normal limits    Narrative:     Release to patient->Immediate   ISTAT PROCEDURE - Abnormal; Notable for the following components:    POC PH 7.316 (*)     POC PCO2 52.3 (*)     POC PO2 27 (*)     All other components within normal limits   HIV 1 / 2 ANTIBODY    Narrative:     Release to patient->Immediate   HEPATITIS C ANTIBODY    Narrative:     Release to patient->Immediate   TROPONIN I    Narrative:     Release to patient->Immediate   ALCOHOL,MEDICAL (ETHANOL)    Narrative:     Release to patient->Immediate   AMMONIA    Narrative:     Release to patient->Immediate   T4, FREE    Narrative:     Release to patient->Immediate       EKG (independently interpreted by me):  Sinus bradycardia, rate 58.  No STEMI.  QRS 72 milliseconds.   milliseconds    X-rays (independently interpreted by me):  No acute abnormality    Chart reviewed.     Imaging Results              CT Cervical Spine Without Contrast (In process)                      X-Ray Chest 1 View (Final result)  Result time 01/14/24  22:44:23      Final result by Dino Mercado MD (01/14/24 22:44:23)                   Impression:      No acute radiographic abnormality.  See above comments.      Electronically signed by: Dino Mercado  Date:    01/14/2024  Time:    22:44               Narrative:    EXAMINATION:  XR CHEST 1 VIEW    CLINICAL HISTORY:  Encephalopathy, unspecified    TECHNIQUE:  Single frontal view of the chest was performed.    COMPARISON:  08/18/2022    FINDINGS:  Suboptimal inspiration.    Mild nonspecific interstitial changes.    Possible mild left basilar retrocardiac atelectasis.    No effusion or pneumothorax.    The cardiac silhouette is normal in size. The hilar and mediastinal contours are unremarkable.    Bones are intact.    No significant change.                                       CT Head Without Contrast (Final result)  Result time 01/14/24 22:21:14      Final result by Dnio Mercado MD (01/14/24 22:21:14)                   Impression:      1. No acute intracranial process.  2. Significant motion artifact may diminish the sensitivity.  3. Involutional changes with chronic microvascular ischemic changes.      Electronically signed by: Dino Mercado  Date:    01/14/2024  Time:    22:21               Narrative:    EXAMINATION:  CT HEAD WITHOUT CONTRAST    CLINICAL HISTORY:  Mental status change, unknown cause;    TECHNIQUE:  Low dose axial CT images obtained throughout the head without intravenous contrast. Sagittal and coronal reconstructions were performed.    COMPARISON:  04/24/2023    FINDINGS:  Intracranial compartment:    No midline shift or acute hydrocephalus.  No extra-axial blood or fluid collections.    Moderate involutional changes and chronic microvascular ischemic changes in the periventricular white matter.    Significant motion artifact may diminish the sensitivity.    No parenchymal mass, hemorrhage, edema or major vascular distribution infarct.    Skull/extracranial contents (limited  evaluation): No fracture. Mastoid air cells and paranasal sinuses are essentially clear.                                      Medications Given:  Medications   haloperidol lactate injection 2.5 mg (has no administration in time range)   haloperidol lactate injection 2.5 mg (2.5 mg Intravenous Given 1/14/24 [id number])       Discussed with:  Internal Medicine    MDM:    78 y.o. female with history of complete deafness, dementia, schizophrenia, DNR, presenting to emergency department with complaint of altered mental status after a fall today.  Also apparently received atropine by EMS but no rhythm strip is available and not bradycardic here.  Here she was afebrile, hemodynamically stable.  No focal neuro deficits.  Unable to assess whether she can follow commands because she is deaf.  She was not seem to have any tenderness to palpation of any long bone or joint, does not grimace when I move them are touch them    Labs reviewed.  No leukocytosis or severe anemia.  No significant electrolyte derangement or acute kidney injury.  Blood glucose is 127.  Normal troponin.  TSH is low but normal free T4.  Negative serum ethanol level.  Urinalysis not consistent with infection.    Initial VBG with a pH of 7.31, I repeated it, patient with normal pH.    CT head and cervical spine somewhat limited due to motion artifact but no obvious fracture or hemorrhage noted.    Patient's sister updated regarding findings.  Plan observation to Internal Medicine for further evaluation    Diagnostic Impression:    1. Acute encephalopathy    2. Altered mental status    3. DNR (do not resuscitate)    4. Fall, initial encounter         ED Disposition Condition    Observation Stable               Patient and/or family understands the plan and is in agreement, verbalized understanding, questions answered    Anayeli Garcia MD  Emergency Medicine         Anayeli Garcia MD  01/15/24 [id number]

## 2024-01-15 NOTE — H&P
[facility] - Emergency Dept  [facility] Medicine  History & Physical    Patient Name: Lay Peres  MRN: [id number]  Patient Class: OP- Observation  Admission Date: 1/14/2024  Attending Physician: Alen Guerra MD   Primary Care Provider: Krista Case MD         Patient information was obtained from relative(s) and ER records.     Subjective:     Principal Problem:Acute encephalopathy    Chief Complaint:   Chief Complaint   Patient presents with    ams     BIB ems from [facility] [place] [facility] for AMS. Gcs 10. Baseline gcs 15.. hx of dementia        HPI: Lay Peres is a 78 y.o. female with a hx of dementia, deaf since birth, CVA, and schizophrenia presents from [facility] for AMS. Patient unable to provide hx or participate in exam. Sister at bedside. States patient was I her usual state of health when she had an unwitnessed fall. Per sister, unclear if she hit her head and unclear how she fell. Per staff and sister, the patient was not herself shortly after. She would not open her eyes all the way, was very combative/ agitated and did not recognize her sister. States she is usually able to ambulate with a walker and can say some short phrases but is unable to have a conversation. Per sister, did not believe patient was in pain and patient was moving all extremities with good strength. No noticeable facial droop, convulsions or unilateral weakness. Patient has a hx of incontinence. Prior to my exam, pt received haldol which sister states put her to sleep and has been the most restful she has been since arrival to the ED.    Confirmed with sister at bedside a code status of DNR. Sister wishes for patient to be comfortable.     ED: AF, VSS. CBC/ CMP largely unremarkable. UA not infectious. VBG reviewed. Mildly hypercapnic. CTH showed no acute abnormality. CT C spine was non diagnostic due to patient moving. Given haldol in ED.     Past Medical History:   Diagnosis Date    Back pain  Pharmacy confirmed.      Phone: [phone] 7/13/2012    Chronic constipation 7/13/2012    Congenital deafness 7/13/2012    Deaf     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 7/13/2012    Hyperlipidemia 7/13/2012    Hypertension 7/13/2012    Macular degeneration     Major depression 7/13/2012    Mild mental retardation (I.Q. 50-70) 7/13/2012    Neck pain 7/13/2012    Paranoid schizophrenia     Schizoaffective disorder, chronic condition 7/13/2012       History reviewed. No pertinent surgical history.    Review of patient's allergies indicates:  No Known Allergies    No current facility-administered medications on file prior to encounter.     Current Outpatient Medications on File Prior to Encounter   Medication Sig    acetaminophen (TYLENOL) 500 MG tablet Take 1,000 mg by mouth 2 (two) times daily.    aspirin (ECOTRIN) 325 MG EC tablet TAKE 1 TABLET BY MOUTH ONCE DAILY    benztropine (COGENTIN) 1 MG tablet TAKE 1 TABLET (1MG) BY MOUTH TWICE DAILY    blood sugar diagnostic Strp 1 strip by Misc.(Non-Drug; Combo Route) route once daily.    cholecalciferol, vitamin D3, (VITAMIN D3) 25 mcg (1,000 unit) capsule Take 1 capsule by mouth Daily.    cyanocobalamin 1,000 mcg/mL injection One ml IM weekly for 4 weeks then one ml IM monthly.  Dipense #10 25 gague 1 inch needles and #10 one ml syringes    donepeziL (ARICEPT) 5 MG tablet Take 1 tablet (5 mg total) by mouth every morning. To be taken with food    DULoxetine (CYMBALTA) 30 MG capsule TAKE 1 CAPSULE (30MG) BY MOUTH ONCE DAILY    famotidine (PEPCID) 40 MG tablet TAKE 1 TABLET BY MOUTH EVERY EVENING AT 8PM    ferrous gluconate (FERGON) 324 MG tablet Take 324 mg by mouth every other day.    gabapentin (NEURONTIN) 300 MG capsule TAKE 1 TABLET (300MG) BY MOUTH AT BEDTIME at 8pm    haloperidoL (HALDOL) 5 MG tablet Take 1 tablet (5 mg total) by mouth daily as needed. Once daily as needed for agitation or paranoia    haloperidol decanoate (HALDOL DECANOATE) 50 mg/mL injection Inject 1 mL (50 mg total) into the muscle  every 14 (fourteen) days.    lisinopriL (PRINIVIL,ZESTRIL) 20 MG tablet Take 1 tablet (20 mg total) by mouth once daily.    memantine (NAMENDA XR) 28 mg CSpX Take 1 capsule (28 mg total) by mouth once daily.    multivitamin capsule Take 1 capsule by mouth once daily.    nystatin (MYCOSTATIN) ointment Apply topically 2 (two) times daily as needed.     QUEtiapine (SEROQUEL) 200 MG Tab TAKE 1 TABLET (200MG) BY MOUTH EVERY EVENING    senna (SENOKOT) 8.6 mg tablet Take 1 tablet by mouth Twice daily.     Family History       Problem Relation (Age of Onset)    Depression Sister, Brother    Suicide Brother          Tobacco Use    Smoking status: Never    Smokeless tobacco: Never   Substance and Sexual Activity    Alcohol use: No    Drug use: No    Sexual activity: Not Currently     Review of Systems   Unable to perform ROS: Patient nonverbal (Dementia and Deaf)     Objective:     Vital Signs (Most Recent):  Temp: 98 °F (36.7 °C) (01/14/24 2121)  Pulse: (!) 59 (01/15/24 0145)  Resp: 18 (01/14/24 2121)  BP: 138/64 (01/14/24 2334)  SpO2: 98 % (01/15/24 0145) Vital Signs (24h Range):  Temp:  [98 °F (36.7 °C)] 98 °F (36.7 °C)  Pulse:  [58-77] 59  Resp:  [18] 18  SpO2:  [94 %-100 %] 98 %  BP: (121-154)/(64-72) 138/64     Weight: 72.6 kg (160 lb)  Body mass index is 29.26 kg/m².     Physical Exam  Vitals and nursing note reviewed.   Constitutional:       Appearance: She is well-developed.      Comments: Resting comfortably on exam s/p haldol in ED   Eyes:      Pupils: Pupils are equal, round, and reactive to light.   Cardiovascular:      Rate and Rhythm: Normal rate and regular rhythm.   Pulmonary:      Effort: Pulmonary effort is normal.      Breath sounds: Normal breath sounds.   Abdominal:      Palpations: Abdomen is soft.   Skin:     General: Skin is warm and dry.   Neurological:      Mental Status: She is alert.      Comments: Unable to participate in neuro exam, cannot hear  Per ED: moving all extremities spontaneously                CRANIAL NERVES     CN III, IV, VI   Pupils are equal, round, and reactive to light.       Significant Labs: All pertinent labs within the past 24 hours have been reviewed.  BMP:   Recent Labs   Lab 01/14/24 2238   *      K 4.5      CO2 22*   BUN 21   CREATININE 1.0   CALCIUM 10.4     CBC:   Recent Labs   Lab 01/14/24 2238   WBC 10.29   HGB 11.8*   HCT 37.3          Significant Imaging: I have reviewed all pertinent imaging results/findings within the past 24 hours.    Imaging Results              CT Cervical Spine Without Contrast (Final result)  Result time 01/15/24 02:12:06      Final result by Akin Booth MD (01/15/24 02:12:06)                   Impression:      Cervical spine CT: Nondiagnostic because of excessive motion artifact.  Correlate clinically.  Consider follow-up scanning if and when the patient is better able to hold still.    If clinically necessary, consider radiographs in the meantime.    PASQUALE Booth MD, first observed the findings on 01/15/2024 at 02:01, and reported the results to Destinee Kapoor RN, via epic secure chat message on 01/15/2024 at 02:10.  Patient name and medical record number were specified/linked in the message. The messaging system provided confirmation that the message was immediately opened and seen by the recipient.    Electronically signed by resident: Philippe Morales  Date:    01/15/2024  Time:    01:19    Electronically signed by: Akin Booth  Date:    01/15/2024  Time:    02:12               Narrative:    EXAMINATION:  CT CERVICAL SPINE WITHOUT CONTRAST    CLINICAL HISTORY:  Neck trauma (Age >= 65y);    TECHNIQUE:  Low dose axial images, sagittal and coronal reformations were performed though the cervical spine.  Contrast was not administered.  Additional images were taken due to severe patient motion artifact.    COMPARISON:  CT C-spine 08/23/2021    FINDINGS:  Severe patient motion artifact significantly limits evaluation.  A  2nd set of images was acquired but is also markedly degraded by motion artifact.    Consequently, this scan is considered nondiagnostic.                                       X-Ray Chest 1 View (Final result)  Result time 01/14/24 22:44:23      Final result by Dino Mercado MD (01/14/24 22:44:23)                   Impression:      No acute radiographic abnormality.  See above comments.      Electronically signed by: Dino Mercado  Date:    01/14/2024  Time:    22:44               Narrative:    EXAMINATION:  XR CHEST 1 VIEW    CLINICAL HISTORY:  Encephalopathy, unspecified    TECHNIQUE:  Single frontal view of the chest was performed.    COMPARISON:  08/18/2022    FINDINGS:  Suboptimal inspiration.    Mild nonspecific interstitial changes.    Possible mild left basilar retrocardiac atelectasis.    No effusion or pneumothorax.    The cardiac silhouette is normal in size. The hilar and mediastinal contours are unremarkable.    Bones are intact.    No significant change.                                       CT Head Without Contrast (Final result)  Result time 01/14/24 22:21:14      Final result by Dino Mercado MD (01/14/24 22:21:14)                   Impression:      1. No acute intracranial process.  2. Significant motion artifact may diminish the sensitivity.  3. Involutional changes with chronic microvascular ischemic changes.      Electronically signed by: Dino Mercado  Date:    01/14/2024  Time:    22:21               Narrative:    EXAMINATION:  CT HEAD WITHOUT CONTRAST    CLINICAL HISTORY:  Mental status change, unknown cause;    TECHNIQUE:  Low dose axial CT images obtained throughout the head without intravenous contrast. Sagittal and coronal reconstructions were performed.    COMPARISON:  04/24/2023    FINDINGS:  Intracranial compartment:    No midline shift or acute hydrocephalus.  No extra-axial blood or fluid collections.    Moderate involutional changes and chronic microvascular ischemic changes  in the periventricular white matter.    Significant motion artifact may diminish the sensitivity.    No parenchymal mass, hemorrhage, edema or major vascular distribution infarct.    Skull/extracranial contents (limited evaluation): No fracture. Mastoid air cells and paranasal sinuses are essentially clear.                                      Assessment/Plan:     * Acute encephalopathy  - unclear etiology  - no leukocytosis, UA not infectious  - CTH w/o acute abnormality  - consider MRI Brain if clinically indicated   - checking B12, B1 and folate  - will need to reassess patient once haldol wears off  - neuro check  - delirium precaution  - hx/ PE difficult to obtain from pt 2/2 dementia, deafness, administration of haldol and speech difficulty     DNR (do not resuscitate)  - spoke with patient's sister at bedside who states she is her MPOA  - confirmed DNR status  - would like patient to be comfortable     History of stroke  - hx noted  - CTH w/o acute abnormalities  - neuro checks ordered      Dementia, unspecified dementia severity, unspecified dementia type, unspecified whether behavioral, psychotic, or mood disturbance or anxiety  - hx noted  - continue home aricept and namenda    Deafness congenital  - hx noted      Chronic schizoaffective disorder  - hx noted  - continue home cogentin, cymbalta, seroquel and haldol    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  Patient's FSGs are controlled on current medication regimen.  Last A1c reviewed-   Lab Results   Component Value Date    HGBA1C 5.4 01/25/2023   Hold Oral hypoglycemics while patient is in the hospital.  - POCT checks    Hypertension  Chronic, controlled. Latest blood pressure and vitals reviewed-     Temp:  [98 °F (36.7 °C)]   Pulse:  [58-77]   Resp:  [18]   BP: (121-154)/(64-72)   SpO2:  [94 %-100 %] .   Home meds for hypertension were reviewed and noted below.   Hypertension Medications               lisinopriL (PRINIVIL,ZESTRIL) 20 MG tablet Take  1 tablet (20 mg total) by mouth once daily.            While in the hospital, will manage blood pressure as follows; Continue home antihypertensive regimen    Will utilize p.r.n. blood pressure medication only if patient's blood pressure greater than 180/110 and she develops symptoms such as worsening chest pain or shortness of breath.      VTE Risk Mitigation (From admission, onward)           Ordered     enoxaparin injection 40 mg  Daily         01/15/24 0218     IP VTE HIGH RISK PATIENT  Once         01/15/24 0218                         On 01/15/2024, patient should be placed in hospital observation services under my care in collaboration with Dr. Harman Sage.           Mayelin Morales PA-C  Department of Hospital Medicine  [facility] - Emergency Dept

## 2024-01-15 NOTE — ED TRIAGE NOTES
Lay Peres, a 78 y.o. female presents to the ED w/ complaint of AMS. Pts caregiver reports that she fell at facility this AM and has been altered since. Pt arrived via [facility]. EMS administered 2mg atropine en route for HR in 20's. Family at bedside states that she has returned to her baseline.     Triage note:  Chief Complaint   Patient presents with    ams     BIB ems from [facility] for AMS. Gcs 10. Baseline gcs 15.. hx of dementia     Review of patient's allergies indicates:  No Known Allergies  Past Medical History:   Diagnosis Date    Back pain 7/13/2012    Chronic constipation 7/13/2012    Congenital deafness 7/13/2012    Deaf     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 7/13/2012    Hyperlipidemia 7/13/2012    Hypertension 7/13/2012    Macular degeneration     Major depression 7/13/2012    Mild mental retardation (I.Q. 50-70) 7/13/2012    Neck pain 7/13/2012    Paranoid schizophrenia     Schizoaffective disorder, chronic condition 7/13/2012

## 2024-01-16 PROBLEM — R41.82 ALTERED MENTAL STATUS: Status: ACTIVE | Noted: 2024-01-16

## 2024-01-16 LAB
ALBUMIN SERPL BCP-MCNC: 3.4 G/DL (ref 3.5–5.2)
ALP SERPL-CCNC: 89 U/L (ref 55–135)
ALT SERPL W/O P-5'-P-CCNC: 28 U/L (ref 10–44)
ANION GAP SERPL CALC-SCNC: 9 MMOL/L (ref 8–16)
AST SERPL-CCNC: 70 U/L (ref 10–40)
BASOPHILS # BLD AUTO: 0.08 K/UL (ref 0–0.2)
BASOPHILS NFR BLD: 0.6 % (ref 0–1.9)
BILIRUB SERPL-MCNC: 0.4 MG/DL (ref 0.1–1)
BUN SERPL-MCNC: 22 MG/DL (ref 8–23)
CALCIUM SERPL-MCNC: 9.8 MG/DL (ref 8.7–10.5)
CHLORIDE SERPL-SCNC: 112 MMOL/L (ref 95–110)
CO2 SERPL-SCNC: 22 MMOL/L (ref 23–29)
CREAT SERPL-MCNC: 0.9 MG/DL (ref 0.5–1.4)
DIFFERENTIAL METHOD BLD: ABNORMAL
EOSINOPHIL # BLD AUTO: 0 K/UL (ref 0–0.5)
EOSINOPHIL NFR BLD: 0.3 % (ref 0–8)
ERYTHROCYTE [DISTWIDTH] IN BLOOD BY AUTOMATED COUNT: 12.8 % (ref 11.5–14.5)
EST. GFR  (NO RACE VARIABLE): >60 ML/MIN/1.73 M^2
GLUCOSE SERPL-MCNC: 122 MG/DL (ref 70–110)
HCT VFR BLD AUTO: 38.3 % (ref 37–48.5)
HGB BLD-MCNC: 12.3 G/DL (ref 12–16)
IMM GRANULOCYTES # BLD AUTO: 0.05 K/UL (ref 0–0.04)
IMM GRANULOCYTES NFR BLD AUTO: 0.4 % (ref 0–0.5)
LACTATE SERPL-SCNC: 1 MMOL/L (ref 0.5–2.2)
LYMPHOCYTES # BLD AUTO: 2.1 K/UL (ref 1–4.8)
LYMPHOCYTES NFR BLD: 15.8 % (ref 18–48)
MAGNESIUM SERPL-MCNC: 1.8 MG/DL (ref 1.6–2.6)
MCH RBC QN AUTO: 29.5 PG (ref 27–31)
MCHC RBC AUTO-ENTMCNC: 32.1 G/DL (ref 32–36)
MCV RBC AUTO: 92 FL (ref 82–98)
MONOCYTES # BLD AUTO: 1 K/UL (ref 0.3–1)
MONOCYTES NFR BLD: 7.6 % (ref 4–15)
NEUTROPHILS # BLD AUTO: 9.9 K/UL (ref 1.8–7.7)
NEUTROPHILS NFR BLD: 75.3 % (ref 38–73)
NRBC BLD-RTO: 0 /100 WBC
PHOSPHATE SERPL-MCNC: 3.1 MG/DL (ref 2.7–4.5)
PLATELET # BLD AUTO: 264 K/UL (ref 150–450)
PMV BLD AUTO: 10.8 FL (ref 9.2–12.9)
POCT GLUCOSE: 102 MG/DL (ref 70–110)
POCT GLUCOSE: 106 MG/DL (ref 70–110)
POCT GLUCOSE: 88 MG/DL (ref 70–110)
POCT GLUCOSE: 88 MG/DL (ref 70–110)
POTASSIUM SERPL-SCNC: 3.8 MMOL/L (ref 3.5–5.1)
PROCALCITONIN SERPL IA-MCNC: 0.09 NG/ML
PROT SERPL-MCNC: 7.3 G/DL (ref 6–8.4)
RBC # BLD AUTO: 4.17 M/UL (ref 4–5.4)
SODIUM SERPL-SCNC: 143 MMOL/L (ref 136–145)
WBC # BLD AUTO: 13.18 K/UL (ref 3.9–12.7)

## 2024-01-16 PROCEDURE — 84100 ASSAY OF PHOSPHORUS: CPT | Mod: HCNC

## 2024-01-16 PROCEDURE — 36415 COLL VENOUS BLD VENIPUNCTURE: CPT | Mod: HCNC,XB | Performed by: STUDENT IN AN ORGANIZED HEALTH CARE EDUCATION/TRAINING PROGRAM

## 2024-01-16 PROCEDURE — 85025 COMPLETE CBC W/AUTO DIFF WBC: CPT | Mod: HCNC

## 2024-01-16 PROCEDURE — 83605 ASSAY OF LACTIC ACID: CPT | Mod: HCNC | Performed by: STUDENT IN AN ORGANIZED HEALTH CARE EDUCATION/TRAINING PROGRAM

## 2024-01-16 PROCEDURE — 83735 ASSAY OF MAGNESIUM: CPT | Mod: HCNC

## 2024-01-16 PROCEDURE — 21400001 HC TELEMETRY ROOM: Mod: HCNC

## 2024-01-16 PROCEDURE — 25000003 PHARM REV CODE 250: Mod: HCNC

## 2024-01-16 PROCEDURE — 80053 COMPREHEN METABOLIC PANEL: CPT | Mod: HCNC

## 2024-01-16 PROCEDURE — 25000003 PHARM REV CODE 250: Mod: HCNC | Performed by: STUDENT IN AN ORGANIZED HEALTH CARE EDUCATION/TRAINING PROGRAM

## 2024-01-16 PROCEDURE — 99223 1ST HOSP IP/OBS HIGH 75: CPT | Mod: HCNC,GC,, | Performed by: STUDENT IN AN ORGANIZED HEALTH CARE EDUCATION/TRAINING PROGRAM

## 2024-01-16 PROCEDURE — 36415 COLL VENOUS BLD VENIPUNCTURE: CPT | Mod: HCNC

## 2024-01-16 PROCEDURE — 84145 PROCALCITONIN (PCT): CPT | Mod: HCNC | Performed by: STUDENT IN AN ORGANIZED HEALTH CARE EDUCATION/TRAINING PROGRAM

## 2024-01-16 PROCEDURE — 25500020 PHARM REV CODE 255: Mod: HCNC | Performed by: STUDENT IN AN ORGANIZED HEALTH CARE EDUCATION/TRAINING PROGRAM

## 2024-01-16 PROCEDURE — A9585 GADOBUTROL INJECTION: HCPCS | Mod: HCNC | Performed by: STUDENT IN AN ORGANIZED HEALTH CARE EDUCATION/TRAINING PROGRAM

## 2024-01-16 PROCEDURE — 63600175 PHARM REV CODE 636 W HCPCS: Mod: HCNC

## 2024-01-16 RX ORDER — GADOBUTROL 604.72 MG/ML
6 INJECTION INTRAVENOUS
Status: COMPLETED | OUTPATIENT
Start: 2024-01-16 | End: 2024-01-16

## 2024-01-16 RX ADMIN — DONEPEZIL HYDROCHLORIDE 5 MG: 5 TABLET, FILM COATED ORAL at 06:01

## 2024-01-16 RX ADMIN — MEMANTINE HYDROCHLORIDE 20 MG: 5 TABLET ORAL at 09:01

## 2024-01-16 RX ADMIN — BENZTROPINE MESYLATE 1 MG: 1 TABLET ORAL at 09:01

## 2024-01-16 RX ADMIN — DEXTROSE MONOHYDRATE 500 MG: 50 INJECTION, SOLUTION INTRAVENOUS at 03:01

## 2024-01-16 RX ADMIN — DEXTROSE MONOHYDRATE 500 MG: 50 INJECTION, SOLUTION INTRAVENOUS at 09:01

## 2024-01-16 RX ADMIN — DEXTROSE 500 MG: 50 INJECTION, SOLUTION INTRAVENOUS at 02:01

## 2024-01-16 RX ADMIN — QUETIAPINE FUMARATE 200 MG: 200 TABLET ORAL at 09:01

## 2024-01-16 RX ADMIN — GABAPENTIN 300 MG: 300 CAPSULE ORAL at 09:01

## 2024-01-16 RX ADMIN — DULOXETINE HYDROCHLORIDE 30 MG: 30 CAPSULE, DELAYED RELEASE ORAL at 09:01

## 2024-01-16 RX ADMIN — BENZTROPINE MESYLATE 1 MG: 1 TABLET ORAL at 11:01

## 2024-01-16 RX ADMIN — GADOBUTROL 6 ML: 604.72 INJECTION INTRAVENOUS at 10:01

## 2024-01-16 RX ADMIN — ENOXAPARIN SODIUM 40 MG: 40 INJECTION SUBCUTANEOUS at 05:01

## 2024-01-16 RX ADMIN — LISINOPRIL 20 MG: 20 TABLET ORAL at 09:01

## 2024-01-16 NOTE — NURSING
Appears calm in bed, occasional kicking of legs, restraints in place, skin accessed and replaced. Vitals stable. NSR at rest. Incontinent care done.  Monitoring ongoing.

## 2024-01-16 NOTE — HPI
77 y/o F with a hx of dementia, deaf since birth, CVA, and schizophrenia [place] [facility] [place] [facility] for AMS s/p an unwitnessed fall. Per sister, unclear if she hit her head and unclear how she fell. Per staff and sister, the patient was not herself shortly after. She would not open her eyes all the way, was very combative/ agitated and did not recognize her sister. States she is usually able to ambulate with a walker and can say some short phrases but is unable to have a conversation. CTH showed no acute abnormality. Patient was admitted to [facility] and remained stable overnight and into 1/15/24. Patient was noticed to be more agitated overnight requiring restrains and PRN antipsychotics. MRI brain with no acute infarct; new findings of remote hemorrhage and vascular which is new compared to MRI from 2018. Vascular Neurology consulted due to MRI findings.

## 2024-01-16 NOTE — HOSPITAL COURSE
Pt admitted to [facility] and remained stable overnight and into 1/15/24. Pt with marked agitation requiring restrains and PRN antipsychotics. MRI brain with nonspecific findings, vascular neurology consulted to review, who recommended CTA head (pending). Mentation and agitation waxing and waning into 1/17/2024.

## 2024-01-16 NOTE — NURSING
Pt remains in 4point restraints, agitated and kicking legs in bed, incontinent care done, feed a cup of apple sauce with medications without any problems.  ST on tele with rate of 115-120s. Iv Depacon infusing at this time. Monitoring ongoing.

## 2024-01-16 NOTE — PROGRESS NOTES
[facility] - Observation [address] [place] [address] Medicine  Progress Note    Patient Name: Lay Peres  MRN: [id number]  Patient Class: IP- Inpatient   Admission Date: 1/14/2024  Length of Stay: 0 days  Attending Physician: Alen Guerra MD  Primary Care Provider: Krista Case MD        Subjective:     Principal Problem:Acute encephalopathy        HPI:  Lay Peres is a 78 y.o. female with a hx of dementia, deaf since birth, CVA, and schizophrenia presents from [facility] for AMS. Patient unable to provide hx or participate in exam. Sister at bedside. States patient was I her usual state of health when she had an unwitnessed fall. Per sister, unclear if she hit her head and unclear how she fell. Per staff and sister, the patient was not herself shortly after. She would not open her eyes all the way, was very combative/ agitated and did not recognize her sister. States she is usually able to ambulate with a walker and can say some short phrases but is unable to have a conversation. Per sister, did not believe patient was in pain and patient was moving all extremities with good strength. No noticeable facial droop, convulsions or unilateral weakness. Patient has a hx of incontinence. Prior to my exam, pt received haldol which sister states put her to sleep and has been the most restful she has been since arrival to the ED.    Confirmed with sister at bedside a code status of DNR. Sister wishes for patient to be comfortable.     ED: AF, VSS. CBC/ CMP largely unremarkable. UA not infectious. VBG reviewed. Mildly hypercapnic. CTH showed no acute abnormality. CT C spine was non diagnostic due to patient moving. Given haldol in ED.     Overview/Hospital Course:  [place] [facility] [place] overnight and into 1/15/24. Pt with marked agitation requiring restrains and PRN antipsychotics. MRI brain with nonspecific findings, NSGY consulted to review.    Interval History: Actively altered, unable  to participate in bedside interview.      Objective:     Vital Signs (Most Recent):  Temp: 97.4 °F (36.3 °C) (01/16/24 0740)  Pulse: 70 (01/16/24 0740)  Resp: 18 (01/16/24 0740)  BP: 128/60 (01/16/24 0740)  SpO2: 97 % (01/16/24 0740) Vital Signs (24h Range):  Temp:  [97.3 °F (36.3 °C)-99.8 °F (37.7 °C)] 97.4 °F (36.3 °C)  Pulse:  [] 70  Resp:  [18-20] 18  SpO2:  [94 %-97 %] 97 %  BP: (116-153)/(55-77) 128/60     Weight: 61.3 kg (135 lb 2.3 oz)  Body mass index is 24.72 kg/m².    Intake/Output Summary (Last 24 hours) at 1/16/2024 1249  Last data filed at 1/15/2024 1415  Gross per 24 hour   Intake 49.78 ml   Output --   Net 49.78 ml         Physical Exam  Gen: in NAD, appears stated age; ill-appearing  Neuro: AAOx0, actively altered, unable to participate in neuro exam  HEENT: NTNC, EOMI, PERRLA, MMM; no thyromegaly or lymphadenopathy; no JVD appreciated  CVS: RRR, no m/r/g; S1/S2 auscultated with no S3 or S4; capillary refill < 2 sec  Resp: lungs CTAB, no w/r/r; no belabored breathing or accessory muscle use appreciated   Abd: BS+ in all 4 quadrants; NTND, soft to palpation; no organomegaly appreciated   Extrem: pulses full, equal, and regular over all 4 extremities; no UE or LE edema BL      Significant Labs: All pertinent labs within the past 24 hours have been reviewed.    Significant Imaging: I have reviewed all pertinent imaging results/findings within the past 24 hours.    Assessment/Plan:      * Acute encephalopathy  - unclear etiology  - no leukocytosis, UA not infectious  - CTH w/o acute abnormality  - MRI brain reviewed: NSGY consulted to evaluate if findings are a significant cause of her AMS  - neuro check  - delirium precautions  - Scheduled IV depakote    Dementia, unspecified dementia severity, unspecified dementia type, unspecified whether behavioral, psychotic, or mood disturbance or anxiety  - hx noted  - continue home aricept and namenda    DNR (do not resuscitate)  - spoke with patient's  sister at bedside who states she is her MPOA  - confirmed DNR status  - would like patient to be comfortable     History of stroke  - hx noted  - CTH w/o acute abnormalities  - neuro checks ordered      Deafness congenital  - hx noted      Chronic schizoaffective disorder  - hx noted  - continue home cogentin, cymbalta, seroquel and haldol    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  Patient's FSGs are controlled on current medication regimen.  Last A1c reviewed-   Lab Results   Component Value Date    HGBA1C 5.4 01/25/2023   Hold Oral hypoglycemics while patient is in the hospital.  - POCT checks    Hypertension  Chronic, controlled. Latest blood pressure and vitals reviewed-     Temp:  [98 °F (36.7 °C)]   Pulse:  [58-77]   Resp:  [18]   BP: (121-154)/(64-72)   SpO2:  [94 %-100 %] .   Home meds for hypertension were reviewed and noted below.   Hypertension Medications               lisinopriL (PRINIVIL,ZESTRIL) 20 MG tablet Take 1 tablet (20 mg total) by mouth once daily.            While in the hospital, will manage blood pressure as follows; Continue home antihypertensive regimen    Will utilize p.r.n. blood pressure medication only if patient's blood pressure greater than 180/110 and she develops symptoms such as worsening chest pain or shortness of breath.      VTE Risk Mitigation (From admission, onward)           Ordered     enoxaparin injection 40 mg  Daily         01/15/24 0218     IP VTE HIGH RISK PATIENT  Once         01/15/24 0218                    Discharge Planning   AGUILAR: 1/16/2024     Code Status: DNR   Is the patient medically ready for discharge?: No    Reason for patient still in hospital (select all that apply): Patient trending condition  Discharge Plan A: Group Home            Alen Guerra MD  Attending Physician  Medical Director - [facility] Observation Unit  Department of Hospital Medicine  1/16/2024

## 2024-01-16 NOTE — PLAN OF CARE
Problem: Adult Inpatient Plan of Care  Goal: Plan of Care Review  Outcome: Ongoing, Progressing     Problem: Infection  Goal: Absence of Infection Signs and Symptoms  Outcome: Ongoing, Progressing     Problem: Fall Injury Risk  Goal: Absence of Fall and Fall-Related Injury  Outcome: Ongoing, Progressing     Problem: Restraint, Nonbehavioral (Nonviolent)  Goal: Absence of Harm or Injury  Outcome: Ongoing, Progressing     Problem: Skin Injury Risk Increased  Goal: Skin Health and Integrity  Outcome: Ongoing, Progressing

## 2024-01-16 NOTE — ASSESSMENT & PLAN NOTE
77 y/o F with a hx of dementia, deaf since birth, CVA, and schizophrenia presents to [facility] ED from [facility] for AMS s/p an unwitnessed fall. CTH showed no acute abnormality. [place] [facility] exhibited more confusion overnight. MRI brain with no acute infarct; new findings of remote hemorrhage and vascular which is new compared to MRI from 2018. On exam, the patient is deaf (since birth), agitated, in 4-points restraints (noticed to be moving all extremities) and not following commands. Unable to complete neurological exam.     -CTH showed no acute abnormality.  -MRI brain with no acute infarct; new findings of remote hemorrhage and vascular which is new compared to MRI from 2018.  -MRI findings do not correlate with patient's symptoms. Possibly cavernous malformation.   -Recommend obtaining vessel imaging (CTA)    -Can repeat MRI Brain in 3 months to compare to recent ones; earlier if needed.   -Case discussed with fellow/staff    Thank you for your consult. Vascular neuro to followup on CTA if obtained. If negative, will sign off. Further workup per primary team. Please do not hesitate to contact us for any questions or concerns.

## 2024-01-16 NOTE — ASSESSMENT & PLAN NOTE
- unclear etiology  - no leukocytosis, UA not infectious  - CTH w/o acute abnormality  - MRI brain reviewed: NSGY consulted to evaluate if findings are a significant cause of her AMS  - neuro check  - delirium precautions  - Scheduled IV depakote

## 2024-01-16 NOTE — CONSULTS
[facility]H  Vascular Neurology  [facility]  Consult Note    Inpatient consult to Vascular (Stroke) Neurology  Consult performed by: Radha Everett DNP  Consult ordered by: Alen Guerra MD  Reason for consult: Altered Mental Status; MRI findings        Assessment/Plan:     Patient is a 78 y.o. year old female with:    Altered mental status  77 y/o F with a hx of dementia, deaf since birth, CVA, and schizophrenia presents to [facility] [place] [facility] ED from [facility] for AMS s/p an unwitnessed fall. CTH showed no acute abnormality. Patient admitted to [facility] [place], exhibited more confusion overnight. MRI brain with no acute infarct; new findings of remote hemorrhage and vascular which is new compared to MRI from 2018. On exam, the patient is deaf (since birth), agitated, in 4-points restraints (noticed to be moving all extremities) and not following commands. Unable to complete neurological exam.     -CTH showed no acute abnormality.  -MRI brain with no acute infarct; new findings of remote hemorrhage and vascular which is new compared to MRI from 2018.  -MRI findings do not correlate with patient's symptoms. Possibly cavernous malformation.   -Recommend obtaining vessel imaging (CTA). Contact us if anything acute/concerning for vascular abnormalities.     -Can repeat MRI Brain in 3 months to compare to recent ones; earlier if needed.   -Case discussed with fellow/staff    Thank you for your consult. Vascular neuro will sign off at this time. Further workup per primary team. Please do not hesitate to contact us for any questions or concerns.        STROKE DOCUMENTATION          NIH Scale:  Interval: baseline  1a. Level of Consciousness: 2-->Not alert, requires repeated stimulation to attend, or is obtunded and requires strong or painful stimulation to make movements (not stereotyped)  1b. LOC Questions: 2-->Answers neither question correctly (Patient deaf at baseline, confused, agitated,  unable to follow commands)  1c. LOC Commands: 2-->Performs neither task correctly (Patient deaf at baseline, confused, agitated, unable to follow commands)  2. Best Gaze: 0-->Normal  3. Visual: 0-->No visual loss  4. Facial Palsy: 0-->Normal symmetrical movements  5a. Motor Arm, Left: 0-->No drift, limb holds 90 (or 45) degrees for full 10 secs  5b. Motor Arm, Right: 0-->No drift, limb holds 90 (or 45) degrees for full 10 secs  6a. Motor Leg, Left: 0-->No drift, leg holds 30 degree position for full 5 secs  6b. Motor Leg, Right: 0-->No drift, leg holds 30 degree position for full 5 secs  7. Limb Ataxia: 0-->Absent  8. Sensory: 0-->Normal, no sensory loss (Patient deaf at baseline, confused, agitated, unable to follow commands)  9. Best Language: 0-->No aphasia, normal (Patient deaf at baseline, confused, agitated, unable to follow commands)  10. Dysarthria: 0-->Normal (Patient deaf at baseline, confused, agitated, unable to follow commands)  11. Extinction and Inattention (formerly Neglect): 0-->No abnormality  Total (NIH Stroke Scale): 6    Modified [place]    Marcia Coma Scale:    ABCD2 Score:    [id number] Score:   HAS -BLED Score:   ICH Score:   Hunt & Lui Classification:       Thrombolysis Candidate? No, Strong suspicion for stroke mimic or alternative diagnosis     Delays to Thrombolysis?  Not Applicable    Interventional Revascularization Candidate?   Is the patient eligible for mechanical endovascular reperfusion (DON)?  No; No large vessel occlusion identified on imaging     Delays to Thrombectomy? Not Applicable    Hemorrhagic change of an Ischemic Stroke: Does this patient have an ischemic stroke with hemorrhagic changes? No     Subjective:     History of Present Illness:  77 y/o F with a hx of dementia, deaf since birth, CVA, and schizophrenia [place] [facility] [place] [facility] for AMS s/p an unwitnessed fall. Per sister, unclear if she hit her head and unclear how she fell. Per staff and  sister, the patient was not herself shortly after. She would not open her eyes all the way, was very combative/ agitated and did not recognize her sister. States she is usually able to ambulate with a walker and can say some short phrases but is unable to have a conversation. CTH showed no acute abnormality. Patient was admitted to [facility] and remained stable overnight and into 1/15/24. Patient was noticed to be more agitated overnight requiring restrains and PRN antipsychotics. MRI brain with no acute infarct; new findings of remote hemorrhage and vascular which is new compared to MRI from 2018. Vascular Neurology consulted due to MRI findings.           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 No pertinent surgical history.  Social History     Tobacco Use    Smoking status: Never    Smokeless tobacco: Never   Substance Use Topics    Alcohol use: No    Drug use: No     Review of patient's allergies indicates:  No Known Allergies    Medications: I have reviewed the current medication administration record.    Medications Prior to Admission   Medication Sig Dispense Refill Last Dose    acetaminophen (TYLENOL) 500 MG tablet Take 1,000 mg by mouth 2 (two) times daily.       aspirin (ECOTRIN) 325 MG EC tablet TAKE 1 TABLET BY MOUTH ONCE DAILY 30 tablet 0     benztropine (COGENTIN) 1 MG tablet TAKE 1 TABLET (1MG) BY MOUTH TWICE DAILY 60 tablet 3     calcium carbonate (OS-SAWYER) 500 mg calcium (1,250 mg) chewable tablet Give 1 to 2 tablets by mouth 30 minutes prior to meals and 1 tablet after  eating as needed for heartburn.       cholecalciferol, vitamin D3, (VITAMIN D3) 25 mcg (1,000 unit) capsule Take 1 capsule by mouth Daily.       cyanocobalamin 1,000 mcg/mL injection One ml IM weekly for 4 weeks then one ml IM monthly.  Dipense #10 25 gague 1 inch needles and #10 one ml syringes (Patient taking differently: Inject 1,000 mcg into the muscle every 30 days.) 10 mL 3     donepeziL (ARICEPT) 5 MG tablet Take 1 tablet (5 mg total) by mouth every morning. To be taken with food 90 tablet 3     DULoxetine (CYMBALTA) 30 MG capsule TAKE 1 CAPSULE (30MG) BY MOUTH ONCE DAILY 30 capsule 3     famotidine (PEPCID) 40 MG tablet TAKE 1 TABLET BY MOUTH EVERY EVENING AT 8PM (Patient taking differently: Take 40 mg by mouth every evening. At 8 PM bedtime.) 30 tablet 6     gabapentin (NEURONTIN) 300 MG capsule TAKE 1 TABLET (300MG) BY MOUTH AT BEDTIME at 8pm 30 capsule 6     haloperidoL (HALDOL) 5 MG tablet Take 1 tablet (5 mg total) by mouth daily as needed. Once daily as needed for agitation or paranoia (Patient taking differently: Take 5 mg by mouth daily as needed (as needed for agitation or paranoia).) 30 tablet 1     haloperidol decanoate (HALDOL DECANOATE) 50 mg/mL injection Inject 1 mL (50 mg total) into the muscle every 14 (fourteen) days. 6 mL 12     lisinopriL (PRINIVIL,ZESTRIL) 20 MG tablet Take 1 tablet (20 mg total) by mouth once daily. 30 tablet 11     memantine (NAMENDA XR) 28 mg CSpX Take 1 capsule (28 mg total) by mouth once daily. 90 capsule 3     multivitamin (THERAGRAN) per tablet Take 1 tablet by mouth once daily.       nystatin (MYCOSTATIN) ointment Apply to affected area twice daily as needed       ondansetron (ZOFRAN-ODT) 4 MG TbDL Take 4 mg by mouth every 8 (eight) hours as needed (nausea.).       QUEtiapine (SEROQUEL) 200 MG Tab TAKE 1 TABLET (200MG) BY MOUTH EVERY EVENING 90 tablet 0     senna (SENOKOT) 8.6 mg tablet Take 1 tablet by mouth Twice daily.       blood sugar diagnostic Strp 1 strip  "by Misc.(Non-Drug; Combo Route) route once daily. 100 strip 4        Review of Systems   Unable to perform ROS: Acuity of condition     Objective:     Vital Signs (Most Recent):  Temp: 97.4 °F (36.3 °C) (01/16/24 0740)  Pulse: 90 (01/16/24 1431)  Resp: 18 (01/16/24 0740)  BP: 128/60 (01/16/24 0740)  SpO2: 97 % (01/16/24 0740)    Vital Signs Range (Last 24H):  Temp:  [97.4 °F (36.3 °C)-99.8 °F (37.7 °C)]   Pulse:  []   Resp:  [18-20]   BP: (116-145)/(55-63)   SpO2:  [94 %-97 %]        Physical Exam  HENT:      Head: Normocephalic and atraumatic.   Cardiovascular:      Rate and Rhythm: Normal rate.   Pulmonary:      Effort: Pulmonary effort is normal. No respiratory distress.   Neurological:      Comments: Patient deaf at baseline, confused, agitated, unable to follow commands. Unable to complete neurological exam. Patient is in 4-point restraints. Noticed to be moving all 4 extremities while restrained.              Neurological Exam:   LOC: alert  Orientation: Deaf baseline, agitated, confused, attempting to get out of bed. Unable to follow commands.   Motor: Arm left  Normal 5/5  Leg left  Normal 5/5  Arm right  Normal 5/5  Leg right Normal 5/5      Laboratory:  CMP:   Recent Labs   Lab 01/16/24  0407   CALCIUM 9.8   ALBUMIN 3.4*   PROT 7.3      K 3.8   CO2 22*   *   BUN 22   CREATININE 0.9   ALKPHOS 89   ALT 28   AST 70*   BILITOT 0.4     CBC:   Recent Labs   Lab 01/16/24  0407   WBC 13.18*   RBC 4.17   HGB 12.3   HCT 38.3      MCV 92   MCH 29.5   MCHC 32.1     Lipid Panel: No results for input(s): "CHOL", "LDLCALC", "HDL", "TRIG" in the last 168 hours.  Coagulation: No results for input(s): "PT", "INR", "APTT" in the last 168 hours.  Hgb A1C: No results for input(s): "HGBA1C" in the last 168 hours.  TSH:   Recent Labs   Lab 01/14/24  2238   TSH 0.352*       Diagnostic Results:      Brain imaging/Vessel Imaging:  CTH 01/14/2024    Impression:     1. No acute intracranial process.  2. " Significant motion artifact may diminish the sensitivity.  3. Involutional changes with chronic microvascular ischemic changes.    MRI Brain 01/16/2024    Impression:     No evidence for acute infarct.     Focal T2 hypointense signal with diffuse focal susceptibility artifact about the right michelle ehsan, with faint heterogeneous peripheral T1 hyperintense signal.  No abnormal enhancement, surrounding edema, abnormal diffusion signal, or mass effect.  Findings are new from comparison MRI 2018, and may relate to remote hemorrhage, noting that additional considerations include vascular abnormality such as a cavernous malformation.  Continued follow-up is recommended.     Sequela of chronic microvascular ischemic change.          Radha Everett, FROILAN  [facility]  Department of Vascular Neurology   [facility] 11[facility]

## 2024-01-16 NOTE — SUBJECTIVE & OBJECTIVE
Interval History: Actively altered, unable to participate in bedside interview.      Objective:     Vital Signs (Most Recent):  Temp: 97.4 °F (36.3 °C) (01/16/24 0740)  Pulse: 70 (01/16/24 0740)  Resp: 18 (01/16/24 0740)  BP: 128/60 (01/16/24 0740)  SpO2: 97 % (01/16/24 0740) Vital Signs (24h Range):  Temp:  [97.3 °F (36.3 °C)-99.8 °F (37.7 °C)] 97.4 °F (36.3 °C)  Pulse:  [] 70  Resp:  [18-20] 18  SpO2:  [94 %-97 %] 97 %  BP: (116-153)/(55-77) 128/60     Weight: 61.3 kg (135 lb 2.3 oz)  Body mass index is 24.72 kg/m².    Intake/Output Summary (Last 24 hours) at 1/16/2024 1249  Last data filed at 1/15/2024 1415  Gross per 24 hour   Intake 49.78 ml   Output --   Net 49.78 ml         Physical Exam  Gen: in NAD, appears stated age; ill-appearing  Neuro: AAOx0, actively altered, unable to participate in neuro exam  HEENT: NTNC, EOMI, PERRLA, MMM; no thyromegaly or lymphadenopathy; no JVD appreciated  CVS: RRR, no m/r/g; S1/S2 auscultated with no S3 or S4; capillary refill < 2 sec  Resp: lungs CTAB, no w/r/r; no belabored breathing or accessory muscle use appreciated   Abd: BS+ in all 4 quadrants; NTND, soft to palpation; no organomegaly appreciated   Extrem: pulses full, equal, and regular over all 4 extremities; no UE or LE edema BL      Significant Labs: All pertinent labs within the past 24 hours have been reviewed.    Significant Imaging: I have reviewed all pertinent imaging results/findings within the past 24 hours.

## 2024-01-17 ENCOUNTER — TELEPHONE (OUTPATIENT)
Dept: INTERNAL MEDICINE | Facility: CLINIC | Age: 79
End: 2024-01-17
Payer: MEDICARE

## 2024-01-17 LAB
ALBUMIN SERPL BCP-MCNC: 3.4 G/DL (ref 3.5–5.2)
ALP SERPL-CCNC: 90 U/L (ref 55–135)
ALT SERPL W/O P-5'-P-CCNC: 42 U/L (ref 10–44)
ANION GAP SERPL CALC-SCNC: 11 MMOL/L (ref 8–16)
AST SERPL-CCNC: 84 U/L (ref 10–40)
BASOPHILS # BLD AUTO: 0.06 K/UL (ref 0–0.2)
BASOPHILS NFR BLD: 0.5 % (ref 0–1.9)
BILIRUB SERPL-MCNC: 0.4 MG/DL (ref 0.1–1)
BUN SERPL-MCNC: 27 MG/DL (ref 8–23)
CALCIUM SERPL-MCNC: 9.9 MG/DL (ref 8.7–10.5)
CHLORIDE SERPL-SCNC: 112 MMOL/L (ref 95–110)
CO2 SERPL-SCNC: 24 MMOL/L (ref 23–29)
CREAT SERPL-MCNC: 0.9 MG/DL (ref 0.5–1.4)
DIFFERENTIAL METHOD BLD: NORMAL
EOSINOPHIL # BLD AUTO: 0.3 K/UL (ref 0–0.5)
EOSINOPHIL NFR BLD: 2.7 % (ref 0–8)
ERYTHROCYTE [DISTWIDTH] IN BLOOD BY AUTOMATED COUNT: 13.3 % (ref 11.5–14.5)
EST. GFR  (NO RACE VARIABLE): >60 ML/MIN/1.73 M^2
GLUCOSE SERPL-MCNC: 110 MG/DL (ref 70–110)
HCT VFR BLD AUTO: 38.5 % (ref 37–48.5)
HGB BLD-MCNC: 12.5 G/DL (ref 12–16)
IMM GRANULOCYTES # BLD AUTO: 0.01 K/UL (ref 0–0.04)
IMM GRANULOCYTES NFR BLD AUTO: 0.1 % (ref 0–0.5)
LYMPHOCYTES # BLD AUTO: 2.6 K/UL (ref 1–4.8)
LYMPHOCYTES NFR BLD: 23.5 % (ref 18–48)
MAGNESIUM SERPL-MCNC: 1.8 MG/DL (ref 1.6–2.6)
MCH RBC QN AUTO: 29.6 PG (ref 27–31)
MCHC RBC AUTO-ENTMCNC: 32.5 G/DL (ref 32–36)
MCV RBC AUTO: 91 FL (ref 82–98)
MONOCYTES # BLD AUTO: 0.9 K/UL (ref 0.3–1)
MONOCYTES NFR BLD: 8.1 % (ref 4–15)
NEUTROPHILS # BLD AUTO: 7.3 K/UL (ref 1.8–7.7)
NEUTROPHILS NFR BLD: 65.1 % (ref 38–73)
NRBC BLD-RTO: 0 /100 WBC
PHOSPHATE SERPL-MCNC: 3.1 MG/DL (ref 2.7–4.5)
PLATELET # BLD AUTO: 253 K/UL (ref 150–450)
PMV BLD AUTO: 11 FL (ref 9.2–12.9)
POCT GLUCOSE: 101 MG/DL (ref 70–110)
POCT GLUCOSE: 115 MG/DL (ref 70–110)
POCT GLUCOSE: 88 MG/DL (ref 70–110)
POTASSIUM SERPL-SCNC: 4.3 MMOL/L (ref 3.5–5.1)
PROT SERPL-MCNC: 7.4 G/DL (ref 6–8.4)
RBC # BLD AUTO: 4.22 M/UL (ref 4–5.4)
SODIUM SERPL-SCNC: 147 MMOL/L (ref 136–145)
WBC # BLD AUTO: 11.17 K/UL (ref 3.9–12.7)

## 2024-01-17 PROCEDURE — 25000003 PHARM REV CODE 250: Mod: HCNC

## 2024-01-17 PROCEDURE — 80053 COMPREHEN METABOLIC PANEL: CPT | Mod: HCNC

## 2024-01-17 PROCEDURE — 36415 COLL VENOUS BLD VENIPUNCTURE: CPT | Mod: HCNC

## 2024-01-17 PROCEDURE — 63600175 PHARM REV CODE 636 W HCPCS: Mod: HCNC

## 2024-01-17 PROCEDURE — 21400001 HC TELEMETRY ROOM: Mod: HCNC

## 2024-01-17 PROCEDURE — 25000003 PHARM REV CODE 250: Mod: HCNC | Performed by: STUDENT IN AN ORGANIZED HEALTH CARE EDUCATION/TRAINING PROGRAM

## 2024-01-17 PROCEDURE — 85025 COMPLETE CBC W/AUTO DIFF WBC: CPT | Mod: HCNC

## 2024-01-17 PROCEDURE — 83735 ASSAY OF MAGNESIUM: CPT | Mod: HCNC

## 2024-01-17 PROCEDURE — 84100 ASSAY OF PHOSPHORUS: CPT | Mod: HCNC

## 2024-01-17 PROCEDURE — 63600175 PHARM REV CODE 636 W HCPCS: Mod: JZ,JG,HCNC | Performed by: STUDENT IN AN ORGANIZED HEALTH CARE EDUCATION/TRAINING PROGRAM

## 2024-01-17 RX ADMIN — LISINOPRIL 20 MG: 20 TABLET ORAL at 08:01

## 2024-01-17 RX ADMIN — DULOXETINE HYDROCHLORIDE 30 MG: 30 CAPSULE, DELAYED RELEASE ORAL at 08:01

## 2024-01-17 RX ADMIN — BENZTROPINE MESYLATE 1 MG: 1 TABLET ORAL at 08:01

## 2024-01-17 RX ADMIN — ENOXAPARIN SODIUM 40 MG: 40 INJECTION SUBCUTANEOUS at 05:01

## 2024-01-17 RX ADMIN — DEXTROSE MONOHYDRATE 500 MG: 50 INJECTION, SOLUTION INTRAVENOUS at 02:01

## 2024-01-17 RX ADMIN — DONEPEZIL HYDROCHLORIDE 5 MG: 5 TABLET, FILM COATED ORAL at 06:01

## 2024-01-17 RX ADMIN — GABAPENTIN 300 MG: 300 CAPSULE ORAL at 08:01

## 2024-01-17 RX ADMIN — OLANZAPINE 5 MG: 10 INJECTION, POWDER, FOR SOLUTION INTRAMUSCULAR at 06:01

## 2024-01-17 RX ADMIN — DEXTROSE MONOHYDRATE 500 MG: 50 INJECTION, SOLUTION INTRAVENOUS at 08:01

## 2024-01-17 RX ADMIN — DEXTROSE MONOHYDRATE 500 MG: 50 INJECTION, SOLUTION INTRAVENOUS at 09:01

## 2024-01-17 RX ADMIN — QUETIAPINE FUMARATE 200 MG: 200 TABLET ORAL at 08:01

## 2024-01-17 RX ADMIN — MEMANTINE HYDROCHLORIDE 20 MG: 5 TABLET ORAL at 08:01

## 2024-01-17 RX ADMIN — DEXTROSE MONOHYDRATE 500 MG: 50 INJECTION, SOLUTION INTRAVENOUS at 03:01

## 2024-01-17 NOTE — TELEPHONE ENCOUNTER
Called and spoke to her sister pt has a fall on Sunday she is not acting like herself   Her behavior is erratic   They were able to to an MRI    No sign of stroke   They are giveinf her sedatives because she is not acting like herself   She is very concerned

## 2024-01-17 NOTE — ASSESSMENT & PLAN NOTE
Chronic, controlled. Latest blood pressure and vitals reviewed-     Temp:  [97 °F (36.1 °C)-97.8 °F (36.6 °C)]   Pulse:  []   Resp:  [18-20]   BP: (137-186)/(70-97)   SpO2:  [94 %-98 %] .   Home meds for hypertension were reviewed and noted below.   Hypertension Medications               lisinopriL (PRINIVIL,ZESTRIL) 20 MG tablet Take 1 tablet (20 mg total) by mouth once daily.            While in the hospital, will manage blood pressure as follows; Continue home antihypertensive regimen    Will utilize p.r.n. blood pressure medication only if patient's blood pressure greater than 180/110 and she develops symptoms such as worsening chest pain or shortness of breath.

## 2024-01-17 NOTE — ASSESSMENT & PLAN NOTE
- hx noted  - CTH w/o acute abnormalities, MRI reviewed; vascular neurology awaiting CTA head  - neuro checks ordered

## 2024-01-17 NOTE — PROGRESS NOTES
[facility] Observation [address] [place] [address] Medicine  Progress Note    Patient Name: Lay Peres  MRN: [id number]  Patient Class: IP- Inpatient   Admission Date: 1/14/2024  Length of Stay: 1 days  Attending Physician: Alen Guerra MD  Primary Care Provider: Krista Case MD        Subjective:     Principal Problem:Acute encephalopathy        HPI:  Lay Peres is a 78 y.o. female with a hx of dementia, deaf since birth, CVA, and schizophrenia presents from [facility] for AMS. Patient unable to provide hx or participate in exam. Sister at bedside. States patient was I her usual state of health when she had an unwitnessed fall. Per sister, unclear if she hit her head and unclear how she fell. Per staff and sister, the patient was not herself shortly after. She would not open her eyes all the way, was very combative/ agitated and did not recognize her sister. States she is usually able to ambulate with a walker and can say some short phrases but is unable to have a conversation. Per sister, did not believe patient was in pain and patient was moving all extremities with good strength. No noticeable facial droop, convulsions or unilateral weakness. Patient has a hx of incontinence. Prior to my exam, pt received haldol which sister states put her to sleep and has been the most restful she has been since arrival to the ED.    Confirmed with sister at bedside a code status of DNR. Sister wishes for patient to be comfortable.     ED: AF, VSS. CBC/ CMP largely unremarkable. UA not infectious. VBG reviewed. Mildly hypercapnic. CTH showed no acute abnormality. CT C spine was non diagnostic due to patient moving. Given haldol in ED.     Overview/Hospital Course:  [place] [facility] [place] stable overnight and into 1/15/24. Pt with marked agitation requiring restrains and PRN antipsychotics. MRI brain with nonspecific findings, vascular neurology consulted to review, who recommended CTA head  (pending). Mentation and agitation waxing and waning into 1/17/2024.    Interval History: Actively altered, unable to participate in bedside interview.      Objective:     Vital Signs (Most Recent):  Temp: 97.8 °F (36.6 °C) (01/17/24 1210)  Pulse: 79 (01/17/24 1210)  Resp: 18 (01/17/24 1210)  BP: 137/75 (01/17/24 1210)  SpO2: (!) 94 % (01/17/24 1210) Vital Signs (24h Range):  Temp:  [97 °F (36.1 °C)-97.8 °F (36.6 °C)] 97.8 °F (36.6 °C)  Pulse:  [] 79  Resp:  [18-20] 18  SpO2:  [94 %-98 %] 94 %  BP: (137-186)/(70-97) 137/75     Weight: 61.3 kg (135 lb 2.3 oz)  Body mass index is 24.72 kg/m².    Intake/Output Summary (Last 24 hours) at 1/17/2024 1242  Last data filed at 1/17/2024 0639  Gross per 24 hour   Intake 190 ml   Output --   Net 190 ml         Physical Exam  Gen: in NAD, appears stated age; ill-appearing  Neuro: AAOx0, actively altered, unable to participate in neuro exam  HEENT: NTNC, EOMI, PERRLA, MMM; no thyromegaly or lymphadenopathy; no JVD appreciated  CVS: RRR, no m/r/g; S1/S2 auscultated with no S3 or S4; capillary refill < 2 sec  Resp: lungs CTAB, no w/r/r; no belabored breathing or accessory muscle use appreciated   Abd: BS+ in all 4 quadrants; NTND, soft to palpation; no organomegaly appreciated   Extrem: pulses full, equal, and regular over all 4 extremities; no UE or LE edema BL      Significant Labs: All pertinent labs within the past 24 hours have been reviewed.    Significant Imaging: I have reviewed all pertinent imaging results/findings within the past 24 hours.    Assessment/Plan:      * Acute encephalopathy  - unclear etiology  - no leukocytosis, UA not infectious  - CTH w/o acute abnormality  - MRI brain reviewed: vascular neurology consulted to evaluate if findings are a significant cause of her AMS  - CTA head pending per recs  - neuro check  - delirium precautions  - Scheduled IV depakote  - Mentation continues to wax and wane, requiring restraints    Dementia with behavioral  disturbance  - hx noted  - continue home aricept and namenda  - As above    DNR (do not resuscitate)  - spoke with patient's sister at bedside who states she is her MPOA  - confirmed DNR status  - would like patient to be comfortable     History of stroke  - hx noted  - CTH w/o acute abnormalities, MRI reviewed; vascular neurology awaiting CTA head  - neuro checks ordered      Deafness congenital  - hx noted      Chronic schizoaffective disorder  - hx noted  - continue home cogentin, cymbalta, seroquel and haldol    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  Patient's FSGs are controlled on current medication regimen.  Last A1c reviewed-   Lab Results   Component Value Date    HGBA1C 5.4 01/25/2023   Hold Oral hypoglycemics while patient is in the hospital.  - POCT checks    Hypertension  Chronic, controlled. Latest blood pressure and vitals reviewed-     Temp:  [97 °F (36.1 °C)-97.8 °F (36.6 °C)]   Pulse:  []   Resp:  [18-20]   BP: (137-186)/(70-97)   SpO2:  [94 %-98 %] .   Home meds for hypertension were reviewed and noted below.   Hypertension Medications               lisinopriL (PRINIVIL,ZESTRIL) 20 MG tablet Take 1 tablet (20 mg total) by mouth once daily.            While in the hospital, will manage blood pressure as follows; Continue home antihypertensive regimen    Will utilize p.r.n. blood pressure medication only if patient's blood pressure greater than 180/110 and she develops symptoms such as worsening chest pain or shortness of breath.      VTE Risk Mitigation (From admission, onward)           Ordered     enoxaparin injection 40 mg  Daily         01/15/24 0218     IP VTE HIGH RISK PATIENT  Once         01/15/24 0218                    Discharge Planning   AGUILAR: 1/18/2024     Code Status: DNR   Is the patient medically ready for discharge?: No    Reason for patient still in hospital (select all that apply): Patient trending condition  Discharge Plan A: Group Home            Alen Guerra,  MD  Attending Physician  Medical Director - [facility]  Department of [facility] Medicine  1/17/2024

## 2024-01-17 NOTE — ASSESSMENT & PLAN NOTE
- unclear etiology  - no leukocytosis, UA not infectious  - CTH w/o acute abnormality  - MRI brain reviewed: vascular neurology consulted to evaluate if findings are a significant cause of her AMS  - CTA head pending per recs  - neuro check  - delirium precautions  - Scheduled IV depakote  - Mentation continues to wax and wane, requiring restraints

## 2024-01-17 NOTE — TELEPHONE ENCOUNTER
----- Message from Dorinda Hoang sent at 1/17/2024  9:10 AM CST -----  Contact: Phone# [phone] Jose Francisco  Patients sister Ms. Felton said that she would like to speak with you about the patients care while she's in the hospital.

## 2024-01-17 NOTE — PLAN OF CARE
Problem: Adult Inpatient Plan of Care  Goal: Plan of Care Review  Outcome: Ongoing, Not Progressing  Goal: Patient-Specific Goal (Individualized)  Outcome: Ongoing, Not Progressing  Goal: Absence of Hospital-Acquired Illness or Injury  Outcome: Ongoing, Not Progressing  Goal: Optimal Comfort and Wellbeing  Outcome: Ongoing, Not Progressing  Goal: Readiness for Transition of Care  Outcome: Ongoing, Not Progressing     Problem: Infection  Goal: Absence of Infection Signs and Symptoms  Outcome: Ongoing, Not Progressing     Problem: Fall Injury Risk  Goal: Absence of Fall and Fall-Related Injury  Outcome: Ongoing, Not Progressing     Problem: Restraint, Nonbehavioral (Nonviolent)  Goal: Absence of Harm or Injury  Outcome: Ongoing, Not Progressing     Problem: Diabetes Comorbidity  Goal: Blood Glucose Level Within Targeted Range  Outcome: Ongoing, Not Progressing     Problem: Skin Injury Risk Increased  Goal: Skin Health and Integrity  Outcome: Ongoing, Not Progressing

## 2024-01-17 NOTE — SUBJECTIVE & OBJECTIVE
Interval History: Actively altered, unable to participate in bedside interview.      Objective:     Vital Signs (Most Recent):  Temp: 97.8 °F (36.6 °C) (01/17/24 1210)  Pulse: 79 (01/17/24 1210)  Resp: 18 (01/17/24 1210)  BP: 137/75 (01/17/24 1210)  SpO2: (!) 94 % (01/17/24 1210) Vital Signs (24h Range):  Temp:  [97 °F (36.1 °C)-97.8 °F (36.6 °C)] 97.8 °F (36.6 °C)  Pulse:  [] 79  Resp:  [18-20] 18  SpO2:  [94 %-98 %] 94 %  BP: (137-186)/(70-97) 137/75     Weight: 61.3 kg (135 lb 2.3 oz)  Body mass index is 24.72 kg/m².    Intake/Output Summary (Last 24 hours) at 1/17/2024 1242  Last data filed at 1/17/2024 0639  Gross per 24 hour   Intake 190 ml   Output --   Net 190 ml         Physical Exam  Gen: in NAD, appears stated age; ill-appearing  Neuro: AAOx0, actively altered, unable to participate in neuro exam  HEENT: NTNC, EOMI, PERRLA, MMM; no thyromegaly or lymphadenopathy; no JVD appreciated  CVS: RRR, no m/r/g; S1/S2 auscultated with no S3 or S4; capillary refill < 2 sec  Resp: lungs CTAB, no w/r/r; no belabored breathing or accessory muscle use appreciated   Abd: BS+ in all 4 quadrants; NTND, soft to palpation; no organomegaly appreciated   Extrem: pulses full, equal, and regular over all 4 extremities; no UE or LE edema BL      Significant Labs: All pertinent labs within the past 24 hours have been reviewed.    Significant Imaging: I have reviewed all pertinent imaging results/findings within the past 24 hours.

## 2024-01-18 PROBLEM — F20.9 SCHIZOPHRENIA: Status: ACTIVE | Noted: 2024-01-18

## 2024-01-18 LAB
ALBUMIN SERPL BCP-MCNC: 3.3 G/DL (ref 3.5–5.2)
ALP SERPL-CCNC: 91 U/L (ref 55–135)
ALT SERPL W/O P-5'-P-CCNC: 53 U/L (ref 10–44)
ANION GAP SERPL CALC-SCNC: 10 MMOL/L (ref 8–16)
AST SERPL-CCNC: 74 U/L (ref 10–40)
BILIRUB SERPL-MCNC: 0.5 MG/DL (ref 0.1–1)
BUN SERPL-MCNC: 29 MG/DL (ref 8–23)
CALCIUM SERPL-MCNC: 10.1 MG/DL (ref 8.7–10.5)
CHLORIDE SERPL-SCNC: 110 MMOL/L (ref 95–110)
CO2 SERPL-SCNC: 22 MMOL/L (ref 23–29)
CREAT SERPL-MCNC: 0.8 MG/DL (ref 0.5–1.4)
ERYTHROCYTE [DISTWIDTH] IN BLOOD BY AUTOMATED COUNT: 13.2 % (ref 11.5–14.5)
EST. GFR  (NO RACE VARIABLE): >60 ML/MIN/1.73 M^2
GLUCOSE SERPL-MCNC: 105 MG/DL (ref 70–110)
HCT VFR BLD AUTO: 42.7 % (ref 37–48.5)
HGB BLD-MCNC: 13 G/DL (ref 12–16)
MAGNESIUM SERPL-MCNC: 1.9 MG/DL (ref 1.6–2.6)
MCH RBC QN AUTO: 28.7 PG (ref 27–31)
MCHC RBC AUTO-ENTMCNC: 30.4 G/DL (ref 32–36)
MCV RBC AUTO: 94 FL (ref 82–98)
PHOSPHATE SERPL-MCNC: 2.8 MG/DL (ref 2.7–4.5)
PLATELET # BLD AUTO: 265 K/UL (ref 150–450)
PMV BLD AUTO: 10.3 FL (ref 9.2–12.9)
POCT GLUCOSE: 111 MG/DL (ref 70–110)
POCT GLUCOSE: 129 MG/DL (ref 70–110)
POCT GLUCOSE: 152 MG/DL (ref 70–110)
POCT GLUCOSE: 163 MG/DL (ref 70–110)
POCT GLUCOSE: 82 MG/DL (ref 70–110)
POTASSIUM SERPL-SCNC: 3.9 MMOL/L (ref 3.5–5.1)
PROT SERPL-MCNC: 7.3 G/DL (ref 6–8.4)
RBC # BLD AUTO: 4.53 M/UL (ref 4–5.4)
SODIUM SERPL-SCNC: 142 MMOL/L (ref 136–145)
WBC # BLD AUTO: 13.62 K/UL (ref 3.9–12.7)

## 2024-01-18 PROCEDURE — 84100 ASSAY OF PHOSPHORUS: CPT | Mod: HCNC | Performed by: STUDENT IN AN ORGANIZED HEALTH CARE EDUCATION/TRAINING PROGRAM

## 2024-01-18 PROCEDURE — 99223 1ST HOSP IP/OBS HIGH 75: CPT | Mod: HCNC,,,

## 2024-01-18 PROCEDURE — 83735 ASSAY OF MAGNESIUM: CPT | Mod: HCNC | Performed by: STUDENT IN AN ORGANIZED HEALTH CARE EDUCATION/TRAINING PROGRAM

## 2024-01-18 PROCEDURE — 25000003 PHARM REV CODE 250: Mod: HCNC | Performed by: STUDENT IN AN ORGANIZED HEALTH CARE EDUCATION/TRAINING PROGRAM

## 2024-01-18 PROCEDURE — 51798 US URINE CAPACITY MEASURE: CPT | Mod: HCNC

## 2024-01-18 PROCEDURE — A4216 STERILE WATER/SALINE, 10 ML: HCPCS | Mod: HCNC

## 2024-01-18 PROCEDURE — 21400001 HC TELEMETRY ROOM: Mod: HCNC

## 2024-01-18 PROCEDURE — 80053 COMPREHEN METABOLIC PANEL: CPT | Mod: HCNC | Performed by: STUDENT IN AN ORGANIZED HEALTH CARE EDUCATION/TRAINING PROGRAM

## 2024-01-18 PROCEDURE — 36415 COLL VENOUS BLD VENIPUNCTURE: CPT | Mod: HCNC | Performed by: STUDENT IN AN ORGANIZED HEALTH CARE EDUCATION/TRAINING PROGRAM

## 2024-01-18 PROCEDURE — 25000003 PHARM REV CODE 250: Mod: HCNC

## 2024-01-18 PROCEDURE — 85027 COMPLETE CBC AUTOMATED: CPT | Mod: HCNC | Performed by: STUDENT IN AN ORGANIZED HEALTH CARE EDUCATION/TRAINING PROGRAM

## 2024-01-18 PROCEDURE — 25500020 PHARM REV CODE 255: Mod: HCNC | Performed by: STUDENT IN AN ORGANIZED HEALTH CARE EDUCATION/TRAINING PROGRAM

## 2024-01-18 PROCEDURE — 63600175 PHARM REV CODE 636 W HCPCS: Mod: HCNC

## 2024-01-18 RX ORDER — SENNOSIDES 8.6 MG/1
1 TABLET ORAL DAILY PRN
Start: 2024-01-18 | End: 2024-01-19

## 2024-01-18 RX ORDER — HYDRALAZINE HYDROCHLORIDE 20 MG/ML
10 INJECTION INTRAMUSCULAR; INTRAVENOUS EVERY 6 HOURS PRN
Status: DISCONTINUED | OUTPATIENT
Start: 2024-01-18 | End: 2024-01-22 | Stop reason: HOSPADM

## 2024-01-18 RX ORDER — OLANZAPINE 2.5 MG/1
2.5 TABLET ORAL EVERY 6 HOURS PRN
Start: 2024-01-18 | End: 2024-01-19

## 2024-01-18 RX ORDER — QUETIAPINE FUMARATE 200 MG/1
200 TABLET, FILM COATED ORAL NIGHTLY
Status: DISCONTINUED | OUTPATIENT
Start: 2024-01-18 | End: 2024-01-22 | Stop reason: HOSPADM

## 2024-01-18 RX ORDER — HALOPERIDOL 5 MG/1
5 TABLET ORAL DAILY PRN
Start: 2024-01-18 | End: 2024-01-19

## 2024-01-18 RX ORDER — CARVEDILOL 3.12 MG/1
6.25 TABLET ORAL 2 TIMES DAILY
Status: DISCONTINUED | OUTPATIENT
Start: 2024-01-18 | End: 2024-01-22 | Stop reason: HOSPADM

## 2024-01-18 RX ORDER — ACETAMINOPHEN 500 MG
1000 TABLET ORAL EVERY 6 HOURS PRN
Refills: 0
Start: 2024-01-18 | End: 2024-01-19

## 2024-01-18 RX ORDER — HALOPERIDOL 5 MG/1
5 TABLET ORAL DAILY PRN
Status: DISCONTINUED | OUTPATIENT
Start: 2024-01-18 | End: 2024-01-22 | Stop reason: HOSPADM

## 2024-01-18 RX ORDER — HALOPERIDOL 5 MG/ML
2 INJECTION INTRAMUSCULAR 2 TIMES DAILY PRN
Status: DISCONTINUED | OUTPATIENT
Start: 2024-01-18 | End: 2024-01-18

## 2024-01-18 RX ORDER — OLANZAPINE 2.5 MG/1
2.5 TABLET ORAL EVERY 6 HOURS PRN
Status: DISCONTINUED | OUTPATIENT
Start: 2024-01-18 | End: 2024-01-22 | Stop reason: HOSPADM

## 2024-01-18 RX ADMIN — IOHEXOL 75 ML: 350 INJECTION, SOLUTION INTRAVENOUS at 04:01

## 2024-01-18 RX ADMIN — Medication 10 ML: at 11:01

## 2024-01-18 RX ADMIN — CARVEDILOL 6.25 MG: 3.12 TABLET, FILM COATED ORAL at 11:01

## 2024-01-18 RX ADMIN — CARVEDILOL 6.25 MG: 3.12 TABLET, FILM COATED ORAL at 10:01

## 2024-01-18 RX ADMIN — GABAPENTIN 300 MG: 300 CAPSULE ORAL at 11:01

## 2024-01-18 RX ADMIN — DEXTROSE MONOHYDRATE 500 MG: 50 INJECTION, SOLUTION INTRAVENOUS at 03:01

## 2024-01-18 RX ADMIN — BENZTROPINE MESYLATE 1 MG: 1 TABLET ORAL at 11:01

## 2024-01-18 RX ADMIN — MEMANTINE HYDROCHLORIDE 20 MG: 5 TABLET ORAL at 10:01

## 2024-01-18 RX ADMIN — LISINOPRIL 20 MG: 20 TABLET ORAL at 10:01

## 2024-01-18 RX ADMIN — QUETIAPINE FUMARATE 200 MG: 200 TABLET ORAL at 10:01

## 2024-01-18 RX ADMIN — DEXTROSE MONOHYDRATE 500 MG: 50 INJECTION, SOLUTION INTRAVENOUS at 10:01

## 2024-01-18 RX ADMIN — QUETIAPINE FUMARATE 200 MG: 200 TABLET ORAL at 11:01

## 2024-01-18 RX ADMIN — ENOXAPARIN SODIUM 40 MG: 40 INJECTION SUBCUTANEOUS at 04:01

## 2024-01-18 RX ADMIN — BENZTROPINE MESYLATE 1 MG: 1 TABLET ORAL at 10:01

## 2024-01-18 RX ADMIN — DULOXETINE HYDROCHLORIDE 30 MG: 30 CAPSULE, DELAYED RELEASE ORAL at 10:01

## 2024-01-18 NOTE — ASSESSMENT & PLAN NOTE
- Dr. Guerra, spoke with patient's sister at bedside who states she is her MPOA  - Confirmed DNR status  - Would like patient to be comfortable

## 2024-01-18 NOTE — ASSESSMENT & PLAN NOTE
Chronic, controlled. Latest blood pressure and vitals reviewed-     Temp:  [97.6 °F (36.4 °C)-98.4 °F (36.9 °C)]   Pulse:  []   Resp:  [16-20]   BP: (152-207)/(73-90)   SpO2:  [93 %-99 %] .   Home meds for hypertension were reviewed and noted below.   Hypertension Medications               lisinopriL (PRINIVIL,ZESTRIL) 20 MG tablet Take 1 tablet (20 mg total) by mouth once daily.            While in the hospital, will manage blood pressure as follows; Continue home antihypertensive regimen    Will utilize p.r.n. blood pressure medication only if patient's blood pressure greater than 160/100 and she develops symptoms such as worsening chest pain or shortness of breath.

## 2024-01-18 NOTE — ASSESSMENT & PLAN NOTE
- Hx noted  - CTH w/o acute abnormalities, MRI reviewed; CTA head/neck reviewed as above  - Vascular neurology consulted  - Neuro checks

## 2024-01-18 NOTE — ASSESSMENT & PLAN NOTE
- Unclear etiology on admission  - UA not infectious  - CXR 1/14 with no acute findings  - Procalcitonin 0.09 (normal), lactate 1.0  - TSH 0.352. FT4 0.78 (subclinical hypothyroidism)   - B-12 slightly elevated, Folate normal, Ammonia normal   - EtOH level <10 on admit, Non- reactive HIV & Hep-C  - CT Head showed no acute abnormality.  - MRI brain with no acute infarct; new findings of remote hemorrhage and vascular which is new compared to MRI from 2018  - Vascular neurology consulted to evaluate if findings are a significant cause of her AMS  -- MRI findings do not correlate with patient's symptoms. Possibly cavernous malformation.    -- CTA Head/Neck with no acute findings.  Generalized cerebral volume loss and chronic microvascular ischemic change.  Abnormality in the ehsan identified on recent MRI is not well delineated with CT per radiology.  No evidence of acute large vessel occlusion or flow-limiting stenosis.   -- Per [facility] neuro, can repeat MRI Brain in 3 months to compare to recent ones; earlier if needed.    - Neuro checks  - Delirium precautions  - Psychiatry consulted  -- Discontinued scheduled IV depakote per psych recs  - Consider neurology consult based psych workup  - Mentation continues to wax and wane, requiring restraints

## 2024-01-18 NOTE — NURSING
Transported to CT via bed accompanied by transporter and myself.pt asleep and remained calm throughout procedure.transported with telemetry.chalo well.

## 2024-01-18 NOTE — PROGRESS NOTES
[facility] [address] [place] [address] Medicine  Progress Note    Patient Name: Lay Peres  MRN: [id number]  Patient Class: IP- Inpatient   Admission Date: 1/14/2024  Length of Stay: 2 days  Attending Physician: Minh Marques MD  Primary Care Provider: Krista Case MD        Subjective:     Principal Problem:Acute encephalopathy        HPI:  Lay Peres is a 78 y.o. female with a hx of dementia, deaf since birth, CVA, and schizophrenia presents from [facility] for AMS. Patient unable to provide hx or participate in exam. Sister at bedside. States patient was I her usual state of health when she had an unwitnessed fall. Per sister, unclear if she hit her head and unclear how she fell. Per staff and sister, the patient was not herself shortly after. She would not open her eyes all the way, was very combative/ agitated and did not recognize her sister. States she is usually able to ambulate with a walker and can say some short phrases but is unable to have a conversation. Per sister, did not believe patient was in pain and patient was moving all extremities with good strength. No noticeable facial droop, convulsions or unilateral weakness. Patient has a hx of incontinence. Prior to my exam, pt received haldol which sister states put her to sleep and has been the most restful she has been since arrival to the ED.    Confirmed with sister at bedside a code status of DNR. Sister wishes for patient to be comfortable.     ED: AF, VSS. CBC/ CMP largely unremarkable. UA not infectious. VBG reviewed. Mildly hypercapnic. CTH showed no acute abnormality. CT C spine was non diagnostic due to patient moving. Given haldol in ED.     Overview/Hospital Course:  Pt admitted to [facility] and remained stable overnight and into 1/15/24. Pt with marked agitation requiring restrains and PRN antipsychotics. MRI brain with nonspecific findings, vascular neurology consulted to review, who recommended CTA head  (pending). Mentation and agitation waxing and waning into 1/17/2024.    Interval History:   No events overnight. Sister undated at bedside. Patient tolerating puree diet. Nutritional shakes added to diet. Psychiatry consulted for continued agitation.       Review of Systems   Unable to perform ROS: Patient nonverbal (Dementia and Deaf)     Objective:     Vital Signs (Most Recent):  Temp: 97.6 °F (36.4 °C) (01/18/24 1202)  Pulse: 70 (01/18/24 1202)  Resp: 18 (01/18/24 1202)  BP: (!) 173/79 (01/18/24 1202)  SpO2: 96 % (01/18/24 1202) Vital Signs (24h Range):  Temp:  [97.6 °F (36.4 °C)-98.4 °F (36.9 °C)] 97.6 °F (36.4 °C)  Pulse:  [] 70  Resp:  [16-20] 18  SpO2:  [93 %-99 %] 96 %  BP: (152-207)/(73-90) 173/79     Weight: 61.3 kg (135 lb 2.3 oz)  Body mass index is 24.72 kg/m².    Intake/Output Summary (Last 24 hours) at 1/18/2024 1409  Last data filed at 1/18/2024 0427  Gross per 24 hour   Intake 280 ml   Output --   Net 280 ml         Physical Exam  Vitals and nursing note reviewed.   Constitutional:       Appearance: She is well-developed.   HENT:      Head: Normocephalic and atraumatic.   Cardiovascular:      Rate and Rhythm: Normal rate and regular rhythm.   Pulmonary:      Effort: Pulmonary effort is normal.      Breath sounds: Normal breath sounds.   Abdominal:      Palpations: Abdomen is soft.   Skin:     General: Skin is warm and dry.   Neurological:      Mental Status: She is alert.      Comments: Unable to participate in neuro exam, cannot hear.  Moving all extremities spontaneously.             Significant Labs: All pertinent labs within the past 24 hours have been reviewed.  CBC:   Recent Labs   Lab 01/17/24  0401 01/18/24  0911   WBC 11.17 13.62*   HGB 12.5 13.0   HCT 38.5 42.7    265     CMP:   Recent Labs   Lab 01/17/24  0401 01/18/24  0911   * 142   K 4.3 3.9   * 110   CO2 24 22*    105   BUN 27* 29*   CREATININE 0.9 0.8   CALCIUM 9.9 10.1   PROT 7.4 7.3   ALBUMIN 3.4*  3.3*   BILITOT 0.4 0.5   ALKPHOS 90 91   AST 84* 74*   ALT 42 53*   ANIONGAP 11 10       Significant Imaging: I have reviewed all pertinent imaging results/findings within the past 24 hours.    Assessment/Plan:      * Acute encephalopathy  - Unclear etiology on admission  - UA not infectious  - CXR 1/14 with no acute findings  - Procalcitonin 0.09 (normal), lactate 1.0  - TSH 0.352. FT4 0.78 (subclinical hypothyroidism)   - B-12 slightly elevated, Folate normal, Ammonia normal   - EtOH level <10 on admit, Non- reactive HIV & Hep-C  - CT Head showed no acute abnormality.  - MRI brain with no acute infarct; new findings of remote hemorrhage and vascular which is new compared to MRI from 2018  - Vascular neurology consulted to evaluate if findings are a significant cause of her AMS  -- MRI findings do not correlate with patient's symptoms. Possibly cavernous malformation.    -- CTA Head/Neck with no acute findings.  Generalized cerebral volume loss and chronic microvascular ischemic change.  Abnormality in the ehsan identified on recent MRI is not well delineated with CT per radiology.  No evidence of acute large vessel occlusion or flow-limiting stenosis.   -- Per [facility] neuro, can repeat MRI Brain in 3 months to compare to recent ones; earlier if needed.    - Neuro checks  - Delirium precautions  - Psychiatry consulted  -- Discontinued scheduled IV depakote per psych recs  - Consider neurology consult based psych workup  - Mentation continues to wax and wane, requiring restraints    Dementia  - Hx noted  - Continue home aricept and namenda  - As above    History of stroke  - Hx noted  - CTH w/o acute abnormalities, MRI reviewed; CTA head/neck reviewed as above  - Vascular neurology consulted  - Neuro checks     Chronic schizoaffective disorder  - Hx noted  - Psychiatry consulted given continued agitation requiring restraints   -- Continue home cogentin, cymbalta, seroquel and PRN haldol  -- Dc'd IV Depacon per recs   --  PRN PO olanzapine for agitation  - Trial out of restraints as tolerated     DNR (do not resuscitate)  - Dr. 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  Last A1c reviewed-   Lab Results   Component Value Date    HGBA1C 5.4 01/25/2023   Hold Oral hypoglycemics while patient is in the hospital.  - POCT checks    Hypertension  Chronic, controlled. Latest blood pressure and vitals reviewed-     Temp:  [97.6 °F (36.4 °C)-98.4 °F (36.9 °C)]   Pulse:  []   Resp:  [16-20]   BP: (152-207)/(73-90)   SpO2:  [93 %-99 %] .   Home meds for hypertension were reviewed and noted below.   Hypertension Medications               lisinopriL (PRINIVIL,ZESTRIL) 20 MG tablet Take 1 tablet (20 mg total) by mouth once daily.            While in the hospital, will manage blood pressure as follows; Continue home antihypertensive regimen    Will utilize p.r.n. blood pressure medication only if patient's blood pressure greater than 160/100 and she develops symptoms such as worsening chest pain or shortness of breath.      VTE Risk Mitigation (From admission, onward)           Ordered     enoxaparin injection 40 mg  Daily         01/15/24 0218     IP VTE HIGH RISK PATIENT  Once         01/15/24 0218                    Discharge Planning   AGUILAR: 1/18/2024     Code Status: DNR   Is the patient medically ready for discharge?: No    Reason for patient still in hospital (select all that apply): Patient trending condition, Laboratory test, Treatment, Consult recommendations, and Pending disposition  Discharge Plan A: Assisted Living   Discharge Delays: None known at this time              Minh Marques MD  Department of Hospital Medicine   [facility] 11[facility]

## 2024-01-18 NOTE — ASSESSMENT & PLAN NOTE
- Hx noted  - Psychiatry consulted given continued agitation requiring restraints   -- Continue home cogentin, cymbalta, seroquel and PRN haldol  -- Dc'd IV Depacon per recs   -- PRN PO olanzapine for agitation  - Trial out of restraints as tolerated

## 2024-01-18 NOTE — PLAN OF CARE
Problem: Adult Inpatient Plan of Care  Goal: Absence of Hospital-Acquired Illness or Injury  Outcome: Ongoing, Progressing  Goal: Optimal Comfort and Wellbeing  Outcome: Ongoing, Progressing     Problem: Fall Injury Risk  Goal: Absence of Fall and Fall-Related Injury  Outcome: Ongoing, Progressing     Problem: Restraint, Nonbehavioral (Nonviolent)  Goal: Absence of Harm or Injury  Outcome: Ongoing, Progressing     Problem: Diabetes Comorbidity  Goal: Blood Glucose Level Within Targeted Range  Outcome: Ongoing, Progressing     Problem: Skin Injury Risk Increased  Goal: Skin Health and Integrity  Outcome: Ongoing, Progressing     Problem: Adult Inpatient Plan of Care  Goal: Plan of Care Review  Outcome: Ongoing, Not Progressing  Goal: Patient-Specific Goal (Individualized)  Outcome: Ongoing, Not Progressing  Goal: Readiness for Transition of Care  Outcome: Ongoing, Not Progressing     Problem: Infection  Goal: Absence of Infection Signs and Symptoms  Outcome: Ongoing, Not Progressing

## 2024-01-18 NOTE — TELEPHONE ENCOUNTER
Spoke with sister at length.   We need to get her back to [facility]   Discussed hospice with sister.   [facility]

## 2024-01-18 NOTE — SUBJECTIVE & OBJECTIVE
Interval History:   No events overnight. Sister undated at bedside. Patient tolerating puree diet. Nutritional shakes added to diet. Psychiatry consulted for continued agitation.       Review of Systems   Unable to perform ROS: Patient nonverbal (Dementia and Deaf)     Objective:     Vital Signs (Most Recent):  Temp: 97.6 °F (36.4 °C) (01/18/24 1202)  Pulse: 70 (01/18/24 1202)  Resp: 18 (01/18/24 1202)  BP: (!) 173/79 (01/18/24 1202)  SpO2: 96 % (01/18/24 1202) Vital Signs (24h Range):  Temp:  [97.6 °F (36.4 °C)-98.4 °F (36.9 °C)] 97.6 °F (36.4 °C)  Pulse:  [] 70  Resp:  [16-20] 18  SpO2:  [93 %-99 %] 96 %  BP: (152-207)/(73-90) 173/79     Weight: 61.3 kg (135 lb 2.3 oz)  Body mass index is 24.72 kg/m².    Intake/Output Summary (Last 24 hours) at 1/18/2024 1409  Last data filed at 1/18/2024 0427  Gross per 24 hour   Intake 280 ml   Output --   Net 280 ml         Physical Exam  Vitals and nursing note reviewed.   Constitutional:       Appearance: She is well-developed.   HENT:      Head: Normocephalic and atraumatic.   Cardiovascular:      Rate and Rhythm: Normal rate and regular rhythm.   Pulmonary:      Effort: Pulmonary effort is normal.      Breath sounds: Normal breath sounds.   Abdominal:      Palpations: Abdomen is soft.   Skin:     General: Skin is warm and dry.   Neurological:      Mental Status: She is alert.      Comments: Unable to participate in neuro exam, cannot hear.  Moving all extremities spontaneously.             Significant Labs: All pertinent labs within the past 24 hours have been reviewed.  CBC:   Recent Labs   Lab 01/17/24  0401 01/18/24  0911   WBC 11.17 13.62*   HGB 12.5 13.0   HCT 38.5 42.7    265     CMP:   Recent Labs   Lab 01/17/24  0401 01/18/24  0911   * 142   K 4.3 3.9   * 110   CO2 24 22*    105   BUN 27* 29*   CREATININE 0.9 0.8   CALCIUM 9.9 10.1   PROT 7.4 7.3   ALBUMIN 3.4* 3.3*   BILITOT 0.4 0.5   ALKPHOS 90 91   AST 84* 74*   ALT 42 53*    ANIONGAP 11 10       Significant Imaging: I have reviewed all pertinent imaging results/findings within the past 24 hours.

## 2024-01-18 NOTE — PLAN OF CARE
[facility]  Discharge Reassessment    Per MD team, pt is not medically ready for d/c at this time. Plan is to return to [facility] residential program when ready. SW called facility and left VM for Sabina Brooks, , for call back regarding d/c plans. Will continue to follow for needs.    Primary Care Provider: Krista Case MD    Expected Discharge Date: 1/18/2024    Reassessment (most recent)       Discharge Reassessment - 01/18/24 1308          Discharge Reassessment    Assessment Type Discharge Planning Reassessment     Did the patient's condition or plan change since previous assessment? No     Discharge Plan discussed with: Patient;Sibling     Name(s) and Number(s) Jose Francisco Segura, (Sister)  [phone]     Communicated AGUILAR with patient/caregiver Yes     Discharge Plan A Assisted Living     Discharge Plan B Assisted Living     DME Needed Upon Discharge  other (see comments) (P)    TBD    Transition of Care Barriers None (P)      Why the patient remains in the hospital Requires continued medical care (P)         Post-Acute Status    Post-Acute Authorization Other (P)      Other Status See Comments (P)    Pt is a resident at [facility]    Discharge Delays None known at this time (P)                    Discharge Plan A and Plan B have been determined by review of patient's clinical status, future medical and therapeutic needs, and coverage/benefits for post-acute care in coordination with multidisciplinary team members.    [id number]  TYLER and MD contacted by pt's PCP, Dr. Enid Case, via Secure Chat to discuss d/c plans. Per Dr. Case, she spoke w/pt's sister, Jose Francisco, regarding pt's worsening dementia and feels that pt returning to [facility] w/hospice may be best. [facility] Case[facility] and pt's sister is agreeable to plan. MD aware and also in agreement. TYLER to coordinate  hospice set-up with AGUILAR of tomorrow, 1/19/24. Will continue to follow for needs.    LI Kessler, LMSW    Case Management Department  [facility]

## 2024-01-18 NOTE — CONSULTS
"CONSULTATION LIAISON PSYCHIATRY INITIAL EVALUATION    Patient Name: Lay Peres  MRN: [id number]  Patient Class: IP- Inpatient  Admission Date: 1/14/2024  Attending Physician: Minh Marques MD      HPI:   Lay Peres is a 78 y.o. female with past psychiatric history of dementia and schizophrenia & past pertinent medical history of deaf since birth and CVA presents to the ED/admitted to the hospital for Acute encephalopathy. Sister at bedside. States patient was in her usual state of health when she had an unwitnessed fall at her living facility. Per sister, unclear if she hit her head and unclear how she fell. Per staff and sister, the patient was not herself shortly after. She would not open her eyes all the way, was very combative/ agitated and did not recognize her sister. States she is usually able to ambulate with a walker and can say some short phrases but is unable to have a conversation     Psychiatry consulted for "agitation requiring restraints, needing to transition to oral medications"     On psych exam, pt lying in bed in 4 point restraints. Sister at bedside provides all information during interview as pt is not able to participate. Sister states prior to Sunday (when pt fell), pt was her normal self. States she spent the day with her on [place] and she was able to interact with family. She did require assistance eating but was able to eat her whole meal. She states at her living facility she has been participative in activities, prior to the fall. She states she has been on psychiatric mediations her whole life and they have been working well. She is currently taking Haldol dec 50mg every 14 days, Seroquel 200mg qHS, Cymbalta 30mg daily, and Cogentin 1mg BID.  She is taking Namenda and Aricept for her dementia.       Sister is concerned  pt has not been receiving the full doses while hospitalized because she finds the pudding and applesauce cups still on the table that " the medications were mixed in. She is also concerned that her sister is not being fed meals and the family is not being updated on the pt's plan of care. Prior to interview charge RN at bedside while pt's sister is expressing these concerns so they can be taken care of properly.    Throughout interview pt lying in bed with eyes closed and moaning. Sister states this is all she does while she has been in the hospital and reiterates that this is not her baseline.        Collateral with patient's permission:   See above    Medical Review of Systems:  Pertinent items are noted in HPI.    Psychiatric Review of Systems (is patient experiencing or having changes in): All information below provided by sister at bedside   sleep: yes  appetite: yes  weight: no  energy/anergy: yes  interest/pleasure/anhedonia: yes  somatic symptoms: no  libido: solomon  anxiety/panic: no  guilty/hopelessness: no  concentration: no  Marichuy:no  Psychosis: no  Trauma: no  S.I.B.s/risky behavior: no    Past Psychiatric History:  Previous Medication Trials: yes  Previous Psychiatric Hospitalizations:yes   Previous Suicide Attempts: no  History of Violence: no  Outpatient Psychiatrist: yes  Family Psychiatric History: yes    Substance Abuse History (with emphasis over the last 12 months):  Recreational Drugs:  denies  Use of Alcohol: denied  Tobacco Use:no  Rehab History:no    Social History:  Marital Status:   Children: 0  Employment Status/Info: on disability  :no  Education: solomon    Special Ed: yes  Housing Status: assisted living  Access to gun: no  Psychosocial Stressors: health  Functioning Relationships: good support system    Legal History:  Past Charges/Incarcerations: no  Pending charges:no    Mental Status Exam:  General Appearance: thin and gaunt, disheveled  Behavior: unable to participate  Involuntary Movements and Motor Activity: no abnormal involuntary movements noted; no tics, no tremors, no akathisia, no dystonia, no  "evidence of tardive dyskinesia; no psychomotor agitation or retardation  Gait and Station: unable to assess - patient lying down or seated  Speech and Language: incoherent  Mood: "solomon"  Affect: irritable  Thought Process and Associations: Unable to Assess  Thought Content and Perceptions:: Unable to Assess  Sensorium and Orientation: Unable to Formally Assess  Recent and Remote Memory: Unable to  Formally Assess  Attention and Concentration: Unable to Formally Assess  Fund of Knowledge: Unable to Formally Assess  Insight: impaired  Judgment: impaired    CAM ICU positive? Solomon but likely positive       ASSESSMENT & RECOMMENDATIONS   Schizophrenia  Dementia  Intellectual Disability  Deafness, congential    PSYCH MEDICATIONS:  Scheduled:   congentin 1mg PO BID  Cymbalta 30mg PO Daily  Haldol 5mg PO daily (if agitated)  Seroquel 200mg Po QHS  Pt unable to swallow meds on own, please ensure the meds are being crushed and fed via pudding/applesauce and entire amount is consumed.  Rec d/c depacon as it does not seem to be beneficial to pt at this time      DELIRIUM  DELIRIUM BEHAVIOR MANAGEMENT  PLEASE utilize CHEMICAL restraints with PRN meds first for agitation. Minimize use of PHYSICAL restraints OR have periods of being out of physical restraints if possible.  Keep window shades open and room lit during day and room dim at night in order to promote normal sleep-wake cycles  Encourage family at bedside. [place] patient often to situation, location, date.  Continue to Limit or Discontinue use of Narcotics, Benzos and Anti-cholinergic medications as they may worsen delirium.  Continue medical workup for causative etiology of Delirium.   Can use zyprexa 2.5mg PO Q6H PRN any agitation  Monitor Qtc closely; last EKG 1/14 Qtc 439      RISK ASSESSMENT  NO NEED FOR PEC patient NOT in any imminent danger of hurting self or others and not gravely disabled.     FOLLOW UP  Will follow up while in house    DISPOSITION - once medically " cleared:    Defer to medical team    Please contact ON CALL psychiatry service (24/7) for any acute issues that may arise.    Philip Reina, PMHNP   Psychiatry  [facility]  1/18/2024 12:09 PM        --------------------------------------------------------------------------------------------------------------------------------------------------------------------------------------------------------------------------------------    CONTINUED HISTORY & OBJECTIVE clinical data & findings reviewed and noted for above decision making    Past Medical/Surgical History:   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 No pertinent surgical history.    Current Medications:   Scheduled Meds:    benztropine  1 mg Oral BID    carvediloL  6.25 mg Oral BID    donepeziL  5 mg Oral QAM    DULoxetine  30 mg Oral Daily    enoxparin  40 mg Subcutaneous Daily    gabapentin  300 mg Oral QHS    lisinopriL  20 mg Oral Daily    memantine  20 mg Oral Daily    QUEtiapine  200 mg Oral BID    valproate sodium (DEPACON) IVPB  500 mg Intravenous Q6H     PRN Meds: acetaminophen, albuterol-ipratropium, bisacodyL, dextrose 10%, dextrose 10%, glucagon (human recombinant), glucose, glucose, hydrALAZINE, melatonin, OLANZapine, ondansetron, polyethylene glycol, promethazine, sodium chloride 0.9%    Allergies:   Review of patient's allergies indicates:  No Known Allergies    Vitals  Vitals:    01/18/24 1202   BP: (!) 173/79   Pulse: 70   Resp: 18   Temp: 97.6 °F (36.4 °C)       Labs/Imaging/Studies:  Recent  Results (from the past 24 hour(s))   POCT glucose    Collection Time: 01/17/24  5:11 PM   Result Value Ref Range    POCT Glucose 115 (H) 70 - 110 mg/dL   POCT glucose    Collection Time: 01/17/24 10:04 PM   Result Value Ref Range    POCT Glucose 163 (H) 70 - 110 mg/dL   POCT glucose    Collection Time: 01/18/24  8:33 AM   Result Value Ref Range    POCT Glucose 82 70 - 110 mg/dL   CBC Without Differential    Collection Time: 01/18/24  9:11 AM   Result Value Ref Range    WBC 13.62 (H) 3.90 - 12.70 K/uL    RBC 4.53 4.00 - 5.40 M/uL    Hemoglobin 13.0 12.0 - 16.0 g/dL    Hematocrit 42.7 37.0 - 48.5 %    MCV 94 82 - 98 fL    MCH 28.7 27.0 - 31.0 pg    MCHC 30.4 (L) 32.0 - 36.0 g/dL    RDW 13.2 11.5 - 14.5 %    Platelets 265 150 - 450 K/uL    MPV 10.3 9.2 - 12.9 fL   Magnesium    Collection Time: 01/18/24  9:11 AM   Result Value Ref Range    Magnesium 1.9 1.6 - 2.6 mg/dL   Phosphorus    Collection Time: 01/18/24  9:11 AM   Result Value Ref Range    Phosphorus 2.8 2.7 - 4.5 mg/dL   Comprehensive Metabolic Panel    Collection Time: 01/18/24  9:11 AM   Result Value Ref Range    Sodium 142 136 - 145 mmol/L    Potassium 3.9 3.5 - 5.1 mmol/L    Chloride 110 95 - 110 mmol/L    CO2 22 (L) 23 - 29 mmol/L    Glucose 105 70 - 110 mg/dL    BUN 29 (H) 8 - 23 mg/dL    Creatinine 0.8 0.5 - 1.4 mg/dL    Calcium 10.1 8.7 - 10.5 mg/dL    Total Protein 7.3 6.0 - 8.4 g/dL    Albumin 3.3 (L) 3.5 - 5.2 g/dL    Total Bilirubin 0.5 0.1 - 1.0 mg/dL    Alkaline Phosphatase 91 55 - 135 U/L    AST 74 (H) 10 - 40 U/L    ALT 53 (H) 10 - 44 U/L    eGFR >60.0 >60 mL/min/1.73 m^2    Anion Gap 10 8 - 16 mmol/L   POCT glucose    Collection Time: 01/18/24 12:04 PM   Result Value Ref Range    POCT Glucose 152 (H) 70 - 110 mg/dL     Imaging Results               MRI Brain W WO Contrast (Final result)  Result time 01/16/24 10:44:48      Final result by Ayaan Coffman MD (01/16/24 10:44:48)                   Impression:      No evidence for acute  infarct.    Focal T2 hypointense signal with diffuse focal susceptibility artifact about the right michelle ehsan, with faint heterogeneous peripheral T1 hyperintense signal.  No abnormal enhancement, surrounding edema, abnormal diffusion signal, or mass effect.  Findings are new from comparison MRI 2018, and may relate to remote hemorrhage, noting that additional considerations include vascular abnormality such as a cavernous malformation.  Continued follow-up is recommended.    Sequela of chronic microvascular ischemic change.    This report was flagged in Epic as abnormal.      Electronically signed by: Ayaan Coffman  Date:    01/16/2024  Time:    10:44               Narrative:    EXAMINATION:  MRI BRAIN W WO CONTRAST    CLINICAL HISTORY:  Acute encephalopathy;.    TECHNIQUE:  Multiplanar multisequence MR imaging of the brain was performed before and after the administration of 6 mL Gadavist  intravenous contrast.    COMPARISON:  CT head without contrast 01/14/2024, MRI brain without contrast 07/13/2018.    FINDINGS:  Exam degraded by patient motion artifact.    Intracranial compartment:    Prominence of the ventricles with compensatory enlargement of the sulci, in keeping with central volume loss.  Punctate focus susceptibility artifact involving the left occipital horn, which may represent choroid plexus.  No extra-axial blood or fluid collections.    Diffuse patchy and confluent regions of subcortical and periventricular T2/FLAIR hyperintense signal, overall nonspecific, but can be seen in the setting of microvascular ischemic change.  Focal T2 hypointensity signal with large region associated susceptibility artifact involving the right michelle ehsan (axial series 12, image 9; series 13, image 9).  Faint peripheral intrinsic T1 hyperintense signal without evidence for surrounding enhancement or abnormal diffusion signal.  Additional faint peripheral T2/FLAIR hyperintense signal.  No significant surrounding mass effect or  edema.  Remote lacunar type infarct versus prominent perivascular space involving the left insular region.    No mass lesion, acute hemorrhage, edema or acute infarct. No abnormal enhancement.  Partially empty sella configuration.    Normal vascular flow voids are preserved.    Skull/extracranial contents (limited evaluation): Bone marrow signal intensity is normal.  Hyperostosis frontalis.    Paranasal sinuses and mastoid air cells are essentially clear.                                       CT Cervical Spine Without Contrast (Final result)  Result time 01/15/24 02:12:06      Final result by Akin Booth MD (01/15/24 02:12:06)                   Impression:      Cervical spine CT: Nondiagnostic because of excessive motion artifact.  Correlate clinically.  Consider follow-up scanning if and when the patient is better able to hold still.    If clinically necessary, consider radiographs in the meantime.    PASQUALE Booth MD, first observed the findings on 01/15/2024 at 02:01, and reported the results to Destinee Kapoor RN, via epic secure chat message on 01/15/2024 at 02:10.  Patient name and medical record number were specified/linked in the message. The messaging system provided confirmation that the message was immediately opened and seen by the recipient.    Electronically signed by resident: Philippe Morales  Date:    01/15/2024  Time:    01:19    Electronically signed by: Akin Booth  Date:    01/15/2024  Time:    02:12               Narrative:    EXAMINATION:  CT CERVICAL SPINE WITHOUT CONTRAST    CLINICAL HISTORY:  Neck trauma (Age >= 65y);    TECHNIQUE:  Low dose axial images, sagittal and coronal reformations were performed though the cervical spine.  Contrast was not administered.  Additional images were taken due to severe patient motion artifact.    COMPARISON:  CT C-spine 08/23/2021    FINDINGS:  Severe patient motion artifact significantly limits evaluation.  A 2nd set of images was acquired but is also  markedly degraded by motion artifact.    Consequently, this scan is considered nondiagnostic.                                       X-Ray Chest 1 View (Final result)  Result time 01/14/24 22:44:23      Final result by Dino Mercado MD (01/14/24 22:44:23)                   Impression:      No acute radiographic abnormality.  See above comments.      Electronically signed by: Dino Mercado  Date:    01/14/2024  Time:    22:44               Narrative:    EXAMINATION:  XR CHEST 1 VIEW    CLINICAL HISTORY:  Encephalopathy, unspecified    TECHNIQUE:  Single frontal view of the chest was performed.    COMPARISON:  08/18/2022    FINDINGS:  Suboptimal inspiration.    Mild nonspecific interstitial changes.    Possible mild left basilar retrocardiac atelectasis.    No effusion or pneumothorax.    The cardiac silhouette is normal in size. The hilar and mediastinal contours are unremarkable.    Bones are intact.    No significant change.                                       CT Head Without Contrast (Final result)  Result time 01/14/24 22:21:14      Final result by Dino Mercado MD (01/14/24 22:21:14)                   Impression:      1. No acute intracranial process.  2. Significant motion artifact may diminish the sensitivity.  3. Involutional changes with chronic microvascular ischemic changes.      Electronically signed by: Dino Mercado  Date:    01/14/2024  Time:    22:21               Narrative:    EXAMINATION:  CT HEAD WITHOUT CONTRAST    CLINICAL HISTORY:  Mental status change, unknown cause;    TECHNIQUE:  Low dose axial CT images obtained throughout the head without intravenous contrast. Sagittal and coronal reconstructions were performed.    COMPARISON:  04/24/2023    FINDINGS:  Intracranial compartment:    No midline shift or acute hydrocephalus.  No extra-axial blood or fluid collections.    Moderate involutional changes and chronic microvascular ischemic changes in the periventricular white  matter.    Significant motion artifact may diminish the sensitivity.    No parenchymal mass, hemorrhage, edema or major vascular distribution infarct.    Skull/extracranial contents (limited evaluation): No fracture. Mastoid air cells and paranasal sinuses are essentially clear.

## 2024-01-18 NOTE — PLAN OF CARE
01/18/24 1653   Post-Acute Status   Post-Acute Authorization Hospice   Hospice Status Referrals Sent   Discharge Delays None known at this time   Discharge Plan   Discharge Plan A Hospice/home   Discharge Plan B Hospice/home     Met with pt/family to review discharge recommendation of home hospice and is agreeable to plan. Per pt's sisterJose Francisco [facility]. SW sent referral via [employer].    Patient/family provided list of facilities in-network with patient's payor plan. Providers that are owned, operated, or affiliated with [facility] are included on the list.     Notified that referral sent to below listed facilities from in-network list based on proximity to home/family support:     [facility] Phone: [phone]        Patient/family instructed to identify preference.    Preferred Facility: (if more than 1, listed in order of descending preference)  [facility] Phone: [phone]      If an additional preferred facility not listed above is identified, additional referral to be sent. If above facilities unable to accept, will send additional referrals to in-network providers.     Discharge Plan A and Plan B have been determined by review of patient's clinical status, future medical and therapeutic needs, and coverage/benefits for post-acute care in coordination with multidisciplinary team members.    LI Kessler, SW    Case Management Department  [facility]

## 2024-01-19 ENCOUNTER — TELEPHONE (OUTPATIENT)
Dept: NEUROLOGY | Facility: CLINIC | Age: 79
End: 2024-01-19
Payer: MEDICARE

## 2024-01-19 ENCOUNTER — TELEPHONE (OUTPATIENT)
Dept: INTERNAL MEDICINE | Facility: CLINIC | Age: 79
End: 2024-01-19
Payer: MEDICARE

## 2024-01-19 LAB
POCT GLUCOSE: 111 MG/DL (ref 70–110)
POCT GLUCOSE: 127 MG/DL (ref 70–110)
POCT GLUCOSE: 168 MG/DL (ref 70–110)

## 2024-01-19 PROCEDURE — 11000001 HC ACUTE MED/SURG PRIVATE ROOM: Mod: HCNC

## 2024-01-19 PROCEDURE — 99232 SBSQ HOSP IP/OBS MODERATE 35: CPT | Mod: HCNC,,,

## 2024-01-19 PROCEDURE — 25000003 PHARM REV CODE 250: Mod: HCNC | Performed by: STUDENT IN AN ORGANIZED HEALTH CARE EDUCATION/TRAINING PROGRAM

## 2024-01-19 PROCEDURE — 25000003 PHARM REV CODE 250: Mod: HCNC

## 2024-01-19 PROCEDURE — 21400001 HC TELEMETRY ROOM: Mod: HCNC

## 2024-01-19 PROCEDURE — 63600175 PHARM REV CODE 636 W HCPCS: Mod: HCNC

## 2024-01-19 RX ORDER — MEMANTINE HYDROCHLORIDE 28 MG/1
1 CAPSULE, EXTENDED RELEASE ORAL DAILY
Qty: 90 CAPSULE | Refills: 3 | Status: SHIPPED | OUTPATIENT
Start: 2024-01-19 | End: 2025-01-18

## 2024-01-19 RX ORDER — CEFDINIR 300 MG/1
300 CAPSULE ORAL 2 TIMES DAILY
Qty: 10 CAPSULE | Refills: 0 | Status: SHIPPED | OUTPATIENT
Start: 2024-01-19 | End: 2024-01-20 | Stop reason: HOSPADM

## 2024-01-19 RX ORDER — LISINOPRIL 20 MG/1
20 TABLET ORAL DAILY
Qty: 90 TABLET | Refills: 3
Start: 2024-01-19 | End: 2024-01-20

## 2024-01-19 RX ORDER — CEFDINIR 300 MG/1
300 CAPSULE ORAL 2 TIMES DAILY
Qty: 10 CAPSULE | Refills: 0 | Status: SHIPPED | OUTPATIENT
Start: 2024-01-19 | End: 2024-01-19

## 2024-01-19 RX ORDER — SENNOSIDES 8.6 MG/1
1 TABLET ORAL DAILY PRN
Qty: 10 TABLET | Refills: 0 | Status: SHIPPED | OUTPATIENT
Start: 2024-01-19

## 2024-01-19 RX ORDER — ACETAMINOPHEN 500 MG
1000 TABLET ORAL EVERY 6 HOURS PRN
Qty: 10 TABLET | Refills: 0 | Status: SHIPPED | OUTPATIENT
Start: 2024-01-19

## 2024-01-19 RX ORDER — HALOPERIDOL 5 MG/1
5 TABLET ORAL DAILY PRN
Qty: 15 TABLET | Refills: 2 | Status: SHIPPED | OUTPATIENT
Start: 2024-01-19 | End: 2025-01-18

## 2024-01-19 RX ORDER — OLANZAPINE 2.5 MG/1
2.5 TABLET ORAL EVERY 6 HOURS PRN
Qty: 10 TABLET | Refills: 2 | Status: SHIPPED | OUTPATIENT
Start: 2024-01-19 | End: 2025-01-18

## 2024-01-19 RX ORDER — BENZTROPINE MESYLATE 1 MG/1
TABLET ORAL
Qty: 60 TABLET | Refills: 3 | Status: SHIPPED | OUTPATIENT
Start: 2024-01-19 | End: 2024-05-06

## 2024-01-19 RX ORDER — LISINOPRIL 20 MG/1
20 TABLET ORAL DAILY
Qty: 90 TABLET | Refills: 3
Start: 2024-01-19 | End: 2024-01-19

## 2024-01-19 RX ORDER — ONDANSETRON 4 MG/1
4 TABLET, ORALLY DISINTEGRATING ORAL EVERY 8 HOURS PRN
Qty: 10 TABLET | Refills: 0 | Status: SHIPPED | OUTPATIENT
Start: 2024-01-19

## 2024-01-19 RX ORDER — DONEPEZIL HYDROCHLORIDE 5 MG/1
5 TABLET, FILM COATED ORAL EVERY MORNING
Qty: 90 TABLET | Refills: 3 | Status: SHIPPED | OUTPATIENT
Start: 2024-01-19 | End: 2025-01-18

## 2024-01-19 RX ORDER — GABAPENTIN 300 MG/1
CAPSULE ORAL
Qty: 30 CAPSULE | Refills: 6 | Status: SHIPPED | OUTPATIENT
Start: 2024-01-19

## 2024-01-19 RX ORDER — AMOXICILLIN AND CLAVULANATE POTASSIUM 875; 125 MG/1; MG/1
1 TABLET, FILM COATED ORAL 2 TIMES DAILY
Qty: 10 TABLET | Refills: 0
Start: 2024-01-19 | End: 2024-01-19 | Stop reason: HOSPADM

## 2024-01-19 RX ORDER — QUETIAPINE FUMARATE 200 MG/1
TABLET, FILM COATED ORAL
Qty: 90 TABLET | Refills: 0 | Status: SHIPPED | OUTPATIENT
Start: 2024-01-19 | End: 2024-04-08

## 2024-01-19 RX ORDER — DULOXETIN HYDROCHLORIDE 30 MG/1
CAPSULE, DELAYED RELEASE ORAL
Qty: 30 CAPSULE | Refills: 3 | Status: SHIPPED | OUTPATIENT
Start: 2024-01-19 | End: 2024-05-06

## 2024-01-19 RX ADMIN — MEMANTINE HYDROCHLORIDE 20 MG: 5 TABLET ORAL at 10:01

## 2024-01-19 RX ADMIN — BENZTROPINE MESYLATE 1 MG: 1 TABLET ORAL at 08:01

## 2024-01-19 RX ADMIN — BENZTROPINE MESYLATE 1 MG: 1 TABLET ORAL at 10:01

## 2024-01-19 RX ADMIN — CARVEDILOL 6.25 MG: 3.12 TABLET, FILM COATED ORAL at 08:01

## 2024-01-19 RX ADMIN — ENOXAPARIN SODIUM 40 MG: 40 INJECTION SUBCUTANEOUS at 04:01

## 2024-01-19 RX ADMIN — GABAPENTIN 300 MG: 300 CAPSULE ORAL at 08:01

## 2024-01-19 RX ADMIN — CARVEDILOL 6.25 MG: 3.12 TABLET, FILM COATED ORAL at 10:01

## 2024-01-19 RX ADMIN — LISINOPRIL 20 MG: 20 TABLET ORAL at 10:01

## 2024-01-19 RX ADMIN — DULOXETINE HYDROCHLORIDE 30 MG: 30 CAPSULE, DELAYED RELEASE ORAL at 10:01

## 2024-01-19 RX ADMIN — QUETIAPINE FUMARATE 200 MG: 200 TABLET ORAL at 08:01

## 2024-01-19 NOTE — PROGRESS NOTES
"CONSULTATION LIAISON PSYCHIATRY PROGRESS NOTE    Patient Name: Lay Peres  MRN: [id number]  Patient Class: IP- Inpatient  Admission Date: 1/14/2024  Attending Physician: Minh Marques MD      SUBJECTIVE:   Lay Peres is a 78 y.o. female with past psychiatric history of dementia and schizophrenia & past pertinent medical history of deaf since birth and CVA presents to the ED/admitted to the hospital for Acute encephalopathy. Sister at bedside. States patient was in her usual state of health when she had an unwitnessed fall at her living facility. Per sister, unclear if she hit her head and unclear how she fell. Per staff and sister, the patient was not herself shortly after. She would not open her eyes all the way, was very combative/ agitated and did not recognize her sister. States she is usually able to ambulate with a walker and can say some short phrases but is unable to have a conversation.    Psychiatry consulted for "agitation requiring restraints, needing to transition to oral medications"      Today, pt sitting up in bed with RN at bedside. RN assisting pt with breakfast and taking medications. Sister at bedside states pt appears to be calmer this morning. Pt not restless in bed this morning and no moaning noted as compared to yesterday. Encouraged staff to trial pt out of restraints today. RN states GERALDO.        OBJECTIVE:    Mental Status Exam:  General Appearance: lying in bed, thin and gaunt  Behavior:  calm and cooperative  Involuntary Movements and Motor Activity: no abnormal involuntary movements noted; no tics, no tremors, no akathisia, no dystonia, no evidence of tardive dyskinesia; no psychomotor agitation or retardation  Gait and Station: unable to assess - patient lying down or seated  Speech and Language:  unable to assess, pt deaf and unable to communicate at this time. Per sister can state few words/short sentences at baseline   Mood: "solomon"  Affect: " mood-congruent  Thought Process and Associations: Unable to Assess  Thought Content and Perceptions:: Unable to Assess  Sensorium and Orientation: Unable to Formally Assess  Recent and Remote Memory: Unable to  Formally Assess  Attention and Concentration: Unable to Formally Assess  Fund of Knowledge: Unable to Formally Assess  Insight: impaired  Judgment: impaired    CAM ICU positive? Alicia       ASSESSMENT & RECOMMENDATIONS   Schizophrenia  Dementia  Intellectual Disability  Deafness, congential     PSYCH MEDICATIONS:  Scheduled:   congentin 1mg PO BID  Cymbalta 30mg PO Daily  Haldol 5mg PO daily (if agitated)  Seroquel 200mg Po QHS  Pt unable to swallow meds on own, please ensure the meds are being crushed and fed via pudding/applesauce and entire amount is consumed.  Rec d/c depacon as it does not seem to be beneficial to pt at this time        DELIRIUM  DELIRIUM BEHAVIOR MANAGEMENT  PLEASE utilize CHEMICAL restraints with PRN meds first for agitation. Minimize use of PHYSICAL restraints OR have periods of being out of physical restraints if possible.-trial pt out of restraints today (1/19) if possible   Keep window shades open and room lit during day and room dim at night in order to promote normal sleep-wake cycles  Encourage family at bedside. [place] patient often to situation, location, date.  Continue to Limit or Discontinue use of Narcotics, Benzos and Anti-cholinergic medications as they may worsen delirium.  Continue medical workup for causative etiology of Delirium.   Can use zyprexa 2.5mg PO Q6H PRN any agitation  Monitor Qtc closely; last EKG 1/14 Qtc 439        RISK ASSESSMENT  NO NEED FOR PEC patient NOT in any imminent danger of hurting self or others and not gravely disabled.      FOLLOW UP  Will follow up while in house     DISPOSITION - once medically cleared:    Defer to medical team    Please contact ON CALL psychiatry service (24/7) for any acute issues that may arise.    Philip Reina,  PMHNP   Psychiatry  [facility]  1/19/2024 11:01 AM        --------------------------------------------------------------------------------------------------------------------------------------------------------------------------------------------------------------------------------------    CONTINUED OBJECTIVE clinical data & findings reviewed and noted for above decision making    Current Medications:   Scheduled Meds:    benztropine  1 mg Oral BID    carvediloL  6.25 mg Oral BID    donepeziL  5 mg Oral QAM    DULoxetine  30 mg Oral Daily    enoxparin  40 mg Subcutaneous Daily    gabapentin  300 mg Oral QHS    lisinopriL  20 mg Oral Daily    memantine  20 mg Oral Daily    QUEtiapine  200 mg Oral Nightly     PRN Meds: acetaminophen, albuterol-ipratropium, bisacodyL, dextrose 10%, dextrose 10%, glucagon (human recombinant), glucose, glucose, haloperidoL, hydrALAZINE, melatonin, OLANZapine, ondansetron, polyethylene glycol, promethazine, sodium chloride 0.9%    Allergies:   Review of patient's allergies indicates:  No Known Allergies    Vitals  Vitals:    01/19/24 0902   BP: (!) 191/81   Pulse: 63   Resp:    Temp:        Labs/Imaging/Studies:  Recent Results (from the past 24 hour(s))   POCT glucose    Collection Time: 01/18/24 12:04 PM   Result Value Ref Range    POCT Glucose 152 (H) 70 - 110 mg/dL   POCT glucose    Collection Time: 01/18/24  5:27 PM   Result Value Ref Range    POCT Glucose 111 (H) 70 - 110 mg/dL   POCT glucose    Collection Time: 01/18/24 11:08 PM   Result Value Ref Range    POCT Glucose 129 (H) 70 - 110 mg/dL   POCT glucose    Collection Time: 01/19/24 10:22 AM   Result Value Ref Range    POCT Glucose 111 (H) 70 - 110 mg/dL     Imaging Results               MRI Brain W WO Contrast (Final result)  Result time 01/16/24 10:44:48      Final result by Ayaan Coffman MD (01/16/24 10:44:48)                   Impression:      No evidence for acute infarct.    Focal T2 hypointense  signal with diffuse focal susceptibility artifact about the right michelle ehsan, with faint heterogeneous peripheral T1 hyperintense signal.  No abnormal enhancement, surrounding edema, abnormal diffusion signal, or mass effect.  Findings are new from comparison MRI 2018, and may relate to remote hemorrhage, noting that additional considerations include vascular abnormality such as a cavernous malformation.  Continued follow-up is recommended.    Sequela of chronic microvascular ischemic change.    This report was flagged in Epic as abnormal.      Electronically signed by: Ayaan Coffman  Date:    01/16/2024  Time:    10:44               Narrative:    EXAMINATION:  MRI BRAIN W WO CONTRAST    CLINICAL HISTORY:  Acute encephalopathy;.    TECHNIQUE:  Multiplanar multisequence MR imaging of the brain was performed before and after the administration of 6 mL Gadavist  intravenous contrast.    COMPARISON:  CT head without contrast 01/14/2024, MRI brain without contrast 07/13/2018.    FINDINGS:  Exam degraded by patient motion artifact.    Intracranial compartment:    Prominence of the ventricles with compensatory enlargement of the sulci, in keeping with central volume loss.  Punctate focus susceptibility artifact involving the left occipital horn, which may represent choroid plexus.  No extra-axial blood or fluid collections.    Diffuse patchy and confluent regions of subcortical and periventricular T2/FLAIR hyperintense signal, overall nonspecific, but can be seen in the setting of microvascular ischemic change.  Focal T2 hypointensity signal with large region associated susceptibility artifact involving the right michelle ehsan (axial series 12, image 9; series 13, image 9).  Faint peripheral intrinsic T1 hyperintense signal without evidence for surrounding enhancement or abnormal diffusion signal.  Additional faint peripheral T2/FLAIR hyperintense signal.  No significant surrounding mass effect or edema.  Remote lacunar type  infarct versus prominent perivascular space involving the left insular region.    No mass lesion, acute hemorrhage, edema or acute infarct. No abnormal enhancement.  Partially empty sella configuration.    Normal vascular flow voids are preserved.    Skull/extracranial contents (limited evaluation): Bone marrow signal intensity is normal.  Hyperostosis frontalis.    Paranasal sinuses and mastoid air cells are essentially clear.                                       CT Cervical Spine Without Contrast (Final result)  Result time 01/15/24 02:12:06      Final result by Akin Booth MD (01/15/24 02:12:06)                   Impression:      Cervical spine CT: Nondiagnostic because of excessive motion artifact.  Correlate clinically.  Consider follow-up scanning if and when the patient is better able to hold still.    If clinically necessary, consider radiographs in the meantime.    PASQUALE Booth MD, first observed the findings on 01/15/2024 at 02:01, and reported the results to Destinee Kapoor RN, via epic secure chat message on 01/15/2024 at 02:10.  Patient name and medical record number were specified/linked in the message. The messaging system provided confirmation that the message was immediately opened and seen by the recipient.    Electronically signed by resident: Philippe Morales  Date:    01/15/2024  Time:    01:19    Electronically signed by: Akin Booth  Date:    01/15/2024  Time:    02:12               Narrative:    EXAMINATION:  CT CERVICAL SPINE WITHOUT CONTRAST    CLINICAL HISTORY:  Neck trauma (Age >= 65y);    TECHNIQUE:  Low dose axial images, sagittal and coronal reformations were performed though the cervical spine.  Contrast was not administered.  Additional images were taken due to severe patient motion artifact.    COMPARISON:  CT C-spine 08/23/2021    FINDINGS:  Severe patient motion artifact significantly limits evaluation.  A 2nd set of images was acquired but is also markedly degraded by motion  artifact.    Consequently, this scan is considered nondiagnostic.                                       X-Ray Chest 1 View (Final result)  Result time 01/14/24 22:44:23      Final result by Dino Mercado MD (01/14/24 22:44:23)                   Impression:      No acute radiographic abnormality.  See above comments.      Electronically signed by: Dino Mercado  Date:    01/14/2024  Time:    22:44               Narrative:    EXAMINATION:  XR CHEST 1 VIEW    CLINICAL HISTORY:  Encephalopathy, unspecified    TECHNIQUE:  Single frontal view of the chest was performed.    COMPARISON:  08/18/2022    FINDINGS:  Suboptimal inspiration.    Mild nonspecific interstitial changes.    Possible mild left basilar retrocardiac atelectasis.    No effusion or pneumothorax.    The cardiac silhouette is normal in size. The hilar and mediastinal contours are unremarkable.    Bones are intact.    No significant change.                                       CT Head Without Contrast (Final result)  Result time 01/14/24 22:21:14      Final result by Dino Mercado MD (01/14/24 22:21:14)                   Impression:      1. No acute intracranial process.  2. Significant motion artifact may diminish the sensitivity.  3. Involutional changes with chronic microvascular ischemic changes.      Electronically signed by: Dino Mercado  Date:    01/14/2024  Time:    22:21               Narrative:    EXAMINATION:  CT HEAD WITHOUT CONTRAST    CLINICAL HISTORY:  Mental status change, unknown cause;    TECHNIQUE:  Low dose axial CT images obtained throughout the head without intravenous contrast. Sagittal and coronal reconstructions were performed.    COMPARISON:  04/24/2023    FINDINGS:  Intracranial compartment:    No midline shift or acute hydrocephalus.  No extra-axial blood or fluid collections.    Moderate involutional changes and chronic microvascular ischemic changes in the periventricular white matter.    Significant motion artifact may  diminish the sensitivity.    No parenchymal mass, hemorrhage, edema or major vascular distribution infarct.    Skull/extracranial contents (limited evaluation): No fracture. Mastoid air cells and paranasal sinuses are essentially clear.

## 2024-01-19 NOTE — CLINICAL REVIEW
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 50-70), Neck pain, Paranoid schizophrenia, and Schizoaffective disorder, chronic condition.    Last VS: BP (!) 191/81 (BP Location: Left arm, Patient Position: Lying)   Pulse 63   Temp 97.2 °F (36.2 °C) (Tympanic)   Resp 18   Ht 5' 2" (1.575 m)   Wt 61.3 kg (135 lb 2.3 oz)   SpO2 97%   BMI 24.72 kg/m²     24H Vital Sign Range:  Temp:  [97.2 °F (36.2 °C)-97.9 °F (36.6 °C)]   Pulse:  [58-84]   Resp:  [18-20]   BP: (140-213)/(73-81)   SpO2:  [94 %-97 %]     Level of Consciousness (AVPU): responds to pain    Recent Labs     01/17/24  0401 01/18/24  0911   WBC 11.17 13.62*   HGB 12.5 13.0   HCT 38.5 42.7    265       Recent Labs     01/17/24  0401 01/18/24  0911   * 142   K 4.3 3.9   * 110   CO2 24 22*   BUN 27* 29*   CREATININE 0.9 0.8    105   PHOS 3.1 2.8   MG 1.8 1.9        No results for input(s): "PH", "PCO2", "PO2", "HCO3", "POCSATURATED", "BE" in the last 72 hours.     OXYGEN:             MEWS score: 3    Charge Davida LAGUERRE  contacted for hypertension. Reports will recheck blood pressure. Patient has scheduled AM antihypertensives. No additional concerns verbalized at this time. Instructed to call [phone] for further concerns or assistance.    Mercedez Muniz RN        "

## 2024-01-19 NOTE — PLAN OF CARE
[facility]    FACILITY TRANSFER ORDERS      Patient Name: Lay Peres  YOB: 1945    PCP: Krista Case MD   PCP Address: [address] [facility] / [place] [address]  PCP Phone Number: [phone]  PCP Fax: [phone]    Encounter Date: 01/22/2024    Admit to: [facility]    Vital Signs:  Routine    Diagnoses:   Active Hospital Problems    Diagnosis  POA    *Acute encephalopathy [G93.40]  Yes     Priority: 1 - High    Dementia [F03.90]  Yes     Priority: 2     History of stroke [Z86.73]  Not Applicable     Priority: 3     Chronic schizoaffective disorder [F25.9]  Yes     Priority: 3     DNR (do not resuscitate) [Z66]  Yes     Priority: 5     Schizophrenia [F20.9]  Yes    Deafness congenital [H90.5]  Yes    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       Allergies:Review of patient's allergies indicates:  No Known Allergies    Diet: pureed diet nectar thick    Activities: Bathroom privileges with assistance    Goals of Care Treatment Preferences:  Code Status: DNR    Health care agent:  Patient's sisterJose Francisco.  Health care agent number: No value filed.          What is most important right now is to focus on avoiding the hospital.  Accordingly, we have decided that the best plan to meet the patient's goals includes enrolling in hospice care.      Nursing: Per facility     Labs:  None   needed      CONSULTS:     to evaluate for community resources/long-range planning.    MISCELLANEOUS CARE:  Routine Skin for Bedridden Patients: Apply moisture barrier cream to all skin folds and wet areas in perineal area daily and after baths and all bowel movements.    WOUND CARE ORDERS  None    Medications: Review discharge medications with patient and family and provide education.      Current Discharge Medication List        START taking these medications    Details    amLODIPine (NORVASC) 5 MG tablet Take 1 tablet (5 mg total) by mouth once daily.  Qty: 30 tablet, Refills: 11    Comments: .      carvediloL (COREG) 6.25 MG tablet Take 1 tablet (6.25 mg total) by mouth 2 (two) times daily.  Qty: 60 tablet, Refills: 11    Comments: .      ciprofloxacin HCl (CIPRO) 500 MG tablet Take 1 tablet (500 mg total) by mouth 2 (two) times daily. for 5 days  Qty: 10 tablet, Refills: 0      hydrALAZINE (APRESOLINE) 25 MG tablet Take 1 tablet (25 mg total) by mouth every 8 (eight) hours as needed (SBP >180).  Qty: 90 tablet, Refills: 11    Comments: .      OLANZapine (ZYPREXA) 2.5 MG tablet Take 1 tablet (2.5 mg total) by mouth every 6 (six) hours as needed (Any agitation refractory).  Qty: 10 tablet, Refills: 2           CONTINUE these medications which have CHANGED    Details   acetaminophen (TYLENOL) 500 MG tablet Take 2 tablets (1,000 mg total) by mouth every 6 (six) hours as needed for Pain.  Qty: 10 tablet, Refills: 0      benztropine (COGENTIN) 1 MG tablet TAKE 1 TABLET (1MG) BY MOUTH TWICE DAILY  Qty: 60 tablet, Refills: 3    Associated Diagnoses: Chronic schizoaffective disorder      donepeziL (ARICEPT) 5 MG tablet Take 1 tablet (5 mg total) by mouth every morning. To be taken with food  Qty: 90 tablet, Refills: 3    Comments: This prescription was filled on 2/20/2023. Any refills authorized will be placed on file.  Associated Diagnoses: Dementia associated with other underlying disease with behavioral disturbance      DULoxetine (CYMBALTA) 30 MG capsule TAKE 1 CAPSULE (30MG) BY MOUTH ONCE DAILY  Qty: 30 capsule, Refills: 3    Associated Diagnoses: Chronic schizoaffective disorder      gabapentin (NEURONTIN) 300 MG capsule TAKE 1 TABLET (300MG) BY MOUTH AT BEDTIME at 8pm  Qty: 30 capsule, Refills: 6      haloperidoL (HALDOL) 5 MG tablet Take 1 tablet (5 mg total) by mouth daily as needed (as needed for agitation or paranoia).  Qty: 15 tablet, Refills: 2      lisinopriL  (PRINIVIL,ZESTRIL) 20 MG tablet Take 1.5 tablets (30 mg total) by mouth once daily.  Qty: 90 tablet, Refills: 3    Comments: .      memantine (NAMENDA XR) 28 mg CSpX Take 1 capsule (28 mg total) by mouth once daily.  Qty: 90 capsule, Refills: 3    Comments: This prescription was filled on 2/20/2023. Any refills authorized will be placed on file.  Associated Diagnoses: Dementia with behavioral disturbance      ondansetron (ZOFRAN-ODT) 4 MG TbDL Take 1 tablet (4 mg total) by mouth every 8 (eight) hours as needed (nausea.).  Qty: 10 tablet, Refills: 0      QUEtiapine (SEROQUEL) 200 MG Tab TAKE 1 TABLET (200MG) BY MOUTH EVERY EVENING  Qty: 90 tablet, Refills: 0    Associated Diagnoses: Chronic schizoaffective disorder      senna (SENOKOT) 8.6 mg tablet Take 1 tablet by mouth daily as needed for Constipation.  Qty: 10 tablet, Refills: 0           CONTINUE these medications which have NOT CHANGED    Details   haloperidol decanoate (HALDOL DECANOATE) 50 mg/mL injection Inject 1 mL (50 mg total) into the muscle every 14 (fourteen) days.  Qty: 6 mL, Refills: 12    Associated Diagnoses: Chronic schizoaffective disorder      multivitamin (THERAGRAN) per tablet Take 1 tablet by mouth once daily.           STOP taking these medications       aspirin (ECOTRIN) 325 MG EC tablet Comments:   Reason for Stopping:         calcium carbonate (OS-SAWYER) 500 mg calcium (1,250 mg) chewable tablet Comments:   Reason for Stopping:         cholecalciferol, vitamin D3, (VITAMIN D3) 25 mcg (1,000 unit) capsule Comments:   Reason for Stopping:         cyanocobalamin 1,000 mcg/mL injection Comments:   Reason for Stopping:         famotidine (PEPCID) 40 MG tablet Comments:   Reason for Stopping:         nystatin (MYCOSTATIN) ointment Comments:   Reason for Stopping:         blood sugar diagnostic Strp Comments:   Reason for Stopping:                  Immunizations Administered as of 1/19/2024       Name Date Dose VIS Date Route Exp Date    COVID-19,  MRNA, LN-S, PF (Moderna) 2/18/2021 -- -- Intramuscular --    Site: Right arm     Lot: [id number]     COVID-19, MRNA, LN-S, PF (Moderna) 1/19/2021 -- -- Intramuscular --    Site: Left arm     Lot: [id number]             This patient has had both covid vaccinations    Some patients may experience side effects after vaccination.  These may include fever, headache, muscle or joint aches.  Most symptoms resolve with 24-48 hours and do not require urgent medical evaluation unless they persist for more than 72 hours or symptoms are concerning for an unrelated medical condition.          _________________________________  Minh Marques MD  01/22/2024

## 2024-01-19 NOTE — TELEPHONE ENCOUNTER
----- Message from Krystina Schulte sent at 1/19/2024  1:22 PM CST -----  Contact: Christina [phone]  Chapo with [facility]  is unable to come admit the pt for hospice she is unable to come up with a diagnosis at this time

## 2024-01-19 NOTE — TELEPHONE ENCOUNTER
----- Message from Fran Ty sent at 1/19/2024  9:30 AM CST -----  Regarding: Rx Clarification Needed  Contact: @ [phone]  Caller is calling to speak to someone in the office to get clarification on pt's rx. Please call to advise. Thanks.             Pharmacy Name: [facility] - [address]    RX needing Clarification: Aspirin 325mg     Best call back number:  @ [phone]    Additional Information: Caller received a cancel rx and is wanting to clarify to let pts facility know

## 2024-01-19 NOTE — DISCHARGE SUMMARY
[facility] [address] [place] [address] Medicine  Discharge Summary      Patient Name: Lay Peres  MRN: [id number]  SILVIA: [id number]  Patient Class: IP- Inpatient  Admission Date: 1/14/2024  Hospital Length of Stay: 3 days  Discharge Date and Time: No discharge date for patient encounter.  Attending Physician: Minh Marques MD   Discharging Provider: Minh Marques MD  Primary Care Provider: Krista Case MD  [facility] Medicine Team: [facility] G Minh Marques MD  Primary Care Team: [facility]     HPI:   Lay Peres is a 78 y.o. female with a hx of dementia, deaf since birth, CVA, and schizophrenia presents from [facility] for AMS. Patient unable to provide hx or participate in exam. Sister at bedside. States patient was I her usual state of health when she had an unwitnessed fall. Per sister, unclear if she hit her head and unclear how she fell. Per staff and sister, the patient was not herself shortly after. She would not open her eyes all the way, was very combative/ agitated and did not recognize her sister. States she is usually able to ambulate with a walker and can say some short phrases but is unable to have a conversation. Per sister, did not believe patient was in pain and patient was moving all extremities with good strength. No noticeable facial droop, convulsions or unilateral weakness. Patient has a hx of incontinence. Prior to my exam, pt received haldol which sister states put her to sleep and has been the most restful she has been since arrival to the ED.    Confirmed with sister at bedside a code status of DNR. Sister wishes for patient to be comfortable.     ED: AF, VSS. CBC/ CMP largely unremarkable. UA not infectious. VBG reviewed. Mildly hypercapnic. CTH showed no acute abnormality. CT C spine was non diagnostic due to patient moving. Given haldol in ED.     * No surgery found *      Hospital Course:    Acute encephalopathy  - Unclear etiology on  admission  - UA not infectious  - CXR 1/14 with no acute findings  - Procalcitonin 0.09 (normal), lactate 1.0  - TSH 0.352. FT4 0.78 (subclinical hypothyroidism)   - B-12 slightly elevated, Folate normal, Ammonia normal   - EtOH level <10 on admit, Non- reactive HIV & Hep-C  - Empiric Cefidinir for potential UTI  - CT Head showed no acute abnormality.  - MRI brain with no acute infarct; new findings of remote hemorrhage and vascular which is new compared to MRI from 2018  - Vascular neurology consulted to evaluate if findings are a significant cause of her AMS  -- MRI findings do not correlate with patient's symptoms. Possibly cavernous malformation.    -- CTA Head/Neck with no acute findings.  Generalized cerebral volume loss and chronic microvascular ischemic change.  Abnormality in the ehsan identified on recent MRI is not well delineated with CT per radiology.  No evidence of acute large vessel occlusion or flow-limiting stenosis.   -- Per [facility] neuro, can repeat MRI Brain in 3 months to compare to recent ones; earlier if needed.    - Neuro checks  - Delirium precautions  - Psychiatry consulted  -- Discontinued scheduled IV depakote per psych recs  - Treatment of UTI with Cipro based on prior cultures  - Mentation continues to wax and wane, requiring restraints     Dementia  - Hx noted, likely with vascular type on dementia based on imaging with co morbidities   - Continue home aricept and namenda  - Palliative care consulted   - As above     History of stroke  - Hx noted  - CTH w/o acute abnormalities, MRI reviewed; CTA head/neck reviewed as above  - Vascular neurology consulted  - Neuro checks      Chronic schizoaffective disorder  - Hx noted  - Psychiatry consulted given continued agitation requiring restraints   -- Continue home cogentin, cymbalta, seroquel and PRN haldol  -- Dc'd IV Depacon per recs   -- PRN PO olanzapine for agitation  - Trial out of restraints as tolerated      Goals of Care Treatment  Preferences:  Code Status: DNR    Health care agent:  Patient's sisterJose Francisco.  Health care agent number: No value filed.          What is most important right now is to focus on avoiding the hospital.  Accordingly, we have decided that the best plan to meet the patient's goals includes enrolling in hospice care.      Consults:   Consults (From admission, onward)          Status Ordering Provider     Inpatient consult to Psychiatry  Once        Provider:  (Not yet assigned)    Completed CARLOS MARIE consult to case management  Once        Provider:  (Not yet assigned)    Acknowledged PEARL CERON     Inpatient consult to Vascular (Stroke) Neurology  Once        Provider:  (Not yet assigned)    Completed PEARL CERON            No new Assessment & Plan notes have been filed under this hospital service since the last note was generated.  Service: Hospital Medicine    Final Active Diagnoses:    Diagnosis Date Noted POA    PRINCIPAL PROBLEM:  Acute encephalopathy [G93.40] 01/15/2024 Yes    Dementia [F03.90]  Yes    History of stroke [Z86.73] 07/13/2018 Not Applicable    Chronic schizoaffective disorder [F25.9] 09/14/2012 Yes    DNR (do not resuscitate) [Z66] 01/15/2024 Yes    Schizophrenia [F20.9] 01/18/2024 Yes    Deafness congenital [H90.5] 09/14/2012 Yes    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 [E13.9] 07/13/2012 Yes    Hypertension [I10] 07/13/2012 Yes      Problems Resolved During this Admission:       Discharged Condition: good    Disposition: Hospice/Medical Facility    Follow Up:    Patient Instructions:      Ambulatory referral/consult to Outpatient Case Management   Referral Priority: Routine Referral Type: Consultation   Referral Reason: Specialty Services Required   Number of Visits Requested: 1     Diet Adult Regular       Significant Diagnostic Studies: Labs: All labs within the past 24 hours have been reviewed    Pending Diagnostic Studies:       Procedure Component Value Units  Date/Time    Vitamin B1 [[id number]] Collected: 01/15/24 0439    Order Status: Sent Lab Status: In process Updated: 01/15/24 0456    Specimen: Blood            Medications:  Reconciled Home Medications:      Medication List        START taking these medications      amLODIPine 5 MG tablet  Commonly known as: NORVASC  Take 1 tablet (5 mg total) by mouth once daily.     carvediloL 6.25 MG tablet  Commonly known as: COREG  Take 1 tablet (6.25 mg total) by mouth 2 (two) times daily.     ciprofloxacin HCl 500 MG tablet  Commonly known as: CIPRO  Take 1 tablet (500 mg total) by mouth 2 (two) times daily. for 5 days     hydrALAZINE 25 MG tablet  Commonly known as: APRESOLINE  Take 1 tablet (25 mg total) by mouth every 8 (eight) hours as needed (SBP >180).     OLANZapine 2.5 MG tablet  Commonly known as: ZyPREXA  Take 1 tablet (2.5 mg total) by mouth every 6 (six) hours as needed (Any agitation refractory).            CHANGE how you take these medications      acetaminophen 500 MG tablet  Commonly known as: TYLENOL  Take 2 tablets (1,000 mg total) by mouth every 6 (six) hours as needed for Pain.  What changed:   when to take this  reasons to take this     lisinopriL 20 MG tablet  Commonly known as: PRINIVIL,ZESTRIL  Take 1.5 tablets (30 mg total) by mouth once daily.  What changed: how much to take     senna 8.6 mg tablet  Commonly known as: SENOKOT  Take 1 tablet by mouth daily as needed for Constipation.  What changed:   when to take this  reasons to take this            CONTINUE taking these medications      benztropine 1 MG tablet  Commonly known as: COGENTIN  TAKE 1 TABLET (1MG) BY MOUTH TWICE DAILY     donepeziL 5 MG tablet  Commonly known as: ARICEPT  Take 1 tablet (5 mg total) by mouth every morning. To be taken with food     DULoxetine 30 MG capsule  Commonly known as: CYMBALTA  TAKE 1 CAPSULE (30MG) BY MOUTH ONCE DAILY     gabapentin 300 MG capsule  Commonly known as: NEURONTIN  TAKE 1 TABLET (300MG) BY MOUTH AT  BEDTIME at 8pm     haloperidoL 5 MG tablet  Commonly known as: HALDOL  Take 1 tablet (5 mg total) by mouth daily as needed (as needed for agitation or paranoia).     haloperidol decanoate 50 mg/mL injection  Commonly known as: HALDOL DECANOATE  Inject 1 mL (50 mg total) into the muscle every 14 (fourteen) days.     memantine 28 mg Cspx  Commonly known as: NAMENDA XR  Take 1 capsule (28 mg total) by mouth once daily.     multivitamin per tablet  Commonly known as: THERAGRAN  Take 1 tablet by mouth once daily.     ondansetron 4 MG Tbdl  Commonly known as: ZOFRAN-ODT  Take 1 tablet (4 mg total) by mouth every 8 (eight) hours as needed (nausea.).     QUEtiapine 200 MG Tab  Commonly known as: SEROQUEL  TAKE 1 TABLET (200MG) BY MOUTH EVERY EVENING            STOP taking these medications      aspirin 325 MG EC tablet  Commonly known as: ECOTRIN     blood sugar diagnostic Strp     calcium carbonate 500 mg calcium (1,250 mg) chewable tablet  Commonly known as: OS-SAWYER     cholecalciferol (vitamin D3) 25 mcg (1,000 unit) capsule  Commonly known as: VITAMIN D3     cyanocobalamin 1,000 mcg/mL injection     famotidine 40 MG tablet  Commonly known as: PEPCID     nystatin ointment  Commonly known as: MYCOSTATIN              Indwelling Lines/Drains at time of discharge:   Lines/Drains/Airways       None                   Time spent on the discharge of patient: 35 minutes         Minh Marques MD  Department of Hospital Medicine  [facility] - Observation 11H

## 2024-01-19 NOTE — PLAN OF CARE
Problem: Adult Inpatient Plan of Care  Goal: Plan of Care Review  Outcome: Ongoing, Progressing  Goal: Patient-Specific Goal (Individualized)  Outcome: Ongoing, Progressing  Goal: Absence of Hospital-Acquired Illness or Injury  Outcome: Ongoing, Progressing     Problem: Infection  Goal: Absence of Infection Signs and Symptoms  Outcome: Ongoing, Progressing     Problem: Fall Injury Risk  Goal: Absence of Fall and Fall-Related Injury  Outcome: Ongoing, Progressing     Problem: Restraint, Nonbehavioral (Nonviolent)  Goal: Absence of Harm or Injury  Outcome: Ongoing, Progressing     Problem: Diabetes Comorbidity  Goal: Blood Glucose Level Within Targeted Range  Outcome: Ongoing, Progressing     Problem: Skin Injury Risk Increased  Goal: Skin Health and Integrity  Outcome: Ongoing, Progressing     Problem: Behavioral Health Comorbidity  Goal: Maintenance of Behavioral Health Symptom Control  Outcome: Ongoing, Progressing

## 2024-01-19 NOTE — PHYSICIAN QUERY
PT Name: Lay Peres  MR #: [id number]     DOCUMENTATION CLARIFICATION     CDS/:  Derick Mack RN, CDS                   Contact Information:  [email or web address]    This form is a permanent document in the medical record.     Query Date: January 19, 2024    By submitting this query, we are merely seeking further clarification of documentation.  Please utilize your independent clinical judgment when addressing the question(s) below.    The Medical Record contains the following   Indicators Supporting Clinical Findings     Location in Medical Record   X Heart Failure documented Lay Peres is a 78 y.o. female with history of ... heart failure  ED MD 1/14        BNP      EF/Echo      Radiology findings      Subjective/Objective Respiratory Conditions      Recent/Current MI      Heart Transplant, LVAD      Edema, JVD      Ascites     X Diuretics/Meds Lisinopril 20mg tablet once daily    Carvedilol tablet 6.25mg oral 2 times daily   H&P 1/15 & MAR    MAR    Other Treatment     X Other  Chronic diastolic heart failure  Derm Office Visit 10/23/2023, Dr. Hussein - Previous Encounter       Heart failure is a clinical diagnosis which includes symptomatic fluid retention, elevated intracardiac pressures, and/or the inability of the heart to deliver adequate blood flow.    Heart Failure with reduced Ejection Fraction (HFrEF) or Systolic Heart Failure (loses ability to contract normally, EF is <40%)    Heart Failure with preserved Ejection Fraction (HFpEF) or Diastolic Heart Failure (stiff ventricles, does not relax properly, EF is >50%)     Heart Failure with Combined Systolic and Diastolic Failure (stiff ventricles, does not relax properly and EF is <50%)    Mid-range or mildly reduced ejection fraction (HFmrEF) is classified as systolic heart failure.  Congestive heart failure with a recovered EF is classified as Diastolic Heart Failure.  Common clues to acute exacerbation:  Rapidly  "progressive symptoms (w/in 2 weeks of presentation), using IV diuretics, using supplemental O2, pulmonary edema on Xray, new or worsening pleural effusion, +JVD or other signs of volume overload, MI w/in 4 weeks, and/or BNP >500  The clinical guidelines noted are only system guidelines, and do not replace the providers clinical judgment.    Provider, please specify the diagnosis of "Heart Failure" associated with the above clinical findings.    [  x ]  Chronic Diastolic Heart Failure (HFpEF) - preexisting and stable   [   ]  Other (please specify): ___________________________________     Please document in your progress notes daily for the duration of treatment until resolved and include in your discharge summary.    References:  American Heart Association editorial staff. (2017, May). Ejection Fraction Heart Failure Measurement. American Heart Association. https://www.heart.org/en/health-topics/heart-failure/diagnosing-heart-failure/ejection-fraction-heart-failure-measurement#:~:text=Ejection%20fraction%20(EF)%20is%20a,pushed%20out%20with%20each%20heartbeat  WILL Schumacher (2020, December 15). Heart failure with preserved ejection fraction: Clinical manifestations and diagnosis. Master [employer]. https://www.[employer]/contents/heart-failure-with-preserved-ejection-fraction-clinical-manifestations-and-diagnosis.  ICD-10-CM/PCS Coding Clinic Third Quarter ICD-10, Effective with discharges: September 8, 2020 [facility] § Heart failure with mid-range or mildly reduced ejection fraction (2020).  ICD-10-CM/PCS Coding Clinic Third Quarter ICD-10, Effective with discharges: September 8, 2020 [facility] § Heart failure with recovered ejection fraction (2020).  Form No. 35105      "

## 2024-01-19 NOTE — TELEPHONE ENCOUNTER
----- Message from Krystina Schulte sent at 1/19/2024  1:22 PM CST -----  Contact: Christina [phone]  Chapo with [facility]  is unable to come admit the pt for hospice she is unable to come up with a diagnosis at this time

## 2024-01-19 NOTE — PLAN OF CARE
[facility]  Discharge Final Note    TYLER observed d/c orders for pt. Ordered [facility] transport via ambulance 2/2 poor trunk control for tomorrow, 1/20/24, at 1000. Report can be called to [phone]. SW informed pt/family of transport ETA and they are agreeable. SW left printed transport packet at nurse's station with unit secretary. All questions answered.    Primary Care Provider: Krista Case MD    Expected Discharge Date: 1/19/2024    Final Discharge Note (most recent)       Final Note - 01/19/24 1545          Final Note    Assessment Type Final Discharge Note (P)      Anticipated Discharge Disposition Intermediate Care Facility for half-way or Supportive Care (P)      What phone number can be called within the next 1-3 days to see how you are doing after discharge? [phone] (P)         Post-Acute Status    Other Status See Comments (P)    Pt to return to [facility]    Discharge Delays None known at this time (P)                      Important Message from Medicare  Important Message from Medicare regarding Discharge Appeal Rights: Given to patient/caregiver, Explained to patient/caregiver, Signed/date by patient/caregiver, Other (comments) (Sister signed)     Date IMM was signed: 01/17/24  Time IMM was signed: 1400    LI Kessler, LMSW    Case Management Department  [facility]

## 2024-01-19 NOTE — PLAN OF CARE
[facility]  Department of Hospital Medicine  [address] [place] [address]  [phone] after hours  [phone] fax    HOSPICE  ORDERS    01/19/2024    Admit to Hospice:  [facility]    Diagnoses:   Active Hospital Problems    Diagnosis  POA    *Acute encephalopathy [G93.40]  Yes     Priority: 1 - High    Dementia [F03.90]  Yes     Priority: 2     History of stroke [Z86.73]  Not Applicable     Priority: 3     Chronic schizoaffective disorder [F25.9]  Yes     Priority: 3     DNR (do not resuscitate) [Z66]  Yes     Priority: 5     Schizophrenia [F20.9]  Yes    Deafness congenital [H90.5]  Yes    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       Hospice Qualifying Diagnoses:        Patient has a life expectancy < 6 months due to:  Primary Hospice Diagnosis:  Dementia  Comorbid Conditions Contributing to Decline:  Schizophrenia, CVA, diabetes mellitus, hypertension    Vital Signs: Routine per Hospice Protocol.    Code Status:  DNR    Allergies: Review of patient's allergies indicates:  No Known Allergies    Diet:  Pureed    Activities: As tolerated    Goals of Care Treatment Preferences:  Code Status: DNR      Nursing: Per Hospice Routine.     Routine Skin for Bedridden Patients: Apply moisture barrier cream to all skin folds and   wet areas in perineal area daily and after baths and all bowel movements.    Oxygen:  Room air    Other Miscellaneous Care:  None    Medications:        Medication List        START taking these medications      cefdinir 300 MG capsule  Commonly known as: OMNICEF  Take 1 capsule (300 mg total) by mouth 2 (two) times daily. for 5 days     OLANZapine 2.5 MG tablet  Commonly known as: ZyPREXA  Take 1 tablet (2.5 mg total) by mouth every 6 (six) hours as needed (Any agitation refractory).            CHANGE how you take these  medications      acetaminophen 500 MG tablet  Commonly known as: TYLENOL  Take 2 tablets (1,000 mg total) by mouth every 6 (six) hours as needed for Pain.  What changed:   when to take this  reasons to take this     senna 8.6 mg tablet  Commonly known as: SENOKOT  Take 1 tablet by mouth daily as needed for Constipation.  What changed:   when to take this  reasons to take this            CONTINUE taking these medications      benztropine 1 MG tablet  Commonly known as: COGENTIN  TAKE 1 TABLET (1MG) BY MOUTH TWICE DAILY     donepeziL 5 MG tablet  Commonly known as: ARICEPT  Take 1 tablet (5 mg total) by mouth every morning. To be taken with food     DULoxetine 30 MG capsule  Commonly known as: CYMBALTA  TAKE 1 CAPSULE (30MG) BY MOUTH ONCE DAILY     gabapentin 300 MG capsule  Commonly known as: NEURONTIN  TAKE 1 TABLET (300MG) BY MOUTH AT BEDTIME at 8pm     haloperidoL 5 MG tablet  Commonly known as: HALDOL  Take 1 tablet (5 mg total) by mouth daily as needed (as needed for agitation or paranoia).     haloperidol decanoate 50 mg/mL injection  Commonly known as: HALDOL DECANOATE  Inject 1 mL (50 mg total) into the muscle every 14 (fourteen) days.     lisinopriL 20 MG tablet  Commonly known as: PRINIVIL,ZESTRIL  Take 1 tablet (20 mg total) by mouth once daily.     memantine 28 mg Cspx  Commonly known as: NAMENDA XR  Take 1 capsule (28 mg total) by mouth once daily.     multivitamin per tablet  Commonly known as: THERAGRAN  Take 1 tablet by mouth once daily.     ondansetron 4 MG Tbdl  Commonly known as: ZOFRAN-ODT  Take 4 mg by mouth every 8 (eight) hours as needed (nausea.).     QUEtiapine 200 MG Tab  Commonly known as: SEROQUEL  TAKE 1 TABLET (200MG) BY MOUTH EVERY EVENING            STOP taking these medications      aspirin 325 MG EC tablet  Commonly known as: ECOTRIN     blood sugar diagnostic Strp     calcium carbonate 500 mg calcium (1,250 mg) chewable tablet  Commonly known as: OS-SAWYER     cholecalciferol (vitamin  D3) 25 mcg (1,000 unit) capsule  Commonly known as: VITAMIN D3     cyanocobalamin 1,000 mcg/mL injection     famotidine 40 MG tablet  Commonly known as: PEPCID     nystatin ointment  Commonly known as: MYCOSTATIN                DIABETES CARE:  None      Future Orders:  Hospice Medical Director may dictate new orders for comfortable care measures & sign death certificate.        _________________________________  Minh Marques MD  01/19/2024

## 2024-01-19 NOTE — TELEPHONE ENCOUNTER
[facility] requested a call back to determine whether to refill Ecotrin 325 mg po daily. Pt was recently hospitalized 1/14/24  for acute encephalopathy. ASA is not on her discharge list and she has not been in to neurology to advise continuing. Informed pharmacist that it has been discontinued.

## 2024-01-19 NOTE — ACP (ADVANCE CARE PLANNING)
Advance Care Planning     Date: 01/18/2024    Today a voluntary meeting took place: bedside    Patient Participation: Patient is unable to participate     Attendees (Name and  Relationship to patient): Health care power of :  Patient's sisterJose Francisco.    Staff attendees (Name and  Role): Minh Marques MD, attending    ACP Conversation (General): Understanding of current condition dementia, schizophrenia, and deafness leaned to progressive disease with poor long-term prognosis.  Sister understanding of workup that was performed in the hospital has been unrevealing medical issues that can be corrected with medical intervention.  Patient's sister discussed plan of care with hospitalist and patient's primary care provider and decided to transition to comfort care and enroll patient in hospice.   contacted to set up hospice.    Code Status: DNR; status confirmed/order placed in chart     ACP Documents: None    Goals of care: The family endorses that what is most important right now is to focus on avoiding the hospital    Accordingly, we have decided that the best plan to meet the patient's goals includes enrolling in hospice care      Recommendations/  Follow-up tasks: The patient and health care agent were provided the following recommendations pursuing hospice and hospice recommendations       Length of ACP   conversation in minutes: 15 minutes

## 2024-01-20 LAB
ALBUMIN SERPL BCP-MCNC: 2.9 G/DL (ref 3.5–5.2)
ALP SERPL-CCNC: 87 U/L (ref 55–135)
ALT SERPL W/O P-5'-P-CCNC: 56 U/L (ref 10–44)
ANION GAP SERPL CALC-SCNC: 8 MMOL/L (ref 8–16)
AST SERPL-CCNC: 40 U/L (ref 10–40)
BACTERIA #/AREA URNS AUTO: ABNORMAL /HPF
BASOPHILS # BLD AUTO: 0.09 K/UL (ref 0–0.2)
BASOPHILS NFR BLD: 0.5 % (ref 0–1.9)
BILIRUB SERPL-MCNC: 0.3 MG/DL (ref 0.1–1)
BILIRUB UR QL STRIP: NEGATIVE
BUN SERPL-MCNC: 32 MG/DL (ref 8–23)
CALCIUM SERPL-MCNC: 10.2 MG/DL (ref 8.7–10.5)
CHLORIDE SERPL-SCNC: 115 MMOL/L (ref 95–110)
CLARITY UR REFRACT.AUTO: ABNORMAL
CO2 SERPL-SCNC: 25 MMOL/L (ref 23–29)
COLOR UR AUTO: ABNORMAL
CREAT SERPL-MCNC: 0.7 MG/DL (ref 0.5–1.4)
DIFFERENTIAL METHOD BLD: ABNORMAL
EOSINOPHIL # BLD AUTO: 0.1 K/UL (ref 0–0.5)
EOSINOPHIL NFR BLD: 0.4 % (ref 0–8)
ERYTHROCYTE [DISTWIDTH] IN BLOOD BY AUTOMATED COUNT: 13.7 % (ref 11.5–14.5)
EST. GFR  (NO RACE VARIABLE): >60 ML/MIN/1.73 M^2
GLUCOSE SERPL-MCNC: 163 MG/DL (ref 70–110)
GLUCOSE UR QL STRIP: NEGATIVE
HCT VFR BLD AUTO: 43.9 % (ref 37–48.5)
HGB BLD-MCNC: 13.7 G/DL (ref 12–16)
HGB UR QL STRIP: ABNORMAL
HYALINE CASTS UR QL AUTO: 0 /LPF
IMM GRANULOCYTES # BLD AUTO: 0.08 K/UL (ref 0–0.04)
IMM GRANULOCYTES NFR BLD AUTO: 0.5 % (ref 0–0.5)
KETONES UR QL STRIP: NEGATIVE
LEUKOCYTE ESTERASE UR QL STRIP: ABNORMAL
LYMPHOCYTES # BLD AUTO: 2.4 K/UL (ref 1–4.8)
LYMPHOCYTES NFR BLD: 13.5 % (ref 18–48)
MAGNESIUM SERPL-MCNC: 1.9 MG/DL (ref 1.6–2.6)
MCH RBC QN AUTO: 29.1 PG (ref 27–31)
MCHC RBC AUTO-ENTMCNC: 31.2 G/DL (ref 32–36)
MCV RBC AUTO: 93 FL (ref 82–98)
MICROSCOPIC COMMENT: ABNORMAL
MONOCYTES # BLD AUTO: 1.8 K/UL (ref 0.3–1)
MONOCYTES NFR BLD: 10.2 % (ref 4–15)
NEUTROPHILS # BLD AUTO: 13.1 K/UL (ref 1.8–7.7)
NEUTROPHILS NFR BLD: 74.9 % (ref 38–73)
NITRITE UR QL STRIP: NEGATIVE
NRBC BLD-RTO: 0 /100 WBC
PH UR STRIP: 7 [PH] (ref 5–8)
PHOSPHATE SERPL-MCNC: 2.5 MG/DL (ref 2.7–4.5)
PLATELET # BLD AUTO: 337 K/UL (ref 150–450)
PMV BLD AUTO: 10.9 FL (ref 9.2–12.9)
POCT GLUCOSE: 131 MG/DL (ref 70–110)
POCT GLUCOSE: 97 MG/DL (ref 70–110)
POTASSIUM SERPL-SCNC: 4 MMOL/L (ref 3.5–5.1)
PROT SERPL-MCNC: 7.4 G/DL (ref 6–8.4)
PROT UR QL STRIP: ABNORMAL
RBC # BLD AUTO: 4.71 M/UL (ref 4–5.4)
RBC #/AREA URNS AUTO: 25 /HPF (ref 0–4)
SODIUM SERPL-SCNC: 148 MMOL/L (ref 136–145)
SP GR UR STRIP: 1.02 (ref 1–1.03)
SQUAMOUS #/AREA URNS AUTO: 3 /HPF
URN SPEC COLLECT METH UR: ABNORMAL
WBC # BLD AUTO: 17.47 K/UL (ref 3.9–12.7)
WBC #/AREA URNS AUTO: >100 /HPF (ref 0–5)

## 2024-01-20 PROCEDURE — 87086 URINE CULTURE/COLONY COUNT: CPT | Mod: HCNC | Performed by: STUDENT IN AN ORGANIZED HEALTH CARE EDUCATION/TRAINING PROGRAM

## 2024-01-20 PROCEDURE — 85025 COMPLETE CBC W/AUTO DIFF WBC: CPT | Mod: HCNC | Performed by: STUDENT IN AN ORGANIZED HEALTH CARE EDUCATION/TRAINING PROGRAM

## 2024-01-20 PROCEDURE — 21400001 HC TELEMETRY ROOM: Mod: HCNC

## 2024-01-20 PROCEDURE — 84100 ASSAY OF PHOSPHORUS: CPT | Mod: HCNC | Performed by: STUDENT IN AN ORGANIZED HEALTH CARE EDUCATION/TRAINING PROGRAM

## 2024-01-20 PROCEDURE — 83735 ASSAY OF MAGNESIUM: CPT | Mod: HCNC | Performed by: STUDENT IN AN ORGANIZED HEALTH CARE EDUCATION/TRAINING PROGRAM

## 2024-01-20 PROCEDURE — 36415 COLL VENOUS BLD VENIPUNCTURE: CPT | Mod: HCNC | Performed by: STUDENT IN AN ORGANIZED HEALTH CARE EDUCATION/TRAINING PROGRAM

## 2024-01-20 PROCEDURE — 25000003 PHARM REV CODE 250: Mod: HCNC

## 2024-01-20 PROCEDURE — 81001 URINALYSIS AUTO W/SCOPE: CPT | Mod: HCNC | Performed by: STUDENT IN AN ORGANIZED HEALTH CARE EDUCATION/TRAINING PROGRAM

## 2024-01-20 PROCEDURE — 63600175 PHARM REV CODE 636 W HCPCS: Mod: HCNC | Performed by: STUDENT IN AN ORGANIZED HEALTH CARE EDUCATION/TRAINING PROGRAM

## 2024-01-20 PROCEDURE — 11000001 HC ACUTE MED/SURG PRIVATE ROOM: Mod: HCNC

## 2024-01-20 PROCEDURE — 63600175 PHARM REV CODE 636 W HCPCS: Mod: HCNC

## 2024-01-20 PROCEDURE — 99232 SBSQ HOSP IP/OBS MODERATE 35: CPT | Mod: HCNC,,, | Performed by: PSYCHIATRY & NEUROLOGY

## 2024-01-20 PROCEDURE — 25000003 PHARM REV CODE 250: Mod: HCNC | Performed by: STUDENT IN AN ORGANIZED HEALTH CARE EDUCATION/TRAINING PROGRAM

## 2024-01-20 PROCEDURE — 80053 COMPREHEN METABOLIC PANEL: CPT | Mod: HCNC | Performed by: STUDENT IN AN ORGANIZED HEALTH CARE EDUCATION/TRAINING PROGRAM

## 2024-01-20 RX ORDER — AMLODIPINE BESYLATE 5 MG/1
5 TABLET ORAL DAILY
Status: DISCONTINUED | OUTPATIENT
Start: 2024-01-20 | End: 2024-01-22 | Stop reason: HOSPADM

## 2024-01-20 RX ORDER — CARVEDILOL 6.25 MG/1
6.25 TABLET ORAL 2 TIMES DAILY
Qty: 60 TABLET | Refills: 11 | Status: SHIPPED | OUTPATIENT
Start: 2024-01-20 | End: 2025-01-19

## 2024-01-20 RX ORDER — CIPROFLOXACIN 2 MG/ML
400 INJECTION, SOLUTION INTRAVENOUS
Status: DISCONTINUED | OUTPATIENT
Start: 2024-01-20 | End: 2024-01-22 | Stop reason: HOSPADM

## 2024-01-20 RX ORDER — HYDRALAZINE HYDROCHLORIDE 25 MG/1
25 TABLET, FILM COATED ORAL EVERY 8 HOURS PRN
Qty: 90 TABLET | Refills: 11 | Status: SHIPPED | OUTPATIENT
Start: 2024-01-20 | End: 2025-01-19

## 2024-01-20 RX ORDER — SODIUM CHLORIDE 9 MG/ML
INJECTION, SOLUTION INTRAVENOUS CONTINUOUS
Status: DISCONTINUED | OUTPATIENT
Start: 2024-01-20 | End: 2024-01-20

## 2024-01-20 RX ORDER — LISINOPRIL 20 MG/1
30 TABLET ORAL DAILY
Qty: 90 TABLET | Refills: 3
Start: 2024-01-20 | End: 2024-01-22 | Stop reason: SDUPTHER

## 2024-01-20 RX ORDER — LABETALOL HCL 20 MG/4 ML
10 SYRINGE (ML) INTRAVENOUS ONCE
Status: DISCONTINUED | OUTPATIENT
Start: 2024-01-20 | End: 2024-01-21

## 2024-01-20 RX ORDER — SODIUM CHLORIDE 9 MG/ML
INJECTION, SOLUTION INTRAVENOUS CONTINUOUS
Status: DISCONTINUED | OUTPATIENT
Start: 2024-01-20 | End: 2024-01-22 | Stop reason: HOSPADM

## 2024-01-20 RX ADMIN — SODIUM CHLORIDE: 9 INJECTION, SOLUTION INTRAVENOUS at 11:01

## 2024-01-20 RX ADMIN — QUETIAPINE FUMARATE 200 MG: 200 TABLET ORAL at 08:01

## 2024-01-20 RX ADMIN — HYDRALAZINE HYDROCHLORIDE 10 MG: 20 INJECTION, SOLUTION INTRAMUSCULAR; INTRAVENOUS at 04:01

## 2024-01-20 RX ADMIN — ENOXAPARIN SODIUM 40 MG: 40 INJECTION SUBCUTANEOUS at 04:01

## 2024-01-20 RX ADMIN — GABAPENTIN 300 MG: 300 CAPSULE ORAL at 08:01

## 2024-01-20 RX ADMIN — BENZTROPINE MESYLATE 1 MG: 1 TABLET ORAL at 08:01

## 2024-01-20 RX ADMIN — BENZTROPINE MESYLATE 1 MG: 1 TABLET ORAL at 09:01

## 2024-01-20 RX ADMIN — MEMANTINE HYDROCHLORIDE 20 MG: 5 TABLET ORAL at 09:01

## 2024-01-20 RX ADMIN — DULOXETINE HYDROCHLORIDE 30 MG: 30 CAPSULE, DELAYED RELEASE ORAL at 09:01

## 2024-01-20 RX ADMIN — CARVEDILOL 6.25 MG: 3.12 TABLET, FILM COATED ORAL at 08:01

## 2024-01-20 RX ADMIN — CIPROFLOXACIN 400 MG: 2 INJECTION, SOLUTION INTRAVENOUS at 04:01

## 2024-01-20 RX ADMIN — CARVEDILOL 6.25 MG: 3.12 TABLET, FILM COATED ORAL at 09:01

## 2024-01-20 RX ADMIN — LISINOPRIL 20 MG: 20 TABLET ORAL at 09:01

## 2024-01-20 RX ADMIN — CEFTRIAXONE SODIUM 1 G: 1 INJECTION, POWDER, FOR SOLUTION INTRAMUSCULAR; INTRAVENOUS at 11:01

## 2024-01-20 RX ADMIN — HYDRALAZINE HYDROCHLORIDE 10 MG: 20 INJECTION, SOLUTION INTRAMUSCULAR; INTRAVENOUS at 09:01

## 2024-01-20 NOTE — ASSESSMENT & PLAN NOTE
Chronic, controlled. Latest blood pressure and vitals reviewed-     Temp:  [97.4 °F (36.3 °C)-99.4 °F (37.4 °C)]   Pulse:  [60-72]   Resp:  [16-17]   BP: (150-214)/(68-88)   SpO2:  [95 %-97 %] .   Home meds for hypertension were reviewed and noted below.   Hypertension Medications               lisinopriL (PRINIVIL,ZESTRIL) 20 MG tablet Take 1 tablet (20 mg total) by mouth once daily.            While in the hospital, will manage blood pressure as follows; Continue home antihypertensive regimen  - Amlodipine, Coreg, Lisinopril   - PRN IV Hydralazine     Will utilize p.r.n. blood pressure medication only if patient's blood pressure greater than 160/100 and she develops symptoms such as worsening chest pain or shortness of breath.

## 2024-01-20 NOTE — ASSESSMENT & PLAN NOTE
- Hx noted  - CTH w/o acute abnormalities, MRI reviewed; CTA head/neck reviewed as above  - Vascular neurology consulted  - mIVF as needed for hydration  - Neuro checks

## 2024-01-20 NOTE — CARE UPDATE
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    Last VS: BP (!) 214/88 (BP Location: Right arm, Patient Position: Lying)   Pulse 65   Temp 97.7 °F (36.5 °C) (Tympanic)   Resp 16   Ht 5' 2" (1.575 m)   Wt 61.3 kg (135 lb 2.3 oz)   SpO2 95%   BMI 24.72 kg/m²     24H Vital Sign Range:  Temp:  [97.4 °F (36.3 °C)-99.4 °F (37.4 °C)]   Pulse:  [60-65]   Resp:  [16-17]   BP: (150-214)/(78-88)   SpO2:  [95 %-97 %]     Level of Consciousness (AVPU): responds to pain    Recent Labs     01/18/24  0911   WBC 13.62*   HGB 13.0   HCT 42.7          Recent Labs     01/18/24  0911      K 3.9      CO2 22*   BUN 29*   CREATININE 0.8      PHOS 2.8   MG 1.9      OXYGEN: RA         MEWS score: 3    Rounding completed with charge CARLOTTA Townsend. contacted for Hypertension. Reports BP Medications have been given. No additional concerns verbalized at this time. Instructed to call [phone] for further concerns or assistance.    Ruth Barrett RN        "

## 2024-01-20 NOTE — SUBJECTIVE & OBJECTIVE
Interval History:   No events overnight. Family updated at bedside. Unable to discharge patient back to [facility] today and will plan for tomorrow AM.       Review of Systems   Unable to perform ROS: Patient nonverbal (Dementia and Deaf)     Objective:     Vital Signs (Most Recent):  Temp: 97.2 °F (36.2 °C) (01/19/24 0805)  Pulse: 63 (01/19/24 0902)  Resp: 18 (01/19/24 0805)  BP: (!) 150/84 (01/19/24 1648)  SpO2: 97 % (01/19/24 0805) Vital Signs (24h Range):  Temp:  [97.2 °F (36.2 °C)-97.5 °F (36.4 °C)] 97.2 °F (36.2 °C)  Pulse:  [58-84] 63  Resp:  [18-20] 18  SpO2:  [96 %-97 %] 97 %  BP: (148-213)/(73-84) 150/84     Weight: 61.3 kg (135 lb 2.3 oz)  Body mass index is 24.72 kg/m².    Intake/Output Summary (Last 24 hours) at 1/19/2024 1845  Last data filed at 1/19/2024 0634  Gross per 24 hour   Intake 120 ml   Output --   Net 120 ml         Physical Exam  Vitals and nursing note reviewed.   Constitutional:       Appearance: She is well-developed.   HENT:      Head: Normocephalic and atraumatic.   Cardiovascular:      Rate and Rhythm: Normal rate and regular rhythm.   Pulmonary:      Effort: Pulmonary effort is normal.      Breath sounds: Normal breath sounds.   Abdominal:      Palpations: Abdomen is soft.   Skin:     General: Skin is warm and dry.   Neurological:      Mental Status: She is alert.      Comments: Unable to participate in neuro exam, cannot hear.  Moving all extremities spontaneously.             Significant Labs: All pertinent labs within the past 24 hours have been reviewed.  CBC:   Recent Labs   Lab 01/18/24  0911   WBC 13.62*   HGB 13.0   HCT 42.7        CMP:   Recent Labs   Lab 01/18/24  0911      K 3.9      CO2 22*      BUN 29*   CREATININE 0.8   CALCIUM 10.1   PROT 7.3   ALBUMIN 3.3*   BILITOT 0.5   ALKPHOS 91   AST 74*   ALT 53*   ANIONGAP 10       Significant Imaging: I have reviewed all pertinent imaging results/findings within the past 24 hours.

## 2024-01-20 NOTE — PROGRESS NOTES
01/20/24 0821   Vital Signs   Temp 97.7 °F (36.5 °C)   Temp Source Tympanic   Pulse 65   Heart Rate Source Monitor   Resp 16   SpO2 95 %   BP (!) 214/88   MAP (mmHg) 127   BP Location Right arm   Patient Position Lying     Unable to give PRN hydralazine at this time due to pyxis mechanical failure. Pharmacy notified.

## 2024-01-20 NOTE — PROGRESS NOTES
[facility] [address] [place] [address] Medicine  Progress Note    Patient Name: Lay Peres  MRN: [id number]  Patient Class: IP- Inpatient   Admission Date: 1/14/2024  Length of Stay: 4 days  Attending Physician: Minh Marques MD  Primary Care Provider: Krista Case MD        Subjective:     Principal Problem:Acute encephalopathy        HPI:  Lay Peres is a 78 y.o. female with a hx of dementia, deaf since birth, CVA, and schizophrenia presents from [facility] for AMS. Patient unable to provide hx or participate in exam. Sister at bedside. States patient was I her usual state of health when she had an unwitnessed fall. Per sister, unclear if she hit her head and unclear how she fell. Per staff and sister, the patient was not herself shortly after. She would not open her eyes all the way, was very combative/ agitated and did not recognize her sister. States she is usually able to ambulate with a walker and can say some short phrases but is unable to have a conversation. Per sister, did not believe patient was in pain and patient was moving all extremities with good strength. No noticeable facial droop, convulsions or unilateral weakness. Patient has a hx of incontinence. Prior to my exam, pt received haldol which sister states put her to sleep and has been the most restful she has been since arrival to the ED.    Confirmed with sister at bedside a code status of DNR. Sister wishes for patient to be comfortable.     ED: AF, VSS. CBC/ CMP largely unremarkable. UA not infectious. VBG reviewed. Mildly hypercapnic. CTH showed no acute abnormality. CT C spine was non diagnostic due to patient moving. Given haldol in ED.     Overview/Hospital Course:  Pt admitted to [facility] and remained stable overnight and into 1/15/24. Pt with marked agitation requiring restrains and PRN antipsychotics. MRI brain with nonspecific findings, vascular neurology consulted to review, who recommended CTA head  (pending). Mentation and agitation waxing and waning into 1/17/2024.        Interval History:   No events overnight. Patient alert at shift change per nursing. EMS arrived to transport patient, however EMS declined to transfer patient given BP of 214/88 and less level alertness. Patient antihypertensives up titrated with addition of amlodipine and started on CTX for potential UTI. CM and sister updated.        Review of Systems   Unable to perform ROS: Patient nonverbal (Dementia and Deaf)     Objective:     Vital Signs (Most Recent):  Temp: 98.1 °F (36.7 °C) (01/20/24 1248)  Pulse: 72 (01/20/24 1248)  Resp: 16 (01/20/24 1248)  BP: (!) 167/72 (01/20/24 1248)  SpO2: 96 % (01/20/24 1248) Vital Signs (24h Range):  Temp:  [97.4 °F (36.3 °C)-99.4 °F (37.4 °C)] 98.1 °F (36.7 °C)  Pulse:  [60-72] 72  Resp:  [16-17] 16  SpO2:  [95 %-97 %] 96 %  BP: (150-214)/(68-88) 167/72     Weight: 61.3 kg (135 lb 2.3 oz)  Body mass index is 24.72 kg/m².  No intake or output data in the 24 hours ending 01/20/24 1453      Physical Exam  Vitals and nursing note reviewed.   Constitutional:       Appearance: She is well-developed.   HENT:      Head: Normocephalic and atraumatic.   Cardiovascular:      Rate and Rhythm: Normal rate and regular rhythm.   Pulmonary:      Effort: Pulmonary effort is normal.      Breath sounds: Normal breath sounds.   Abdominal:      Palpations: Abdomen is soft.   Skin:     General: Skin is warm and dry.   Neurological:      Mental Status: She is lethargic.      Comments: Unable to participate in neuro exam, cannot hear.  Moving all extremities spontaneously.             Significant Labs: All pertinent labs within the past 24 hours have been reviewed.    Significant Imaging: I have reviewed all pertinent imaging results/findings within the past 24 hours.    Assessment/Plan:      * Acute encephalopathy  - Unclear etiology on admission  - UA not infectious  - CXR 1/14 with no acute findings  - Procalcitonin 0.09  (normal), lactate 1.0  - TSH 0.352. FT4 0.78 (subclinical hypothyroidism)   - B-12 slightly elevated, Folate normal, Ammonia normal   - EtOH level <10 on admit, Non-reactive HIV & Hep-C  - Continue Ciprofloxacin for potential UTI (based on prior culture data), sot 1/20   - CT Head showed no acute abnormality.  - MRI brain with no acute infarct; new findings of remote hemorrhage and vascular which is new compared to MRI from 2018  - Vascular neurology consulted to evaluate if findings are a significant cause of her AMS  -- MRI findings do not correlate with patient's symptoms. Possibly cavernous malformation.    -- CTA Head/Neck with no acute findings.  Generalized cerebral volume loss and chronic microvascular ischemic change.  Abnormality in the ehsan identified on recent MRI is not well delineated with CT per radiology.  No evidence of acute large vessel occlusion or flow-limiting stenosis.   -- Per [facility] neuro, can repeat MRI Brain in 3 months to compare to recent ones; earlier if needed.    - Neuro checks  - Delirium precautions  - Psychiatry consulted  -- Discontinued scheduled IV depakote per psych recs  - Mentation continues to wax and wane    Dementia  - Hx noted  - CTH w/o acute abnormalities, MRI reviewed; CTA head/neck reviewed as above  - Vascular neurology consulted  - mIVF as needed for hydration  - Neuro checks     Hypertension  Chronic, controlled. Latest blood pressure and vitals reviewed-     Temp:  [97.4 °F (36.3 °C)-99.4 °F (37.4 °C)]   Pulse:  [60-72]   Resp:  [16-17]   BP: (150-214)/(68-88)   SpO2:  [95 %-97 %] .   Home meds for hypertension were reviewed and noted below.   Hypertension Medications               lisinopriL (PRINIVIL,ZESTRIL) 20 MG tablet Take 1 tablet (20 mg total) by mouth once daily.            While in the hospital, will manage blood pressure as follows; Continue home antihypertensive regimen  - Amlodipine, Coreg, Lisinopril   - PRN IV Hydralazine     Will utilize p.r.n. blood  pressure medication only if patient's blood pressure greater than 160/100 and she develops symptoms such as worsening chest pain or shortness of breath.    History of stroke  - Hx noted  - CTH w/o acute abnormalities, MRI reviewed; CTA head/neck reviewed as above  - Vascular neurology consulted  - Neuro checks     Chronic schizoaffective disorder  - Hx noted  - Psychiatry consulted given continued agitation requiring restraints   -- Continue home cogentin, cymbalta, seroquel and PRN haldol  -- Dc'd IV Depacon per recs   -- PRN PO olanzapine for agitation  - Trial out of restraints as tolerated     DNR (do not resuscitate)  - Dr. 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  Last A1c reviewed-   Lab Results   Component Value Date    HGBA1C 5.4 01/25/2023   Hold Oral hypoglycemics while patient is in the hospital.  - POCT checks      VTE Risk Mitigation (From admission, onward)           Ordered     enoxaparin injection 40 mg  Daily         01/15/24 0218     IP VTE HIGH RISK PATIENT  Once         01/15/24 0218                    Discharge Planning   AGUILAR: 1/20/2024     Code Status: DNR   Is the patient medically ready for discharge?: No    Reason for patient still in hospital (select all that apply): Patient trending condition, Laboratory test, Treatment, and Pending disposition  Discharge Plan A: Hospice/home   Discharge Delays: None known at this time              Minh Marques MD  Department of Hospital Medicine   [facility] 11[facility]

## 2024-01-20 NOTE — PROGRESS NOTES
[facility] [address] [place] [address] Medicine  Progress Note    Patient Name: Lay Peres  MRN: [id number]  Patient Class: IP- Inpatient   Admission Date: 1/14/2024  Length of Stay: 3 days  Attending Physician: Minh Marques MD  Primary Care Provider: Krista Case MD        Subjective:     Principal Problem:Acute encephalopathy        HPI:  Lay Peres is a 78 y.o. female with a hx of dementia, deaf since birth, CVA, and schizophrenia presents from [facility] for AMS. Patient unable to provide hx or participate in exam. Sister at bedside. States patient was I her usual state of health when she had an unwitnessed fall. Per sister, unclear if she hit her head and unclear how she fell. Per staff and sister, the patient was not herself shortly after. She would not open her eyes all the way, was very combative/ agitated and did not recognize her sister. States she is usually able to ambulate with a walker and can say some short phrases but is unable to have a conversation. Per sister, did not believe patient was in pain and patient was moving all extremities with good strength. No noticeable facial droop, convulsions or unilateral weakness. Patient has a hx of incontinence. Prior to my exam, pt received haldol which sister states put her to sleep and has been the most restful she has been since arrival to the ED.    Confirmed with sister at bedside a code status of DNR. Sister wishes for patient to be comfortable.     ED: AF, VSS. CBC/ CMP largely unremarkable. UA not infectious. VBG reviewed. Mildly hypercapnic. CTH showed no acute abnormality. CT C spine was non diagnostic due to patient moving. Given haldol in ED.     Overview/Hospital Course:      Interval History:   No events overnight. Family updated at bedside. Unable to discharge patient back to [facility] today and will plan for tomorrow AM.       Review of Systems   Unable to perform ROS: Patient nonverbal (Dementia and Deaf)      Objective:     Vital Signs (Most Recent):  Temp: 97.2 °F (36.2 °C) (01/19/24 0805)  Pulse: 63 (01/19/24 0902)  Resp: 18 (01/19/24 0805)  BP: (!) 150/84 (01/19/24 1648)  SpO2: 97 % (01/19/24 0805) Vital Signs (24h Range):  Temp:  [97.2 °F (36.2 °C)-97.5 °F (36.4 °C)] 97.2 °F (36.2 °C)  Pulse:  [58-84] 63  Resp:  [18-20] 18  SpO2:  [96 %-97 %] 97 %  BP: (148-213)/(73-84) 150/84     Weight: 61.3 kg (135 lb 2.3 oz)  Body mass index is 24.72 kg/m².    Intake/Output Summary (Last 24 hours) at 1/19/2024 1845  Last data filed at 1/19/2024 0634  Gross per 24 hour   Intake 120 ml   Output --   Net 120 ml         Physical Exam  Vitals and nursing note reviewed.   Constitutional:       Appearance: She is well-developed.   HENT:      Head: Normocephalic and atraumatic.   Cardiovascular:      Rate and Rhythm: Normal rate and regular rhythm.   Pulmonary:      Effort: Pulmonary effort is normal.      Breath sounds: Normal breath sounds.   Abdominal:      Palpations: Abdomen is soft.   Skin:     General: Skin is warm and dry.   Neurological:      Mental Status: She is alert.      Comments: Unable to participate in neuro exam, cannot hear.  Moving all extremities spontaneously.             Significant Labs: All pertinent labs within the past 24 hours have been reviewed.  CBC:   Recent Labs   Lab 01/18/24  0911   WBC 13.62*   HGB 13.0   HCT 42.7        CMP:   Recent Labs   Lab 01/18/24  0911      K 3.9      CO2 22*      BUN 29*   CREATININE 0.8   CALCIUM 10.1   PROT 7.3   ALBUMIN 3.3*   BILITOT 0.5   ALKPHOS 91   AST 74*   ALT 53*   ANIONGAP 10       Significant Imaging: I have reviewed all pertinent imaging results/findings within the past 24 hours.    Assessment/Plan:      * Acute encephalopathy  - Unclear etiology on admission  - UA not infectious  - CXR 1/14 with no acute findings  - Procalcitonin 0.09 (normal), lactate 1.0  - TSH 0.352. FT4 0.78 (subclinical hypothyroidism)   - B-12 slightly  elevated, Folate normal, Ammonia normal   - EtOH level <10 on admit, Non- reactive HIV & Hep-C  - CT Head showed no acute abnormality.  - MRI brain with no acute infarct; new findings of remote hemorrhage and vascular which is new compared to MRI from 2018  - Vascular neurology consulted to evaluate if findings are a significant cause of her AMS  -- MRI findings do not correlate with patient's symptoms. Possibly cavernous malformation.    -- CTA Head/Neck with no acute findings.  Generalized cerebral volume loss and chronic microvascular ischemic change.  Abnormality in the ehsan identified on recent MRI is not well delineated with CT per radiology.  No evidence of acute large vessel occlusion or flow-limiting stenosis.   -- Per [facility] neuro, can repeat MRI Brain in 3 months to compare to recent ones; earlier if needed.    - Neuro checks  - Delirium precautions  - Psychiatry consulted  -- Discontinued scheduled IV depakote per psych recs  - Consider neurology consult based psych workup  - Mentation continues to wax and wane, requiring restraints    Dementia  - Hx noted  - CTH w/o acute abnormalities, MRI reviewed; CTA head/neck reviewed as above  - Vascular neurology consulted  - Neuro checks     History of stroke  - Hx noted  - CTH w/o acute abnormalities, MRI reviewed; CTA head/neck reviewed as above  - Vascular neurology consulted  - Neuro checks     Chronic schizoaffective disorder  - Hx noted  - Psychiatry consulted given continued agitation requiring restraints   -- Continue home cogentin, cymbalta, seroquel and PRN haldol  -- Dc'd IV Depacon per recs   -- PRN PO olanzapine for agitation  - Trial out of restraints as tolerated     DNR (do not resuscitate)  - Dr. Guerra, spoke with patient's sister at bedside who states she is her MPOA  - Confirmed DNR status  - Would like patient to be comfortable     Schizophrenia  - See above      Deafness congenital  - hx noted      Diabetes mellitus due to abnormal  insulin  Patient's FSGs are controlled on current medication regimen.  Last A1c reviewed-   Lab Results   Component Value Date    HGBA1C 5.4 01/25/2023   Hold Oral hypoglycemics while patient is in the hospital.  - POCT checks    Hypertension  Chronic, controlled. Latest blood pressure and vitals reviewed-     Temp:  [97.6 °F (36.4 °C)-98.4 °F (36.9 °C)]   Pulse:  []   Resp:  [16-20]   BP: (152-207)/(73-90)   SpO2:  [93 %-99 %] .   Home meds for hypertension were reviewed and noted below.   Hypertension Medications               lisinopriL (PRINIVIL,ZESTRIL) 20 MG tablet Take 1 tablet (20 mg total) by mouth once daily.            While in the hospital, will manage blood pressure as follows; Continue home antihypertensive regimen    Will utilize p.r.n. blood pressure medication only if patient's blood pressure greater than 160/100 and she develops symptoms such as worsening chest pain or shortness of breath.      VTE Risk Mitigation (From admission, onward)           Ordered     enoxaparin injection 40 mg  Daily         01/15/24 0218     IP VTE HIGH RISK PATIENT  Once         01/15/24 0218                    Discharge Planning   AGUILAR: 1/19/2024     Code Status: DNR   Is the patient medically ready for discharge?: No    Reason for patient still in hospital (select all that apply): Patient trending condition and Pending disposition  Discharge Plan A: Hospice/home   Discharge Delays: None known at this time              Minh Marques MD  Department of Hospital Medicine   [facility]

## 2024-01-20 NOTE — PROGRESS NOTES
"CONSULTATION LIAISON PSYCHIATRY PROGRESS NOTE    Patient Name: Lay Peres  MRN: [id number]  Patient Class: IP- Inpatient  Admission Date: 1/14/2024  Attending Physician: Minh Marques MD      SUBJECTIVE:   Lay Peres is a 78 y.o. female with past psychiatric history of dementia and schizophrenia & past pertinent medical history of deaf since birth and CVA presents to the ED/admitted to the hospital for Acute encephalopathy. Sister at bedside. States patient was in her usual state of health when she had an unwitnessed fall at her living facility. Per sister, unclear if she hit her head and unclear how she fell. Per staff and sister, the patient was not herself shortly after. She would not open her eyes all the way, was very combative/ agitated and did not recognize her sister. States she is usually able to ambulate with a walker and can say some short phrases but is unable to have a conversation.    Psychiatry consulted for "agitation requiring restraints, needing to transition to oral medications"      No behavioral PRNs required overnight.     On today's psych eval, pt is laying in bed, eyes partially open. She does not respond to voice or follow commands.        OBJECTIVE:    Mental Status Exam:  General Appearance: lying in bed, thin and gaunt  Behavior: unable to participate, minimal responses  Involuntary Movements and Motor Activity: no abnormal involuntary movements noted  Gait and Station: unable to assess - patient lying down or seated  Speech and Language:  unable to assess, pt deaf and unable to communicate at this time. Per sister can state few words/short sentences at baseline   Mood: FRANK  Affect: blunted  Thought Process and Associations: Unable to Assess  Thought Content and Perceptions:: Unable to Assess  Sensorium and Orientation: Unable to Formally Assess  Recent and Remote Memory: Unable to  Formally Assess  Attention and Concentration: Unable to Formally " Assess  Fund of Knowledge: Unable to Formally Assess  Insight:  FRANK  Judgment:  FRANK    CAM ICU positive? Frank       ASSESSMENT & RECOMMENDATIONS   Schizophrenia  Dementia  Intellectual Disability  Deafness, congential     PSYCH MEDICATIONS (per previous recommendations):   Cogentin 1mg PO BID  Cymbalta 30mg PO Daily  PRN Haldol 5mg PO daily (if agitated)  Can use zyprexa 2.5mg PO Q6H PRN agitation  Seroquel 200mg PO QHS  Pt unable to swallow meds on own, please ensure the meds are being crushed and fed via pudding/applesauce and entire amount is consumed.       DELIRIUM  DELIRIUM BEHAVIOR MANAGEMENT  Continue medical workup for causative etiology of delirium.  Continue to manage medical conditions and assess for and treat pain.  Please try to minimize the use of physical restraints, as they can worsen agitation; utilize PRN medications first.  Please avoid/minimize use of benzodiazepines (understand that may need to use BZD taper for EtOH withdrawal), anticholinergics, antihistamines (H1- and H2-blockers), and opioids when possible, as they may exacerbate delirium.  Please keep shades open and lights on during day and shades closed and lights off at night to encourage normal sleep/wake cycle.  Reduce unnecessary stimulation/noise. TV screen and sound should be off unless patient is actively engaged w/ the program.  Encourage staff to reorient pt as needed throughout the day and to optimize pt's surroundings w/ an accurately updated calendar, and functioning clock.  Frequently remind pt of reason for hospitalization and the plan of care.  Attempt to maintain consistency in nursing staff.  Encourage family to be present as much as possible.   Correct sensory deficits, when present, with provision of the pt's eyeglasses and/or hearing aids.  Optimize nutrition and hydration status.  Recommend PT/OT consult. Early mobility and exercise have been shown to decrease duration of delirium.    Monitor QTc closely; last EKG  1/14 Qtc 439        RISK ASSESSMENT  NO NEED FOR PEC patient NOT in any imminent danger of hurting self or others and not gravely disabled.      FOLLOW UP  Will follow up while in house     DISPOSITION - once medically cleared:    Defer to medical team    Please contact ON CALL psychiatry service (24/7) for any acute issues that may arise.      Elbert Jack MD  [facility], PGY-II        --------------------------------------------------------------------------------------------------------------------------------------------------------------------------------------------------------------------------------------    CONTINUED OBJECTIVE clinical data & findings reviewed and noted for above decision making    Current Medications:   Scheduled Meds:    amLODIPine  5 mg Oral Daily    benztropine  1 mg Oral BID    carvediloL  6.25 mg Oral BID    ciprofloxacin  400 mg Intravenous Q12H    donepeziL  5 mg Oral QAM    DULoxetine  30 mg Oral Daily    enoxparin  40 mg Subcutaneous Daily    gabapentin  300 mg Oral QHS    lisinopriL  20 mg Oral Daily    memantine  20 mg Oral Daily    QUEtiapine  200 mg Oral Nightly     PRN Meds: acetaminophen, albuterol-ipratropium, bisacodyL, dextrose 10%, dextrose 10%, glucagon (human recombinant), glucose, glucose, haloperidoL, hydrALAZINE, melatonin, OLANZapine, ondansetron, polyethylene glycol, promethazine, sodium chloride 0.9%    Allergies:   Review of patient's allergies indicates:  No Known Allergies    Vitals  Vitals:    01/20/24 1658   BP: (!) 180/70   Pulse:    Resp:    Temp:        Labs/Imaging/Studies:  Recent Results (from the past 24 hour(s))   POCT glucose    Collection Time: 01/19/24  8:31 PM   Result Value Ref Range    POCT Glucose 127 (H) 70 - 110 mg/dL   POCT glucose    Collection Time: 01/20/24  8:22 AM   Result Value Ref Range    POCT Glucose 131 (H) 70 - 110 mg/dL     Imaging Results               MRI Brain W WO Contrast (Final result)  Result time  01/16/24 10:44:48      Final result by Ayaan Coffman MD (01/16/24 10:44:48)                   Impression:      No evidence for acute infarct.    Focal T2 hypointense signal with diffuse focal susceptibility artifact about the right michelle ehsan, with faint heterogeneous peripheral T1 hyperintense signal.  No abnormal enhancement, surrounding edema, abnormal diffusion signal, or mass effect.  Findings are new from comparison MRI 2018, and may relate to remote hemorrhage, noting that additional considerations include vascular abnormality such as a cavernous malformation.  Continued follow-up is recommended.    Sequela of chronic microvascular ischemic change.    This report was flagged in Epic as abnormal.      Electronically signed by: Ayaan Coffman  Date:    01/16/2024  Time:    10:44               Narrative:    EXAMINATION:  MRI BRAIN W WO CONTRAST    CLINICAL HISTORY:  Acute encephalopathy;.    TECHNIQUE:  Multiplanar multisequence MR imaging of the brain was performed before and after the administration of 6 mL Gadavist  intravenous contrast.    COMPARISON:  CT head without contrast 01/14/2024, MRI brain without contrast 07/13/2018.    FINDINGS:  Exam degraded by patient motion artifact.    Intracranial compartment:    Prominence of the ventricles with compensatory enlargement of the sulci, in keeping with central volume loss.  Punctate focus susceptibility artifact involving the left occipital horn, which may represent choroid plexus.  No extra-axial blood or fluid collections.    Diffuse patchy and confluent regions of subcortical and periventricular T2/FLAIR hyperintense signal, overall nonspecific, but can be seen in the setting of microvascular ischemic change.  Focal T2 hypointensity signal with large region associated susceptibility artifact involving the right michelle ehsan (axial series 12, image 9; series 13, image 9).  Faint peripheral intrinsic T1 hyperintense signal without evidence for surrounding  enhancement or abnormal diffusion signal.  Additional faint peripheral T2/FLAIR hyperintense signal.  No significant surrounding mass effect or edema.  Remote lacunar type infarct versus prominent perivascular space involving the left insular region.    No mass lesion, acute hemorrhage, edema or acute infarct. No abnormal enhancement.  Partially empty sella configuration.    Normal vascular flow voids are preserved.    Skull/extracranial contents (limited evaluation): Bone marrow signal intensity is normal.  Hyperostosis frontalis.    Paranasal sinuses and mastoid air cells are essentially clear.                                       CT Cervical Spine Without Contrast (Final result)  Result time 01/15/24 02:12:06      Final result by Akin Booth MD (01/15/24 02:12:06)                   Impression:      Cervical spine CT: Nondiagnostic because of excessive motion artifact.  Correlate clinically.  Consider follow-up scanning if and when the patient is better able to hold still.    If clinically necessary, consider radiographs in the meantime.    PASQUALE Booth MD, first observed the findings on 01/15/2024 at 02:01, and reported the results to Destinee Kapoor RN, via epic secure chat message on 01/15/2024 at 02:10.  Patient name and medical record number were specified/linked in the message. The messaging system provided confirmation that the message was immediately opened and seen by the recipient.    Electronically signed by resident: Philippe Morales  Date:    01/15/2024  Time:    01:19    Electronically signed by: Akin Booth  Date:    01/15/2024  Time:    02:12               Narrative:    EXAMINATION:  CT CERVICAL SPINE WITHOUT CONTRAST    CLINICAL HISTORY:  Neck trauma (Age >= 65y);    TECHNIQUE:  Low dose axial images, sagittal and coronal reformations were performed though the cervical spine.  Contrast was not administered.  Additional images were taken due to severe patient motion artifact.    COMPARISON:  CT  C-spine 08/23/2021    FINDINGS:  Severe patient motion artifact significantly limits evaluation.  A 2nd set of images was acquired but is also markedly degraded by motion artifact.    Consequently, this scan is considered nondiagnostic.                                       X-Ray Chest 1 View (Final result)  Result time 01/14/24 22:44:23      Final result by Dino Mercado MD (01/14/24 22:44:23)                   Impression:      No acute radiographic abnormality.  See above comments.      Electronically signed by: Dino Mercado  Date:    01/14/2024  Time:    22:44               Narrative:    EXAMINATION:  XR CHEST 1 VIEW    CLINICAL HISTORY:  Encephalopathy, unspecified    TECHNIQUE:  Single frontal view of the chest was performed.    COMPARISON:  08/18/2022    FINDINGS:  Suboptimal inspiration.    Mild nonspecific interstitial changes.    Possible mild left basilar retrocardiac atelectasis.    No effusion or pneumothorax.    The cardiac silhouette is normal in size. The hilar and mediastinal contours are unremarkable.    Bones are intact.    No significant change.                                       CT Head Without Contrast (Final result)  Result time 01/14/24 22:21:14      Final result by Dino Mercado MD (01/14/24 22:21:14)                   Impression:      1. No acute intracranial process.  2. Significant motion artifact may diminish the sensitivity.  3. Involutional changes with chronic microvascular ischemic changes.      Electronically signed by: Dino Mercado  Date:    01/14/2024  Time:    22:21               Narrative:    EXAMINATION:  CT HEAD WITHOUT CONTRAST    CLINICAL HISTORY:  Mental status change, unknown cause;    TECHNIQUE:  Low dose axial CT images obtained throughout the head without intravenous contrast. Sagittal and coronal reconstructions were performed.    COMPARISON:  04/24/2023    FINDINGS:  Intracranial compartment:    No midline shift or acute hydrocephalus.  No extra-axial  blood or fluid collections.    Moderate involutional changes and chronic microvascular ischemic changes in the periventricular white matter.    Significant motion artifact may diminish the sensitivity.    No parenchymal mass, hemorrhage, edema or major vascular distribution infarct.    Skull/extracranial contents (limited evaluation): No fracture. Mastoid air cells and paranasal sinuses are essentially clear.

## 2024-01-20 NOTE — ASSESSMENT & PLAN NOTE
- Unclear etiology on admission  - UA not infectious  - CXR 1/14 with no acute findings  - Procalcitonin 0.09 (normal), lactate 1.0  - TSH 0.352. FT4 0.78 (subclinical hypothyroidism)   - B-12 slightly elevated, Folate normal, Ammonia normal   - EtOH level <10 on admit, Non-reactive HIV & Hep-C  - Continue Ciprofloxacin for potential UTI (based on prior culture data), sot 1/20   - CT Head showed no acute abnormality.  - MRI brain with no acute infarct; new findings of remote hemorrhage and vascular which is new compared to MRI from 2018  - Vascular neurology consulted to evaluate if findings are a significant cause of her AMS  -- MRI findings do not correlate with patient's symptoms. Possibly cavernous malformation.    -- CTA Head/Neck with no acute findings.  Generalized cerebral volume loss and chronic microvascular ischemic change.  Abnormality in the ehsan identified on recent MRI is not well delineated with CT per radiology.  No evidence of acute large vessel occlusion or flow-limiting stenosis.   -- Per [facility] neuro, can repeat MRI Brain in 3 months to compare to recent ones; earlier if needed.    - Neuro checks  - Delirium precautions  - Psychiatry consulted  -- Discontinued scheduled IV depakote per psych recs  - Mentation continues to wax and wane

## 2024-01-20 NOTE — PLAN OF CARE
[facility] - Observation 11H  Discharge Final Note    Primary Care Provider: Krista Case MD    Expected Discharge Date: 1/20/2024    Per TYLER Brownves note from 01/19/2024 at 4:42pm, TYLER observed d/c orders for pt. Ordered [facility] transport via ambulance 2/2 poor trunk control for tomorrow, 1/20/24, at 1000. Report can be called to [phone]. TYLER informed pt/family of transport ETA and they are agreeable. TYLER left printed transport packet at nurse's station with unit secretary. All questions answered.     Final Discharge Note (most recent)       Final Note - 01/20/24 0915          Final Note    Assessment Type Final Discharge Note     Anticipated Discharge Disposition Hospice/Home     Hospital Resources/Appts/Education Provided Provided patient/caregiver with written discharge plan information;Provided education on problems/symptoms using teachback                     Important Message from Medicare  Important Message from Medicare regarding Discharge Appeal Rights: Given to patient/caregiver, Explained to patient/caregiver, Signed/date by patient/caregiver, Other (comments) (Sister signed)     Date IMM was signed: 01/17/24  Time IMM was signed: 1400    Contact Info       [address] [address] [phone]       Next Steps: Follow up    Instructions: group home    [facility]   Specialty: Hospice Services    [address] [address]   Phone: [phone]       Next Steps: Follow up    Instructions: Hospice

## 2024-01-20 NOTE — SUBJECTIVE & OBJECTIVE
Interval History:   No events overnight. Patient alert at shift change per nursing. EMS arrived to transport patient, however EMS declined to transfer patient given BP of 214/88 and less level alertness. Patient antihypertensives up titrated with addition of amlodipine and started on CTX for potential UTI. CM and sister updated.        Review of Systems   Unable to perform ROS: Patient nonverbal (Dementia and Deaf)     Objective:     Vital Signs (Most Recent):  Temp: 98.1 °F (36.7 °C) (01/20/24 1248)  Pulse: 72 (01/20/24 1248)  Resp: 16 (01/20/24 1248)  BP: (!) 167/72 (01/20/24 1248)  SpO2: 96 % (01/20/24 1248) Vital Signs (24h Range):  Temp:  [97.4 °F (36.3 °C)-99.4 °F (37.4 °C)] 98.1 °F (36.7 °C)  Pulse:  [60-72] 72  Resp:  [16-17] 16  SpO2:  [95 %-97 %] 96 %  BP: (150-214)/(68-88) 167/72     Weight: 61.3 kg (135 lb 2.3 oz)  Body mass index is 24.72 kg/m².  No intake or output data in the 24 hours ending 01/20/24 1453      Physical Exam  Vitals and nursing note reviewed.   Constitutional:       Appearance: She is well-developed.   HENT:      Head: Normocephalic and atraumatic.   Cardiovascular:      Rate and Rhythm: Normal rate and regular rhythm.   Pulmonary:      Effort: Pulmonary effort is normal.      Breath sounds: Normal breath sounds.   Abdominal:      Palpations: Abdomen is soft.   Skin:     General: Skin is warm and dry.   Neurological:      Mental Status: She is lethargic.      Comments: Unable to participate in neuro exam, cannot hear.  Moving all extremities spontaneously.             Significant Labs: All pertinent labs within the past 24 hours have been reviewed.    Significant Imaging: I have reviewed all pertinent imaging results/findings within the past 24 hours.

## 2024-01-21 LAB
ALBUMIN SERPL BCP-MCNC: 2.6 G/DL (ref 3.5–5.2)
ALP SERPL-CCNC: 86 U/L (ref 55–135)
ALT SERPL W/O P-5'-P-CCNC: 51 U/L (ref 10–44)
ANION GAP SERPL CALC-SCNC: 7 MMOL/L (ref 8–16)
AST SERPL-CCNC: 31 U/L (ref 10–40)
BASOPHILS # BLD AUTO: 0.14 K/UL (ref 0–0.2)
BASOPHILS NFR BLD: 0.7 % (ref 0–1.9)
BILIRUB SERPL-MCNC: 0.4 MG/DL (ref 0.1–1)
BUN SERPL-MCNC: 31 MG/DL (ref 8–23)
CALCIUM SERPL-MCNC: 9.6 MG/DL (ref 8.7–10.5)
CHLORIDE SERPL-SCNC: 114 MMOL/L (ref 95–110)
CO2 SERPL-SCNC: 24 MMOL/L (ref 23–29)
CREAT SERPL-MCNC: 0.7 MG/DL (ref 0.5–1.4)
DIFFERENTIAL METHOD BLD: ABNORMAL
EOSINOPHIL # BLD AUTO: 0.1 K/UL (ref 0–0.5)
EOSINOPHIL NFR BLD: 0.4 % (ref 0–8)
ERYTHROCYTE [DISTWIDTH] IN BLOOD BY AUTOMATED COUNT: 13.7 % (ref 11.5–14.5)
EST. GFR  (NO RACE VARIABLE): >60 ML/MIN/1.73 M^2
GLUCOSE SERPL-MCNC: 178 MG/DL (ref 70–110)
HCT VFR BLD AUTO: 39.1 % (ref 37–48.5)
HGB BLD-MCNC: 12.1 G/DL (ref 12–16)
IMM GRANULOCYTES # BLD AUTO: 0.08 K/UL (ref 0–0.04)
IMM GRANULOCYTES NFR BLD AUTO: 0.4 % (ref 0–0.5)
LYMPHOCYTES # BLD AUTO: 2.4 K/UL (ref 1–4.8)
LYMPHOCYTES NFR BLD: 12.3 % (ref 18–48)
MAGNESIUM SERPL-MCNC: 1.8 MG/DL (ref 1.6–2.6)
MCH RBC QN AUTO: 29 PG (ref 27–31)
MCHC RBC AUTO-ENTMCNC: 30.9 G/DL (ref 32–36)
MCV RBC AUTO: 94 FL (ref 82–98)
MONOCYTES # BLD AUTO: 2.2 K/UL (ref 0.3–1)
MONOCYTES NFR BLD: 11.6 % (ref 4–15)
NEUTROPHILS # BLD AUTO: 14.3 K/UL (ref 1.8–7.7)
NEUTROPHILS NFR BLD: 74.6 % (ref 38–73)
NRBC BLD-RTO: 0 /100 WBC
PHOSPHATE SERPL-MCNC: 2.6 MG/DL (ref 2.7–4.5)
PLATELET # BLD AUTO: 284 K/UL (ref 150–450)
PLATELET BLD QL SMEAR: ABNORMAL
PMV BLD AUTO: 11.6 FL (ref 9.2–12.9)
POCT GLUCOSE: 118 MG/DL (ref 70–110)
POCT GLUCOSE: 122 MG/DL (ref 70–110)
POCT GLUCOSE: 124 MG/DL (ref 70–110)
POCT GLUCOSE: 124 MG/DL (ref 70–110)
POTASSIUM SERPL-SCNC: 3.5 MMOL/L (ref 3.5–5.1)
PROT SERPL-MCNC: 6.8 G/DL (ref 6–8.4)
RBC # BLD AUTO: 4.17 M/UL (ref 4–5.4)
SODIUM SERPL-SCNC: 145 MMOL/L (ref 136–145)
WBC # BLD AUTO: 19.19 K/UL (ref 3.9–12.7)

## 2024-01-21 PROCEDURE — 25000003 PHARM REV CODE 250: Mod: HCNC | Performed by: STUDENT IN AN ORGANIZED HEALTH CARE EDUCATION/TRAINING PROGRAM

## 2024-01-21 PROCEDURE — 36415 COLL VENOUS BLD VENIPUNCTURE: CPT | Mod: HCNC | Performed by: STUDENT IN AN ORGANIZED HEALTH CARE EDUCATION/TRAINING PROGRAM

## 2024-01-21 PROCEDURE — 21400001 HC TELEMETRY ROOM: Mod: HCNC

## 2024-01-21 PROCEDURE — 84100 ASSAY OF PHOSPHORUS: CPT | Mod: HCNC | Performed by: STUDENT IN AN ORGANIZED HEALTH CARE EDUCATION/TRAINING PROGRAM

## 2024-01-21 PROCEDURE — 80053 COMPREHEN METABOLIC PANEL: CPT | Mod: HCNC | Performed by: STUDENT IN AN ORGANIZED HEALTH CARE EDUCATION/TRAINING PROGRAM

## 2024-01-21 PROCEDURE — 87040 BLOOD CULTURE FOR BACTERIA: CPT | Mod: HCNC | Performed by: STUDENT IN AN ORGANIZED HEALTH CARE EDUCATION/TRAINING PROGRAM

## 2024-01-21 PROCEDURE — 85025 COMPLETE CBC W/AUTO DIFF WBC: CPT | Mod: HCNC | Performed by: STUDENT IN AN ORGANIZED HEALTH CARE EDUCATION/TRAINING PROGRAM

## 2024-01-21 PROCEDURE — 25000003 PHARM REV CODE 250: Mod: HCNC

## 2024-01-21 PROCEDURE — 83735 ASSAY OF MAGNESIUM: CPT | Mod: HCNC | Performed by: STUDENT IN AN ORGANIZED HEALTH CARE EDUCATION/TRAINING PROGRAM

## 2024-01-21 PROCEDURE — 63600175 PHARM REV CODE 636 W HCPCS: Mod: HCNC | Performed by: STUDENT IN AN ORGANIZED HEALTH CARE EDUCATION/TRAINING PROGRAM

## 2024-01-21 PROCEDURE — 11000001 HC ACUTE MED/SURG PRIVATE ROOM: Mod: HCNC

## 2024-01-21 PROCEDURE — 63600175 PHARM REV CODE 636 W HCPCS: Mod: HCNC

## 2024-01-21 PROCEDURE — 99232 SBSQ HOSP IP/OBS MODERATE 35: CPT | Mod: HCNC,,, | Performed by: PSYCHIATRY & NEUROLOGY

## 2024-01-21 RX ADMIN — LISINOPRIL 20 MG: 20 TABLET ORAL at 09:01

## 2024-01-21 RX ADMIN — BENZTROPINE MESYLATE 1 MG: 1 TABLET ORAL at 08:01

## 2024-01-21 RX ADMIN — AMLODIPINE BESYLATE 5 MG: 5 TABLET ORAL at 09:01

## 2024-01-21 RX ADMIN — CARVEDILOL 6.25 MG: 3.12 TABLET, FILM COATED ORAL at 09:01

## 2024-01-21 RX ADMIN — SODIUM CHLORIDE: 9 INJECTION, SOLUTION INTRAVENOUS at 08:01

## 2024-01-21 RX ADMIN — QUETIAPINE FUMARATE 200 MG: 200 TABLET ORAL at 08:01

## 2024-01-21 RX ADMIN — CIPROFLOXACIN 400 MG: 2 INJECTION, SOLUTION INTRAVENOUS at 03:01

## 2024-01-21 RX ADMIN — DULOXETINE HYDROCHLORIDE 30 MG: 30 CAPSULE, DELAYED RELEASE ORAL at 09:01

## 2024-01-21 RX ADMIN — ENOXAPARIN SODIUM 40 MG: 40 INJECTION SUBCUTANEOUS at 04:01

## 2024-01-21 RX ADMIN — BENZTROPINE MESYLATE 1 MG: 1 TABLET ORAL at 09:01

## 2024-01-21 RX ADMIN — GABAPENTIN 300 MG: 300 CAPSULE ORAL at 08:01

## 2024-01-21 RX ADMIN — SODIUM CHLORIDE: 9 INJECTION, SOLUTION INTRAVENOUS at 05:01

## 2024-01-21 RX ADMIN — CARVEDILOL 6.25 MG: 3.12 TABLET, FILM COATED ORAL at 08:01

## 2024-01-21 RX ADMIN — MEMANTINE HYDROCHLORIDE 20 MG: 5 TABLET ORAL at 09:01

## 2024-01-21 RX ADMIN — CIPROFLOXACIN 400 MG: 2 INJECTION, SOLUTION INTRAVENOUS at 04:01

## 2024-01-21 NOTE — PLAN OF CARE
Problem: Adult Inpatient Plan of Care  Goal: Plan of Care Review  Outcome: Ongoing, Progressing  Goal: Patient-Specific Goal (Individualized)  Outcome: Ongoing, Progressing  Goal: Absence of Hospital-Acquired Illness or Injury  Outcome: Ongoing, Progressing  Goal: Optimal Comfort and Wellbeing  Outcome: Ongoing, Progressing  Goal: Readiness for Transition of Care  Outcome: Ongoing, Progressing     Problem: Infection  Goal: Absence of Infection Signs and Symptoms  Outcome: Ongoing, Progressing     Problem: Fall Injury Risk  Goal: Absence of Fall and Fall-Related Injury  Outcome: Ongoing, Progressing     Problem: Diabetes Comorbidity  Goal: Blood Glucose Level Within Targeted Range  Outcome: Ongoing, Progressing     Problem: Skin Injury Risk Increased  Goal: Skin Health and Integrity  Outcome: Ongoing, Progressing     Problem: Behavioral Health Comorbidity  Goal: Maintenance of Behavioral Health Symptom Control  Outcome: Ongoing, Progressing     Problem: UTI (Urinary Tract Infection)  Goal: Improved Infection Symptoms  Outcome: Ongoing, Progressing     Problem: Confusion Chronic  Goal: Optimal Cognitive Function  Outcome: Ongoing, Progressing     Problem: Coping Ineffective  Goal: Effective Coping  Outcome: Ongoing, Progressing

## 2024-01-21 NOTE — PROGRESS NOTES
"CONSULTATION LIAISON PSYCHIATRY PROGRESS NOTE    Patient Name: Lay Peres  MRN: [id number]  Patient Class: IP- Inpatient  Admission Date: 1/14/2024  Attending Physician: Minh Marques MD      SUBJECTIVE:   Lay Peres is a 78 y.o. female with past psychiatric history of dementia and schizophrenia & past pertinent medical history of deaf since birth and CVA presents to the ED/admitted to the hospital for Acute encephalopathy. Sister at bedside. States patient was in her usual state of health when she had an unwitnessed fall at her living facility. Per sister, unclear if she hit her head and unclear how she fell. Per staff and sister, the patient was not herself shortly after. She would not open her eyes all the way, was very combative/ agitated and did not recognize her sister. States she is usually able to ambulate with a walker and can say some short phrases but is unable to have a conversation.     Psychiatry consulted for "agitation requiring restraints, needing to transition to oral medications    Interval events: no PRNs given. Refusing scheduled aricept 5mg. Adherent to other scheduled medications. WBC increased to 19.19, trending up since 1/18.     Today, pt was sleeping. No obvious signs of discomfort observed. Pt's nurse reported no issues during her shift or from overnight handoff.        OBJECTIVE:    Mental Status Exam:  General Appearance: appears stated age, well developed and nourished, adequately groomed and appropriately dressed, in no acute distress, asleep  Behavior:  asleep  Involuntary Movements and Motor Activity: no abnormal involuntary movements noted  Gait and Station: unable to assess - patient lying down or seated  Speech and Language:  unable to assess, pt asleep   Mood: unable to assess, pt asleep   Affect: calm, unable to assess - pt asleep  Thought Process and Associations: Unable to Assess  Thought Content and Perceptions:: Unable to Assess  Sensorium " and Orientation: Unable to Formally Assess  Recent and Remote Memory: Unable to  Formally Assess  Attention and Concentration: Unable to Formally Assess  Fund of Knowledge: Unable to Formally Assess  Insight:  unable to assess - pt asleep  Judgment:  unable to assess - pt asleep     CAM ICU positive? Unable to assess      ASSESSMENT & RECOMMENDATIONS   Schizophrenia  Dementia  Intellectual Disability  Deafness, congential     PSYCH MEDICATIONS (per previous recommendations):   Cogentin 1mg PO BID  Cymbalta 30mg PO Daily  PRN Haldol 5mg PO daily (if agitated)  Can use zyprexa 2.5mg PO Q6H PRN agitation  Seroquel 200mg PO QHS  Pt unable to swallow meds on own, please ensure the meds are being crushed and fed via pudding/applesauce and entire amount is consumed.        DELIRIUM  DELIRIUM BEHAVIOR MANAGEMENT  Continue medical workup for causative etiology of delirium.  Continue to manage medical conditions and assess for and treat pain.  Please try to minimize the use of physical restraints, as they can worsen agitation; utilize PRN medications first.  Please avoid/minimize use of benzodiazepines (understand that may need to use BZD taper for EtOH withdrawal), anticholinergics, antihistamines (H1- and H2-blockers), and opioids when possible, as they may exacerbate delirium.  Please keep shades open and lights on during day and shades closed and lights off at night to encourage normal sleep/wake cycle.  Reduce unnecessary stimulation/noise. TV screen and sound should be off unless patient is actively engaged w/ the program.  Encourage staff to reorient pt as needed throughout the day and to optimize pt's surroundings w/ an accurately updated calendar, and functioning clock.  Frequently remind pt of reason for hospitalization and the plan of care.  Attempt to maintain consistency in nursing staff.  Encourage family to be present as much as possible.   Correct sensory deficits, when present, with provision of the pt's  eyeglasses and/or hearing aids.  Optimize nutrition and hydration status.  Recommend PT/OT consult. Early mobility and exercise have been shown to decrease duration of delirium.     Monitor QTc closely; last EKG 1/14 Qtc 439     RISK ASSESSMENT  NO NEED FOR PEC patient NOT in any imminent danger of hurting self or others and not gravely disabled.      FOLLOW UP  Will follow up while in house    Please contact ON CALL psychiatry service (24/7) for any acute issues that may arise.    Dr. Gianna Cho MD  [facility] PGY-II   Psychiatry  [facility]  1/21/2024 2:22 PM        --------------------------------------------------------------------------------------------------------------------------------------------------------------------------------------------------------------------------------------    CONTINUED OBJECTIVE clinical data & findings reviewed and noted for above decision making    Current Medications:   Scheduled Meds:    amLODIPine  5 mg Oral Daily    benztropine  1 mg Oral BID    carvediloL  6.25 mg Oral BID    ciprofloxacin  400 mg Intravenous Q12H    donepeziL  5 mg Oral QAM    DULoxetine  30 mg Oral Daily    enoxparin  40 mg Subcutaneous Daily    gabapentin  300 mg Oral QHS    lisinopriL  20 mg Oral Daily    memantine  20 mg Oral Daily    QUEtiapine  200 mg Oral Nightly     PRN Meds: acetaminophen, albuterol-ipratropium, bisacodyL, dextrose 10%, dextrose 10%, glucagon (human recombinant), glucose, glucose, haloperidoL, hydrALAZINE, melatonin, OLANZapine, ondansetron, polyethylene glycol, promethazine, sodium chloride 0.9%    Allergies:   Review of patient's allergies indicates:  No Known Allergies    Vitals  Vitals:    01/21/24 1121   BP: (!) 141/65   Pulse: 78   Resp: 18   Temp: 98 °F (36.7 °C)       Labs/Imaging/Studies:  Recent Results (from the past 24 hour(s))   Urinalysis, Reflex to Urine Culture Urine, Clean Catch    Collection Time: 01/20/24  4:00 PM     Specimen: Urine   Result Value Ref Range    Specimen UA Urine, Clean Catch     Color, UA Sue Yellow, Straw, Sue    Appearance, UA Cloudy (A) Clear    pH, UA 7.0 5.0 - 8.0    Specific Gravity, UA 1.025 1.005 - 1.030    Protein, UA 2+ (A) Negative    Glucose, UA Negative Negative    Ketones, UA Negative Negative    Bilirubin (UA) Negative Negative    Occult Blood UA 2+ (A) Negative    Nitrite, UA Negative Negative    Leukocytes, UA 3+ (A) Negative   Urinalysis Microscopic    Collection Time: 01/20/24  4:00 PM   Result Value Ref Range    RBC, UA 25 (H) 0 - 4 /hpf    WBC, UA >100 (H) 0 - 5 /hpf    Bacteria Many (A) None-Occ /hpf    Squam Epithel, UA 3 /hpf    Hyaline Casts, UA 0 0-1/lpf /lpf    Microscopic Comment SEE COMMENT    Comprehensive Metabolic Panel    Collection Time: 01/20/24  5:34 PM   Result Value Ref Range    Sodium 148 (H) 136 - 145 mmol/L    Potassium 4.0 3.5 - 5.1 mmol/L    Chloride 115 (H) 95 - 110 mmol/L    CO2 25 23 - 29 mmol/L    Glucose 163 (H) 70 - 110 mg/dL    BUN 32 (H) 8 - 23 mg/dL    Creatinine 0.7 0.5 - 1.4 mg/dL    Calcium 10.2 8.7 - 10.5 mg/dL    Total Protein 7.4 6.0 - 8.4 g/dL    Albumin 2.9 (L) 3.5 - 5.2 g/dL    Total Bilirubin 0.3 0.1 - 1.0 mg/dL    Alkaline Phosphatase 87 55 - 135 U/L    AST 40 10 - 40 U/L    ALT 56 (H) 10 - 44 U/L    eGFR >60.0 >60 mL/min/1.73 m^2    Anion Gap 8 8 - 16 mmol/L   CBC Auto Differential    Collection Time: 01/20/24  5:34 PM   Result Value Ref Range    WBC 17.47 (H) 3.90 - 12.70 K/uL    RBC 4.71 4.00 - 5.40 M/uL    Hemoglobin 13.7 12.0 - 16.0 g/dL    Hematocrit 43.9 37.0 - 48.5 %    MCV 93 82 - 98 fL    MCH 29.1 27.0 - 31.0 pg    MCHC 31.2 (L) 32.0 - 36.0 g/dL    RDW 13.7 11.5 - 14.5 %    Platelets 337 150 - 450 K/uL    MPV 10.9 9.2 - 12.9 fL    Immature Granulocytes 0.5 0.0 - 0.5 %    Gran # (ANC) 13.1 (H) 1.8 - 7.7 K/uL    Immature Grans (Abs) 0.08 (H) 0.00 - 0.04 K/uL    Lymph # 2.4 1.0 - 4.8 K/uL    Mono # 1.8 (H) 0.3 - 1.0 K/uL    Eos # 0.1 0.0 -  0.5 K/uL    Baso # 0.09 0.00 - 0.20 K/uL    nRBC 0 0 /100 WBC    Gran % 74.9 (H) 38.0 - 73.0 %    Lymph % 13.5 (L) 18.0 - 48.0 %    Mono % 10.2 4.0 - 15.0 %    Eosinophil % 0.4 0.0 - 8.0 %    Basophil % 0.5 0.0 - 1.9 %    Differential Method Automated    Magnesium    Collection Time: 01/20/24  5:34 PM   Result Value Ref Range    Magnesium 1.9 1.6 - 2.6 mg/dL   Phosphorus    Collection Time: 01/20/24  5:34 PM   Result Value Ref Range    Phosphorus 2.5 (L) 2.7 - 4.5 mg/dL   POCT glucose    Collection Time: 01/20/24  8:28 PM   Result Value Ref Range    POCT Glucose 97 70 - 110 mg/dL   Comprehensive Metabolic Panel    Collection Time: 01/21/24  5:28 AM   Result Value Ref Range    Sodium 145 136 - 145 mmol/L    Potassium 3.5 3.5 - 5.1 mmol/L    Chloride 114 (H) 95 - 110 mmol/L    CO2 24 23 - 29 mmol/L    Glucose 178 (H) 70 - 110 mg/dL    BUN 31 (H) 8 - 23 mg/dL    Creatinine 0.7 0.5 - 1.4 mg/dL    Calcium 9.6 8.7 - 10.5 mg/dL    Total Protein 6.8 6.0 - 8.4 g/dL    Albumin 2.6 (L) 3.5 - 5.2 g/dL    Total Bilirubin 0.4 0.1 - 1.0 mg/dL    Alkaline Phosphatase 86 55 - 135 U/L    AST 31 10 - 40 U/L    ALT 51 (H) 10 - 44 U/L    eGFR >60.0 >60 mL/min/1.73 m^2    Anion Gap 7 (L) 8 - 16 mmol/L   CBC Auto Differential    Collection Time: 01/21/24  5:28 AM   Result Value Ref Range    WBC 19.19 (H) 3.90 - 12.70 K/uL    RBC 4.17 4.00 - 5.40 M/uL    Hemoglobin 12.1 12.0 - 16.0 g/dL    Hematocrit 39.1 37.0 - 48.5 %    MCV 94 82 - 98 fL    MCH 29.0 27.0 - 31.0 pg    MCHC 30.9 (L) 32.0 - 36.0 g/dL    RDW 13.7 11.5 - 14.5 %    Platelets 284 150 - 450 K/uL    MPV 11.6 9.2 - 12.9 fL    Immature Granulocytes 0.4 0.0 - 0.5 %    Gran # (ANC) 14.3 (H) 1.8 - 7.7 K/uL    Immature Grans (Abs) 0.08 (H) 0.00 - 0.04 K/uL    Lymph # 2.4 1.0 - 4.8 K/uL    Mono # 2.2 (H) 0.3 - 1.0 K/uL    Eos # 0.1 0.0 - 0.5 K/uL    Baso # 0.14 0.00 - 0.20 K/uL    nRBC 0 0 /100 WBC    Gran % 74.6 (H) 38.0 - 73.0 %    Lymph % 12.3 (L) 18.0 - 48.0 %    Mono % 11.6 4.0 -  15.0 %    Eosinophil % 0.4 0.0 - 8.0 %    Basophil % 0.7 0.0 - 1.9 %    Platelet Estimate Appears normal     Differential Method Automated    Magnesium    Collection Time: 01/21/24  5:28 AM   Result Value Ref Range    Magnesium 1.8 1.6 - 2.6 mg/dL   Phosphorus    Collection Time: 01/21/24  5:28 AM   Result Value Ref Range    Phosphorus 2.6 (L) 2.7 - 4.5 mg/dL   POCT glucose    Collection Time: 01/21/24  7:05 AM   Result Value Ref Range    POCT Glucose 124 (H) 70 - 110 mg/dL   POCT glucose    Collection Time: 01/21/24 11:15 AM   Result Value Ref Range    POCT Glucose 122 (H) 70 - 110 mg/dL     Imaging Results               MRI Brain W WO Contrast (Final result)  Result time 01/16/24 10:44:48      Final result by Ayaan Coffman MD (01/16/24 10:44:48)                   Impression:      No evidence for acute infarct.    Focal T2 hypointense signal with diffuse focal susceptibility artifact about the right michelle ehsan, with faint heterogeneous peripheral T1 hyperintense signal.  No abnormal enhancement, surrounding edema, abnormal diffusion signal, or mass effect.  Findings are new from comparison MRI 2018, and may relate to remote hemorrhage, noting that additional considerations include vascular abnormality such as a cavernous malformation.  Continued follow-up is recommended.    Sequela of chronic microvascular ischemic change.    This report was flagged in Epic as abnormal.      Electronically signed by: Ayaan Coffman  Date:    01/16/2024  Time:    10:44               Narrative:    EXAMINATION:  MRI BRAIN W WO CONTRAST    CLINICAL HISTORY:  Acute encephalopathy;.    TECHNIQUE:  Multiplanar multisequence MR imaging of the brain was performed before and after the administration of 6 mL Gadavist  intravenous contrast.    COMPARISON:  CT head without contrast 01/14/2024, MRI brain without contrast 07/13/2018.    FINDINGS:  Exam degraded by patient motion artifact.    Intracranial compartment:    Prominence of the ventricles  with compensatory enlargement of the sulci, in keeping with central volume loss.  Punctate focus susceptibility artifact involving the left occipital horn, which may represent choroid plexus.  No extra-axial blood or fluid collections.    Diffuse patchy and confluent regions of subcortical and periventricular T2/FLAIR hyperintense signal, overall nonspecific, but can be seen in the setting of microvascular ischemic change.  Focal T2 hypointensity signal with large region associated susceptibility artifact involving the right michelle ehsan (axial series 12, image 9; series 13, image 9).  Faint peripheral intrinsic T1 hyperintense signal without evidence for surrounding enhancement or abnormal diffusion signal.  Additional faint peripheral T2/FLAIR hyperintense signal.  No significant surrounding mass effect or edema.  Remote lacunar type infarct versus prominent perivascular space involving the left insular region.    No mass lesion, acute hemorrhage, edema or acute infarct. No abnormal enhancement.  Partially empty sella configuration.    Normal vascular flow voids are preserved.    Skull/extracranial contents (limited evaluation): Bone marrow signal intensity is normal.  Hyperostosis frontalis.    Paranasal sinuses and mastoid air cells are essentially clear.                                       CT Cervical Spine Without Contrast (Final result)  Result time 01/15/24 02:12:06      Final result by Akin Booth MD (01/15/24 02:12:06)                   Impression:      Cervical spine CT: Nondiagnostic because of excessive motion artifact.  Correlate clinically.  Consider follow-up scanning if and when the patient is better able to hold still.    If clinically necessary, consider radiographs in the meantime.    PASQUALE Booth MD, first observed the findings on 01/15/2024 at 02:01, and reported the results to Destinee Kapoor RN, via epic secure chat message on 01/15/2024 at 02:10.  Patient name and medical record number  were specified/linked in the message. The messaging system provided confirmation that the message was immediately opened and seen by the recipient.    Electronically signed by resident: Philippe Morales  Date:    01/15/2024  Time:    01:19    Electronically signed by: Akin Booth  Date:    01/15/2024  Time:    02:12               Narrative:    EXAMINATION:  CT CERVICAL SPINE WITHOUT CONTRAST    CLINICAL HISTORY:  Neck trauma (Age >= 65y);    TECHNIQUE:  Low dose axial images, sagittal and coronal reformations were performed though the cervical spine.  Contrast was not administered.  Additional images were taken due to severe patient motion artifact.    COMPARISON:  CT C-spine 08/23/2021    FINDINGS:  Severe patient motion artifact significantly limits evaluation.  A 2nd set of images was acquired but is also markedly degraded by motion artifact.    Consequently, this scan is considered nondiagnostic.                                       X-Ray Chest 1 View (Final result)  Result time 01/14/24 22:44:23      Final result by Dino Mercado MD (01/14/24 22:44:23)                   Impression:      No acute radiographic abnormality.  See above comments.      Electronically signed by: Dino Mercado  Date:    01/14/2024  Time:    22:44               Narrative:    EXAMINATION:  XR CHEST 1 VIEW    CLINICAL HISTORY:  Encephalopathy, unspecified    TECHNIQUE:  Single frontal view of the chest was performed.    COMPARISON:  08/18/2022    FINDINGS:  Suboptimal inspiration.    Mild nonspecific interstitial changes.    Possible mild left basilar retrocardiac atelectasis.    No effusion or pneumothorax.    The cardiac silhouette is normal in size. The hilar and mediastinal contours are unremarkable.    Bones are intact.    No significant change.                                       CT Head Without Contrast (Final result)  Result time 01/14/24 22:21:14      Final result by Dino Mercado MD (01/14/24 22:21:14)                    Impression:      1. No acute intracranial process.  2. Significant motion artifact may diminish the sensitivity.  3. Involutional changes with chronic microvascular ischemic changes.      Electronically signed by: Dino Yuan  Date:    01/14/2024  Time:    22:21               Narrative:    EXAMINATION:  CT HEAD WITHOUT CONTRAST    CLINICAL HISTORY:  Mental status change, unknown cause;    TECHNIQUE:  Low dose axial CT images obtained throughout the head without intravenous contrast. Sagittal and coronal reconstructions were performed.    COMPARISON:  04/24/2023    FINDINGS:  Intracranial compartment:    No midline shift or acute hydrocephalus.  No extra-axial blood or fluid collections.    Moderate involutional changes and chronic microvascular ischemic changes in the periventricular white matter.    Significant motion artifact may diminish the sensitivity.    No parenchymal mass, hemorrhage, edema or major vascular distribution infarct.    Skull/extracranial contents (limited evaluation): No fracture. Mastoid air cells and paranasal sinuses are essentially clear.

## 2024-01-21 NOTE — CONSULTS
"Consult received, chart reviewed,       Per chart Ms. Peres is "78 y.o. female with a hx of dementia, deaf since birth, CVA, and schizophrenia presents from [facility] for AMS. ", patient can be seen tomorrow.       Full consult to follow Monday.       Thank you for the opportunity to care for this patient and family.       Please call with questions.     "

## 2024-01-21 NOTE — ASSESSMENT & PLAN NOTE
Chronic, controlled. Latest blood pressure and vitals reviewed-     Temp:  [97.5 °F (36.4 °C)-98.9 °F (37.2 °C)]   Pulse:  [67-88]   Resp:  [14-20]   BP: (106-186)/(50-82)   SpO2:  [96 %-98 %] .   Home meds for hypertension were reviewed and noted below.   Hypertension Medications               lisinopriL (PRINIVIL,ZESTRIL) 20 MG tablet Take 1 tablet (20 mg total) by mouth once daily.            While in the hospital, will manage blood pressure as follows; Continue home antihypertensive regimen  - Amlodipine, Coreg, Lisinopril   - PRN IV Hydralazine     Will utilize p.r.n. blood pressure medication only if patient's blood pressure greater than 160/100 and she develops symptoms such as worsening chest pain or shortness of breath.

## 2024-01-21 NOTE — ASSESSMENT & PLAN NOTE
- Hx noted  - CTH w/o acute abnormalities, MRI reviewed; CTA head/neck reviewed as above  - Vascular neurology consulted as above  - mIVF as needed for hydration  - Palliative care consulted   - Neuro checks

## 2024-01-21 NOTE — SUBJECTIVE & OBJECTIVE
Interval History:   No events overnight. Blood pressure improved this morning. Continue Ciprofloxacin for UTI. Blood cultures obtained. Sister updated via phone. Palliative car consulted.      Review of Systems   Unable to perform ROS: Patient nonverbal (Dementia and Deaf)     Objective:     Vital Signs (Most Recent):  Temp: 98 °F (36.7 °C) (01/21/24 1121)  Pulse: 78 (01/21/24 1121)  Resp: 18 (01/21/24 1121)  BP: (!) 141/65 (01/21/24 1121)  SpO2: 98 % (01/21/24 1121) Vital Signs (24h Range):  Temp:  [97.5 °F (36.4 °C)-98.9 °F (37.2 °C)] 98 °F (36.7 °C)  Pulse:  [67-88] 78  Resp:  [14-20] 18  SpO2:  [96 %-98 %] 98 %  BP: (106-186)/(50-82) 141/65     Weight: 61.3 kg (135 lb 2.3 oz)  Body mass index is 24.72 kg/m².    Intake/Output Summary (Last 24 hours) at 1/21/2024 1149  Last data filed at 1/21/2024 0528  Gross per 24 hour   Intake 1095 ml   Output 400 ml   Net 695 ml         Physical Exam  Vitals and nursing note reviewed.   Constitutional:       Appearance: She is well-developed.   HENT:      Head: Normocephalic and atraumatic.   Cardiovascular:      Rate and Rhythm: Normal rate and regular rhythm.   Pulmonary:      Effort: Pulmonary effort is normal.      Breath sounds: Normal breath sounds.   Abdominal:      Palpations: Abdomen is soft.   Skin:     General: Skin is warm and dry.   Neurological:      Mental Status: She is lethargic.      Comments: Unable to participate in neuro exam, cannot hear.  Moving all extremities spontaneously.             Significant Labs: All pertinent labs within the past 24 hours have been reviewed.  CBC:   Recent Labs   Lab 01/20/24 1734 01/21/24  0528   WBC 17.47* 19.19*   HGB 13.7 12.1   HCT 43.9 39.1    284     CMP:   Recent Labs   Lab 01/20/24  1734 01/21/24  0528   * 145   K 4.0 3.5   * 114*   CO2 25 24   * 178*   BUN 32* 31*   CREATININE 0.7 0.7   CALCIUM 10.2 9.6   PROT 7.4 6.8   ALBUMIN 2.9* 2.6*   BILITOT 0.3 0.4   ALKPHOS 87 86   AST 40 31   ALT  56* 51*   ANIONGAP 8 7*       Significant Imaging: I have reviewed all pertinent imaging results/findings within the past 24 hours.

## 2024-01-21 NOTE — PLAN OF CARE
Problem: Adult Inpatient Plan of Care  Goal: Plan of Care Review  Outcome: Ongoing, Progressing  Goal: Patient-Specific Goal (Individualized)  Outcome: Ongoing, Progressing  Goal: Absence of Hospital-Acquired Illness or Injury  Outcome: Ongoing, Progressing  Goal: Optimal Comfort and Wellbeing  Outcome: Ongoing, Progressing  Goal: Readiness for Transition of Care  Outcome: Ongoing, Progressing     Problem: Infection  Goal: Absence of Infection Signs and Symptoms  Outcome: Ongoing, Progressing     Problem: Fall Injury Risk  Goal: Absence of Fall and Fall-Related Injury  Outcome: Ongoing, Progressing     Problem: Diabetes Comorbidity  Goal: Blood Glucose Level Within Targeted Range  Outcome: Ongoing, Progressing     Problem: Skin Injury Risk Increased  Goal: Skin Health and Integrity  Outcome: Ongoing, Progressing     Problem: Behavioral Health Comorbidity  Goal: Maintenance of Behavioral Health Symptom Control  Outcome: Ongoing, Progressing     Problem: Restraint, Nonbehavioral (Nonviolent)  Goal: Absence of Harm or Injury  Outcome: Met

## 2024-01-21 NOTE — PLAN OF CARE
Problem: Adult Inpatient Plan of Care  Goal: Plan of Care Review  Outcome: Ongoing, Progressing  Goal: Patient-Specific Goal (Individualized)  Outcome: Ongoing, Progressing  Goal: Absence of Hospital-Acquired Illness or Injury  Outcome: Ongoing, Progressing  Goal: Optimal Comfort and Wellbeing  Outcome: Ongoing, Progressing  Goal: Readiness for Transition of Care  Outcome: Ongoing, Progressing     Problem: Infection  Goal: Absence of Infection Signs and Symptoms  Outcome: Ongoing, Progressing     Problem: Fall Injury Risk  Goal: Absence of Fall and Fall-Related Injury  Outcome: Ongoing, Progressing     Problem: Restraint, Nonbehavioral (Nonviolent)  Goal: Absence of Harm or Injury  Outcome: Ongoing, Progressing     Problem: Diabetes Comorbidity  Goal: Blood Glucose Level Within Targeted Range  Outcome: Ongoing, Progressing     Problem: Skin Injury Risk Increased  Goal: Skin Health and Integrity  Outcome: Ongoing, Progressing     Problem: Behavioral Health Comorbidity  Goal: Maintenance of Behavioral Health Symptom Control  Outcome: Ongoing, Progressing   Pt progressing toward goals. No distress noted. No falls or injuries during shift. Pt bed in lowest position. Side rails x2. Bed alarm activated. Call bell and personal belongs within reach.  Safety precautions maintained.

## 2024-01-21 NOTE — ASSESSMENT & PLAN NOTE
- Unclear etiology on admission  - UA not infectious  - CXR 1/14 with no acute findings  - Procalcitonin 0.09 (normal), lactate 1.0  - TSH 0.352. FT4 0.78 (subclinical hypothyroidism)   - B-12 slightly elevated, Folate normal, Ammonia normal   - EtOH level <10 on admit, Non-reactive HIV & Hep-C  - CT Head showed no acute abnormality.  - MRI brain with no acute infarct; new findings of remote hemorrhage and vascular which is new compared to MRI from 2018  - Vascular neurology consulted to evaluate if findings are a significant cause of her AMS  -- MRI findings do not correlate with patient's symptoms. Possibly cavernous malformation.    -- CTA Head/Neck with no acute findings.  Generalized cerebral volume loss and chronic microvascular ischemic change.  Abnormality in the ehsan identified on recent MRI is not well delineated with CT per radiology.  No evidence of acute large vessel occlusion or flow-limiting stenosis.   -- Per [facility] neuro, can repeat MRI Brain in 3 months to compare to recent ones; earlier if needed.    - Neuro checks  - Delirium precautions  - Psychiatry consulted  -- Discontinued scheduled IV depakote per psych recs  - Mentation continues to wax and wane  - Infectious workup  -- Continue Ciprofloxacin for potential UTI (based on prior culture data), sot 1/20   -- Follow up blood cultures  -- Consider repeat CXR and KUB

## 2024-01-21 NOTE — PROGRESS NOTES
[facility] [address] [place] [address] Medicine  Progress Note    Patient Name: Lay Peres  MRN: [id number]  Patient Class: IP- Inpatient   Admission Date: 1/14/2024  Length of Stay: 5 days  Attending Physician: Minh Marques MD  Primary Care Provider: Krista Case MD        Subjective:     Principal Problem:Acute encephalopathy        HPI:  Lay Peres is a 78 y.o. female with a hx of dementia, deaf since birth, CVA, and schizophrenia presents from [facility] for AMS. Patient unable to provide hx or participate in exam. Sister at bedside. States patient was I her usual state of health when she had an unwitnessed fall. Per sister, unclear if she hit her head and unclear how she fell. Per staff and sister, the patient was not herself shortly after. She would not open her eyes all the way, was very combative/ agitated and did not recognize her sister. States she is usually able to ambulate with a walker and can say some short phrases but is unable to have a conversation. Per sister, did not believe patient was in pain and patient was moving all extremities with good strength. No noticeable facial droop, convulsions or unilateral weakness. Patient has a hx of incontinence. Prior to my exam, pt received haldol which sister states put her to sleep and has been the most restful she has been since arrival to the ED.    Confirmed with sister at bedside a code status of DNR. Sister wishes for patient to be comfortable.     ED: AF, VSS. CBC/ CMP largely unremarkable. UA not infectious. VBG reviewed. Mildly hypercapnic. CTH showed no acute abnormality. CT C spine was non diagnostic due to patient moving. Given haldol in ED.     Overview/Hospital Course:  Pt admitted to [facility] and remained stable overnight and into 1/15/24. Pt with marked agitation requiring restrains and PRN antipsychotics. MRI brain with nonspecific findings, vascular neurology consulted to review, who recommended CTA head  (pending). Mentation and agitation waxing and waning into 1/17/2024.        Interval History:   No events overnight. Blood pressure improved this morning. Continue Ciprofloxacin for UTI. Blood cultures obtained. Sister updated via phone. Palliative car consulted.      Review of Systems   Unable to perform ROS: Patient nonverbal (Dementia and Deaf)     Objective:     Vital Signs (Most Recent):  Temp: 98 °F (36.7 °C) (01/21/24 1121)  Pulse: 78 (01/21/24 1121)  Resp: 18 (01/21/24 1121)  BP: (!) 141/65 (01/21/24 1121)  SpO2: 98 % (01/21/24 1121) Vital Signs (24h Range):  Temp:  [97.5 °F (36.4 °C)-98.9 °F (37.2 °C)] 98 °F (36.7 °C)  Pulse:  [67-88] 78  Resp:  [14-20] 18  SpO2:  [96 %-98 %] 98 %  BP: (106-186)/(50-82) 141/65     Weight: 61.3 kg (135 lb 2.3 oz)  Body mass index is 24.72 kg/m².    Intake/Output Summary (Last 24 hours) at 1/21/2024 1149  Last data filed at 1/21/2024 0528  Gross per 24 hour   Intake 1095 ml   Output 400 ml   Net 695 ml         Physical Exam  Vitals and nursing note reviewed.   Constitutional:       Appearance: She is well-developed.   HENT:      Head: Normocephalic and atraumatic.   Cardiovascular:      Rate and Rhythm: Normal rate and regular rhythm.   Pulmonary:      Effort: Pulmonary effort is normal.      Breath sounds: Normal breath sounds.   Abdominal:      Palpations: Abdomen is soft.   Skin:     General: Skin is warm and dry.   Neurological:      Mental Status: She is lethargic.      Comments: Unable to participate in neuro exam, cannot hear.  Moving all extremities spontaneously.             Significant Labs: All pertinent labs within the past 24 hours have been reviewed.  CBC:   Recent Labs   Lab 01/20/24 1734 01/21/24  0528   WBC 17.47* 19.19*   HGB 13.7 12.1   HCT 43.9 39.1    284     CMP:   Recent Labs   Lab 01/20/24 1734 01/21/24  0528   * 145   K 4.0 3.5   * 114*   CO2 25 24   * 178*   BUN 32* 31*   CREATININE 0.7 0.7   CALCIUM 10.2 9.6   PROT 7.4  6.8   ALBUMIN 2.9* 2.6*   BILITOT 0.3 0.4   ALKPHOS 87 86   AST 40 31   ALT 56* 51*   ANIONGAP 8 7*       Significant Imaging: I have reviewed all pertinent imaging results/findings within the past 24 hours.    Assessment/Plan:      * Acute encephalopathy  - Unclear etiology on admission  - UA not infectious  - CXR 1/14 with no acute findings  - Procalcitonin 0.09 (normal), lactate 1.0  - TSH 0.352. FT4 0.78 (subclinical hypothyroidism)   - B-12 slightly elevated, Folate normal, Ammonia normal   - EtOH level <10 on admit, Non-reactive HIV & Hep-C  - CT Head showed no acute abnormality.  - MRI brain with no acute infarct; new findings of remote hemorrhage and vascular which is new compared to MRI from 2018  - Vascular neurology consulted to evaluate if findings are a significant cause of her AMS  -- MRI findings do not correlate with patient's symptoms. Possibly cavernous malformation.    -- CTA Head/Neck with no acute findings.  Generalized cerebral volume loss and chronic microvascular ischemic change.  Abnormality in the ehsan identified on recent MRI is not well delineated with CT per radiology.  No evidence of acute large vessel occlusion or flow-limiting stenosis.   -- Per [facility] neuro, can repeat MRI Brain in 3 months to compare to recent ones; earlier if needed.    - Neuro checks  - Delirium precautions  - Psychiatry consulted  -- Discontinued scheduled IV depakote per psych recs  - Mentation continues to wax and wane  - Infectious workup  -- Continue Ciprofloxacin for potential UTI (based on prior culture data), sot 1/20   -- Follow up blood cultures  -- Consider repeat CXR and KUB    Dementia  - Hx noted  - CTH w/o acute abnormalities, MRI reviewed; CTA head/neck reviewed as above  - Vascular neurology consulted as above  - mIVF as needed for hydration  - Palliative care consulted   - Neuro checks     Hypertension  Chronic, controlled. Latest blood pressure and vitals reviewed-     Temp:  [97.5 °F (36.4  °C)-98.9 °F (37.2 °C)]   Pulse:  [67-88]   Resp:  [14-20]   BP: (106-186)/(50-82)   SpO2:  [96 %-98 %] .   Home meds for hypertension were reviewed and noted below.   Hypertension Medications               lisinopriL (PRINIVIL,ZESTRIL) 20 MG tablet Take 1 tablet (20 mg total) by mouth once daily.            While in the hospital, will manage blood pressure as follows; Continue home antihypertensive regimen  - Amlodipine, Coreg, Lisinopril   - PRN IV Hydralazine     Will utilize p.r.n. blood pressure medication only if patient's blood pressure greater than 160/100 and she develops symptoms such as worsening chest pain or shortness of breath.    History of stroke  - Hx noted  - CTH w/o acute abnormalities, MRI reviewed; CTA head/neck reviewed as above  - Vascular neurology consulted  - Neuro checks     Chronic schizoaffective disorder  - Hx noted  - Psychiatry consulted given continued agitation requiring restraints   -- Continue home cogentin, cymbalta, seroquel and PRN haldol  -- Dc'd IV Depacon per recs   -- PRN PO olanzapine for agitation  - Trial out of restraints as tolerated     DNR (do not resuscitate)  - Dr. 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  Last A1c reviewed-   Lab Results   Component Value Date    HGBA1C 5.4 01/25/2023   Hold Oral hypoglycemics while patient is in the hospital.  - POCT checks      VTE Risk Mitigation (From admission, onward)           Ordered     enoxaparin injection 40 mg  Daily         01/15/24 0218     IP VTE HIGH RISK PATIENT  Once         01/15/24 0218                    Discharge Planning   AGUILAR: 1/20/2024     Code Status: DNR   Is the patient medically ready for discharge?: No    Reason for patient still in  hospital (select all that apply): Patient trending condition, Laboratory test, Treatment, Consult recommendations, and Pending disposition  Discharge Plan A: Hospice/home   Discharge Delays: None known at this time              Minh Marques MD  Department of Hospital Medicine   [facility] 11H

## 2024-01-22 VITALS
OXYGEN SATURATION: 96 % | BODY MASS INDEX: 24.87 KG/M2 | HEIGHT: 62 IN | WEIGHT: 135.13 LBS | DIASTOLIC BLOOD PRESSURE: 58 MMHG | HEART RATE: 72 BPM | RESPIRATION RATE: 20 BRPM | TEMPERATURE: 99 F | SYSTOLIC BLOOD PRESSURE: 157 MMHG

## 2024-01-22 PROBLEM — Z51.5 PALLIATIVE CARE ENCOUNTER: Status: ACTIVE | Noted: 2024-01-22

## 2024-01-22 PROBLEM — R53.81 DEBILITY: Status: ACTIVE | Noted: 2024-01-22

## 2024-01-22 LAB
ALBUMIN SERPL BCP-MCNC: 2.4 G/DL (ref 3.5–5.2)
ALP SERPL-CCNC: 87 U/L (ref 55–135)
ALT SERPL W/O P-5'-P-CCNC: 46 U/L (ref 10–44)
ANION GAP SERPL CALC-SCNC: 7 MMOL/L (ref 8–16)
AST SERPL-CCNC: 28 U/L (ref 10–40)
BACTERIA UR CULT: NORMAL
BACTERIA UR CULT: NORMAL
BASOPHILS # BLD AUTO: 0.12 K/UL (ref 0–0.2)
BASOPHILS NFR BLD: 0.7 % (ref 0–1.9)
BILIRUB SERPL-MCNC: 0.3 MG/DL (ref 0.1–1)
BUN SERPL-MCNC: 18 MG/DL (ref 8–23)
CALCIUM SERPL-MCNC: 9 MG/DL (ref 8.7–10.5)
CHLORIDE SERPL-SCNC: 116 MMOL/L (ref 95–110)
CO2 SERPL-SCNC: 23 MMOL/L (ref 23–29)
CREAT SERPL-MCNC: 0.6 MG/DL (ref 0.5–1.4)
DIFFERENTIAL METHOD BLD: ABNORMAL
EOSINOPHIL # BLD AUTO: 0.3 K/UL (ref 0–0.5)
EOSINOPHIL NFR BLD: 1.9 % (ref 0–8)
ERYTHROCYTE [DISTWIDTH] IN BLOOD BY AUTOMATED COUNT: 13.3 % (ref 11.5–14.5)
EST. GFR  (NO RACE VARIABLE): >60 ML/MIN/1.73 M^2
GLUCOSE SERPL-MCNC: 158 MG/DL (ref 70–110)
HCT VFR BLD AUTO: 35.2 % (ref 37–48.5)
HGB BLD-MCNC: 10.7 G/DL (ref 12–16)
IMM GRANULOCYTES # BLD AUTO: 0.08 K/UL (ref 0–0.04)
IMM GRANULOCYTES NFR BLD AUTO: 0.5 % (ref 0–0.5)
LYMPHOCYTES # BLD AUTO: 2.8 K/UL (ref 1–4.8)
LYMPHOCYTES NFR BLD: 16.5 % (ref 18–48)
MAGNESIUM SERPL-MCNC: 1.6 MG/DL (ref 1.6–2.6)
MCH RBC QN AUTO: 29.2 PG (ref 27–31)
MCHC RBC AUTO-ENTMCNC: 30.4 G/DL (ref 32–36)
MCV RBC AUTO: 96 FL (ref 82–98)
MONOCYTES # BLD AUTO: 1.8 K/UL (ref 0.3–1)
MONOCYTES NFR BLD: 10.6 % (ref 4–15)
NEUTROPHILS # BLD AUTO: 11.9 K/UL (ref 1.8–7.7)
NEUTROPHILS NFR BLD: 69.8 % (ref 38–73)
NRBC BLD-RTO: 0 /100 WBC
PHOSPHATE SERPL-MCNC: 2.1 MG/DL (ref 2.7–4.5)
PLATELET # BLD AUTO: 243 K/UL (ref 150–450)
PMV BLD AUTO: 10.6 FL (ref 9.2–12.9)
POCT GLUCOSE: 120 MG/DL (ref 70–110)
POCT GLUCOSE: 168 MG/DL (ref 70–110)
POTASSIUM SERPL-SCNC: 3.4 MMOL/L (ref 3.5–5.1)
PROT SERPL-MCNC: 6.2 G/DL (ref 6–8.4)
RBC # BLD AUTO: 3.66 M/UL (ref 4–5.4)
SODIUM SERPL-SCNC: 146 MMOL/L (ref 136–145)
WBC # BLD AUTO: 17.03 K/UL (ref 3.9–12.7)

## 2024-01-22 PROCEDURE — 85025 COMPLETE CBC W/AUTO DIFF WBC: CPT | Mod: HCNC | Performed by: STUDENT IN AN ORGANIZED HEALTH CARE EDUCATION/TRAINING PROGRAM

## 2024-01-22 PROCEDURE — 36415 COLL VENOUS BLD VENIPUNCTURE: CPT | Mod: HCNC | Performed by: STUDENT IN AN ORGANIZED HEALTH CARE EDUCATION/TRAINING PROGRAM

## 2024-01-22 PROCEDURE — 80053 COMPREHEN METABOLIC PANEL: CPT | Mod: HCNC | Performed by: STUDENT IN AN ORGANIZED HEALTH CARE EDUCATION/TRAINING PROGRAM

## 2024-01-22 PROCEDURE — 99223 1ST HOSP IP/OBS HIGH 75: CPT | Mod: HCNC,,, | Performed by: CLINICAL NURSE SPECIALIST

## 2024-01-22 PROCEDURE — 63600175 PHARM REV CODE 636 W HCPCS: Mod: HCNC | Performed by: STUDENT IN AN ORGANIZED HEALTH CARE EDUCATION/TRAINING PROGRAM

## 2024-01-22 PROCEDURE — 83735 ASSAY OF MAGNESIUM: CPT | Mod: HCNC | Performed by: STUDENT IN AN ORGANIZED HEALTH CARE EDUCATION/TRAINING PROGRAM

## 2024-01-22 PROCEDURE — 25000003 PHARM REV CODE 250: Mod: HCNC | Performed by: STUDENT IN AN ORGANIZED HEALTH CARE EDUCATION/TRAINING PROGRAM

## 2024-01-22 PROCEDURE — 84100 ASSAY OF PHOSPHORUS: CPT | Mod: HCNC | Performed by: STUDENT IN AN ORGANIZED HEALTH CARE EDUCATION/TRAINING PROGRAM

## 2024-01-22 PROCEDURE — 99231 SBSQ HOSP IP/OBS SF/LOW 25: CPT | Mod: HCNC,,,

## 2024-01-22 PROCEDURE — 25000003 PHARM REV CODE 250: Mod: HCNC

## 2024-01-22 RX ORDER — CIPROFLOXACIN 500 MG/1
500 TABLET ORAL 2 TIMES DAILY
Qty: 10 TABLET | Refills: 0 | Status: SHIPPED | OUTPATIENT
Start: 2024-01-22 | End: 2024-01-27

## 2024-01-22 RX ORDER — LISINOPRIL 20 MG/1
30 TABLET ORAL DAILY
Qty: 45 TABLET | Refills: 11 | Status: SHIPPED | OUTPATIENT
Start: 2024-01-22

## 2024-01-22 RX ORDER — AMLODIPINE BESYLATE 5 MG/1
5 TABLET ORAL DAILY
Qty: 30 TABLET | Refills: 11 | Status: SHIPPED | OUTPATIENT
Start: 2024-01-22 | End: 2025-01-21

## 2024-01-22 RX ADMIN — SODIUM CHLORIDE: 9 INJECTION, SOLUTION INTRAVENOUS at 10:01

## 2024-01-22 RX ADMIN — AMLODIPINE BESYLATE 5 MG: 5 TABLET ORAL at 09:01

## 2024-01-22 RX ADMIN — DULOXETINE HYDROCHLORIDE 30 MG: 30 CAPSULE, DELAYED RELEASE ORAL at 09:01

## 2024-01-22 RX ADMIN — MEMANTINE HYDROCHLORIDE 20 MG: 5 TABLET ORAL at 09:01

## 2024-01-22 RX ADMIN — BENZTROPINE MESYLATE 1 MG: 1 TABLET ORAL at 09:01

## 2024-01-22 RX ADMIN — LISINOPRIL 20 MG: 20 TABLET ORAL at 09:01

## 2024-01-22 RX ADMIN — CIPROFLOXACIN 400 MG: 2 INJECTION, SOLUTION INTRAVENOUS at 04:01

## 2024-01-22 RX ADMIN — DONEPEZIL HYDROCHLORIDE 5 MG: 5 TABLET, FILM COATED ORAL at 06:01

## 2024-01-22 RX ADMIN — CARVEDILOL 6.25 MG: 3.12 TABLET, FILM COATED ORAL at 09:01

## 2024-01-22 NOTE — ASSESSMENT & PLAN NOTE
Impression: Pt is an 77 y/o female with hx of dementia, deaf since birth, CVA, and schizophrenia presents from [facility] for AMS. Pt sleeping at this time. Puree diet. Pt moving extremities in bed to reposition slef while sleeping. No signs of distress noted. Pt is DNR.     Reason for consult: Spoke to MD. Sister was interested in hospice care at [facility]. Per MD, [facility] evaluated pt and did not meet hospice criteria. Pt appropriate to go back with palliative.     Goals of care/ACP:   Rounded on pt. Pt sleeping. No family at bedside. Pt appears to be able to reposition herself while sleeping. Pt is deaf.   Called to speak to pt's sister to gauge her dementia at this time. No answer. Pt eats puree diet, ambulates with assistance to bathroom.  From chart review, pt appears appropriate for home based palliative care at [facility] with transition to hospice as she declines.     MPOA: Pt's sister, Jose Francisco.   Code status: DNR.     Symptom management:    Debility:  Pt needs assistance with meals, ambulation.    No sign of distress noted when examining pt.     Diet: Puree.     Schizophrenia:   Managed per psychiatry.     Plan:   Back to [place] when attending ready.   Will f/u with pt's sister.   Resume Palliative services if pt had prior to admit, if not pt would benefit from home-based Pal care.    Plan:  See above.

## 2024-01-22 NOTE — HPI
Pt is an 78 y.o. female with a hx of dementia, deaf since birth, CVA, and schizophrenia who presented from [facility] for AMS. Patient unable to provide hx or participate in exam. Sister at bedside. States patient was in her usual state of health when she had an unwitnessed fall. Per sister, unclear if she hit her head and unclear how she fell. Per staff and sister, the patient was not herself shortly after. She would not open her eyes all the way, was very combative/ agitated and did not recognize her sister. Stated she is usually able to ambulate with a walker and can say some short phrases but is unable to have a conversation. Per sister, did not believe patient was in pain and patient was moving all extremities with good strength. No noticeable facial droop, convulsions or unilateral weakness. Patient has a hx of incontinence. Prior to my exam, pt received haldol which sister states put her to sleep and has been the most restful she has been since arrival to the ED.     Confirmed with sister at bedside a code status of DNR. Sister wishes for patient to be comfortable.

## 2024-01-22 NOTE — PROGRESS NOTES
"CONSULTATION LIAISON PSYCHIATRY PROGRESS NOTE    Patient Name: Lay Peres  MRN: [id number]  Patient Class: IP- Inpatient  Admission Date: 1/14/2024  Attending Physician: Minh Marques MD      SUBJECTIVE:   Lay Peres is a 78 y.o. female with past psychiatric history of dementia and schizophrenia & past pertinent medical history of deaf since birth and CVA presents to the ED/admitted to the hospital for Acute encephalopathy. Sister at bedside. States patient was in her usual state of health when she had an unwitnessed fall at her living facility. Per sister, unclear if she hit her head and unclear how she fell. Per staff and sister, the patient was not herself shortly after. She would not open her eyes all the way, was very combative/ agitated and did not recognize her sister. States she is usually able to ambulate with a walker and can say some short phrases but is unable to have a conversation.    Psychiatry consulted for "agitation requiring restraints, needing to transition to oral medications"      Per chart review GERALDO. Also per chart review pt will be discharging soon, likely today. Pt has not rec'd/needed of any of reccommended behavioral PRNs.    Pt sleeping in bed this morning. Moving in bed. No distress noted. No family at bedside.        OBJECTIVE:    Mental Status Exam:  General Appearance: lying in bed, thin and gaunt  Behavior: unable to participate, minimal responses  Involuntary Movements and Motor Activity: no abnormal involuntary movements noted  Gait and Station: unable to assess - patient lying down or seated  Speech and Language:  unable to assess, pt deaf and unable to communicate at this time. Per sister can state few words/short sentences at baseline   Mood: FRANK  Affect: blunted  Thought Process and Associations: Unable to Assess  Thought Content and Perceptions:: Unable to Assess  Sensorium and Orientation: Unable to Formally Assess  Recent and Remote Memory: " Unable to  Formally Assess  Attention and Concentration: Unable to Formally Assess  Fund of Knowledge: Unable to Formally Assess  Insight:  FRANK  Judgment:  FRANK    CAM ICU positive? Frank       ASSESSMENT & RECOMMENDATIONS   Schizophrenia  Dementia  Intellectual Disability  Deafness, congential     PSYCH MEDICATIONS (per previous recommendations):   Cogentin 1mg PO BID  Cymbalta 30mg PO Daily  PRN Haldol 5mg PO daily (if agitated)  Can use zyprexa 2.5mg PO Q6H PRN agitation  Seroquel 200mg PO QHS  Pt unable to swallow meds on own, please ensure the meds are being crushed and fed via pudding/applesauce and entire amount is consumed.       DELIRIUM  DELIRIUM BEHAVIOR MANAGEMENT  Continue medical workup for causative etiology of delirium.  Continue to manage medical conditions and assess for and treat pain.  Please try to minimize the use of physical restraints, as they can worsen agitation; utilize PRN medications first.  Please avoid/minimize use of benzodiazepines (understand that may need to use BZD taper for EtOH withdrawal), anticholinergics, antihistamines (H1- and H2-blockers), and opioids when possible, as they may exacerbate delirium.  Please keep shades open and lights on during day and shades closed and lights off at night to encourage normal sleep/wake cycle.  Reduce unnecessary stimulation/noise. TV screen and sound should be off unless patient is actively engaged w/ the program.  Encourage staff to reorient pt as needed throughout the day and to optimize pt's surroundings w/ an accurately updated calendar, and functioning clock.  Frequently remind pt of reason for hospitalization and the plan of care.  Attempt to maintain consistency in nursing staff.  Encourage family to be present as much as possible.   Correct sensory deficits, when present, with provision of the pt's eyeglasses and/or hearing aids.  Optimize nutrition and hydration status.  Recommend PT/OT consult. Early mobility and exercise have been  shown to decrease duration of delirium.    Monitor QTc closely; last EKG 1/14 Qtc 439        RISK ASSESSMENT  NO NEED FOR PEC patient NOT in any imminent danger of hurting self or others and not gravely disabled.      FOLLOW UP  Will sign off. Please re-consult if needed.     DISPOSITION - once medically cleared:    Defer to medical team    Please contact ON CALL psychiatry service (24/7) for any acute issues that may arise.      Devin Poplus, PMHNP [facility]        --------------------------------------------------------------------------------------------------------------------------------------------------------------------------------------------------------------------------------------    CONTINUED OBJECTIVE clinical data & findings reviewed and noted for above decision making    Current Medications:   Scheduled Meds:    amLODIPine  5 mg Oral Daily    benztropine  1 mg Oral BID    carvediloL  6.25 mg Oral BID    ciprofloxacin  400 mg Intravenous Q12H    donepeziL  5 mg Oral QAM    DULoxetine  30 mg Oral Daily    enoxparin  40 mg Subcutaneous Daily    gabapentin  300 mg Oral QHS    lisinopriL  20 mg Oral Daily    memantine  20 mg Oral Daily    QUEtiapine  200 mg Oral Nightly     PRN Meds: acetaminophen, albuterol-ipratropium, bisacodyL, dextrose 10%, dextrose 10%, glucagon (human recombinant), glucose, glucose, haloperidoL, hydrALAZINE, melatonin, OLANZapine, ondansetron, polyethylene glycol, promethazine, sodium chloride 0.9%    Allergies:   Review of patient's allergies indicates:  No Known Allergies    Vitals  Vitals:    01/22/24 0727   BP: (!) 179/74   Pulse: 82   Resp: 18   Temp: 98.4 °F (36.9 °C)       Labs/Imaging/Studies:  Recent Results (from the past 24 hour(s))   POCT glucose    Collection Time: 01/21/24 11:15 AM   Result Value Ref Range    POCT Glucose 122 (H) 70 - 110 mg/dL   Blood culture    Collection Time: 01/21/24 11:16 AM    Specimen: Antecubital, Right Arm; Blood   Result Value  Ref Range    Blood Culture, Routine No Growth to date    Blood culture    Collection Time: 01/21/24 11:17 AM    Specimen: Peripheral, Right Hand; Blood   Result Value Ref Range    Blood Culture, Routine No Growth to date    POCT glucose    Collection Time: 01/21/24  4:45 PM   Result Value Ref Range    POCT Glucose 124 (H) 70 - 110 mg/dL   POCT glucose    Collection Time: 01/21/24  8:47 PM   Result Value Ref Range    POCT Glucose 118 (H) 70 - 110 mg/dL   Comprehensive Metabolic Panel    Collection Time: 01/22/24  5:59 AM   Result Value Ref Range    Sodium 146 (H) 136 - 145 mmol/L    Potassium 3.4 (L) 3.5 - 5.1 mmol/L    Chloride 116 (H) 95 - 110 mmol/L    CO2 23 23 - 29 mmol/L    Glucose 158 (H) 70 - 110 mg/dL    BUN 18 8 - 23 mg/dL    Creatinine 0.6 0.5 - 1.4 mg/dL    Calcium 9.0 8.7 - 10.5 mg/dL    Total Protein 6.2 6.0 - 8.4 g/dL    Albumin 2.4 (L) 3.5 - 5.2 g/dL    Total Bilirubin 0.3 0.1 - 1.0 mg/dL    Alkaline Phosphatase 87 55 - 135 U/L    AST 28 10 - 40 U/L    ALT 46 (H) 10 - 44 U/L    eGFR >60.0 >60 mL/min/1.73 m^2    Anion Gap 7 (L) 8 - 16 mmol/L   CBC Auto Differential    Collection Time: 01/22/24  5:59 AM   Result Value Ref Range    WBC 17.03 (H) 3.90 - 12.70 K/uL    RBC 3.66 (L) 4.00 - 5.40 M/uL    Hemoglobin 10.7 (L) 12.0 - 16.0 g/dL    Hematocrit 35.2 (L) 37.0 - 48.5 %    MCV 96 82 - 98 fL    MCH 29.2 27.0 - 31.0 pg    MCHC 30.4 (L) 32.0 - 36.0 g/dL    RDW 13.3 11.5 - 14.5 %    Platelets 243 150 - 450 K/uL    MPV 10.6 9.2 - 12.9 fL    Immature Granulocytes 0.5 0.0 - 0.5 %    Gran # (ANC) 11.9 (H) 1.8 - 7.7 K/uL    Immature Grans (Abs) 0.08 (H) 0.00 - 0.04 K/uL    Lymph # 2.8 1.0 - 4.8 K/uL    Mono # 1.8 (H) 0.3 - 1.0 K/uL    Eos # 0.3 0.0 - 0.5 K/uL    Baso # 0.12 0.00 - 0.20 K/uL    nRBC 0 0 /100 WBC    Gran % 69.8 38.0 - 73.0 %    Lymph % 16.5 (L) 18.0 - 48.0 %    Mono % 10.6 4.0 - 15.0 %    Eosinophil % 1.9 0.0 - 8.0 %    Basophil % 0.7 0.0 - 1.9 %    Differential Method Automated    Magnesium     Collection Time: 01/22/24  5:59 AM   Result Value Ref Range    Magnesium 1.6 1.6 - 2.6 mg/dL   Phosphorus    Collection Time: 01/22/24  5:59 AM   Result Value Ref Range    Phosphorus 2.1 (L) 2.7 - 4.5 mg/dL   POCT glucose    Collection Time: 01/22/24  7:27 AM   Result Value Ref Range    POCT Glucose 168 (H) 70 - 110 mg/dL     Imaging Results               MRI Brain W WO Contrast (Final result)  Result time 01/16/24 10:44:48      Final result by Ayaan Coffman MD (01/16/24 10:44:48)                   Impression:      No evidence for acute infarct.    Focal T2 hypointense signal with diffuse focal susceptibility artifact about the right michelle ehsan, with faint heterogeneous peripheral T1 hyperintense signal.  No abnormal enhancement, surrounding edema, abnormal diffusion signal, or mass effect.  Findings are new from comparison MRI 2018, and may relate to remote hemorrhage, noting that additional considerations include vascular abnormality such as a cavernous malformation.  Continued follow-up is recommended.    Sequela of chronic microvascular ischemic change.    This report was flagged in Epic as abnormal.      Electronically signed by: Ayaan Coffman  Date:    01/16/2024  Time:    10:44               Narrative:    EXAMINATION:  MRI BRAIN W WO CONTRAST    CLINICAL HISTORY:  Acute encephalopathy;.    TECHNIQUE:  Multiplanar multisequence MR imaging of the brain was performed before and after the administration of 6 mL Gadavist  intravenous contrast.    COMPARISON:  CT head without contrast 01/14/2024, MRI brain without contrast 07/13/2018.    FINDINGS:  Exam degraded by patient motion artifact.    Intracranial compartment:    Prominence of the ventricles with compensatory enlargement of the sulci, in keeping with central volume loss.  Punctate focus susceptibility artifact involving the left occipital horn, which may represent choroid plexus.  No extra-axial blood or fluid collections.    Diffuse patchy and confluent  regions of subcortical and periventricular T2/FLAIR hyperintense signal, overall nonspecific, but can be seen in the setting of microvascular ischemic change.  Focal T2 hypointensity signal with large region associated susceptibility artifact involving the right michelle ehsan (axial series 12, image 9; series 13, image 9).  Faint peripheral intrinsic T1 hyperintense signal without evidence for surrounding enhancement or abnormal diffusion signal.  Additional faint peripheral T2/FLAIR hyperintense signal.  No significant surrounding mass effect or edema.  Remote lacunar type infarct versus prominent perivascular space involving the left insular region.    No mass lesion, acute hemorrhage, edema or acute infarct. No abnormal enhancement.  Partially empty sella configuration.    Normal vascular flow voids are preserved.    Skull/extracranial contents (limited evaluation): Bone marrow signal intensity is normal.  Hyperostosis frontalis.    Paranasal sinuses and mastoid air cells are essentially clear.                                       CT Cervical Spine Without Contrast (Final result)  Result time 01/15/24 02:12:06      Final result by Akin Booth MD (01/15/24 02:12:06)                   Impression:      Cervical spine CT: Nondiagnostic because of excessive motion artifact.  Correlate clinically.  Consider follow-up scanning if and when the patient is better able to hold still.    If clinically necessary, consider radiographs in the meantime.    PASQUALE Booth MD, first observed the findings on 01/15/2024 at 02:01, and reported the results to Destinee Kapoor RN, via epic secure chat message on 01/15/2024 at 02:10.  Patient name and medical record number were specified/linked in the message. The messaging system provided confirmation that the message was immediately opened and seen by the recipient.    Electronically signed by resident: Philippe Morales  Date:    01/15/2024  Time:    01:19    Electronically signed by: Akin  Leobardo  Date:    01/15/2024  Time:    02:12               Narrative:    EXAMINATION:  CT CERVICAL SPINE WITHOUT CONTRAST    CLINICAL HISTORY:  Neck trauma (Age >= 65y);    TECHNIQUE:  Low dose axial images, sagittal and coronal reformations were performed though the cervical spine.  Contrast was not administered.  Additional images were taken due to severe patient motion artifact.    COMPARISON:  CT C-spine 08/23/2021    FINDINGS:  Severe patient motion artifact significantly limits evaluation.  A 2nd set of images was acquired but is also markedly degraded by motion artifact.    Consequently, this scan is considered nondiagnostic.                                       X-Ray Chest 1 View (Final result)  Result time 01/14/24 22:44:23      Final result by Dino Mercado MD (01/14/24 22:44:23)                   Impression:      No acute radiographic abnormality.  See above comments.      Electronically signed by: Dino Mercado  Date:    01/14/2024  Time:    22:44               Narrative:    EXAMINATION:  XR CHEST 1 VIEW    CLINICAL HISTORY:  Encephalopathy, unspecified    TECHNIQUE:  Single frontal view of the chest was performed.    COMPARISON:  08/18/2022    FINDINGS:  Suboptimal inspiration.    Mild nonspecific interstitial changes.    Possible mild left basilar retrocardiac atelectasis.    No effusion or pneumothorax.    The cardiac silhouette is normal in size. The hilar and mediastinal contours are unremarkable.    Bones are intact.    No significant change.                                       CT Head Without Contrast (Final result)  Result time 01/14/24 22:21:14      Final result by Dino Mercado MD (01/14/24 22:21:14)                   Impression:      1. No acute intracranial process.  2. Significant motion artifact may diminish the sensitivity.  3. Involutional changes with chronic microvascular ischemic changes.      Electronically signed by: Dino Mercado  Date:    01/14/2024  Time:    22:21                Narrative:    EXAMINATION:  CT HEAD WITHOUT CONTRAST    CLINICAL HISTORY:  Mental status change, unknown cause;    TECHNIQUE:  Low dose axial CT images obtained throughout the head without intravenous contrast. Sagittal and coronal reconstructions were performed.    COMPARISON:  04/24/2023    FINDINGS:  Intracranial compartment:    No midline shift or acute hydrocephalus.  No extra-axial blood or fluid collections.    Moderate involutional changes and chronic microvascular ischemic changes in the periventricular white matter.    Significant motion artifact may diminish the sensitivity.    No parenchymal mass, hemorrhage, edema or major vascular distribution infarct.    Skull/extracranial contents (limited evaluation): No fracture. Mastoid air cells and paranasal sinuses are essentially clear.

## 2024-01-22 NOTE — SUBJECTIVE & OBJECTIVE
Interval History: From [facility].    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 No pertinent surgical history.    Review of patient's allergies indicates:  No Known Allergies    Medications:  Continuous Infusions:   sodium chloride 0.9% 75 mL/hr at 01/21/24 2019     Scheduled Meds:   amLODIPine  5 mg Oral Daily    benztropine  1 mg Oral BID    carvediloL  6.25 mg Oral BID    ciprofloxacin  400 mg Intravenous Q12H    donepeziL  5 mg Oral QAM    DULoxetine  30 mg Oral Daily    enoxparin  40 mg Subcutaneous Daily    gabapentin  300 mg Oral QHS    lisinopriL  20 mg Oral Daily    memantine  20 mg Oral Daily    QUEtiapine  200 mg Oral Nightly     PRN Meds:acetaminophen, albuterol-ipratropium, bisacodyL, dextrose 10%, dextrose 10%, glucagon (human recombinant), glucose, glucose, haloperidoL, hydrALAZINE, melatonin, OLANZapine, ondansetron, polyethylene glycol, promethazine, sodium chloride 0.9%    Family History       Problem Relation (Age of Onset)    Depression Sister, Brother    Suicide Brother          Tobacco Use    Smoking status: Never    Smokeless tobacco: Never   Substance and Sexual Activity    Alcohol use: No    Drug use: No    Sexual activity: Not Currently       Review of Systems   Unable to perform ROS: Dementia (deaf)     Objective:     Vital Signs (Most Recent):  Temp: 98.4 °F (36.9 °C) (01/22/24 0727)  Pulse: 82 (01/22/24 0727)  Resp: 18 (01/22/24 0727)  BP: (!) 179/74 (01/22/24 0727)  SpO2: 97 % (01/22/24 0727) Vital Signs (24h Range):  Temp:  [98 °F  (36.7 °C)-98.9 °F (37.2 °C)] 98.4 °F (36.9 °C)  Pulse:  [78-99] 82  Resp:  [18-20] 18  SpO2:  [96 %-98 %] 97 %  BP: (141-179)/() 179/74     Weight: 61.3 kg (135 lb 2.3 oz)  Body mass index is 24.72 kg/m².       Physical Exam  Constitutional:       General: She is sleeping.      Appearance: She is normal weight.   HENT:      Head: Normocephalic and atraumatic.      Ears:      Comments: deaf  Cardiovascular:      Rate and Rhythm: Normal rate and regular rhythm.   Pulmonary:      Effort: Pulmonary effort is normal. No respiratory distress.   Musculoskeletal:      Comments: Moving extremities in bed.    Skin:     General: Skin is warm and dry.   Neurological:      Comments: sleeping            Review of Symptoms      Symptom Assessment (ESAS 0-10 Scale)  Unable to complete assessment due to Dementia         Pain Assessment in Advanced Demential Scale (PAINAD)   Breathing - Independent of vocalization:  0  Negative vocalization:  0  Facial expression:  0  Body language:  0  Consolability:  0  Total:  0    Performance Status:  40    Living Arrangements:  Lives in assisted living    Psychosocial/Cultural:   See Palliative Psychosocial Note: No  Pt lives in at [place]. Pt's sister, Jose Francisco is MPOA.   **Primary  to Follow**  Palliative Care  Consult: No        Advance Care Planning   Advance Directives:   Living Will: Yes        Copy on chart: Yes    Agent's Name:  Jose Francisco Segura    Decision Making:  Patient unable to communicate due to disease severity/cognitive impairment  Goals of Care: What is most important right now is to focus on avoiding the hospital. Accordingly, we have decided that the best plan to meet the patient's goals is palliative f/u         Significant Labs: All pertinent labs within the past 24 hours have been reviewed.  CBC:   Recent Labs   Lab 01/22/24  0559   WBC 17.03*   HGB 10.7*   HCT 35.2*   MCV 96        BMP:  Recent Labs   Lab 01/22/24  0559   *   NA  146*   K 3.4*   *   CO2 23   BUN 18   CREATININE 0.6   CALCIUM 9.0   MG 1.6     LFT:  Lab Results   Component Value Date    AST 28 01/22/2024    ALKPHOS 87 01/22/2024    BILITOT 0.3 01/22/2024     Albumin:   Albumin   Date Value Ref Range Status   01/22/2024 2.4 (L) 3.5 - 5.2 g/dL Final     Protein:   Total Protein   Date Value Ref Range Status   01/22/2024 6.2 6.0 - 8.4 g/dL Final     Lactic acid:   Lab Results   Component Value Date    LACTATE 1.0 01/16/2024    LACTATE 1.1 12/27/2021       Significant Imaging: I have reviewed all pertinent imaging results/findings within the past 24 hours.

## 2024-01-22 NOTE — PLAN OF CARE
[facility] - Observation 11H  Discharge Final Note    SW observed d/c orders for pt. Ordered [facility] transport via ambulance for today at 1300 to [facility]. Report can be called to [phone]. SW informed pt/family of transport ETA and they are agreeable. SW left printed transport packet at nurse's station with unit secretary. All questions answered.    Primary Care Provider: Krista Case MD    Expected Discharge Date: 1/22/2024    Final Discharge Note (most recent)       Final Note - 01/22/24 1143          Final Note    Assessment Type Final Discharge Note     Anticipated Discharge Disposition Intermediate Care Facility for detention or Supportive Care     What phone number can be called within the next 1-3 days to see how you are doing after discharge? [phone]        Post-Acute Status    Other Status See Comments   Pt to return to [facility]    Discharge Delays None known at this time                     Important Message from Medicare  Important Message from Medicare regarding Discharge Appeal Rights: Given to patient/caregiver, Explained to patient/caregiver, Signed/date by patient/caregiver, Other (comments) (Sister signed)     Date IMM was signed: 01/17/24  Time IMM was signed: 1400    Contact Info       [place] [phone]       Next Steps: Follow up    Instructions: group home    [facility]   Specialty: Hospice Services    [address] [address]   Phone: [phone]       Next Steps: Follow up    Instructions: Hospice          1429  SW added an avoidable night for 1/19/24-1/20/24 due to DON from [facility] stating that if pt could not be received prior to 7pm, then pt would have to wait until Saturday, 1/20/24, to be admitted.    LI Kessler, LMSW    Case Management Department  [facility]

## 2024-01-22 NOTE — CONSULTS
[facility]  Palliative Medicine  Consult Note    Patient Name: Lay Peres  MRN: [id number]  Admission Date: 1/14/2024  Hospital Length of Stay: 6 days  Code Status: DNR   Attending Provider: Minh Marques MD  Consulting Provider: LORNA Coreas  Primary Care Physician: Krista Case MD  Principal Problem:Acute encephalopathy    Patient information was obtained from past medical records and primary team.      Consults  Assessment/Plan:     Palliative Care  Palliative care encounter  Impression: Pt is an 79 y/o female with hx of dementia, deaf since birth, CVA, and schizophrenia presents from [facility] for AMS. Pt sleeping at this time. Puree diet. Pt moving extremities in bed to reposition slef while sleeping. No signs of distress noted. Pt is DNR.     Reason for consult: Spoke to MD. Sister was interested in hospice care at [place]. Per MD, [facility] evaluated pt and did not meet hospice criteria. Pt appropriate to go back with palliative.     Goals of care/ACP:   Rounded on pt. Pt sleeping. No family at bedside. Pt appears to be able to reposition herself while sleeping. Pt is deaf.   Called to speak to pt's sister to gauge her dementia at this time. No answer. Pt eats puree diet, ambulates with assistance to bathroom.  From chart review, pt appears appropriate for home based palliative care at [place] with transition to hospice as she declines.     MPOA: Pt's sister, Jose Francisco.   Code status: DNR.     Symptom management:    Debility:  Pt needs assistance with meals, ambulation.    No sign of distress noted when examining pt.     Diet: Puree.     Schizophrenia:   Managed per psychiatry.     Plan:   Back to [place] when attending ready.   Will f/u with pt's sister.   Resume Palliative services if pt had prior to admit, if not pt would benefit from home-based Pal care.    Plan:  See above.           Thank you for your consult. I will follow-up with  patient. Please contact us if you have any additional questions.    Subjective:     HPI:   Pt is an 78 y.o. female with a hx of dementia, deaf since birth, CVA, and schizophrenia who presented from [facility] for AMS. Patient unable to provide hx or participate in exam. Sister at bedside. States patient was in her usual state of health when she had an unwitnessed fall. Per sister, unclear if she hit her head and unclear how she fell. Per staff and sister, the patient was not herself shortly after. She would not open her eyes all the way, was very combative/ agitated and did not recognize her sister. Stated she is usually able to ambulate with a walker and can say some short phrases but is unable to have a conversation. Per sister, did not believe patient was in pain and patient was moving all extremities with good strength. No noticeable facial droop, convulsions or unilateral weakness. Patient has a hx of incontinence. Prior to my exam, pt received haldol which sister states put her to sleep and has been the most restful she has been since arrival to the ED.     Confirmed with sister at bedside a code status of DNR. Sister wishes for patient to be comfortable.     Hospital Course:  No notes on file    Interval History: From [facility].    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 No pertinent surgical history.    Review of patient's allergies indicates:  No Known Allergies    Medications:  Continuous Infusions:   sodium chloride 0.9% 75 mL/hr at  01/21/24 2019     Scheduled Meds:   amLODIPine  5 mg Oral Daily    benztropine  1 mg Oral BID    carvediloL  6.25 mg Oral BID    ciprofloxacin  400 mg Intravenous Q12H    donepeziL  5 mg Oral QAM    DULoxetine  30 mg Oral Daily    enoxparin  40 mg Subcutaneous Daily    gabapentin  300 mg Oral QHS    lisinopriL  20 mg Oral Daily    memantine  20 mg Oral Daily    QUEtiapine  200 mg Oral Nightly     PRN Meds:acetaminophen, albuterol-ipratropium, bisacodyL, dextrose 10%, dextrose 10%, glucagon (human recombinant), glucose, glucose, haloperidoL, hydrALAZINE, melatonin, OLANZapine, ondansetron, polyethylene glycol, promethazine, sodium chloride 0.9%    Family History       Problem Relation (Age of Onset)    Depression Sister, Brother    Suicide Brother          Tobacco Use    Smoking status: Never    Smokeless tobacco: Never   Substance and Sexual Activity    Alcohol use: No    Drug use: No    Sexual activity: Not Currently       Review of Systems   Unable to perform ROS: Dementia (deaf)     Objective:     Vital Signs (Most Recent):  Temp: 98.4 °F (36.9 °C) (01/22/24 0727)  Pulse: 82 (01/22/24 0727)  Resp: 18 (01/22/24 0727)  BP: (!) 179/74 (01/22/24 0727)  SpO2: 97 % (01/22/24 0727) Vital Signs (24h Range):  Temp:  [98 °F (36.7 °C)-98.9 °F (37.2 °C)] 98.4 °F (36.9 °C)  Pulse:  [78-99] 82  Resp:  [18-20] 18  SpO2:  [96 %-98 %] 97 %  BP: (141-179)/() 179/74     Weight: 61.3 kg (135 lb 2.3 oz)  Body mass index is 24.72 kg/m².       Physical Exam  Constitutional:       General: She is sleeping.      Appearance: She is normal weight.   HENT:      Head: Normocephalic and atraumatic.      Ears:      Comments: deaf  Cardiovascular:      Rate and Rhythm: Normal rate and regular rhythm.   Pulmonary:      Effort: Pulmonary effort is normal. No respiratory distress.   Musculoskeletal:      Comments: Moving extremities in bed.    Skin:     General: Skin is warm and dry.   Neurological:      Comments: sleeping            Review  of Symptoms      Symptom Assessment (ESAS 0-10 Scale)  Unable to complete assessment due to Dementia         Pain Assessment in Advanced Demential Scale (PAINAD)   Breathing - Independent of vocalization:  0  Negative vocalization:  0  Facial expression:  0  Body language:  0  Consolability:  0  Total:  0    Performance Status:  40    Living Arrangements:  Lives in assisted living    Psychosocial/Cultural:   See Palliative Psychosocial Note: No  Pt lives in at [place]. Pt's sister, Jose Francisco is MPOA.   **Primary  to Follow**  Palliative Care  Consult: No        Advance Care Planning  Advance Directives:   Living Will: Yes        Copy on chart: Yes    Agent's Name:  Jose Francisco Segura    Decision Making:  Patient unable to communicate due to disease severity/cognitive impairment  Goals of Care: What is most important right now is to focus on avoiding the hospital. Accordingly, we have decided that the best plan to meet the patient's goals is palliative f/u         Significant Labs: All pertinent labs within the past 24 hours have been reviewed.  CBC:   Recent Labs   Lab 01/22/24  0559   WBC 17.03*   HGB 10.7*   HCT 35.2*   MCV 96        BMP:  Recent Labs   Lab 01/22/24  0559   *   *   K 3.4*   *   CO2 23   BUN 18   CREATININE 0.6   CALCIUM 9.0   MG 1.6     LFT:  Lab Results   Component Value Date    AST 28 01/22/2024    ALKPHOS 87 01/22/2024    BILITOT 0.3 01/22/2024     Albumin:   Albumin   Date Value Ref Range Status   01/22/2024 2.4 (L) 3.5 - 5.2 g/dL Final     Protein:   Total Protein   Date Value Ref Range Status   01/22/2024 6.2 6.0 - 8.4 g/dL Final     Lactic acid:   Lab Results   Component Value Date    LACTATE 1.0 01/16/2024    LACTATE 1.1 12/27/2021       Significant Imaging: I have reviewed all pertinent imaging results/findings within the past 24 hours.      I spent a total of 75 minutes on the day of the visit. This includes face to face time in discussion of  goals of care, symptom assessment, coordination of care and emotional support.  This also includes non-face to face time preparing to see the patient (eg, review of tests/imaging), obtaining and/or reviewing separately obtained history, documenting clinical information in the electronic or other health record, independently interpreting results and communicating results to the patient/family/caregiver, or care coordinator.    Remedios De Souza, CNS  Palliative Medicine  [facility] - Observation 11H

## 2024-01-23 ENCOUNTER — TELEPHONE (OUTPATIENT)
Dept: INTERNAL MEDICINE | Facility: CLINIC | Age: 79
End: 2024-01-23
Payer: MEDICARE

## 2024-01-23 LAB — VIT B1 BLD-MCNC: 100 UG/L (ref 38–122)

## 2024-01-23 NOTE — TELEPHONE ENCOUNTER
----- Message from Darlene Jade sent at 1/23/2024  9:36 AM CST -----  Contact: carmen/[employer]/[phone]  Carmen with [employer]  call in regards revaluation to the patient. Please call and advise. Thank you

## 2024-01-24 DIAGNOSIS — G31.1 SENILE DEGENERATION OF BRAIN: Primary | ICD-10-CM

## 2024-01-24 NOTE — PHYSICIAN QUERY
PT Name: Lay Peres  MR #: [id number]    DOCUMENTATION CLARIFICATION     CDS/:  Derick Mack RN, CDS                   Contact Information:  [email or web address]  This form is a permanent document in the medical record.     Query Date: January 24, 2024    By submitting this query, we are merely seeking further clarification of documentation. Please utilize your independent clinical judgment when addressing the question(s) below.    The Medical Record contains the following:   Indicators   Supporting Clinical Findings     Location in Medical Record   X AMS, Confusion,  LOC, etc.  Lay Peres is a 78 y.o. female with a hx of dementia, deaf since birth, CVA, and schizophrenia presents from [facility] for AMS.     Throughout interview pt lying in bed with eyes closed and moaning. Sister states this is all she does while she has been in the hospital and reiterates that this is not her baseline     Pt with marked agitation requiring restrains and PRN antipsychotics      DCS 1/22        Psych Consult 1/18        HM PN 1/21     X Acute/Chronic Illness Acute encephalopathy       - Unclear etiology on admission       - UA not infectious       - CXR 1/14 with no acute findings       - Procalcitonin 0.09 (normal), lactate 1.0       - TSH 0.352. FT4 0.78 (subclinical hypothyroidism)        - B-12 slightly elevated, Folate normal, Ammonia normal        - EtOH level <10 on admit, Non- reactive HIV & Hep-C    Dementia       - Hx noted, likely with vascular type on dementia based on imaging with co morbidities     Chronic schizoaffective disorder    History of stroke   DCS 1/22    Radiology Findings Impression:     No evidence for acute infarct.     Focal T2 hypointense signal with diffuse focal susceptibility artifact about the right michelle ehsan, with faint heterogeneous peripheral T1 hyperintense signal.  No abnormal enhancement, surrounding edema, abnormal diffusion signal, or mass effect.   "Findings are new from comparison MRI 2018, and may relate to remote hemorrhage, noting that additional considerations include vascular abnormality such as a cavernous malformation.  Continued follow-up is recommended.     Sequela of chronic microvascular ischemic change.    CT Head showed no acute abnormality.  MRI 1/16                              DCS 1/22      Electrolyte Imbalance     X Medication  She is currently taking Haldol dec 50mg every 14 days, Seroquel 200mg qHS, Cymbalta 30mg daily, and Cogentin 1mg BID.  She is taking Namenda and Aricept for her dementia.       Continue home cogentin, cymbalta, seroquel and PRN haldol  Dc'd IV Depacon per recs   PRN PO olanzapine for agitation   Psych consult 1/18        DCS 1/22   X Treatment         From chart review, pt appears appropriate for home based palliative care at [place] with transition to hospice as she declines.        PM Consult 1/22   X Other Per family pt at baseline "kicks/ hits/ scratches and bites".    Mentation continues to wax and wane, requiring restraints     MRI findings do not correlate with patient's symptoms. Possibly cavernous malformation     Long history of schizophrenia, dementia appears to be progressing     ED RN 1/14    DCS 1/22    VN Consult 1/16      Psych PN attestation 1/20       The noted clinical guidelines are only system guidelines and do not replace the providers clinical judgment.    The [place] of Neurologic Disorders and Stroke (NINDS) of the NIH describes encephalopathy as any diffuse disease of the brain that alters brain function or structure.    Provider, after study please document your best medical opinion regarding the etiology of Acute Encephalopathy?    [ x  ] Encephalopathy Ruled Out;  AMS d/t delirium on dementia     [   ] Other Etiology (please specify): ____________________     [   ] Other neurological condition (please specify condition): __________         Please document in your progress " notes daily for the duration of treatment until resolved, and include in your discharge summary.    References:  FRANSISCO Benson RN, CCDS. (2018, June 9). Notes from the Instructor: Encephalopathy tips. Retrieved October 22, 2020, from https://acdis.org/articles/note-instructor-encephalopathy-tips    ICD-9-CM Coding Clinic First Quarter 2013, Effective with discharges: October 21, 2013 [facility] § Seizure with encephalopathy due to postictal state (2013).    ICD-10-CM/[employer] Integrated Codebook (Version V.20.8.10.0) [Computer software]. (2020). Retrieved October 21, 2020.    National [place] of Neurological Disorders and Stroke. (2019, March 27). Retrieved October 22, 2020, from https://www.ninds.nih.gov/Disorders/All-Disorders/Fqdwcmnrlxglhc-Ifssnyfbvux-Cgmw    Form No. 65604

## 2024-01-24 NOTE — TELEPHONE ENCOUNTER
Yes have hospice re evaluate  Not eating  Lost weight - current weight 121  December weight 128  November weight 129

## 2024-01-24 NOTE — TELEPHONE ENCOUNTER
Spoke to Christina gave her the reevaluate for pt she will send a nurse and they will get intouch with the sister

## 2024-01-24 NOTE — PLAN OF CARE
CHW met with patient/family at bedside. Patient experience rounding completed and reviewed the following.     Do you know your discharge plan? Yes  If yes, what is the plan? Other - Hospice    Have you discussed your needs and preferences with your SW/CM? Yes       Assigned SW/CM notified of any patient/family needs or concerns. Appropriate resources provided to address patient's needs.

## 2024-01-25 NOTE — TELEPHONE ENCOUNTER
Pt is not currently an active pt in neurology and appears to be managed by her PCP for ASA if needed at this time.

## 2024-01-26 ENCOUNTER — OUTPATIENT CASE MANAGEMENT (OUTPATIENT)
Dept: ADMINISTRATIVE | Facility: OTHER | Age: 79
End: 2024-01-26
Payer: MEDICARE

## 2024-01-26 LAB
BACTERIA BLD CULT: NORMAL
BACTERIA BLD CULT: NORMAL

## 2024-01-26 NOTE — PROGRESS NOTES
Outpatient Care Management  Patient Not Qualified    Patient: Lay Peres  MRN:  [id number]  Date of Service:  1/26/2024  Completed by:  Aye Rodriguez RN    No chief complaint on file.      Patient Summary           Reason Not Qualified:  Other (see comment)  Patient admitted to [facility] on 1/24.

## 2024-02-08 ENCOUNTER — DOCUMENTATION ONLY (OUTPATIENT)
Dept: PSYCHIATRY | Facility: CLINIC | Age: 79
End: 2024-02-08
Payer: MEDICARE

## 2024-02-08 NOTE — PROGRESS NOTES
Melony 2/8/24  In person    Plan at last appointment on 12/5/24:  I will touch base with Dr. Cardenas since he has followed patient for a long time.  Unclear if she has ever been on higher dose of Cymbalta.  I am also hesitant to increase psychotropics given that she is already unsteady on her feet and a fall risk.  She was on Haldol dec 100mg every 3 weeks until 2020.    Continue Haldol dec 50mg every 14 days, Seroquel 200mg qHS, Cymbalta 30mg daily, and Cogentin 1mg BID.  Follow up in 2 months in person.    Met with patient and Dr. Hernandez in her group home.  She is smiling, reaching for my hand to hold and kiss.  Cooperative with interaction.  Per staff, she has mentally been at her baseline since discharge from the hospital.  Still has outbursts that come out of the blue - suspect due to vascular dementia.  Mood remains stable except during these outbursts.    No medication changes today.  Continue Haldol dec 50mg every 14 days, Seroquel 200mg qHS, Cymbalta 30mg daily, and Cogentin 1mg BID.  Follow up in 3 months.

## 2024-04-02 ENCOUNTER — TELEPHONE (OUTPATIENT)
Dept: INTERNAL MEDICINE | Facility: CLINIC | Age: 79
End: 2024-04-02
Payer: MEDICARE

## 2024-04-02 NOTE — TELEPHONE ENCOUNTER
Spoke to Anayeli Cowan   She sd she is looking better   Graduate from Hospice and that you may want to visit with her.

## 2024-04-08 DIAGNOSIS — F25.9 CHRONIC SCHIZOAFFECTIVE DISORDER: ICD-10-CM

## 2024-04-08 RX ORDER — QUETIAPINE FUMARATE 200 MG/1
TABLET, FILM COATED ORAL
Qty: 30 TABLET | Refills: 10 | Status: SHIPPED | OUTPATIENT
Start: 2024-04-08 | End: 2024-04-29 | Stop reason: SDUPTHER

## 2024-04-29 ENCOUNTER — HOSPITAL ENCOUNTER (OUTPATIENT)
Dept: RADIOLOGY | Facility: HOSPITAL | Age: 79
Discharge: HOME | End: 2024-04-29
Attending: INTERNAL MEDICINE
Payer: MEDICARE

## 2024-04-29 DIAGNOSIS — M79.644 PAIN OF FINGER OF RIGHT HAND: ICD-10-CM

## 2024-04-29 DIAGNOSIS — M79.644 PAIN OF FINGER OF RIGHT HAND: Primary | ICD-10-CM

## 2024-04-29 DIAGNOSIS — F25.9 CHRONIC SCHIZOAFFECTIVE DISORDER: ICD-10-CM

## 2024-04-29 PROCEDURE — 73130 X-RAY EXAM OF HAND: CPT | Mod: TC,HCNC,RT

## 2024-04-29 PROCEDURE — 73130 X-RAY EXAM OF HAND: CPT | Mod: 26,HCNC,RT, | Performed by: RADIOLOGY

## 2024-04-29 RX ORDER — QUETIAPINE FUMARATE 300 MG/1
300 TABLET, FILM COATED ORAL NIGHTLY
Qty: 30 TABLET | Refills: 2 | Status: SHIPPED | OUTPATIENT
Start: 2024-04-29 | End: 2024-05-16 | Stop reason: SDUPTHER

## 2024-05-03 DIAGNOSIS — F25.9 CHRONIC SCHIZOAFFECTIVE DISORDER: ICD-10-CM

## 2024-05-05 NOTE — PLAN OF CARE
DISCHARGE ORDER  Date/Time 2024 2:59 PM  Completed by: Johanna Ramsey, RN, Case Management    Patient Name: Zia Garrett    : 1939      Admit order Date and Status:24 observation  Noted discharge order. (verify MD's last order for status of admission/Traditional Medicare 3 [place] Inpatient qualifying stay required for SNF)    Confirmed discharge plan with:              Patient:  Yes              When pt confirms DC plan does any support person need to be contacted by CM Yes if yes who LIZZY Still                      Discharge to Facility: [facility]   Facility phone number for staff giving report: [phone]   Pre-certification completed: N/A   Hospital Exemption Notification (HENS) completed: N/A   Discharge orders and Continuity of Care faxed to facility:     Transportation:               Medical Transport explained with choice list offered to pt/family.                Choice:(no preference)  Agency used: [facility] transport   time:   16:15      Pt/family/Nursing/Facility aware of  time:   Yes Names: Justine Gandhi nurse, pt, Marina at [facility] and Gabi BALL   Ambulance form completed:  Yes via round trip:      Date Last IMM Given: N/A    Comments:  Reviewed chart and noted pt was admitted evening of  from [facility] SNF and is discharged today. Spoke withMarina at [facility] who states pt is LTC and can return. Facitiy supply for all needed. Spoke with LIZZY Still who verifies pt will return to [facility]. She is aware of dc today. No other dc needs identified.     Pt is being d/c'd to Skilled Nursing Facility (SNF) today. Pt's O2 sats are 95% on 2L NC.    Discharge timeout done with nsg CM and pt and KATERINAGElenita. All discharge needs and concerns addressed.    Discharging nurse to complete LIANE, reconcile AVS, and place final copy with patient's discharge packet. Discharging RN to ensure  IMPRESSIONS:   This 74 y.o. woman appears to present with  1. Mild swallowing dysfunction c/b premature spillage, reduced pharyngeal contraction, and reduced tongue base retraction resulting in flash penetration with 2/4 sequential sips of thin liquid; mild to moderate vallecular residue; and mild pyriform sinus residue. No aspiration observed.   2. History of Dementia.   3. History of mental handicap.    RECOMMENDATIONS/PLAN OF CARE:   It is felt that she would benefit from  1. Continuation of her current regular consistency diet with thin liquids using the following strategies and common aspiration precautions, including, but not limited to   A.  Appropriate upright seating for all eating and drinking.   B.  Small bites (1 tsp) of solid consistencies.   C. Alternating solids with a liquid wash.   D. One sip of liquid at a time.    E. Take medications whole w/ thin liquid.    F. Multiple swallows (2) per bolus.   G. Minimize distractions while eating and drinking.    H. Thorough chewing.    I.  Monitoring for any signs/symptoms of aspiration (such as wet/gurgly voice that does not clear with coughing, inability to make any voice sounds, any persistent coughing with oral intake, otherwise unexplained fever, unexplained increased or new difficulty or discomfort breathing, unexplained increase in sleepiness/lethargy/significant fatigue, unexplained increase or new onset confusion or change in cognitive functioning, or any other unexplained change in health or well-being that could be related to swallowing).  2. Repeat MBSS if swallowing function changes or worsens.  3. Follow up with Dr. Case as directed.

## 2024-05-06 ENCOUNTER — TELEPHONE (OUTPATIENT)
Dept: PSYCHIATRY | Facility: CLINIC | Age: 79
End: 2024-05-06
Payer: MEDICARE

## 2024-05-06 ENCOUNTER — DOCUMENTATION ONLY (OUTPATIENT)
Dept: PSYCHIATRY | Facility: CLINIC | Age: 79
End: 2024-05-06
Payer: MEDICARE

## 2024-05-06 RX ORDER — DULOXETIN HYDROCHLORIDE 30 MG/1
CAPSULE, DELAYED RELEASE ORAL
Qty: 30 CAPSULE | Refills: 10 | Status: SHIPPED | OUTPATIENT
Start: 2024-05-06

## 2024-05-06 RX ORDER — BENZTROPINE MESYLATE 0.5 MG/1
TABLET ORAL
Qty: 14 TABLET | Refills: 0 | Status: SHIPPED | OUTPATIENT
Start: 2024-05-06

## 2024-05-06 NOTE — PROGRESS NOTES
Melony   Virtual 5/6/24    Plan at last appointment on 2/8/24:  No medication changes today.  Continue Haldol dec 50mg every 14 days, Seroquel 200mg qHS, Cymbalta 30mg daily, and Cogentin 1mg BID.  Follow up in 3 months.  *Notified 4/23/24 that Haldol Dec was discontinued soon after discharge from hospital in January.  Patient with worsening agitation and insomnia the last few months.  Seroquel increased to 300mg qHS on 4/29/24.  Dr. Case also sent in the following Namenda taper on 4/23/24: Namenda ER 21mg daily for 7 days then 14mg daily for 7 days then 7mg daily for 7 days then stop.    Met with patient, Paulina, Dr. Hernandez, and 2 sisters virtually.  Patient went on trip with sisters over [facility] and patient's behavior was horrible.  Yelling and cursing at them, had to bring her home.  They state patient has been on Haldol injections for over 30 years.  It does seem behavior has worsened since this was stopped in January.  Paulina and Dr. Hernandez report that agitation and sleep have improved with increase in Seroquel dose.  No side effects.  On interview, patient is sitting in wheelchair and calm and cooperative.  She does not speak but is smiling and interacting through hand motions.      Patient's agitation is improving on higher dose of Seroquel 300mg qHS.  If behavior and/or sleep worsen again, will plan to increase Seroquel to 400mg qHS.  Continue to hold Haldol but low threshold to restart.  Will taper and stop Cogentin since she is no longer on Haldol.  Continue Cymbalta 30mg daily.   Dr. Case is tapering and discontinuing Namenda as above.    Email sent to nursing to clarify PRN medications.  Follow up in 1 month in person.

## 2024-05-16 DIAGNOSIS — F25.9 CHRONIC SCHIZOAFFECTIVE DISORDER: ICD-10-CM

## 2024-05-16 RX ORDER — QUETIAPINE FUMARATE 400 MG/1
400 TABLET, FILM COATED ORAL NIGHTLY
Qty: 30 TABLET | Refills: 2 | Status: SHIPPED | OUTPATIENT
Start: 2024-05-16

## 2024-06-06 ENCOUNTER — DOCUMENTATION ONLY (OUTPATIENT)
Dept: PSYCHIATRY | Facility: CLINIC | Age: 79
End: 2024-06-06
Payer: MEDICARE

## 2024-06-06 NOTE — PROGRESS NOTES
Melony 6/6/24  In person    Plan at last appointment on 5/6/24:  Patient's agitation is improving on higher dose of Seroquel 300mg qHS.  If behavior and/or sleep worsen again, will plan to increase Seroquel to 400mg qHS.  Continue to hold Haldol but low threshold to restart.  Will taper and stop Cogentin since she is no longer on Haldol.  Continue Cymbalta 30mg daily.   Dr. Case is tapering and discontinuing Namenda as above.    Email sent to nursing to clarify PRN medications.  Follow up in 1 month in person.  *Seroquel increased to 400mg qHS on 5/8/24 due to worsening agitation/aggression.    Met with Paulina, Dr. Hernandez, and patient at patient's group home.  Patient sitting in recliner, calm, cooperative, smiling.  No involuntary movements.  Per staff, behavior has significantly improved.  She is sleeping well at night but also napping more in the mornings.  Overall, has seemed much happier.  No medication side effects.    Patient's mood and behavior have significantly improved on Seroquel 400mg qHS.  She is napping more in the mornings, however this may subside over time the longer she is on Seroquel.   Continue Cymbalta 30mg daily.  Email sent to Dr. Case about potentially discontinuing Neurontin and Melatonin.    Follow up in 2 months in person.

## 2024-06-10 ENCOUNTER — PATIENT MESSAGE (OUTPATIENT)
Dept: INTERNAL MEDICINE | Facility: CLINIC | Age: 79
End: 2024-06-10
Payer: MEDICARE

## 2024-06-20 ENCOUNTER — TELEPHONE (OUTPATIENT)
Dept: INTERNAL MEDICINE | Facility: CLINIC | Age: 79
End: 2024-06-20
Payer: MEDICARE

## 2024-06-20 NOTE — TELEPHONE ENCOUNTER
Leobardo from [facility] would like to speak with you to discuss a Haldol for the pt. Please contact Leobardo directly at .

## 2024-06-20 NOTE — TELEPHONE ENCOUNTER
Spoke with Christina  She has lost weight and is still in hospice.   Maria D saw her last night - Melony called her because she was very agitated.

## 2024-06-20 NOTE — TELEPHONE ENCOUNTER
----- Message from Umu Love sent at 6/20/2024  1:07 PM CDT -----  Contact: [facility]. Leobardo/[phone]  Type: Home Health (orders, updates, clarifications, etc.)    Home Health Agency/Nurse:[facility]./Leobardo    Phone number:/[phone]    Reason for call:    Comments:Leobardo said that she is calling in regards to needing to discuss a Haldol with the doctor. Please advise

## 2024-07-29 RX ORDER — OLANZAPINE 2.5 MG/1
TABLET ORAL
Qty: 30 TABLET | Refills: 4 | Status: SHIPPED | OUTPATIENT
Start: 2024-07-29

## 2024-07-29 NOTE — TELEPHONE ENCOUNTER
Care Due:                  Date            Visit Type   Department     Provider  --------------------------------------------------------------------------------                                EP -                              PRIMARY      [facility]  Last Visit: 01-      CARE ([facility])   MEDICINE       DENI KNOX  Next Visit: None Scheduled  None         None Found                                                            Last  Test          Frequency    Reason                     Performed    Due Date  --------------------------------------------------------------------------------    Office Visit  15 months..  lisinopriL...............  01- 04-    [facility] Embedded Care Due Messages. Reference number: [id number].   7/29/2024 10:25:28 AM CDT

## 2024-08-08 ENCOUNTER — DOCUMENTATION ONLY (OUTPATIENT)
Dept: PSYCHIATRY | Facility: CLINIC | Age: 79
End: 2024-08-08
Payer: MEDICARE

## 2024-08-08 DIAGNOSIS — F25.9 CHRONIC SCHIZOAFFECTIVE DISORDER: Primary | ICD-10-CM

## 2024-08-08 RX ORDER — QUETIAPINE FUMARATE 200 MG/1
200 TABLET, FILM COATED ORAL NIGHTLY
Qty: 30 TABLET | Refills: 11 | Status: SHIPPED | OUTPATIENT
Start: 2024-08-08

## 2024-08-08 RX ORDER — OLANZAPINE 5 MG/1
5 TABLET ORAL NIGHTLY
Qty: 30 TABLET | Refills: 11 | Status: SHIPPED | OUTPATIENT
Start: 2024-08-08 | End: 2025-08-08

## 2024-08-08 RX ORDER — OLANZAPINE 2.5 MG/1
2.5 TABLET ORAL 2 TIMES DAILY
Qty: 30 TABLET | Refills: 11 | Status: SHIPPED | OUTPATIENT
Start: 2024-08-08

## 2024-10-07 ENCOUNTER — LAB VISIT (OUTPATIENT)
Dept: LAB | Facility: HOSPITAL | Age: 79
End: 2024-10-07
Payer: MEDICARE

## 2024-10-07 DIAGNOSIS — N39.0 URINARY TRACT INFECTION, SITE NOT SPECIFIED: Primary | ICD-10-CM

## 2024-10-07 LAB
BILIRUB UR QL STRIP: NEGATIVE
CLARITY UR REFRACT.AUTO: CLEAR
COLOR UR AUTO: YELLOW
GLUCOSE UR QL STRIP: NEGATIVE
HGB UR QL STRIP: NEGATIVE
KETONES UR QL STRIP: NEGATIVE
LEUKOCYTE ESTERASE UR QL STRIP: NEGATIVE
NITRITE UR QL STRIP: NEGATIVE
PH UR STRIP: 6 [PH] (ref 5–8)
PROT UR QL STRIP: NEGATIVE
SP GR UR STRIP: 1.02 (ref 1–1.03)
URN SPEC COLLECT METH UR: NORMAL

## 2024-10-07 PROCEDURE — 87086 URINE CULTURE/COLONY COUNT: CPT | Mod: HCNC | Performed by: INTERNAL MEDICINE

## 2024-10-07 PROCEDURE — 81003 URINALYSIS AUTO W/O SCOPE: CPT | Mod: HCNC | Performed by: INTERNAL MEDICINE

## 2024-10-08 LAB
BACTERIA UR CULT: NORMAL
BACTERIA UR CULT: NORMAL

## 2024-10-17 ENCOUNTER — IMMUNIZATION (OUTPATIENT)
Dept: INTERNAL MEDICINE | Facility: CLINIC | Age: 79
End: 2024-10-17
Payer: MEDICARE

## 2024-10-17 DIAGNOSIS — Z23 NEED FOR VACCINATION: Primary | ICD-10-CM

## 2024-10-17 PROCEDURE — G0008 ADMIN INFLUENZA VIRUS VAC: HCPCS | Mod: HCNC,S$GLB,, | Performed by: INTERNAL MEDICINE

## 2024-10-17 PROCEDURE — 90653 IIV ADJUVANT VACCINE IM: CPT | Mod: HCNC,S$GLB,, | Performed by: INTERNAL MEDICINE

## 2024-11-19 DIAGNOSIS — F03.918 DEMENTIA WITH BEHAVIORAL DISTURBANCE: Primary | ICD-10-CM

## 2024-11-19 NOTE — PROGRESS NOTES
Sleeping more. Not eating well. Currently not in hospice. Will have hospice come evaluate  [facility]ed [facility] to [facility]

## 2024-11-20 ENCOUNTER — TELEPHONE (OUTPATIENT)
Dept: INTERNAL MEDICINE | Facility: CLINIC | Age: 79
End: 2024-11-20
Payer: MEDICARE

## 2024-11-20 NOTE — TELEPHONE ENCOUNTER
----- Message from Enid sent at 11/20/2024  2:16 PM CST -----  Contact: Anayeli with [facility] [phone]  [email or web address]     Patient is calling for Medical Advice regarding: Anayeli states Pt does not currently qualify for hospice due to weight loss but will follow up in a few weeks.    How long has patient had these symptoms: n/a    Pharmacy name and phone#: n/a    Patient wants a call back or thru [facility]: call back    Comments:    Please advise patient replies from provider may take up to 48 hours.

## 2024-11-20 NOTE — TELEPHONE ENCOUNTER
----- Message from Darlene sent at 11/20/2024  8:26 AM CST -----  Contact: Anayeli/Zen Hendricks-  Type:Home Health(orders,updates,clarifications,etc)    Home Health Agency/Nurse:[facility]    Phone number: [phone]    Reason for call:    Comments: per anayeli please sent some notes on the patient, did receive the referral, but need the notes. Fax # [phone]

## 2024-12-30 DIAGNOSIS — F02.818 DEMENTIA ASSOCIATED WITH OTHER UNDERLYING DISEASE WITH BEHAVIORAL DISTURBANCE: ICD-10-CM

## 2025-01-09 RX ORDER — AMLODIPINE BESYLATE 5 MG/1
5 TABLET ORAL DAILY
Qty: 30 TABLET | Refills: 11 | Status: SHIPPED | OUTPATIENT
Start: 2025-01-09 | End: 2026-01-09

## 2025-01-09 RX ORDER — AMLODIPINE BESYLATE 5 MG/1
TABLET ORAL
Qty: 29 TABLET | Refills: 10 | OUTPATIENT
Start: 2025-01-09

## 2025-01-09 RX ORDER — CARVEDILOL 6.25 MG/1
6.25 TABLET ORAL 2 TIMES DAILY
Qty: 59 TABLET | Refills: 10 | OUTPATIENT
Start: 2025-01-09

## 2025-01-09 RX ORDER — DONEPEZIL HYDROCHLORIDE 5 MG/1
5 TABLET, FILM COATED ORAL EVERY MORNING
Qty: 90 TABLET | Refills: 3 | Status: SHIPPED | OUTPATIENT
Start: 2025-01-09 | End: 2026-01-09

## 2025-01-09 RX ORDER — CARVEDILOL 6.25 MG/1
6.25 TABLET ORAL 2 TIMES DAILY
Qty: 60 TABLET | Refills: 11 | Status: SHIPPED | OUTPATIENT
Start: 2025-01-09 | End: 2026-01-09

## 2025-01-09 RX ORDER — DONEPEZIL HYDROCHLORIDE 5 MG/1
TABLET, FILM COATED ORAL
Qty: 29 TABLET | Refills: 10 | OUTPATIENT
Start: 2025-01-09

## 2025-01-15 ENCOUNTER — HOSPITAL ENCOUNTER (EMERGENCY)
Facility: HOSPITAL | Age: 80
Discharge: HOME | End: 2025-01-15
Attending: EMERGENCY MEDICINE
Payer: MEDICARE

## 2025-01-15 VITALS
SYSTOLIC BLOOD PRESSURE: 138 MMHG | WEIGHT: 118 LBS | TEMPERATURE: 98 F | RESPIRATION RATE: 18 BRPM | BODY MASS INDEX: 20.14 KG/M2 | HEART RATE: 62 BPM | DIASTOLIC BLOOD PRESSURE: 78 MMHG | OXYGEN SATURATION: 98 % | HEIGHT: 64 IN
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
## 2025-01-15 PROCEDURE — 93010 ELECTROCARDIOGRAM REPORT: CPT | Mod: HCNC,,, | Performed by: INTERNAL MEDICINE

## 2025-01-15 PROCEDURE — 63600175 PHARM REV CODE 636 W HCPCS: Mod: HCNC | Performed by: EMERGENCY MEDICINE

## 2025-01-15 PROCEDURE — 93005 ELECTROCARDIOGRAM TRACING: CPT | Mod: HCNC

## 2025-01-15 PROCEDURE — 96372 THER/PROPH/DIAG INJ SC/IM: CPT | Performed by: EMERGENCY MEDICINE

## 2025-01-15 PROCEDURE — 96374 THER/PROPH/DIAG INJ IV PUSH: CPT | Mod: HCNC

## 2025-01-15 PROCEDURE — 63600175 PHARM REV CODE 636 W HCPCS: Mod: HCNC

## 2025-01-15 PROCEDURE — 99284 EMERGENCY DEPT VISIT MOD MDM: CPT | Mod: 25,HCNC

## 2025-01-15 RX ORDER — ACETAMINOPHEN 500 MG
1000 TABLET ORAL
Status: DISCONTINUED | OUTPATIENT
Start: 2025-01-15 | End: 2025-01-15 | Stop reason: HOSPADM

## 2025-01-15 RX ORDER — HALOPERIDOL 5 MG/ML
5 INJECTION INTRAMUSCULAR
Status: COMPLETED | OUTPATIENT
Start: 2025-01-15 | End: 2025-01-15

## 2025-01-15 RX ORDER — HALOPERIDOL 5 MG/ML
2.5 INJECTION INTRAMUSCULAR
Status: COMPLETED | OUTPATIENT
Start: 2025-01-15 | End: 2025-01-15

## 2025-01-15 RX ADMIN — HALOPERIDOL LACTATE 5 MG: 5 INJECTION, SOLUTION INTRAMUSCULAR at 08:01

## 2025-01-15 RX ADMIN — HALOPERIDOL LACTATE 2.5 MG: 5 INJECTION, SOLUTION INTRAMUSCULAR at 04:01

## 2025-01-15 RX ADMIN — HALOPERIDOL LACTATE 2.5 MG: 5 INJECTION, SOLUTION INTRAMUSCULAR at 05:01

## 2025-01-15 NOTE — DISCHARGE INSTRUCTIONS
Diagnosis:  Fall    Her x-ray of her pelvis did not show any acute fractures.  Overall, we are reassured that she did not injure anything.  She started to have issues moving her extremities, begins acting abnormally for her, begins vomiting, please return to the emergency department immediately.  Otherwise, follow up with the primary care physician.    Tests today showed:   Labs Reviewed - No data to display  X-Ray Hips Bilateral 2 View Incl AP Pelvis   Final Result      1. No convincing acute displaced fracture or dislocation of the left or right hip noting degenerative change.         Electronically signed by: Tr Kc MD   Date:    01/15/2025   Time:    17:09          Treatments you had today:   Medications   acetaminophen tablet 1,000 mg (0 mg Oral Hold 1/15/25 1515)   haloperidol lactate injection 2.5 mg (2.5 mg Intramuscular Given 1/15/25 1621)       Follow-Up Plan:  - Follow-up with primary care doctor within 3 - 5 days  - Additional testing and/or evaluation as directed by your primary doctor    Return to the Emergency Department for symptoms including but not limited to: worsening symptoms, shortness of breath or chest pain, vomiting with inability to hold down fluids, fevers greater than 100.4°F, passing out/fainting/unconsciousness, or other concerning symptoms.
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
## 2025-01-15 NOTE — ED PROVIDER NOTES
Encounter Date: 1/15/2025       History     Chief Complaint   Patient presents with    Fall     Attempted to stand up from wheelchair and had a fall. Pt. C/o left hip pain. No head trauma or LOC.      HPI    Patient is an 79-year-old female with a history of diabetes, hypertension, dementia, dysphonia presenting to the ED for evaluation after a fall.  Patient had a witnessed fall after she stood from a wheelchair and slipped.  Patient does not supposed to be ambulating.  No in his head trauma.  Patient did lay on the ground for a period of time holding her head, but review of video footage did not show any head trauma.  Patient landed on the left hip of would also also holding her right hip.  Unable to obtain history from patient.    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 No pertinent surgical history.  Family History   Problem Relation Name Age of Onset    Depression Sister      Depression Brother      Suicide Brother      ADD / ADHD Neg Hx      Alcohol abuse Neg Hx      Anxiety disorder Neg Hx      Bipolar disorder Neg Hx      Dementia Neg Hx      Drug abuse Neg Hx      OCD Neg Hx      Paranoid behavior Neg Hx      Physical abuse Neg Hx      Schizophrenia Neg Hx      Seizures Neg Hx      Self injury Neg Hx      Sexual abuse Neg Hx       Social History     Tobacco Use    Smoking status: Never    Smokeless tobacco: Never   Substance Use Topics    Alcohol use: No    Drug use: No     Physical Exam     Initial Vitals  [01/15/25 1429]   BP Pulse Resp Temp SpO2   138/78 62 18 98.4 °F (36.9 °C) 98 %      MAP       --         Physical Exam    Constitutional: She appears well-developed and well-nourished. She is not diaphoretic. No distress.   HENT:   Head: Normocephalic and atraumatic.   Cardiovascular:  Normal rate, regular rhythm and normal heart sounds.           Pulmonary/Chest: Breath sounds normal. No respiratory distress. She has no wheezes.   Abdominal: Abdomen is soft. She exhibits no distension. There is no abdominal tenderness.   Musculoskeletal:         General: No tenderness or edema. Normal range of motion.     Neurological: She is alert. She has normal strength.   Patient is at neurologic baseline  Unable to form sentences  Intermittently attempting to climb out of bed; when surrounded by multiple people or attempted to be disturbed, will attempt to grab or push away/kick   Skin: Skin is warm and dry.   Psychiatric: She has a normal mood and affect. Thought content normal.         ED Course   Procedures  Labs Reviewed - No data to display         Imaging Results              X-Ray Hips Bilateral 2 View Incl AP Pelvis (Final result)  Result time 01/15/25 17:09:13      Final result by Tr Kc MD (01/15/25 17:09:13)                   Impression:      1. No convincing acute displaced fracture or dislocation of the left or right hip noting degenerative change.      Electronically signed by: Tr Kc MD  Date:    01/15/2025  Time:    17:09               Narrative:    EXAMINATION:  XR HIPS BILATERAL 2 VIEW INCL AP PELVIS    CLINICAL HISTORY:  Unspecified fall, initial encounter    TECHNIQUE:  AP view of the pelvis and frogleg lateral views of both hips were performed.    COMPARISON:  01/13/2014    FINDINGS:  Four views bilateral hips.    The bilateral sacroiliac joints are intact.  The pubic symphysis is intact.  The bilateral femoroacetabular joints are intact noting degenerative changes.  No convincing  acute displaced fracture or dislocation of the left or right hip.  No radiopaque foreign body.  No significant edema.                                       Medications   haloperidol lactate injection 2.5 mg (2.5 mg Intramuscular Given 1/15/25 1621)   haloperidol lactate injection 2.5 mg (2.5 mg Intravenous Given 1/15/25 1737)   haloperidol lactate injection 5 mg (5 mg Intramuscular Given 1/15/25 2007)     Medical Decision Making  Risk  OTC drugs.  Prescription drug management.    Patient is an 79-year-old female with a history of diabetes, hypertension, dementia, dysphonia presenting to the ED for evaluation after a fall.     Concern that patient may have injured her left hip, as this is the hip she had landed on.  Video footage revealed no significant concern for head injury.  Patient is unable to participate in exam willingly, but she moving all of her extremities willingly and seems to have decent strength in all extremities as well.    Given concern for hip injury, we will obtain hip and pelvic x-ray to assess for injury.    Wiring just a few doses of IM Haldol, no restraints actually required.  Pelvic x-ray not revealing any acute fractures.  Patient continued to range extremities with good strength.  She was safely discharged.  Patient's sister was informed and was present for part of ED course.          Attending Attestation:             Attending ED Notes:   Attending Note:  I have seen the patient, have repeated the key portions of the history and physical, reviewed and agree with the medical documentation, and supervised and managed the medical care of the patient. Additionally, I was present for the critical portion of any procedure(s) performed.    79-year-old female who had a witnessed fall after trying to stand from her wheelchair.  Did not hit her head, no LOC.  appears to have hip pain.  Patient is agitated, Haldol 2.5 mg IM ordered, soft restraints ordered however never used because patient  responded to the Haldol.  Moving all 4 extremities without difficulty, focal tenderness, deformity or edema  No obvious head injury  X-ray of the pelvis reviewed with no acute fracture.  Patient treated with Tylenol      JOSE Hilario MD  Staff ED Physician  01/15/2025 5:22 PM                                   Clinical Impression:  Final diagnoses:  [W19.XXXA] Fall  [W19.XXXA] Fall - hip pain          ED Disposition Condition    Discharge Stable          ED Prescriptions    None       Follow-up Information       Follow up With Specialties Details Why Contact Info    Krista Case MD Internal Medicine Schedule an appointment as soon as possible for a visit   [address]  [place] [address]  [phone]      [facility] - Emergency Dept Emergency Medicine Go to  As needed, If symptoms worsen [address] [facility] [id number] [phone]             Leatha Camarena DO  Resident  01/16/25 0026

## 2025-01-16 LAB
OHS QRS DURATION: 74 MS
OHS QTC CALCULATION: 429 MS

## 2025-01-30 DIAGNOSIS — Z00.00 ENCOUNTER FOR MEDICARE ANNUAL WELLNESS EXAM: ICD-10-CM

## 2025-02-25 ENCOUNTER — OFFICE VISIT (OUTPATIENT)
Dept: INTERNAL MEDICINE | Facility: CLINIC | Age: 80
End: 2025-02-25
Payer: MEDICARE

## 2025-02-25 VITALS
OXYGEN SATURATION: 95 % | HEART RATE: 105 BPM | RESPIRATION RATE: 15 BRPM | SYSTOLIC BLOOD PRESSURE: 110 MMHG | DIASTOLIC BLOOD PRESSURE: 70 MMHG

## 2025-02-25 DIAGNOSIS — I50.32 CHRONIC DIASTOLIC HEART FAILURE: ICD-10-CM

## 2025-02-25 DIAGNOSIS — F20.9 SCHIZOPHRENIA, UNSPECIFIED TYPE: ICD-10-CM

## 2025-02-25 DIAGNOSIS — G30.9 SEVERE ALZHEIMER'S DEMENTIA WITHOUT BEHAVIORAL DISTURBANCE, PSYCHOTIC DISTURBANCE, MOOD DISTURBANCE, OR ANXIETY, UNSPECIFIED TIMING OF DEMENTIA ONSET: ICD-10-CM

## 2025-02-25 DIAGNOSIS — F02.C0 SEVERE ALZHEIMER'S DEMENTIA WITHOUT BEHAVIORAL DISTURBANCE, PSYCHOTIC DISTURBANCE, MOOD DISTURBANCE, OR ANXIETY, UNSPECIFIED TIMING OF DEMENTIA ONSET: ICD-10-CM

## 2025-02-25 DIAGNOSIS — E13.9 DIABETES MELLITUS DUE TO ABNORMAL INSULIN: ICD-10-CM

## 2025-02-25 DIAGNOSIS — H90.5 DEAFNESS CONGENITAL: Primary | ICD-10-CM

## 2025-02-25 PROBLEM — K52.9 ENTERITIS: Status: RESOLVED | Noted: 2021-12-28 | Resolved: 2025-02-25

## 2025-02-25 PROBLEM — U07.1 COVID: Status: RESOLVED | Noted: 2022-05-04 | Resolved: 2025-02-25

## 2025-02-25 PROCEDURE — 99999 PR PBB SHADOW E&M-EST. PATIENT-LVL II: CPT | Mod: PBBFAC,HCNC,, | Performed by: INTERNAL MEDICINE

## 2025-02-25 RX ORDER — FAMOTIDINE 40 MG/1
40 TABLET, FILM COATED ORAL DAILY
Qty: 30 TABLET | Refills: 11 | Status: SHIPPED | OUTPATIENT
Start: 2025-02-25 | End: 2026-02-25

## 2025-02-25 RX ORDER — OLANZAPINE 10 MG/1
10 TABLET ORAL NIGHTLY
COMMUNITY
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
## 2025-03-01 ENCOUNTER — PATIENT MESSAGE (OUTPATIENT)
Dept: ADMINISTRATIVE | Facility: HOSPITAL | Age: 80
End: 2025-03-01
Payer: MEDICARE

## 2025-04-03 ENCOUNTER — DOCUMENTATION ONLY (OUTPATIENT)
Dept: PSYCHIATRY | Facility: CLINIC | Age: 80
End: 2025-04-03
Payer: MEDICARE

## 2025-04-03 DIAGNOSIS — F25.9 CHRONIC SCHIZOAFFECTIVE DISORDER: Primary | ICD-10-CM

## 2025-04-03 RX ORDER — HALOPERIDOL 5 MG/1
5 TABLET ORAL NIGHTLY
Qty: 30 TABLET | Refills: 11 | Status: SHIPPED | OUTPATIENT
Start: 2025-04-03 | End: 2026-04-03

## 2025-04-03 NOTE — PROGRESS NOTES
[place]   In person 4/3/25    Plan at last appointment on 8/8/24  Will give Zyprexa 5mg now and 5mg tonight.  Then start Zyprexa 2.5mg in the morning, 2.5mg at 2pm, and 5mg qHS.  Low threshold to increase qHS dose to 10mg.  Continue Cymbalta 30mg daily for now.  Consider restarting Neurontin and Melatonin, which she used to take before bed.  Follow up in 1 week.    3/24/25 email from Paulina:  No changes with Lay. She is not any better but not worse. Her current pattern is having one day where she will be in a better mood, less agitated, and less aggressive followed by 2-3 days of her primarily being agitated and aggressive on and off all day. We are having less of her good days. She is very up and down. There are no clear patterns or triggers to her behaviors.  Hallucinations/tactile hallucinations continue daily.     From nursing station 3/24/25:  02/11/2025 Increased Gabapentin 300mg 1 caps 3 times daily  11/29/2024 Haloperidol 5mg changed to PRN for agitation and olanzapine 2.5mg changed to PRN  08/27/2024 Zyprexa increased to 5 mg in the in the am and at 2pm, then 10mg at bedtime.  She is on quetiapine 200mg in the evening for 8pm    Met with patient, Dr. Hernandez, and  (Paulina) in person at [place].  Patient faintly smiles when addressed on interview but has blank stare most of the interview.  She has scratches on her legs.  Per Dr. Hernandez and Paulina she has continued to get worse with her hallucinations and behavior.  She has episodes where she is swinging at objects that are not there and aggressively scratching herself.  Sleep is off and on.  Some nights she sleeps fine and other nights she does not sleep at all and then naps all the next day.  Video footage of patient reacting to her visual and tactile hallucinations viewed today.      Based on slow worsening of her mental status and behavior, I suspect she is on a gradual decline from her dementia.    Stop Seroquel and instead start Haldol 5mg  qHS.    Continue Zyprexa 5mg/5mg/10mg for mood.    Continue Cymbalta 30mg daily for now.  Consider restarting Neurontin and Melatonin, which she used to take before bed.  She was started on Neurontin 300mg TID by Dr. Case on 2/11/25.    Follow up in 2 weeks.

## 2025-04-17 ENCOUNTER — OFFICE VISIT (OUTPATIENT)
Dept: PSYCHIATRY | Facility: CLINIC | Age: 80
End: 2025-04-17
Payer: MEDICARE

## 2025-04-17 DIAGNOSIS — F41.9 ANXIETY: ICD-10-CM

## 2025-04-17 DIAGNOSIS — F25.9 CHRONIC SCHIZOAFFECTIVE DISORDER: ICD-10-CM

## 2025-04-17 DIAGNOSIS — G30.9 SEVERE ALZHEIMER'S DEMENTIA WITH AGITATION, UNSPECIFIED TIMING OF DEMENTIA ONSET: Primary | ICD-10-CM

## 2025-04-17 DIAGNOSIS — F02.C11 SEVERE ALZHEIMER'S DEMENTIA WITH AGITATION, UNSPECIFIED TIMING OF DEMENTIA ONSET: Primary | ICD-10-CM

## 2025-04-17 DIAGNOSIS — F79 INTELLECTUAL DISABILITY: ICD-10-CM

## 2025-04-17 PROCEDURE — 98006 SYNCH AUDIO-VIDEO EST MOD 30: CPT | Mod: HCNC,95,, | Performed by: INTERNAL MEDICINE

## 2025-04-17 PROCEDURE — 1159F MED LIST DOCD IN RCRD: CPT | Mod: HCNC,CPTII,95, | Performed by: INTERNAL MEDICINE

## 2025-04-17 PROCEDURE — 1157F ADVNC CARE PLAN IN RCRD: CPT | Mod: HCNC,CPTII,95, | Performed by: INTERNAL MEDICINE

## 2025-04-17 PROCEDURE — 1160F RVW MEDS BY RX/DR IN RCRD: CPT | Mod: HCNC,CPTII,95, | Performed by: INTERNAL MEDICINE

## 2025-04-17 RX ORDER — ESCITALOPRAM OXALATE 10 MG/1
TABLET ORAL
Qty: 30 TABLET | Refills: 2 | Status: SHIPPED | OUTPATIENT
Start: 2025-04-17

## 2025-04-17 NOTE — PROGRESS NOTES
OUTPATIENT PSYCHIATRY RETURN VISIT    ENCOUNTER DATE:  4/17/25   SITE:  [facility]  LENGTH OF SESSION:  14 minutes    The patient location is:  [place], not in a healthcare facility  Visit type:  audiovisual    Face to Face time with patient:  14 minutes  22 minutes of total time spent on the encounter, which includes face to face time and non-face to face time preparing to see the patient (eg, review of tests), Obtaining and/or reviewing separately obtained history, Documenting clinical information in the electronic or other health record, Independently interpreting results (not separately reported) and communicating results to the patient/family/caregiver, or Care coordination (not separately reported).     Each patient to whom he or she provides medical services by telemedicine is:  (1) informed of the relationship between the physician and patient and the respective role of any other health care provider with respect to management of the patient; and (2) notified that he or she may decline to receive medical services by telemedicine and may withdraw from such care at any time.    CHIEF COMPLAINT:  Agitation and Psychosis      HISTORY OF PRESENTING ILLNESS:  Lay Peres is a 79 y.o. female with history of Schizoaffective disorder, Congenital deafness, and Alzheimer's dementia who presents for follow up appointment.  She presents today accompanied by Paulina, , who provides all history since patient is nonverbal (congenital deafness).      Plan at last appointment on 4/3/25 ([place]):  Based on slow worsening of her mental status and behavior, I suspect she is on a gradual decline from her dementia.    Stop Seroquel and instead start Haldol 5mg qHS.    Continue Zyprexa 5mg/5mg/10mg for mood.    Continue Cymbalta 30mg daily for now.  Consider restarting Neurontin and Melatonin, which she used to take before bed.  She was started on Neurontin 300mg TID by   Manpreet on 2/11/25.    Follow up in 2 weeks.    History as told by :  Patient agitated this morning.  Paulina says it is not a good day.  Has continued to have slow decline no mater what medications are changed.  Has a blank stare, not as engaged, hitting at things that aren't there, scratching self at times.  Continues to sleep ok, eating well.  No change in aggressive episodes.      Medication side effects:  No  Medication compliance:  Yes    PSYCHIATRIC REVIEW OF SYSTEMS:  Trouble with sleep:  Sometimes  Appetite changes:  Denies  Weight changes:  Loss  Lack of energy:  Sometimes  Anhedonia:  Yes  Somatic symptoms:  Unable to assess  Libido:  Not discussed  Anxiety/panic:  Sometimes  Guilty/hopeless:  Unable to assess since patient nonverbal  Self-injurious behavior/risky behavior:  Yes  Any drugs:  Denies  Alcohol:  Denies    MEDICAL REVIEW OF SYSTEMS:  Complete review of systems performed covering Constitutional, Musculoskeletal, Neurologic.  All systems negative except for that covered in HPI.    PAST PSYCHIATRIC, MEDICAL, AND SOCIAL HISTORY REVIEWED  The patient's past medical, family and social history have been reviewed and updated as appropriate within the electronic medical record - see encounter notes.    MEDICATIONS:  Current Medications[1]    ALLERGIES:  Review of patient's allergies indicates:  No Known Allergies    PSYCHIATRIC EXAM:  There were no vitals filed for this visit.  Appearance:  Well groomed, appearing healthy and of stated age  Behavior:  Uncooperative, agitated, not letting Paulina touch her without swiping her hand away  Speech:  Nonverbal  Mood:  Nonverbal  Affect:  Agitated  Thought Process:  Unable to assess since patient is nonverbal  Thought Content:  Unable to assess since patient is nonverbal  Associations:  Unable to assess since patient is nonverbal  Memory:  Unable to assess since patient is nonverbal  Level of Consciousness/Orientation:  Unable to assess since  patient is nonverbal  Fund of Knowledge:  Unable to assess since patient is nonverbal  Attention:  Poor  Language:  Unable to assess since patient is nonverbal  Insight:  Poor due to dementia and intellectual disability  Judgment:  Impaired due to dementia and intellectual disability  Psychomotor signs:  Occasional tremor in arms  Gait:  Sitting in wheelchair      RELEVANT LABS/STUDIES:    Lab Results   Component Value Date    WBC 17.03 (H) 01/22/2024    HGB 10.7 (L) 01/22/2024    HCT 35.2 (L) 01/22/2024    MCV 96 01/22/2024     01/22/2024     BMP  Lab Results   Component Value Date     (H) 01/22/2024    K 3.4 (L) 01/22/2024     (H) 01/22/2024    CO2 23 01/22/2024    BUN 18 01/22/2024    CREATININE 0.6 01/22/2024    CALCIUM 9.0 01/22/2024    ANIONGAP 7 (L) 01/22/2024    ESTGFRAFRICA >60.0 04/20/2022    EGFRNONAA >60.0 04/20/2022     Lab Results   Component Value Date    ALT 46 (H) 01/22/2024    AST 28 01/22/2024    ALKPHOS 87 01/22/2024    BILITOT 0.3 01/22/2024     Lab Results   Component Value Date    TSH 0.352 (L) 01/14/2024     Lab Results   Component Value Date    HGBA1C 5.4 01/25/2023       IMPRESSION:    Lay Peres is a 79 y.o. female with history of Schizoaffective disorder, Congenital deafness, and Alzheimer's dementia who presents for follow up appointment.  She presents today accompanied by Paulian, , who provides all history since patient is nonverbal (congenital deafness).      Status/Progress:  Based on the examination today, the patient's problem(s) is/are inadequately controlled.  New problems have not been presented today.     Risk Parameters:  Patient reports no suicidal ideation  Patient reports no homicidal ideation  Patient reports no self-injurious behavior  Patient reports no violent behavior      DIAGNOSES:    ICD-10-CM ICD-9-CM   1. Severe Alzheimer's dementia with agitation, unspecified timing of dementia onset  G30.9 331.0    F02.C11 294.11   2.  Anxiety  F41.9 300.00   3. Chronic schizoaffective disorder  F25.9 295.72   4. Intellectual disability  F79 319       PLAN:  Based on slow worsening of her mental status and behavior despite medication changes, I suspect she is on a gradual decline from her dementia.   Change Cymbalta 30mg daily to Lexapro 5mg daily x 1 week and then 10mg daily.     Continue Zyprexa 5mg/5mg/10mg for mood.    Continue Haldol 5mg qHS.  May increase dose at next appointment.  She was started on Neurontin 300mg TID by Dr. Case on 2/11/25.    Discussed with staff informed consent, risks versus benefits, alternative treatments, side effect profile and the inherent unpredictability of individual responses to these treatments.  The staff expresses understanding of the above and displays the capacity to agree with this current plan.    RETURN TO CLINIC:  Follow up in about 2 weeks (around 5/1/2025).         [1]   Current Outpatient Medications:     acetaminophen (TYLENOL) 500 MG tablet, Take 2 tablets (1,000 mg total) by mouth every 6 (six) hours as needed for Pain. (Patient taking differently: Take 1,000 mg by mouth 2 (two) times a day.), Disp: 10 tablet, Rfl: 0    amLODIPine (NORVASC) 5 MG tablet, Take 1 tablet (5 mg total) by mouth once daily., Disp: 30 tablet, Rfl: 11    carvediloL (COREG) 6.25 MG tablet, Take 1 tablet (6.25 mg total) by mouth 2 (two) times daily., Disp: 60 tablet, Rfl: 11    donepeziL (ARICEPT) 5 MG tablet, Take 1 tablet (5 mg total) by mouth every morning. To be taken with food, Disp: 90 tablet, Rfl: 3    EScitalopram oxalate (LEXAPRO) 10 MG tablet, Take 1/2 tablet (5mg) by mouth daily x 7 days.  Then increase to 1 tablet (10mg) daily thereafter., Disp: 30 tablet, Rfl: 2    famotidine (PEPCID) 40 MG tablet, Take 1 tablet (40 mg total) by mouth once daily., Disp: 30 tablet, Rfl: 11    gabapentin (NEURONTIN) 300 MG capsule, TAKE 1 TABLET (300MG) BY MOUTH AT BEDTIME at 8pm (Patient taking differently: One capsule  three times daily), Disp: 30 capsule, Rfl: 6    haloperidoL (HALDOL) 5 MG tablet, Take 1 tablet (5 mg total) by mouth every evening., Disp: 30 tablet, Rfl: 11    hydrALAZINE (APRESOLINE) 25 MG tablet, Take 1 tablet (25 mg total) by mouth every 8 (eight) hours as needed (SBP >180)., Disp: 90 tablet, Rfl: 11    lisinopriL (PRINIVIL,ZESTRIL) 20 MG tablet, Take 1.5 tablets (30 mg total) by mouth once daily., Disp: 45 tablet, Rfl: 11    multivitamin (THERAGRAN) per tablet, Take 1 tablet by mouth once daily., Disp: , Rfl:     OLANZapine (ZYPREXA) 10 MG tablet, Take 10 mg by mouth every evening., Disp: , Rfl:     OLANZapine (ZYPREXA) 5 MG tablet, Take 1 tablet (5 mg total) by mouth every evening. (Patient taking differently: 5 mg in am and 5 mg in afternoon), Disp: 30 tablet, Rfl: 11    ondansetron (ZOFRAN-ODT) 4 MG TbDL, Take 1 tablet (4 mg total) by mouth every 8 (eight) hours as needed (nausea.)., Disp: 10 tablet, Rfl: 0    senna (SENOKOT) 8.6 mg tablet, Take 1 tablet by mouth daily as needed for Constipation. (Patient taking differently: Take 1 tablet by mouth 2 (two) times a day.), Disp: 10 tablet, Rfl: 0

## 2025-04-24 ENCOUNTER — TELEPHONE (OUTPATIENT)
Dept: INTERNAL MEDICINE | Facility: CLINIC | Age: 80
End: 2025-04-24
Payer: MEDICARE

## 2025-04-24 DIAGNOSIS — G30.9 SEVERE ALZHEIMER'S DEMENTIA WITHOUT BEHAVIORAL DISTURBANCE, PSYCHOTIC DISTURBANCE, MOOD DISTURBANCE, OR ANXIETY, UNSPECIFIED TIMING OF DEMENTIA ONSET: Primary | ICD-10-CM

## 2025-04-24 DIAGNOSIS — F02.C0 SEVERE ALZHEIMER'S DEMENTIA WITHOUT BEHAVIORAL DISTURBANCE, PSYCHOTIC DISTURBANCE, MOOD DISTURBANCE, OR ANXIETY, UNSPECIFIED TIMING OF DEMENTIA ONSET: Primary | ICD-10-CM

## 2025-04-24 NOTE — TELEPHONE ENCOUNTER
----- Message from [employer] sent at 4/24/2025  2:03 PM CDT -----  Contact: [phone]  [email or web address] Patient is calling for Medical Advice regarding: [facility]'s sister to sign hospice paperwork, wants to know if Dr. Case can reach pt's sisterPatient wants a call back or thru [facility]: callComments:Please advise patient replies from provider may take up to 48 hours.